# Patient Record
Sex: FEMALE | Race: WHITE | NOT HISPANIC OR LATINO | Employment: OTHER | URBAN - METROPOLITAN AREA
[De-identification: names, ages, dates, MRNs, and addresses within clinical notes are randomized per-mention and may not be internally consistent; named-entity substitution may affect disease eponyms.]

---

## 2017-01-10 ENCOUNTER — OFFICE VISIT (OUTPATIENT)
Dept: ORTHOPEDICS | Facility: CLINIC | Age: 82
End: 2017-01-10
Payer: MEDICARE

## 2017-01-10 VITALS — DIASTOLIC BLOOD PRESSURE: 64 MMHG | HEART RATE: 76 BPM | SYSTOLIC BLOOD PRESSURE: 103 MMHG

## 2017-01-10 DIAGNOSIS — M17.11 PRIMARY OSTEOARTHRITIS OF RIGHT KNEE: Primary | ICD-10-CM

## 2017-01-10 PROCEDURE — 20610 DRAIN/INJ JOINT/BURSA W/O US: CPT | Mod: S$PBB,RT,, | Performed by: PHYSICIAN ASSISTANT

## 2017-01-10 PROCEDURE — 99999 PR PBB SHADOW E&M-EST. PATIENT-LVL III: CPT | Mod: PBBFAC,,, | Performed by: PHYSICIAN ASSISTANT

## 2017-01-10 PROCEDURE — 20610 DRAIN/INJ JOINT/BURSA W/O US: CPT | Mod: PBBFAC | Performed by: PHYSICIAN ASSISTANT

## 2017-01-10 PROCEDURE — 99213 OFFICE O/P EST LOW 20 MIN: CPT | Mod: 25,S$PBB,, | Performed by: PHYSICIAN ASSISTANT

## 2017-01-10 PROCEDURE — 99213 OFFICE O/P EST LOW 20 MIN: CPT | Mod: PBBFAC | Performed by: PHYSICIAN ASSISTANT

## 2017-01-10 RX ORDER — HYALURONATE SODIUM 20 MG/2 ML
2 SYRINGE (ML) INTRAARTICULAR WEEKLY
Status: COMPLETED | OUTPATIENT
Start: 2017-01-10 | End: 2017-01-24

## 2017-01-10 RX ADMIN — Medication 20 MG: at 05:01

## 2017-01-10 NOTE — PROGRESS NOTES
Subjective:      Patient ID: Ruth Lan is a 81 y.o. female.    Chief Complaint: No chief complaint on file.    HPI  Patient returns to clinic regarding her right knee pain. She has a known h/o OA. She received cortisone injection in October that provided relief for 1.5 months. Pain is mostly posterior knee. She is having increased difficulty getting in and out of her car. She ambulates with a cane when she leaves the house.   Review of Systems   Constitution: Negative for chills, fever and night sweats.   Cardiovascular: Negative for chest pain.   Respiratory: Negative for cough and shortness of breath.    Hematologic/Lymphatic: Does not bruise/bleed easily.   Skin: Negative for color change.   Gastrointestinal: Negative for heartburn.   Genitourinary: Negative for dysuria.   Neurological: Negative for numbness and paresthesias.   Psychiatric/Behavioral: Negative for altered mental status.   Allergic/Immunologic: Negative for persistent infections.         Objective:            General    Vitals reviewed.  Constitutional: She is oriented to person, place, and time. She appears well-developed and well-nourished.   Cardiovascular: Normal rate.    Neurological: She is alert and oriented to person, place, and time.             Right Knee Exam:  ROM 5-90 degrees  +crepitus  No effusion  TTP medial and lateral joint line    X-ray: reviewed by myself. Severe DJD present.       Assessment:       Encounter Diagnosis   Name Primary?    Primary osteoarthritis of right knee Yes          Plan:       Discussed treatment options with patient. She is not ready for surgery at this time. She would like to try viscosupplementation. First euflexxa injection was given. She will f/u in 1 week for second injection.     PROCEDURE:  I have explained the risks, benefits, and alternatives of the procedure in detail.  The patient voices understanding and all questions have been answered.  The patient agrees to proceed as planned. So  after I performed a sterile prep of the skin in the normal fashion the right knee is injected using a 22 gauge needle from the anterolateral approach with 2cc of euflexxa solution. The patient is reminded that it can take 6 - 8 weeks to see all the affects of this treatment, they must complete all three injections to see all the affects and the treatment can not be repeated any earlier than six months.

## 2017-01-10 NOTE — MR AVS SNAPSHOT
[facility]  [address]  [facility] [address]  Phone: [phone]                  Ruth Lan   1/10/2017 11:00 AM   Appointment    Description:  Female : 1935   Provider:  Valeri Liz PA-C   Department:  [facility] - Orthopedics                To Do List           Future Appointments        Provider Department Dept Phone    1/10/2017 11:00 AM Valeri Liz PA-C [facility] [phone]      Goals (5 Years of Data)     None      [facility]     [facility] Care [facility] -  Assistance  Registered nurses in the [facility] On Call Center provide clinical advisement, health education, appointment booking, and other advisory services.  Call for this free service at [phone].             Medications           Message regarding Medications     Verify the changes and/or additions to your medication regime listed below are the same as discussed with your clinician today.  If any of these changes or additions are incorrect, please notify your healthcare provider.             Verify that the below list of medications is an accurate representation of the medications you are currently taking.  If none reported, the list may be blank. If incorrect, please contact your healthcare provider. Carry this list with you in case of emergency.           Current Medications     acetaminophen (TYLENOL) 325 MG tablet Take by mouth 2 (two) times daily.     amlodipine (NORVASC) 5 MG tablet Take 1 tablet (5 mg total) by mouth once daily.    aspirin 81 mg Tab Take 81 mg by mouth Daily. 1 Tablet Oral Every day    calcium-vitamin D 500-125 mg-unit tablet Take 3 tablets by mouth Daily. 3 Tablet Oral Every day    cyanocobalamin (VITAMIN B-12) 1000 MCG tablet Take 100 mcg by mouth once daily.    DOCOSAHEXANOIC ACID/EPA (FISH OIL ORAL) Daily. 2   Every day    flaxseed 1,000 mg Cap     multivitamin (ONE DAILY MULTIVITAMIN) per tablet Take 1 tablet by mouth once daily.     pravastatin (PRAVACHOL) 80 MG tablet Take 1 tablet (80 mg total) by mouth once daily.           Clinical Reference Information           Allergies as of 1/10/2017     Etodolac    Nsaids (Non-steroidal Anti-inflammatory Drug)      Immunizations Administered on Date of Encounter - 1/10/2017     None

## 2017-01-17 ENCOUNTER — OFFICE VISIT (OUTPATIENT)
Dept: ORTHOPEDICS | Facility: CLINIC | Age: 82
End: 2017-01-17
Payer: MEDICARE

## 2017-01-17 VITALS — DIASTOLIC BLOOD PRESSURE: 73 MMHG | HEART RATE: 75 BPM | SYSTOLIC BLOOD PRESSURE: 127 MMHG

## 2017-01-17 DIAGNOSIS — M17.11 PRIMARY OSTEOARTHRITIS OF RIGHT KNEE: Primary | ICD-10-CM

## 2017-01-17 PROCEDURE — 99213 OFFICE O/P EST LOW 20 MIN: CPT | Mod: PBBFAC | Performed by: PHYSICIAN ASSISTANT

## 2017-01-17 PROCEDURE — 99999 PR PBB SHADOW E&M-EST. PATIENT-LVL III: CPT | Mod: PBBFAC,,, | Performed by: PHYSICIAN ASSISTANT

## 2017-01-17 PROCEDURE — 99499 UNLISTED E&M SERVICE: CPT | Mod: S$PBB,,, | Performed by: PHYSICIAN ASSISTANT

## 2017-01-17 PROCEDURE — 20610 DRAIN/INJ JOINT/BURSA W/O US: CPT | Mod: PBBFAC | Performed by: PHYSICIAN ASSISTANT

## 2017-01-17 PROCEDURE — 20610 DRAIN/INJ JOINT/BURSA W/O US: CPT | Mod: S$PBB,,, | Performed by: PHYSICIAN ASSISTANT

## 2017-01-17 RX ADMIN — Medication 20 MG: at 11:01

## 2017-01-17 NOTE — MR AVS SNAPSHOT
[facility]  [address]  [facility] [address]  Phone: [phone]                  Ruth Lan   2017 10:45 AM   Appointment    Description:  Female : 1935   Provider:  Valeri Liz PA-C   Department:  [facility] - Orthopedics                To Do List           Future Appointments        Provider Department Dept Phone    2017 10:45 AM Valeri Liz PA-C [facility] [phone]      Goals (5 Years of Data)     None      [facility]     [facility] Care [facility] -  Assistance  Registered nurses in the [facility] On Call Center provide clinical advisement, health education, appointment booking, and other advisory services.  Call for this free service at [phone].             Medications           Message regarding Medications     Verify the changes and/or additions to your medication regime listed below are the same as discussed with your clinician today.  If any of these changes or additions are incorrect, please notify your healthcare provider.             Verify that the below list of medications is an accurate representation of the medications you are currently taking.  If none reported, the list may be blank. If incorrect, please contact your healthcare provider. Carry this list with you in case of emergency.           Current Medications     acetaminophen (TYLENOL) 325 MG tablet Take by mouth 2 (two) times daily.     amlodipine (NORVASC) 5 MG tablet Take 1 tablet (5 mg total) by mouth once daily.    aspirin 81 mg Tab Take 81 mg by mouth Daily. 1 Tablet Oral Every day    calcium-vitamin D 500-125 mg-unit tablet Take 3 tablets by mouth Daily. 3 Tablet Oral Every day    cyanocobalamin (VITAMIN B-12) 1000 MCG tablet Take 100 mcg by mouth once daily.    DOCOSAHEXANOIC ACID/EPA (FISH OIL ORAL) Daily. 2   Every day    flaxseed 1,000 mg Cap     multivitamin (ONE DAILY MULTIVITAMIN) per tablet Take 1 tablet by mouth once daily.     pravastatin (PRAVACHOL) 80 MG tablet Take 1 tablet (80 mg total) by mouth once daily.           Clinical Reference Information           Allergies as of 1/17/2017     Etodolac    Nsaids (Non-steroidal Anti-inflammatory Drug)      Immunizations Administered on Date of Encounter - 1/17/2017     None

## 2017-01-17 NOTE — LETTER
January 17, 2017      Leif Butler MD  [address]  [facility] [address]           [facility]  [address]  [facility] [address]  Phone: [phone]          Patient: Ruth Lan   MR Number: [id number]   YOB: 1935   Date of Visit: 1/17/2017       Dear Dr. Leif Butler:    Thank you for referring Ruth Lan to me for evaluation. Attached you will find relevant portions of my assessment and plan of care.    If you have questions, please do not hesitate to call me. I look forward to following Ruth Lan along with you.    Sincerely,    Valeri Liz PA-C    Enclosure  CC:  No Recipients    If you would like to receive this communication electronically, please contact [email or web address] or [phone] to request more information on [employer] Link access.    For providers and/or their staff who would like to refer a patient to [facility], please contact us through our one-stop-shop provider referral line, [facility], at [phone].    If you feel you have received this communication in error or would no longer like to receive these types of communications, please e-mail [email or web address]

## 2017-01-17 NOTE — PROGRESS NOTES
Subjective:      Patient ID: Ruth Lan is a 81 y.o. female.    Chief Complaint: Injections    HPI  Patient returns for the second of three. The patient tolerated the last injection well. The patient reports the Euflexxa injection in the right knee(s) has brought about little improvement..    Review of Systems   Constitution: Negative for chills, fever and night sweats.   Cardiovascular: Negative for chest pain.   Respiratory: Negative for cough and shortness of breath.    Hematologic/Lymphatic: Does not bruise/bleed easily.   Skin: Negative for color change.   Gastrointestinal: Negative for heartburn.   Genitourinary: Negative for dysuria.   Neurological: Negative for numbness and paresthesias.   Psychiatric/Behavioral: Negative for altered mental status.   Allergic/Immunologic: Negative for persistent infections.         Objective:            Ortho/SPM Exam  The right knee is examined, there is no evidence of swelling or effusion.  There is no evidence of erythema. Skin, pulses, sensation are intact.            Assessment:       Encounter Diagnosis   Name Primary?    Primary osteoarthritis of right knee Yes          Plan:       PROCEDURE:  I have explained the risks, benefits, and alternatives of the procedure in detail.  The patient voices understanding and all questions have been answered.  The patient agrees to proceed as planned. So after I performed a sterile prep of the skin in the normal fashion the right knee is injected using a 22 gauge needle from the anteromedial approach with 2cc of euflexxa solution. The patient is reminded that it can take 6 - 8 weeks to see all the affects of this treatment, they must complete all three injections to see all the affects and the treatment can not be repeated any earlier than six months.

## 2017-01-24 ENCOUNTER — OFFICE VISIT (OUTPATIENT)
Dept: ORTHOPEDICS | Facility: CLINIC | Age: 82
End: 2017-01-24
Payer: MEDICARE

## 2017-01-24 VITALS — HEART RATE: 70 BPM | DIASTOLIC BLOOD PRESSURE: 65 MMHG | SYSTOLIC BLOOD PRESSURE: 98 MMHG

## 2017-01-24 DIAGNOSIS — M17.11 PRIMARY OSTEOARTHRITIS OF RIGHT KNEE: Primary | ICD-10-CM

## 2017-01-24 PROCEDURE — 20610 DRAIN/INJ JOINT/BURSA W/O US: CPT | Mod: PBBFAC | Performed by: PHYSICIAN ASSISTANT

## 2017-01-24 PROCEDURE — 99213 OFFICE O/P EST LOW 20 MIN: CPT | Mod: PBBFAC,25 | Performed by: PHYSICIAN ASSISTANT

## 2017-01-24 PROCEDURE — 99499 UNLISTED E&M SERVICE: CPT | Mod: S$PBB,,, | Performed by: PHYSICIAN ASSISTANT

## 2017-01-24 PROCEDURE — 99999 PR PBB SHADOW E&M-EST. PATIENT-LVL III: CPT | Mod: PBBFAC,,, | Performed by: PHYSICIAN ASSISTANT

## 2017-01-24 PROCEDURE — 20610 DRAIN/INJ JOINT/BURSA W/O US: CPT | Mod: S$PBB,RT,, | Performed by: PHYSICIAN ASSISTANT

## 2017-01-24 RX ADMIN — Medication 20 MG: at 11:01

## 2017-01-24 NOTE — MR AVS SNAPSHOT
[facility]  [address]  [facility] [address]  Phone: [phone]                  Ruth Lan   2017 10:45 AM   Appointment    Description:  Female : 1935   Provider:  Valeri Liz PA-C   Department:  [facility] - Orthopedics                To Do List           Future Appointments        Provider Department Dept Phone    2017 10:45 AM Valeri Liz PA-C [facility] [phone]      Goals (5 Years of Data)     None      [facility]     [facility] Care [facility] -  Assistance  Registered nurses in the [facility] Call Center provide clinical advisement, health education, appointment booking, and other advisory services.  Call for this free service at [phone].             Medications           Message regarding Medications     Verify the changes and/or additions to your medication regime listed below are the same as discussed with your clinician today.  If any of these changes or additions are incorrect, please notify your healthcare provider.             Verify that the below list of medications is an accurate representation of the medications you are currently taking.  If none reported, the list may be blank. If incorrect, please contact your healthcare provider. Carry this list with you in case of emergency.           Current Medications     acetaminophen (TYLENOL) 325 MG tablet Take by mouth 2 (two) times daily.     amlodipine (NORVASC) 5 MG tablet Take 1 tablet (5 mg total) by mouth once daily.    aspirin 81 mg Tab Take 81 mg by mouth Daily. 1 Tablet Oral Every day    calcium-vitamin D 500-125 mg-unit tablet Take 3 tablets by mouth Daily. 3 Tablet Oral Every day    cyanocobalamin (VITAMIN B-12) 1000 MCG tablet Take 100 mcg by mouth once daily.    DOCOSAHEXANOIC ACID/EPA (FISH OIL ORAL) Daily. 2   Every day    flaxseed 1,000 mg Cap     multivitamin (ONE DAILY MULTIVITAMIN) per tablet Take 1 tablet by mouth once daily.     pravastatin (PRAVACHOL) 80 MG tablet Take 1 tablet (80 mg total) by mouth once daily.           Clinical Reference Information           Allergies as of 1/24/2017     Etodolac    Nsaids (Non-steroidal Anti-inflammatory Drug)      Immunizations Administered on Date of Encounter - 1/24/2017     None

## 2017-01-24 NOTE — PROGRESS NOTES
Subjective:      Patient ID: Ruth Lan is a 81 y.o. female.    Chief Complaint: Injections    HPI  Patient returns for the third of three. The patient tolerated the last injection well. The patient reports the Euflexxa injection in the right knee(s) has brought about little improvement..    Review of Systems   Constitution: Negative for chills, fever and night sweats.   Cardiovascular: Negative for chest pain.   Respiratory: Negative for cough and shortness of breath.    Hematologic/Lymphatic: Does not bruise/bleed easily.   Skin: Negative for color change.   Gastrointestinal: Negative for heartburn.   Genitourinary: Negative for dysuria.   Neurological: Negative for numbness and paresthesias.   Psychiatric/Behavioral: Negative for altered mental status.   Allergic/Immunologic: Negative for persistent infections.         Objective:            Ortho/SPM Exam  The right knee is examined, there is no evidence of swelling or effusion.  There is no evidence of erythema. Skin, pulses, sensation are intact.            Assessment:       Encounter Diagnosis   Name Primary?    Primary osteoarthritis of right knee Yes          Plan:       PROCEDURE:  I have explained the risks, benefits, and alternatives of the procedure in detail.  The patient voices understanding and all questions have been answered.  The patient agrees to proceed as planned. So after I performed a sterile prep of the skin in the normal fashion the right knee is injected using a 22 gauge needle from the anteromedial approach with 2cc of euflexxa solution. The patient is reminded that it can take 6 - 8 weeks to see all the affects of this treatment, they must complete all three injections to see all the affects and the treatment can not be repeated any earlier than six months.

## 2017-03-12 ENCOUNTER — PATIENT MESSAGE (OUTPATIENT)
Dept: HEMATOLOGY/ONCOLOGY | Facility: CLINIC | Age: 82
End: 2017-03-12

## 2017-03-24 ENCOUNTER — PATIENT MESSAGE (OUTPATIENT)
Dept: RESEARCH | Facility: HOSPITAL | Age: 82
End: 2017-03-24

## 2017-05-02 ENCOUNTER — OFFICE VISIT (OUTPATIENT)
Dept: OPTOMETRY | Facility: CLINIC | Age: 82
End: 2017-05-02
Payer: MEDICARE

## 2017-05-02 DIAGNOSIS — H04.123 DRY EYE SYNDROME, BILATERAL: ICD-10-CM

## 2017-05-02 DIAGNOSIS — I10 ESSENTIAL HYPERTENSION: Primary | ICD-10-CM

## 2017-05-02 DIAGNOSIS — Z96.1 PSEUDOPHAKIA OF BOTH EYES: ICD-10-CM

## 2017-05-02 DIAGNOSIS — Z13.5 GLAUCOMA SCREENING: ICD-10-CM

## 2017-05-02 DIAGNOSIS — H52.13 MYOPIC ASTIGMATISM OF BOTH EYES: ICD-10-CM

## 2017-05-02 DIAGNOSIS — H52.203 MYOPIC ASTIGMATISM OF BOTH EYES: ICD-10-CM

## 2017-05-02 PROCEDURE — 99211 OFF/OP EST MAY X REQ PHY/QHP: CPT | Mod: PBBFAC,PO | Performed by: OPTOMETRIST

## 2017-05-02 PROCEDURE — 99999 PR PBB SHADOW E&M-EST. PATIENT-LVL I: CPT | Mod: PBBFAC,,, | Performed by: OPTOMETRIST

## 2017-05-02 PROCEDURE — 92014 COMPRE OPH EXAM EST PT 1/>: CPT | Mod: S$PBB,,, | Performed by: OPTOMETRIST

## 2017-05-02 PROCEDURE — 92015 DETERMINE REFRACTIVE STATE: CPT | Mod: ,,, | Performed by: OPTOMETRIST

## 2017-05-02 NOTE — PROGRESS NOTES
HPI     DSL- 4/26/17     Pt states no VA change.   Pt denies eye allergies.   Pt occas has floaters. Denies flashes.     Eyemeds  NO gtts       Last edited by Leif De Oliveira, OD on 5/2/2017 11:02 AM.         Assessment /Plan     For exam results, see Encounter Report.    Essential hypertension  No retinopathy noted today.  Continued control with primary care physician and annual comprehensive eye exam.    Glaucoma screening  -Monitor with annual eye exam and IOP check    Pseudophakia of both eyes  -Clear, centered    Myopic astigmatism of both eyes  Eyeglass Final Rx     Eyeglass Final Rx      Sphere Cylinder Axis Add   Right -2.25 +1.25 010 +2.50   Left -1.50 +2.00 170 +2.50       Expiration Date:  5/3/2018                Dry eye syndrome, bilateral  -Systane PRN        RTC 1 yr

## 2017-05-12 ENCOUNTER — OFFICE VISIT (OUTPATIENT)
Dept: HEMATOLOGY/ONCOLOGY | Facility: CLINIC | Age: 82
End: 2017-05-12
Payer: MEDICARE

## 2017-05-12 VITALS
HEART RATE: 85 BPM | SYSTOLIC BLOOD PRESSURE: 127 MMHG | RESPIRATION RATE: 16 BRPM | DIASTOLIC BLOOD PRESSURE: 64 MMHG | TEMPERATURE: 97 F

## 2017-05-12 DIAGNOSIS — C50.311 BREAST CANCER OF LOWER-INNER QUADRANT OF RIGHT FEMALE BREAST: Primary | ICD-10-CM

## 2017-05-12 PROCEDURE — 99213 OFFICE O/P EST LOW 20 MIN: CPT | Mod: S$PBB,,, | Performed by: INTERNAL MEDICINE

## 2017-05-12 PROCEDURE — 99999 PR PBB SHADOW E&M-EST. PATIENT-LVL III: CPT | Mod: PBBFAC,,, | Performed by: INTERNAL MEDICINE

## 2017-05-12 PROCEDURE — 99213 OFFICE O/P EST LOW 20 MIN: CPT | Mod: PBBFAC | Performed by: INTERNAL MEDICINE

## 2017-05-12 NOTE — PROGRESS NOTES
Subjective:       Patient ID: Ruth Lan is a 82 y.o. female.    Chief Complaint: No chief complaint on file.    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  She was intolerant of aromatase inhibitor therapy.    She continues to do well with no new issues.  Her major problem has been chronic right knee pain.          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 The tumor was strongly ER/NC positive.  Underwent adjuvant XRT 3/21/13-5/14/13.  Started on letrozole 5/15/13 and then changed to anastrozole, then to Aromasin which was stopped in April 2016.  Review of Systems   Constitutional: Negative for activity change, appetite change, fever and unexpected weight change.   Respiratory: Negative for cough and shortness of breath.    Cardiovascular: Negative for chest pain.   Gastrointestinal: Negative for abdominal pain, diarrhea, nausea and vomiting.   Musculoskeletal: Positive for arthralgias (right knee). Negative for back pain.   Neurological: Negative for headaches.   Psychiatric/Behavioral: Positive for sleep disturbance. Negative for dysphoric mood.       Objective:      Physical Exam   Constitutional: She is oriented to person, place, and time. She appears well-developed and well-nourished. No distress.   HENT:   Mouth/Throat: No oropharyngeal exudate.   Dentures in place   Cardiovascular: Normal rate and regular rhythm.    Murmur heard.  Pulmonary/Chest: Effort normal and breath sounds normal. She has no wheezes. She has no rales. Right breast exhibits skin change.  Right breast exhibits no mass and no nipple discharge. Left breast exhibits no mass, no nipple discharge and no skin change.       Abdominal: Soft. She exhibits no mass. There is no tenderness.   Lymphadenopathy:     She has no cervical adenopathy.   Neurological: She is alert and oriented to person, place, and time.   Psychiatric: Her behavior is normal. Thought content normal.   Slightly depressed affect       Assessment:       1. Breast cancer of lower-inner quadrant of right female breast        Plan:       I'll plan to see her again in 5 months.

## 2017-05-24 DIAGNOSIS — I34.0 MITRAL VALVE INSUFFICIENCY, UNSPECIFIED ETIOLOGY: Primary | ICD-10-CM

## 2017-06-16 ENCOUNTER — OFFICE VISIT (OUTPATIENT)
Dept: CARDIOLOGY | Facility: CLINIC | Age: 82
End: 2017-06-16
Payer: MEDICARE

## 2017-06-16 ENCOUNTER — HOSPITAL ENCOUNTER (OUTPATIENT)
Dept: CARDIOLOGY | Facility: CLINIC | Age: 82
Discharge: HOME | End: 2017-06-16
Payer: MEDICARE

## 2017-06-16 VITALS
DIASTOLIC BLOOD PRESSURE: 62 MMHG | BODY MASS INDEX: 31.24 KG/M2 | WEIGHT: 183 LBS | HEART RATE: 72 BPM | HEIGHT: 64 IN | SYSTOLIC BLOOD PRESSURE: 123 MMHG

## 2017-06-16 DIAGNOSIS — E78.00 PURE HYPERCHOLESTEROLEMIA: ICD-10-CM

## 2017-06-16 DIAGNOSIS — I10 ESSENTIAL HYPERTENSION: ICD-10-CM

## 2017-06-16 DIAGNOSIS — I34.0 SEVERE MITRAL REGURGITATION: ICD-10-CM

## 2017-06-16 DIAGNOSIS — Z85.3 HISTORY OF BREAST CANCER: ICD-10-CM

## 2017-06-16 DIAGNOSIS — I34.0 MITRAL VALVE INSUFFICIENCY, UNSPECIFIED ETIOLOGY: ICD-10-CM

## 2017-06-16 DIAGNOSIS — Z95.2 S/P AORTIC VALVE REPLACEMENT: Primary | Chronic | ICD-10-CM

## 2017-06-16 LAB
ESTIMATED PA SYSTOLIC PRESSURE: 44.89
MITRAL VALVE MOBILITY: ABNORMAL
MITRAL VALVE REGURGITATION: ABNORMAL
RETIRED EF AND QEF - SEE NOTES: 60 (ref 55–65)
TRICUSPID VALVE REGURGITATION: ABNORMAL

## 2017-06-16 PROCEDURE — 1157F ADVNC CARE PLAN IN RCRD: CPT | Mod: ,,, | Performed by: NURSE PRACTITIONER

## 2017-06-16 PROCEDURE — 99213 OFFICE O/P EST LOW 20 MIN: CPT | Mod: PBBFAC | Performed by: NURSE PRACTITIONER

## 2017-06-16 PROCEDURE — 93306 TTE W/DOPPLER COMPLETE: CPT | Mod: 26,S$PBB,, | Performed by: INTERNAL MEDICINE

## 2017-06-16 PROCEDURE — 1125F AMNT PAIN NOTED PAIN PRSNT: CPT | Mod: ,,, | Performed by: NURSE PRACTITIONER

## 2017-06-16 PROCEDURE — 99999 PR PBB SHADOW E&M-EST. PATIENT-LVL III: CPT | Mod: PBBFAC,,, | Performed by: NURSE PRACTITIONER

## 2017-06-16 PROCEDURE — 99214 OFFICE O/P EST MOD 30 MIN: CPT | Mod: S$PBB,,, | Performed by: NURSE PRACTITIONER

## 2017-06-16 PROCEDURE — 1159F MED LIST DOCD IN RCRD: CPT | Mod: ,,, | Performed by: NURSE PRACTITIONER

## 2017-06-16 NOTE — PROGRESS NOTES
Ms. Lan  was last seen in [facility] 3/31/2016.      Subjective:   Patient ID:  Ruth Lan is a 82 y.o. female who presents for follow-up of Severe mitral regurgitation  .   HPI:    Ms. Lan is an 81yo female with a PMHx of bioprosthetic TAVR in 2003, breast CA (s/p x2 lumpectomy and radiation in 2012), HTN, HLD, and MR here for annual follow-up. Today she reports no changes in symptoms. Ms. Lan denies chest pain with exertion or at rest, palpitations, SOB, VALLE, dizziness, syncope, claudication, PND, or orthopnea. She has mild dependent edema which resolves with elevation.  She is currently taking ASA 81mg. Pravastatin 80mg (.6 on 12/27/2016), amlodipine 5mg. She does not routinely exercise but the ambulates around the house and experiences no limitations in activity tolerance. She is significantly limited by right knee pain and arthritis.     Recent Cardiac Tests:    2D Echo (6/16/2017):  CONCLUSIONS     1 - Normal left ventricular systolic function (EF 60-65%).     2 - Normal right ventricular systolic function .     3 - Pulmonary hypertension. The estimated PA systolic pressure is greater than 45 mmHg.     4 - S/P transcatheter AVR, effective prosthetic valve area corrected for BSA is 0.63 cm2.     5 - The mitral valve is markedly sclerotic with mildly restricted leaflet mobility due to severe MAC, no significant stenosis.      6 - Moderate mitral regurgitation.     7 - Mild tricuspid regurgitation.     8 - Severe left atrial enlargement.     Current Outpatient Prescriptions   Medication Sig    acetaminophen (TYLENOL) 325 MG tablet Take by mouth 2 (two) times daily.     amlodipine (NORVASC) 5 MG tablet Take 1 tablet (5 mg total) by mouth once daily.    aspirin 81 mg Tab Take 81 mg by mouth Daily. 1 Tablet Oral Every day    calcium-vitamin D 500-125 mg-unit tablet Take 3 tablets by mouth Daily. 3 Tablet Oral Every day    cyanocobalamin (VITAMIN B-12) 1000 MCG tablet Take 100  "mcg by mouth once daily.    DOCOSAHEXANOIC ACID/EPA (FISH OIL ORAL) Daily. 2   Every day    flaxseed 1,000 mg Cap     multivitamin (ONE DAILY MULTIVITAMIN) per tablet Take 1 tablet by mouth once daily.    pravastatin (PRAVACHOL) 80 MG tablet Take 1 tablet (80 mg total) by mouth once daily.     No current facility-administered medications for this visit.        Review of Systems   Constitution: Negative for malaise/fatigue.   HENT: Negative for headaches.    Eyes: Negative for blurred vision.   Cardiovascular: Negative for chest pain, claudication, dyspnea on exertion, irregular heartbeat, leg swelling, orthopnea, palpitations, paroxysmal nocturnal dyspnea and syncope.   Respiratory: Negative for shortness of breath.    Hematologic/Lymphatic: Negative for bleeding problem.   Skin: Negative for rash.   Musculoskeletal: Positive for arthritis and joint pain (right knee). Negative for myalgias.   Gastrointestinal: Negative for abdominal pain, constipation, diarrhea and nausea.   Genitourinary: Negative for hematuria.   Neurological: Negative for loss of balance and numbness.   Psychiatric/Behavioral: Negative for altered mental status. The patient has insomnia.    Allergic/Immunologic: Negative for persistent infections.     Objective:     Right Arm BP - Sittin/63 (17 1316)  Left Arm BP - Sittin/62 (17 1316)    /62 (BP Location: Left arm, Patient Position: Sitting, BP Method: Automatic)   Pulse 72   Ht 5' 4" (1.626 m)   Wt 83 kg (182 lb 15.7 oz)   BMI 31.41 kg/m²     Physical Exam   Constitutional: She is oriented to person, place, and time. She appears well-developed and well-nourished.   HENT:   Head: Normocephalic.   Nose: Nose normal.   Eyes: Pupils are equal, round, and reactive to light.   Neck: No JVD present. Carotid bruit is not present.   Cardiovascular: Normal rate, regular rhythm, S1 normal and S2 normal.  Exam reveals no gallop.    Murmur heard.   Harsh midsystolic " murmur is present with a grade of 3/6  at the upper right sternal border radiating to the neck  High-pitched musical holosystolic murmur of grade 3/6 is also present at the back, and apex.  Pulses:       Carotid pulses are 2+ on the right side, and 2+ on the left side.       Radial pulses are 2+ on the right side, and 2+ on the left side.        Dorsalis pedis pulses are 2+ on the right side, and 2+ on the left side.   Pulmonary/Chest: Breath sounds normal. No respiratory distress.   Abdominal: Soft. Bowel sounds are normal. She exhibits no distension. There is no tenderness.   Musculoskeletal: Normal range of motion. She exhibits no edema.   Neurological: She is alert and oriented to person, place, and time.   Skin: Skin is warm and dry. No erythema.   Psychiatric: She has a normal mood and affect. Her speech is normal and behavior is normal.   Nursing note and vitals reviewed.    Lab Results   Component Value Date     12/27/2016    K 4.7 12/27/2016    MG 2.0 09/18/2013     12/27/2016    CO2 28 12/27/2016    BUN 31 (H) 12/27/2016    CREATININE 1.0 12/27/2016     (H) 12/27/2016    HGBA1C 5.8 10/10/2013    AST 67 (H) 12/27/2016    ALT 60 (H) 12/27/2016    ALBUMIN 3.6 12/27/2016    PROT 7.6 12/27/2016    BILITOT 0.4 12/27/2016    WBC 6.07 12/27/2016    HGB 13.3 12/27/2016    HCT 40.7 12/27/2016    MCV 98 12/27/2016     12/27/2016    TSH 1.853 09/04/2013    CHOL 201 (H) 12/27/2016    HDL 59 12/27/2016    LDLCALC 119.6 12/27/2016    TRIG 112 12/27/2016         Test(s) Reviewed  I have reviewed the following in detail:  [] Stress test   [] Angiography   [x] Echocardiogram   [x] Labs   [] Other:         Assessment:         1. S/P aortic valve replacement. S/P AVR in 2003. Effective prosthetic valve area corrected for BSA is 0.63 cm2. Patient has no angina, SOB or other signs of ischemia. No unstable arrhythmia. No symptoms of heart failure. Advised patient that she needs antibiotic prophylaxis  prior to dental procedures.       2. Severe mitral regurgitation. Moderate per echo today. Patient asymptomatic. Will follow-up in one year. Advised patient that she should keep her SBP under 130.     3. Essential hypertension. Controlled.      4. Pure hypercholesterolemia. On pravastatin 80mg. . No known CAD.      5. History of breast cancer.     Plan:     Ruth was seen today for severe mitral regurgitation.    Diagnoses and all orders for this visit:    S/P aortic valve replacement  -     2D echo with color flow doppler; Future; Expected date: 06/16/2018    Severe mitral regurgitation  -     2D echo with color flow doppler; Future; Expected date: 06/16/2018    Essential hypertension    Pure hypercholesterolemia    History of breast cancer      Continue current medications.  Echo prior to visit in one year.    Return in about 1 year (around 6/16/2018).    ------------------------------------------------------------------    TEE Andrade, NP-C  Consult Cardiology

## 2017-06-26 ENCOUNTER — OFFICE VISIT (OUTPATIENT)
Dept: FAMILY MEDICINE | Facility: CLINIC | Age: 82
End: 2017-06-26
Payer: MEDICARE

## 2017-06-26 ENCOUNTER — LAB VISIT (OUTPATIENT)
Dept: LAB | Facility: HOSPITAL | Age: 82
End: 2017-06-26
Attending: FAMILY MEDICINE
Payer: MEDICARE

## 2017-06-26 VITALS
RESPIRATION RATE: 16 BRPM | DIASTOLIC BLOOD PRESSURE: 86 MMHG | HEART RATE: 88 BPM | OXYGEN SATURATION: 95 % | SYSTOLIC BLOOD PRESSURE: 120 MMHG | TEMPERATURE: 98 F | HEIGHT: 64 IN | WEIGHT: 179 LBS | BODY MASS INDEX: 30.56 KG/M2

## 2017-06-26 DIAGNOSIS — F51.04 PSYCHOPHYSIOLOGICAL INSOMNIA: ICD-10-CM

## 2017-06-26 DIAGNOSIS — E78.00 PURE HYPERCHOLESTEROLEMIA: ICD-10-CM

## 2017-06-26 DIAGNOSIS — N18.30 STAGE 3 CHRONIC KIDNEY DISEASE: ICD-10-CM

## 2017-06-26 DIAGNOSIS — I10 ESSENTIAL HYPERTENSION: Primary | ICD-10-CM

## 2017-06-26 DIAGNOSIS — M17.11 PRIMARY OSTEOARTHRITIS OF RIGHT KNEE: ICD-10-CM

## 2017-06-26 LAB
ANION GAP SERPL CALC-SCNC: 8 MMOL/L
BUN SERPL-MCNC: 25 MG/DL
CALCIUM SERPL-MCNC: 10.4 MG/DL
CHLORIDE SERPL-SCNC: 102 MMOL/L
CHOLEST/HDLC SERPL: 3.4 {RATIO}
CO2 SERPL-SCNC: 29 MMOL/L
CREAT SERPL-MCNC: 1.1 MG/DL
EST. GFR  (AFRICAN AMERICAN): 54 ML/MIN/1.73 M^2
EST. GFR  (NON AFRICAN AMERICAN): 46.9 ML/MIN/1.73 M^2
GLUCOSE SERPL-MCNC: 123 MG/DL
HDL/CHOLESTEROL RATIO: 29.1 %
HDLC SERPL-MCNC: 165 MG/DL
HDLC SERPL-MCNC: 48 MG/DL
LDLC SERPL CALC-MCNC: 95.2 MG/DL
NONHDLC SERPL-MCNC: 117 MG/DL
POTASSIUM SERPL-SCNC: 4.3 MMOL/L
SODIUM SERPL-SCNC: 139 MMOL/L
TRIGL SERPL-MCNC: 109 MG/DL

## 2017-06-26 PROCEDURE — 99999 PR PBB SHADOW E&M-EST. PATIENT-LVL III: CPT | Mod: PBBFAC,,, | Performed by: FAMILY MEDICINE

## 2017-06-26 PROCEDURE — 99214 OFFICE O/P EST MOD 30 MIN: CPT | Mod: S$PBB,,, | Performed by: FAMILY MEDICINE

## 2017-06-26 PROCEDURE — 80061 LIPID PANEL: CPT

## 2017-06-26 PROCEDURE — 36415 COLL VENOUS BLD VENIPUNCTURE: CPT | Mod: PO

## 2017-06-26 PROCEDURE — 1159F MED LIST DOCD IN RCRD: CPT | Mod: ,,, | Performed by: FAMILY MEDICINE

## 2017-06-26 PROCEDURE — 80048 BASIC METABOLIC PNL TOTAL CA: CPT

## 2017-06-26 PROCEDURE — 1126F AMNT PAIN NOTED NONE PRSNT: CPT | Mod: ,,, | Performed by: FAMILY MEDICINE

## 2017-06-26 PROCEDURE — 1157F ADVNC CARE PLAN IN RCRD: CPT | Mod: ,,, | Performed by: FAMILY MEDICINE

## 2017-06-26 RX ORDER — TRAZODONE HYDROCHLORIDE 50 MG/1
50 TABLET ORAL NIGHTLY
Qty: 90 TABLET | Refills: 0 | Status: SHIPPED | OUTPATIENT
Start: 2017-06-26 | End: 2017-11-22

## 2017-06-26 NOTE — PROGRESS NOTES
Chief Complaint   Patient presents with    Follow-up     6 month from LOV, 12/27/2016       Ruth Lan is a 82 y.o. female who presents per the Chief Complaint.  Pt is known to me and was last seen by me on 12/27/2016.  All known chronic medical issues have been documented.       Hypertension   This is a chronic problem. The current episode started more than 1 year ago. The problem has been waxing and waning since onset. The problem is uncontrolled. Associated symptoms include anxiety. Pertinent negatives include no blurred vision, chest pain, headaches, malaise/fatigue, neck pain, orthopnea, palpitations, peripheral edema, PND, shortness of breath or sweats. There are no associated agents to hypertension. Risk factors for coronary artery disease include dyslipidemia, obesity and post-menopausal state. Past treatments include calcium channel blockers. The current treatment provides moderate improvement. There is no history of angina, kidney disease, CAD/MI, CVA, heart failure, left ventricular hypertrophy, PVD, renovascular disease, retinopathy or a thyroid problem. There is no history of chronic renal disease, coarctation of the aorta, hyperaldosteronism, hypercortisolism, hyperparathyroidism, a hypertension causing med, pheochromocytoma or sleep apnea.   Hyperlipidemia   This is a chronic problem. The current episode started more than 1 year ago. The problem is uncontrolled. Recent lipid tests were reviewed and are high. Exacerbating diseases include obesity. She has no history of chronic renal disease, diabetes, hypothyroidism, liver disease or nephrotic syndrome. There are no known factors aggravating her hyperlipidemia. Pertinent negatives include no chest pain, myalgias or shortness of breath. Current antihyperlipidemic treatment includes statins. The current treatment provides moderate improvement of lipids. Risk factors for coronary artery disease include dyslipidemia, hypertension, obesity and  post-menopausal.   Arthritis   Presents for follow-up visit. She complains of pain and joint swelling. She reports no stiffness or joint warmth. The symptoms have been stable. Affected locations include the right knee. Her pain is at a severity of 4/10. Associated symptoms include pain at night and pain while resting. Pertinent negatives include no diarrhea, dry eyes, dry mouth, dysuria, fatigue, fever, rash, Raynaud's syndrome, uveitis or weight loss.        ROS  Review of Systems   Constitutional: Negative.  Negative for activity change, appetite change, chills, diaphoresis, fatigue, fever, malaise/fatigue, unexpected weight change and weight loss.   HENT: Negative.  Negative for congestion, ear pain, hearing loss, nosebleeds, postnasal drip, rhinorrhea, sinus pressure, sneezing, sore throat and trouble swallowing.    Eyes: Negative for blurred vision, pain and visual disturbance.   Respiratory: Negative for cough, choking and shortness of breath.    Cardiovascular: Negative for chest pain, palpitations, orthopnea, leg swelling and PND.   Gastrointestinal: Negative for abdominal pain, constipation, diarrhea, nausea and vomiting.   Genitourinary: Positive for enuresis and frequency. Negative for decreased urine volume, difficulty urinating, dyspareunia, dysuria, flank pain, genital sores, hematuria, menstrual problem, pelvic pain, urgency, vaginal bleeding, vaginal discharge and vaginal pain.        Urinary incontinence   Musculoskeletal: Positive for arthralgias (right knee), arthritis and joint swelling. Negative for back pain, gait problem, myalgias, neck pain and stiffness.   Skin: Negative.  Negative for rash.   Allergic/Immunologic: Negative for environmental allergies and food allergies.   Neurological: Negative.  Negative for dizziness, seizures, syncope, weakness, light-headedness and headaches.   Psychiatric/Behavioral: Positive for sleep disturbance. Negative for confusion, decreased concentration and  "dysphoric mood. The patient is not nervous/anxious.        Physical Exam  Vitals:    06/26/17 0919   BP: 120/86   Pulse: 88   Resp: 16   Temp: 98.3 °F (36.8 °C)    Body mass index is 30.73 kg/m².  Weight: 81.2 kg (179 lb 0.2 oz)   Height: 5' 4" (162.6 cm)     Physical Exam   Constitutional: She is oriented to person, place, and time. She appears well-developed and well-nourished. She is active and cooperative.  Non-toxic appearance. She does not appear ill.   HENT:   Head: Normocephalic and atraumatic.   Right Ear: Hearing, tympanic membrane, external ear and ear canal normal.   Left Ear: Hearing, tympanic membrane, external ear and ear canal normal.   Nose: Nose normal. No rhinorrhea or nasal deformity.   Mouth/Throat: Uvula is midline, oropharynx is clear and moist and mucous membranes are normal.   Eyes: Conjunctivae, EOM and lids are normal. Pupils are equal, round, and reactive to light. No scleral icterus.   Neck: Normal range of motion. Neck supple. No thyroid mass and no thyromegaly present.   Cardiovascular: Normal rate, regular rhythm, S1 normal and S2 normal.  Exam reveals no gallop and no friction rub.    Murmur heard.   Systolic murmur is present with a grade of 5/6   Mechanical murmur noted   Pulmonary/Chest: Effort normal and breath sounds normal. She has no wheezes. She has no rales.   Abdominal: Soft. She exhibits no mass. There is no tenderness. There is no rebound and no guarding.   Musculoskeletal:        Right knee: She exhibits decreased range of motion, swelling and bony tenderness. She exhibits no effusion, no ecchymosis, no deformity, no laceration, no erythema, normal alignment, no LCL laxity, normal patellar mobility and normal meniscus. Tenderness found. Medial joint line and lateral joint line tenderness noted. No MCL, no LCL and no patellar tendon tenderness noted.   Lymphadenopathy:        Head (right side): No submental, no submandibular and no tonsillar adenopathy present.        " Head (left side): No submental, no submandibular and no tonsillar adenopathy present.     She has no cervical adenopathy.   Neurological: She is alert and oriented to person, place, and time.   Skin: Skin is warm, dry and intact. She is not diaphoretic. No pallor.   Psychiatric: She has a normal mood and affect. Her speech is normal and behavior is normal. Judgment and thought content normal. Cognition and memory are normal.       Assessment & Plan    1. Essential hypertension  Patient was counseled and encouraged to maintain a low sodium diet, as well as increasing physical activity.  Recommend random BP checks at home on a regular basis.  Repeat BP at end of visit was not necessary. Will continue medication at this time, and follow up in 6 months, or sooner if blood pressure is not well controlled.       2. Pure hypercholesterolemia  We discussed ways to manage cholesterol levels, including increasing whole grain foods and decreasing fried and fatty foods.  I also recommended OTC Omega 3 and Omega 6 supplements to improve overall cholesterol levels.  Will continue current therapy at this visit; patient is due for screening blood test at this time.   - Lipid panel; Future    3. Primary osteoarthritis of right knee  Continue acetaminophen 8 hr as needed; continue physical activity as tolerated.    4. Stage 3 chronic kidney disease  Stable; encouraged patient to avoid NSAID medications and to increase water and clear liquid hydration.  Will continue to monitor for decline and refer as necessary.  Screening test will be ordered and once results available patient will be notified of results and managed accordingly.    - Basic metabolic panel; Future    5. Psychophysiological insomnia  Patient was advised to practice good sleep hygiene, which includes decreasing stimulation at least one hour before bedtime.  This includes no television, no stimulation reading or games.  Natural sleep aids were recommended as well  including lavender and chamomile.  The problem of recurrent insomnia is discussed. Avoidance of caffeine sources is strongly encouraged. Sleep hygiene issues are reviewed.    - trazodone (DESYREL) 50 MG tablet; Take 1 tablet (50 mg total) by mouth every evening.  Dispense: 90 tablet; Refill: 0      Follow up documented    ACTIVE MEDICAL ISSUES:  Documented in Problem List    PAST MEDICAL HISTORY  Documented    PAST SURGICAL HISTORY:  Documented    SOCIAL HISTORY:  Documented    FAMILY HISTORY:  Documented    ALLERGIES AND MEDICATIONS: updated and reviewed.  Documented    Health Maintenance       Date Due Completion Date    TETANUS VACCINE 03/07/1953 ---    Zoster Vaccine 03/07/1995 ---    Pneumococcal (65+) (1 of 2 - PCV13) 03/07/2000 ---    Influenza Vaccine 08/01/2017 11/18/2014 (Declined)    Override on 11/18/2014: Declined (refused)    Override on 8/19/2014: Declined (refused)    DEXA SCAN 02/02/2019 2/2/2016    Lipid Panel 12/27/2021 12/27/2016

## 2017-09-19 ENCOUNTER — TELEPHONE (OUTPATIENT)
Dept: HEMATOLOGY/ONCOLOGY | Facility: CLINIC | Age: 82
End: 2017-09-19

## 2017-09-19 NOTE — TELEPHONE ENCOUNTER
----- Message from Saul Adamson sent at 9/19/2017 10:57 AM CDT -----  Contact: Pt daughter Ela      ----- Message -----  From: Denise Justin  Sent: 9/19/2017  10:17 AM  To: Saul Mahmood calling regarding pt appt on 10/10 with . She wants to move it to another day.    Ela call back number [phone]

## 2017-10-11 ENCOUNTER — OFFICE VISIT (OUTPATIENT)
Dept: HEMATOLOGY/ONCOLOGY | Facility: CLINIC | Age: 82
End: 2017-10-11
Payer: MEDICARE

## 2017-10-11 ENCOUNTER — HOSPITAL ENCOUNTER (OUTPATIENT)
Dept: RADIOLOGY | Facility: HOSPITAL | Age: 82
Discharge: HOME | End: 2017-10-11
Attending: PHYSICIAN ASSISTANT
Payer: MEDICARE

## 2017-10-11 VITALS
HEART RATE: 83 BPM | RESPIRATION RATE: 18 BRPM | DIASTOLIC BLOOD PRESSURE: 75 MMHG | BODY MASS INDEX: 28.99 KG/M2 | TEMPERATURE: 98 F | SYSTOLIC BLOOD PRESSURE: 154 MMHG | OXYGEN SATURATION: 99 % | HEIGHT: 65 IN | WEIGHT: 174 LBS

## 2017-10-11 DIAGNOSIS — Z17.0 MALIGNANT NEOPLASM OF LOWER-INNER QUADRANT OF RIGHT BREAST OF FEMALE, ESTROGEN RECEPTOR POSITIVE: ICD-10-CM

## 2017-10-11 DIAGNOSIS — C50.311 MALIGNANT NEOPLASM OF LOWER-INNER QUADRANT OF RIGHT BREAST OF FEMALE, ESTROGEN RECEPTOR POSITIVE: ICD-10-CM

## 2017-10-11 DIAGNOSIS — C50.311 MALIGNANT NEOPLASM OF LOWER-INNER QUADRANT OF RIGHT BREAST OF FEMALE, ESTROGEN RECEPTOR POSITIVE: Primary | ICD-10-CM

## 2017-10-11 DIAGNOSIS — R63.4 WEIGHT LOSS: ICD-10-CM

## 2017-10-11 DIAGNOSIS — Z17.0 MALIGNANT NEOPLASM OF LOWER-INNER QUADRANT OF RIGHT BREAST OF FEMALE, ESTROGEN RECEPTOR POSITIVE: Primary | ICD-10-CM

## 2017-10-11 PROCEDURE — 99213 OFFICE O/P EST LOW 20 MIN: CPT | Mod: PBBFAC,25 | Performed by: PHYSICIAN ASSISTANT

## 2017-10-11 PROCEDURE — 99213 OFFICE O/P EST LOW 20 MIN: CPT | Mod: S$PBB,,, | Performed by: PHYSICIAN ASSISTANT

## 2017-10-11 PROCEDURE — 77066 DX MAMMO INCL CAD BI: CPT | Mod: 26,,, | Performed by: RADIOLOGY

## 2017-10-11 PROCEDURE — 76642 ULTRASOUND BREAST LIMITED: CPT | Mod: 26,LT,, | Performed by: RADIOLOGY

## 2017-10-11 PROCEDURE — 77066 DX MAMMO INCL CAD BI: CPT | Mod: TC

## 2017-10-11 PROCEDURE — 76642 ULTRASOUND BREAST LIMITED: CPT | Mod: TC,LT

## 2017-10-11 PROCEDURE — 77062 BREAST TOMOSYNTHESIS BI: CPT | Mod: 26,,, | Performed by: RADIOLOGY

## 2017-10-11 PROCEDURE — 99999 PR PBB SHADOW E&M-EST. PATIENT-LVL III: CPT | Mod: PBBFAC,,, | Performed by: PHYSICIAN ASSISTANT

## 2017-10-11 NOTE — Clinical Note
Hi- Saw this patient yesterday for breast cancer f/u and she will be moving to annual visit. We did discuss her weight loss (20 lbs over the last year) and I asked her to try Boost/high protein snacks and let one of us know if that continued. Just wanted to give you a heads up. homar

## 2017-10-11 NOTE — PROGRESS NOTES
Subjective:       Patient ID: Ruth Lan is a 82 y.o. female.    Chief Complaint: No chief complaint on file.    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  She was intolerant of aromatase inhibitor therapy.     She continues to do well with no new issues.  She has some arthritic complaints and ambulates with a cane.  No fever, chills, nausea, vomiting, shortness of breath.  She has had 20 lb weight loss over the last year which she attributes to decreasing appetite.  Has spoken with primary care regarding this issue, trying to drink an additional Boost daily.  Lives alone, doesn't often cook for one. Notes decreased physical activity as well ; however continues to volunteer twice weekly at the [employer] and tries to remain active but limited by knee pain.      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 The tumor was strongly ER/ID positive.  Underwent adjuvant XRT 3/21/13-5/14/13.  Started on letrozole 5/15/13 and then changed to anastrozole, then to Aromasin which was stopped in April 2016.      Review of Systems   Constitutional: Positive for unexpected weight change (see HPI). Negative for activity change, appetite change, chills, diaphoresis, fatigue and fever.   HENT: Negative for congestion, rhinorrhea, sore throat and trouble swallowing.    Eyes: Negative for visual disturbance.   Respiratory: Negative for cough, chest tightness and shortness of breath.    Cardiovascular: Negative.    Gastrointestinal: Negative.    Genitourinary: Negative for dysuria and hematuria.    Musculoskeletal: Positive for arthralgias (knees).   Skin: Negative for pallor and rash.   Neurological: Negative for dizziness, syncope, weakness and light-headedness.   Psychiatric/Behavioral: Negative.        Objective:      Physical Exam   Constitutional: She is oriented to person, place, and time. She appears well-developed and well-nourished. No distress.   Presents with daughter  ECOG 0   HENT:   Head: Normocephalic and atraumatic.   Eyes: Conjunctivae are normal. No scleral icterus.   Neck: Normal range of motion. Neck supple.   Cardiovascular: Normal rate and regular rhythm.    Pulmonary/Chest: Effort normal and breath sounds normal. No respiratory distress.   Right breast with well healed lumpectomy incision and hyperpigmentation over central portion of breast from radiation. Left breast without mass or nodules. There are several shoddy small nodularities in the left axilla, proximal to the chest wall. No skin changes. No axillary or supraclavicular adenopathy.    Abdominal: Soft. Bowel sounds are normal. She exhibits no distension and no mass. There is no tenderness.   Musculoskeletal: Normal range of motion.   No spinal or paraspinal tenderness to palpation     Neurological: She is alert and oriented to person, place, and time. No cranial nerve deficit.   Skin: Skin is warm and dry.   Psychiatric: She has a normal mood and affect. Her behavior is normal. Judgment and thought content normal.   Vitals reviewed.      Assessment:       1. Malignant neoplasm of lower-inner quadrant of right breast of female, estrogen receptor positive    2. Weight loss        Plan:       Patient sent for US today for questionable left axillary findings, US and mammogram were normal with recommended repeat in 1 year.  She is approaching her 5 year george, will plan to see her in 1 year with annual mammogram.  We discussed weight loss and she will continue to supplement diet with Boost and high protein shakes. Will alert PCP  and patient knows to contact office if weight loss continues.

## 2017-11-22 ENCOUNTER — OFFICE VISIT (OUTPATIENT)
Dept: FAMILY MEDICINE | Facility: CLINIC | Age: 82
End: 2017-11-22
Payer: MEDICARE

## 2017-11-22 ENCOUNTER — LAB VISIT (OUTPATIENT)
Dept: LAB | Facility: HOSPITAL | Age: 82
End: 2017-11-22
Attending: FAMILY MEDICINE
Payer: MEDICARE

## 2017-11-22 VITALS
DIASTOLIC BLOOD PRESSURE: 76 MMHG | OXYGEN SATURATION: 95 % | TEMPERATURE: 98 F | SYSTOLIC BLOOD PRESSURE: 130 MMHG | RESPIRATION RATE: 17 BRPM | HEIGHT: 65 IN | HEART RATE: 80 BPM

## 2017-11-22 DIAGNOSIS — E78.00 PURE HYPERCHOLESTEROLEMIA: ICD-10-CM

## 2017-11-22 DIAGNOSIS — N39.42 URINARY INCONTINENCE WITHOUT SENSORY AWARENESS: ICD-10-CM

## 2017-11-22 DIAGNOSIS — R53.83 FATIGUE, UNSPECIFIED TYPE: ICD-10-CM

## 2017-11-22 DIAGNOSIS — I10 ESSENTIAL HYPERTENSION: ICD-10-CM

## 2017-11-22 DIAGNOSIS — M17.11 PRIMARY OSTEOARTHRITIS OF RIGHT KNEE: ICD-10-CM

## 2017-11-22 DIAGNOSIS — I10 ESSENTIAL HYPERTENSION: Primary | ICD-10-CM

## 2017-11-22 LAB
ALBUMIN SERPL BCP-MCNC: 3.5 G/DL
ALP SERPL-CCNC: 127 U/L
ALT SERPL W/O P-5'-P-CCNC: 18 U/L
ANION GAP SERPL CALC-SCNC: 9 MMOL/L
AST SERPL-CCNC: 25 U/L
BASOPHILS # BLD AUTO: 0.04 K/UL
BASOPHILS NFR BLD: 0.7 %
BILIRUB SERPL-MCNC: 0.5 MG/DL
BUN SERPL-MCNC: 29 MG/DL
CALCIUM SERPL-MCNC: 10.4 MG/DL
CHLORIDE SERPL-SCNC: 104 MMOL/L
CHOLEST SERPL-MCNC: 195 MG/DL
CHOLEST/HDLC SERPL: 3.3 {RATIO}
CO2 SERPL-SCNC: 27 MMOL/L
CREAT SERPL-MCNC: 1.1 MG/DL
DIFFERENTIAL METHOD: ABNORMAL
EOSINOPHIL # BLD AUTO: 0.3 K/UL
EOSINOPHIL NFR BLD: 5.2 %
ERYTHROCYTE [DISTWIDTH] IN BLOOD BY AUTOMATED COUNT: 14.1 %
EST. GFR  (AFRICAN AMERICAN): 54 ML/MIN/1.73 M^2
EST. GFR  (NON AFRICAN AMERICAN): 46.9 ML/MIN/1.73 M^2
GLUCOSE SERPL-MCNC: 121 MG/DL
HCT VFR BLD AUTO: 41.8 %
HDLC SERPL-MCNC: 60 MG/DL
HDLC SERPL: 30.8 %
HGB BLD-MCNC: 13.5 G/DL
IMM GRANULOCYTES # BLD AUTO: 0.02 K/UL
IMM GRANULOCYTES NFR BLD AUTO: 0.4 %
LDLC SERPL CALC-MCNC: 111.4 MG/DL
LYMPHOCYTES # BLD AUTO: 1.2 K/UL
LYMPHOCYTES NFR BLD: 22 %
MCH RBC QN AUTO: 31.8 PG
MCHC RBC AUTO-ENTMCNC: 32.3 G/DL
MCV RBC AUTO: 98 FL
MONOCYTES # BLD AUTO: 0.5 K/UL
MONOCYTES NFR BLD: 9.5 %
NEUTROPHILS # BLD AUTO: 3.5 K/UL
NEUTROPHILS NFR BLD: 62.2 %
NONHDLC SERPL-MCNC: 135 MG/DL
NRBC BLD-RTO: 0 /100 WBC
PLATELET # BLD AUTO: 196 K/UL
PMV BLD AUTO: 10.9 FL
POTASSIUM SERPL-SCNC: 4.6 MMOL/L
PROT SERPL-MCNC: 8.3 G/DL
RBC # BLD AUTO: 4.25 M/UL
SODIUM SERPL-SCNC: 140 MMOL/L
T4 FREE SERPL-MCNC: 1.01 NG/DL
TRIGL SERPL-MCNC: 118 MG/DL
TSH SERPL DL<=0.005 MIU/L-ACNC: 3.43 UIU/ML
WBC # BLD AUTO: 5.6 K/UL

## 2017-11-22 PROCEDURE — 80053 COMPREHEN METABOLIC PANEL: CPT

## 2017-11-22 PROCEDURE — 99213 OFFICE O/P EST LOW 20 MIN: CPT | Mod: PBBFAC,PO | Performed by: FAMILY MEDICINE

## 2017-11-22 PROCEDURE — 84443 ASSAY THYROID STIM HORMONE: CPT

## 2017-11-22 PROCEDURE — 36415 COLL VENOUS BLD VENIPUNCTURE: CPT | Mod: PO

## 2017-11-22 PROCEDURE — 99999 PR PBB SHADOW E&M-EST. PATIENT-LVL III: CPT | Mod: PBBFAC,,, | Performed by: FAMILY MEDICINE

## 2017-11-22 PROCEDURE — 80061 LIPID PANEL: CPT

## 2017-11-22 PROCEDURE — 84439 ASSAY OF FREE THYROXINE: CPT

## 2017-11-22 PROCEDURE — 99214 OFFICE O/P EST MOD 30 MIN: CPT | Mod: S$PBB,,, | Performed by: FAMILY MEDICINE

## 2017-11-22 PROCEDURE — 85025 COMPLETE CBC W/AUTO DIFF WBC: CPT

## 2017-11-22 RX ORDER — PRAVASTATIN SODIUM 80 MG/1
80 TABLET ORAL DAILY
Qty: 90 TABLET | Refills: 3 | Status: SHIPPED | OUTPATIENT
Start: 2017-11-22 | End: 2018-09-04 | Stop reason: SDUPTHER

## 2017-11-22 RX ORDER — AMLODIPINE BESYLATE 5 MG/1
5 TABLET ORAL DAILY
Qty: 90 TABLET | Refills: 3 | Status: SHIPPED | OUTPATIENT
Start: 2017-11-22 | End: 2018-07-16 | Stop reason: ALTCHOICE

## 2017-11-22 RX ORDER — TOLTERODINE 2 MG/1
2 CAPSULE, EXTENDED RELEASE ORAL DAILY
Qty: 90 CAPSULE | Refills: 0 | Status: SHIPPED | OUTPATIENT
Start: 2017-11-22 | End: 2018-03-26 | Stop reason: DRUGHIGH

## 2017-11-22 NOTE — PROGRESS NOTES
Chief Complaint   Patient presents with    Hypertension    Hyperlipidemia       Ruth Lan is a 82 y.o. female who presents per the Chief Complaint.  Pt is known to me and was last seen by me on 6/26/2017.  All known chronic medical issues have been documented.       Hypertension   This is a chronic problem. The current episode started more than 1 year ago. The problem has been waxing and waning since onset. The problem is uncontrolled. Associated symptoms include anxiety. Pertinent negatives include no blurred vision, chest pain, headaches, malaise/fatigue, neck pain, orthopnea, palpitations, peripheral edema, PND, shortness of breath or sweats. There are no associated agents to hypertension. Risk factors for coronary artery disease include dyslipidemia, obesity and post-menopausal state. Past treatments include calcium channel blockers. The current treatment provides moderate improvement. There is no history of angina, kidney disease, CAD/MI, CVA, heart failure, left ventricular hypertrophy, PVD, renovascular disease, retinopathy or a thyroid problem. There is no history of chronic renal disease, coarctation of the aorta, hyperaldosteronism, hypercortisolism, hyperparathyroidism, a hypertension causing med, pheochromocytoma or sleep apnea.   Hyperlipidemia   This is a chronic problem. The current episode started more than 1 year ago. The problem is uncontrolled. Recent lipid tests were reviewed and are high. She has no history of chronic renal disease, diabetes, hypothyroidism, liver disease, obesity or nephrotic syndrome. There are no known factors aggravating her hyperlipidemia. Pertinent negatives include no chest pain, myalgias or shortness of breath. Current antihyperlipidemic treatment includes statins. The current treatment provides moderate improvement of lipids. Risk factors for coronary artery disease include dyslipidemia, hypertension, obesity and post-menopausal.        ROS  Review of Systems  "  Constitutional: Negative.  Negative for activity change, appetite change, chills, diaphoresis, fatigue, fever, malaise/fatigue, unexpected weight change and weight loss.   HENT: Negative.  Negative for congestion, ear pain, hearing loss, nosebleeds, postnasal drip, rhinorrhea, sinus pressure, sneezing, sore throat and trouble swallowing.    Eyes: Negative for blurred vision, pain and visual disturbance.   Respiratory: Negative for cough, choking and shortness of breath.    Cardiovascular: Negative for chest pain, palpitations, orthopnea, leg swelling and PND.   Gastrointestinal: Negative for abdominal pain, constipation, diarrhea, nausea and vomiting.   Genitourinary: Negative for difficulty urinating, dysuria, frequency and urgency.   Musculoskeletal: Positive for arthralgias, arthritis and joint swelling. Negative for back pain, gait problem, myalgias, neck pain and stiffness.   Skin: Negative.  Negative for rash.   Allergic/Immunologic: Negative for environmental allergies and food allergies.   Neurological: Negative.  Negative for dizziness, seizures, syncope, weakness, light-headedness and headaches.   Psychiatric/Behavioral: Negative.  Negative for confusion, decreased concentration, dysphoric mood and sleep disturbance. The patient is not nervous/anxious.        Physical Exam  Vitals:    11/22/17 0959   BP: 130/76   Pulse: 80   Resp: 17   Temp: 97.7 °F (36.5 °C)    There is no height or weight on file to calculate BMI.      Height: 5' 5" (165.1 cm)     Physical Exam   Constitutional: She is oriented to person, place, and time. She appears well-developed and well-nourished. She is active and cooperative.  Non-toxic appearance. She does not appear ill.   HENT:   Head: Normocephalic and atraumatic.   Right Ear: Hearing, tympanic membrane, external ear and ear canal normal.   Left Ear: Hearing, tympanic membrane, external ear and ear canal normal.   Nose: Nose normal. No rhinorrhea or nasal deformity. "   Mouth/Throat: Uvula is midline, oropharynx is clear and moist and mucous membranes are normal.   Eyes: Conjunctivae, EOM and lids are normal. Pupils are equal, round, and reactive to light. No scleral icterus.   Neck: Normal range of motion. Neck supple. No thyroid mass and no thyromegaly present.   Cardiovascular: Normal rate, regular rhythm, S1 normal and S2 normal.  Exam reveals no gallop and no friction rub.    Murmur heard.   Systolic murmur is present with a grade of 5/6   Mechanical murmur noted   Pulmonary/Chest: Effort normal and breath sounds normal. She has no wheezes. She has no rales.   Abdominal: Soft. She exhibits no mass. There is no tenderness. There is no rebound and no guarding.   Musculoskeletal:        Right knee: She exhibits decreased range of motion, swelling and bony tenderness. She exhibits no effusion, no ecchymosis, no deformity, no laceration, no erythema, normal alignment, no LCL laxity, normal patellar mobility and normal meniscus. Tenderness found. Medial joint line and lateral joint line tenderness noted. No MCL, no LCL and no patellar tendon tenderness noted.   Lymphadenopathy:        Head (right side): No submental, no submandibular and no tonsillar adenopathy present.        Head (left side): No submental, no submandibular and no tonsillar adenopathy present.     She has no cervical adenopathy.   Neurological: She is alert and oriented to person, place, and time.   Skin: Skin is warm, dry and intact. She is not diaphoretic. No pallor.   Psychiatric: She has a normal mood and affect. Her speech is normal and behavior is normal. Judgment and thought content normal. Cognition and memory are normal.       Assessment & Plan    1. Essential hypertension  Patient was counseled and encouraged to maintain a low sodium diet, as well as increasing physical activity.  Recommend random BP checks at home on a regular basis.  Repeat BP at end of visit was not necessary. Will continue  medication at this time, and follow up in 3-6 months, or sooner if blood pressure begins to increase.     - amLODIPine (NORVASC) 5 MG tablet; Take 1 tablet (5 mg total) by mouth once daily.  Dispense: 90 tablet; Refill: 3  - Comprehensive metabolic panel; Future    2. Pure hypercholesterolemia  We discussed ways to manage cholesterol levels, including increasing whole grain foods and decreasing fried and fatty foods.  I also recommended OTC Omega 3 and Omega 6 supplements to improve overall cholesterol levels.  Will continue current therapy at this visit; patient is due for screening blood test at this time.   - pravastatin (PRAVACHOL) 80 MG tablet; Take 1 tablet (80 mg total) by mouth once daily.  Dispense: 90 tablet; Refill: 3  - Lipid panel; Future    3. Fatigue, unspecified type  Screening test will be ordered and once results available patient will be notified of results and managed accordingly.    - CBC auto differential; Future  - TSH; Future  - T4, free; Future    4. Primary osteoarthritis of right knee  Continue extended release acetaminophen for pain management.    5. Urinary incontinence without sensory awareness  Will start medication to control symptoms of incontinence; may increase to 4 mg daily if not effective.  - tolterodine (DETROL LA) 2 MG Cp24; Take 1 capsule (2 mg total) by mouth once daily.  Dispense: 90 capsule; Refill: 0      Follow up documented    ACTIVE MEDICAL ISSUES:  Documented in Problem List    PAST MEDICAL HISTORY  Documented    PAST SURGICAL HISTORY:  Documented    SOCIAL HISTORY:  Documented    FAMILY HISTORY:  Documented    ALLERGIES AND MEDICATIONS: updated and reviewed.  Documented    Health Maintenance       Date Due Completion Date    TETANUS VACCINE 03/07/1953 ---    Zoster Vaccine 03/07/1995 ---    Pneumococcal (65+) (1 of 2 - PCV13) 03/07/2000 ---    Influenza Vaccine 08/01/2017 11/18/2014 (Declined)    Override on 11/18/2014: Declined (refused)    Override on 8/19/2014:  Declined (refused)    DEXA SCAN 02/02/2019 2/2/2016    Lipid Panel 06/26/2022 6/26/2017

## 2017-11-29 ENCOUNTER — TELEPHONE (OUTPATIENT)
Dept: FAMILY MEDICINE | Facility: CLINIC | Age: 82
End: 2017-11-29

## 2017-11-29 NOTE — TELEPHONE ENCOUNTER
Spoke with patient. States she was a little upset after reading her after visit summary because she is supposed to start a new medication and she says she doesn't have it. Explained to her that [facility] to [facility] and that it should be coming in the mail. Verbalized understanding and states that she will give them a few more days and if she doesn't get them she will call the office back.

## 2017-11-29 NOTE — TELEPHONE ENCOUNTER
----- Message from Bull Najera sent at 11/29/2017  9:00 AM CST -----  Contact: PT/[phone]  Calling to speak with Doc or nurse regarding results. Pt states she's very nervous,and upset regarding results .She don't understand them. Please call Pt back asap

## 2017-12-04 ENCOUNTER — TELEPHONE (OUTPATIENT)
Dept: FAMILY MEDICINE | Facility: CLINIC | Age: 82
End: 2017-12-04

## 2017-12-04 NOTE — TELEPHONE ENCOUNTER
Spoke with patient. States that she received a letter from [employer] and it confused her. I asked her what she was confused about and she said that it made her feel like all three of her medications were the same. Educated patient on reason for each medication. Verbalized understanding.

## 2017-12-04 NOTE — TELEPHONE ENCOUNTER
----- Message from Charissa Abdul sent at 12/4/2017  2:16 PM CST -----  Contact: [phone]  Pt is wanting to know when she needs to take a certain kind of medication  Please call pt at your earliest convenience.  Thanks !

## 2017-12-04 NOTE — TELEPHONE ENCOUNTER
----- Message from Cassi Guerrier sent at 12/4/2017  1:53 PM CST -----  Contact: self  Pt calling to discuss medication. Pt states she is confused about medications. Please call [phone]

## 2018-01-01 ENCOUNTER — PATIENT MESSAGE (OUTPATIENT)
Dept: ELECTROPHYSIOLOGY | Facility: CLINIC | Age: 83
End: 2018-01-01

## 2018-01-01 ENCOUNTER — ANESTHESIA (OUTPATIENT)
Dept: MEDSURG UNIT | Facility: HOSPITAL | Age: 83
End: 2018-01-01
Payer: MEDICARE

## 2018-01-01 ENCOUNTER — HOSPITAL ENCOUNTER (OUTPATIENT)
Dept: RADIOLOGY | Facility: HOSPITAL | Age: 83
Discharge: HOME | End: 2018-11-05
Attending: STUDENT IN AN ORGANIZED HEALTH CARE EDUCATION/TRAINING PROGRAM
Payer: MEDICARE

## 2018-01-01 ENCOUNTER — HOSPITAL ENCOUNTER (INPATIENT)
Facility: HOSPITAL | Age: 83
LOS: 3 days | Discharge: HOME HEALTH | DRG: 309 | End: 2018-10-26
Attending: EMERGENCY MEDICINE | Admitting: HOSPITALIST
Payer: MEDICARE

## 2018-01-01 ENCOUNTER — INITIAL CONSULT (OUTPATIENT)
Dept: ELECTROPHYSIOLOGY | Facility: CLINIC | Age: 83
End: 2018-01-01
Payer: MEDICARE

## 2018-01-01 ENCOUNTER — HOSPITAL ENCOUNTER (OUTPATIENT)
Dept: RADIOLOGY | Facility: HOSPITAL | Age: 83
Discharge: HOME | End: 2018-10-18
Attending: PHYSICIAN ASSISTANT
Payer: MEDICARE

## 2018-01-01 ENCOUNTER — HOSPITAL ENCOUNTER (OUTPATIENT)
Dept: CARDIOLOGY | Facility: CLINIC | Age: 83
Discharge: HOME | End: 2018-10-17
Attending: NURSE PRACTITIONER
Payer: MEDICARE

## 2018-01-01 ENCOUNTER — TELEPHONE (OUTPATIENT)
Dept: CARDIOLOGY | Facility: CLINIC | Age: 83
End: 2018-01-01

## 2018-01-01 ENCOUNTER — OFFICE VISIT (OUTPATIENT)
Dept: FAMILY MEDICINE | Facility: CLINIC | Age: 83
End: 2018-01-01
Payer: MEDICARE

## 2018-01-01 ENCOUNTER — HOSPITAL ENCOUNTER (OUTPATIENT)
Dept: CARDIOLOGY | Facility: CLINIC | Age: 83
Discharge: HOME | End: 2018-11-01
Payer: MEDICARE

## 2018-01-01 ENCOUNTER — PES CALL (OUTPATIENT)
Dept: ADMINISTRATIVE | Facility: CLINIC | Age: 83
End: 2018-01-01

## 2018-01-01 ENCOUNTER — HOSPITAL ENCOUNTER (OUTPATIENT)
Facility: HOSPITAL | Age: 83
Discharge: HOME | End: 2018-12-18
Attending: INTERNAL MEDICINE | Admitting: INTERNAL MEDICINE
Payer: MEDICARE

## 2018-01-01 ENCOUNTER — OFFICE VISIT (OUTPATIENT)
Dept: HEMATOLOGY/ONCOLOGY | Facility: CLINIC | Age: 83
End: 2018-01-01
Payer: MEDICARE

## 2018-01-01 ENCOUNTER — LAB VISIT (OUTPATIENT)
Dept: LAB | Facility: HOSPITAL | Age: 83
End: 2018-01-01
Attending: INTERNAL MEDICINE
Payer: MEDICARE

## 2018-01-01 ENCOUNTER — CLINICAL SUPPORT (OUTPATIENT)
Dept: CARDIOLOGY | Facility: CLINIC | Age: 83
End: 2018-01-01
Attending: STUDENT IN AN ORGANIZED HEALTH CARE EDUCATION/TRAINING PROGRAM
Payer: MEDICARE

## 2018-01-01 ENCOUNTER — PATIENT MESSAGE (OUTPATIENT)
Dept: CARDIOLOGY | Facility: CLINIC | Age: 83
End: 2018-01-01

## 2018-01-01 ENCOUNTER — PATIENT MESSAGE (OUTPATIENT)
Dept: FAMILY MEDICINE | Facility: CLINIC | Age: 83
End: 2018-01-01

## 2018-01-01 ENCOUNTER — PATIENT MESSAGE (OUTPATIENT)
Dept: NEUROLOGY | Facility: CLINIC | Age: 83
End: 2018-01-01

## 2018-01-01 ENCOUNTER — HOSPITAL ENCOUNTER (OUTPATIENT)
Dept: CARDIOLOGY | Facility: CLINIC | Age: 83
Discharge: HOME | End: 2018-12-11
Payer: MEDICARE

## 2018-01-01 ENCOUNTER — DOCUMENTATION ONLY (OUTPATIENT)
Dept: ELECTROPHYSIOLOGY | Facility: CLINIC | Age: 83
End: 2018-01-01

## 2018-01-01 ENCOUNTER — OFFICE VISIT (OUTPATIENT)
Dept: NEUROLOGY | Facility: CLINIC | Age: 83
End: 2018-01-01
Payer: MEDICARE

## 2018-01-01 ENCOUNTER — HOSPITAL ENCOUNTER (OUTPATIENT)
Dept: CARDIOLOGY | Facility: CLINIC | Age: 83
Discharge: HOME | End: 2018-12-18
Payer: MEDICARE

## 2018-01-01 ENCOUNTER — OFFICE VISIT (OUTPATIENT)
Dept: CARDIOLOGY | Facility: CLINIC | Age: 83
End: 2018-01-01
Payer: MEDICARE

## 2018-01-01 ENCOUNTER — TELEPHONE (OUTPATIENT)
Dept: NEUROLOGY | Facility: CLINIC | Age: 83
End: 2018-01-01

## 2018-01-01 ENCOUNTER — TELEPHONE (OUTPATIENT)
Dept: ELECTROPHYSIOLOGY | Facility: CLINIC | Age: 83
End: 2018-01-01

## 2018-01-01 ENCOUNTER — ANESTHESIA EVENT (OUTPATIENT)
Dept: MEDSURG UNIT | Facility: HOSPITAL | Age: 83
End: 2018-01-01
Payer: MEDICARE

## 2018-01-01 ENCOUNTER — HOSPITAL ENCOUNTER (OUTPATIENT)
Dept: CARDIOLOGY | Facility: CLINIC | Age: 83
Discharge: HOME | End: 2018-12-27
Payer: MEDICARE

## 2018-01-01 ENCOUNTER — TELEPHONE (OUTPATIENT)
Dept: ADMINISTRATIVE | Facility: CLINIC | Age: 83
End: 2018-01-01

## 2018-01-01 VITALS
RESPIRATION RATE: 14 BRPM | OXYGEN SATURATION: 94 % | HEART RATE: 86 BPM | WEIGHT: 158.94 LBS | SYSTOLIC BLOOD PRESSURE: 107 MMHG | DIASTOLIC BLOOD PRESSURE: 58 MMHG | WEIGHT: 156.5 LBS | HEIGHT: 64 IN | BODY MASS INDEX: 25.54 KG/M2 | HEART RATE: 112 BPM | BODY MASS INDEX: 26.72 KG/M2 | RESPIRATION RATE: 17 BRPM | DIASTOLIC BLOOD PRESSURE: 59 MMHG | TEMPERATURE: 98 F | SYSTOLIC BLOOD PRESSURE: 93 MMHG | TEMPERATURE: 98 F | OXYGEN SATURATION: 93 % | HEIGHT: 66 IN

## 2018-01-01 VITALS
OXYGEN SATURATION: 95 % | RESPIRATION RATE: 17 BRPM | DIASTOLIC BLOOD PRESSURE: 62 MMHG | DIASTOLIC BLOOD PRESSURE: 61 MMHG | BODY MASS INDEX: 28.16 KG/M2 | BODY MASS INDEX: 28.24 KG/M2 | SYSTOLIC BLOOD PRESSURE: 100 MMHG | SYSTOLIC BLOOD PRESSURE: 96 MMHG | HEART RATE: 99 BPM | TEMPERATURE: 98 F | WEIGHT: 159.38 LBS | HEIGHT: 63 IN | HEART RATE: 138 BPM | HEIGHT: 63 IN

## 2018-01-01 VITALS
DIASTOLIC BLOOD PRESSURE: 55 MMHG | HEART RATE: 74 BPM | WEIGHT: 159.19 LBS | HEIGHT: 64 IN | BODY MASS INDEX: 27.18 KG/M2 | SYSTOLIC BLOOD PRESSURE: 103 MMHG

## 2018-01-01 VITALS
DIASTOLIC BLOOD PRESSURE: 63 MMHG | WEIGHT: 158 LBS | BODY MASS INDEX: 26.98 KG/M2 | SYSTOLIC BLOOD PRESSURE: 100 MMHG | TEMPERATURE: 97 F | RESPIRATION RATE: 16 BRPM | HEART RATE: 71 BPM | HEIGHT: 64 IN | OXYGEN SATURATION: 90 %

## 2018-01-01 VITALS
SYSTOLIC BLOOD PRESSURE: 82 MMHG | BODY MASS INDEX: 28.24 KG/M2 | HEIGHT: 63 IN | HEART RATE: 123 BPM | DIASTOLIC BLOOD PRESSURE: 60 MMHG

## 2018-01-01 VITALS — WEIGHT: 159 LBS | HEIGHT: 64 IN | BODY MASS INDEX: 27.14 KG/M2

## 2018-01-01 DIAGNOSIS — I48.91 AF (ATRIAL FIBRILLATION): ICD-10-CM

## 2018-01-01 DIAGNOSIS — I10 ESSENTIAL HYPERTENSION: ICD-10-CM

## 2018-01-01 DIAGNOSIS — Z85.3 HISTORY OF BREAST CANCER: ICD-10-CM

## 2018-01-01 DIAGNOSIS — M79.89 LEG SWELLING: Primary | ICD-10-CM

## 2018-01-01 DIAGNOSIS — I48.91 ATRIAL FIBRILLATION, UNSPECIFIED TYPE: ICD-10-CM

## 2018-01-01 DIAGNOSIS — R00.2 PALPITATIONS: Primary | ICD-10-CM

## 2018-01-01 DIAGNOSIS — E78.00 PURE HYPERCHOLESTEROLEMIA: ICD-10-CM

## 2018-01-01 DIAGNOSIS — I48.19 PERSISTENT ATRIAL FIBRILLATION: ICD-10-CM

## 2018-01-01 DIAGNOSIS — G31.84 MCI (MILD COGNITIVE IMPAIRMENT): Primary | ICD-10-CM

## 2018-01-01 DIAGNOSIS — I10 ESSENTIAL HYPERTENSION: Chronic | ICD-10-CM

## 2018-01-01 DIAGNOSIS — I34.0 SEVERE MITRAL REGURGITATION: Chronic | ICD-10-CM
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
## 2018-01-01 DIAGNOSIS — I48.0 PAROXYSMAL ATRIAL FIBRILLATION: Primary | ICD-10-CM

## 2018-01-01 DIAGNOSIS — I48.91 ATRIAL FIBRILLATION, UNSPECIFIED TYPE: Primary | ICD-10-CM

## 2018-01-01 DIAGNOSIS — I48.0 PAROXYSMAL ATRIAL FIBRILLATION: ICD-10-CM

## 2018-01-01 DIAGNOSIS — R60.9 EDEMA, UNSPECIFIED TYPE: ICD-10-CM

## 2018-01-01 DIAGNOSIS — C50.311 MALIGNANT NEOPLASM OF LOWER-INNER QUADRANT OF RIGHT BREAST OF FEMALE, ESTROGEN RECEPTOR POSITIVE: Primary | ICD-10-CM

## 2018-01-01 DIAGNOSIS — I48.19 PERSISTENT ATRIAL FIBRILLATION: Primary | ICD-10-CM

## 2018-01-01 DIAGNOSIS — R00.0 TACHYCARDIA: ICD-10-CM

## 2018-01-01 DIAGNOSIS — I50.32 CHRONIC DIASTOLIC HEART FAILURE: ICD-10-CM

## 2018-01-01 DIAGNOSIS — I48.91 ATRIAL FIBRILLATION WITH RVR: ICD-10-CM

## 2018-01-01 DIAGNOSIS — I48.91 ATRIAL FIBRILLATION: ICD-10-CM

## 2018-01-01 DIAGNOSIS — E53.8 VITAMIN B12 DEFICIENCY: ICD-10-CM

## 2018-01-01 DIAGNOSIS — M79.89 LEG SWELLING: ICD-10-CM

## 2018-01-01 DIAGNOSIS — R00.2 PALPITATIONS: ICD-10-CM

## 2018-01-01 DIAGNOSIS — J90 PLEURAL EFFUSION, RIGHT: Chronic | ICD-10-CM

## 2018-01-01 DIAGNOSIS — F41.9 ANXIETY: Chronic | ICD-10-CM

## 2018-01-01 DIAGNOSIS — R41.840 ATTENTION OR CONCENTRATION DEFICIT: ICD-10-CM

## 2018-01-01 DIAGNOSIS — I70.0 AORTIC ATHEROSCLEROSIS: ICD-10-CM

## 2018-01-01 DIAGNOSIS — I34.0 SEVERE MITRAL REGURGITATION: ICD-10-CM

## 2018-01-01 DIAGNOSIS — R79.89 POSITIVE D DIMER: ICD-10-CM

## 2018-01-01 DIAGNOSIS — C50.311 MALIGNANT NEOPLASM OF LOWER-INNER QUADRANT OF RIGHT BREAST OF FEMALE, ESTROGEN RECEPTOR POSITIVE: ICD-10-CM

## 2018-01-01 DIAGNOSIS — N18.9 CHRONIC KIDNEY DISEASE, UNSPECIFIED CKD STAGE: ICD-10-CM

## 2018-01-01 DIAGNOSIS — R06.02 SHORTNESS OF BREATH: ICD-10-CM

## 2018-01-01 DIAGNOSIS — I48.91 NEW ONSET ATRIAL FIBRILLATION: Primary | ICD-10-CM

## 2018-01-01 DIAGNOSIS — Z17.0 MALIGNANT NEOPLASM OF LOWER-INNER QUADRANT OF RIGHT BREAST OF FEMALE, ESTROGEN RECEPTOR POSITIVE: ICD-10-CM

## 2018-01-01 DIAGNOSIS — I48.91 ATRIAL FIBRILLATION WITH RAPID VENTRICULAR RESPONSE: ICD-10-CM

## 2018-01-01 DIAGNOSIS — I50.33 ACUTE ON CHRONIC DIASTOLIC HEART FAILURE: ICD-10-CM

## 2018-01-01 DIAGNOSIS — I34.0 SEVERE MITRAL REGURGITATION: Primary | Chronic | ICD-10-CM

## 2018-01-01 DIAGNOSIS — Z17.0 MALIGNANT NEOPLASM OF LOWER-INNER QUADRANT OF RIGHT BREAST OF FEMALE, ESTROGEN RECEPTOR POSITIVE: Primary | ICD-10-CM

## 2018-01-01 DIAGNOSIS — I27.20 PULMONARY HTN: ICD-10-CM

## 2018-01-01 DIAGNOSIS — N18.30 STAGE 3 CHRONIC KIDNEY DISEASE: ICD-10-CM

## 2018-01-01 DIAGNOSIS — I50.32 CHRONIC DIASTOLIC HEART FAILURE: Chronic | ICD-10-CM

## 2018-01-01 LAB
ALBUMIN SERPL BCP-MCNC: 3.2 G/DL
ALP SERPL-CCNC: 203 U/L
ALT SERPL W/O P-5'-P-CCNC: 78 U/L
ANION GAP SERPL CALC-SCNC: 11 MMOL/L
ANION GAP SERPL CALC-SCNC: 11 MMOL/L
ANION GAP SERPL CALC-SCNC: 12 MMOL/L
ANION GAP SERPL CALC-SCNC: 13 MMOL/L
ANION GAP SERPL CALC-SCNC: 18 MMOL/L
APTT BLDCRRT: 32.2 SEC
AST SERPL-CCNC: 69 U/L
BASOPHILS # BLD AUTO: 0.02 K/UL
BASOPHILS # BLD AUTO: 0.03 K/UL
BASOPHILS # BLD AUTO: 0.03 K/UL
BASOPHILS # BLD AUTO: 0.04 K/UL
BASOPHILS NFR BLD: 0.3 %
BASOPHILS NFR BLD: 0.4 %
BASOPHILS NFR BLD: 0.4 %
BASOPHILS NFR BLD: 0.6 %
BILIRUB SERPL-MCNC: 0.5 MG/DL
BNP SERPL-MCNC: 952 PG/ML
BSA FOR ECHO PROCEDURE: 1.8 M2
BUN SERPL-MCNC: 29 MG/DL
BUN SERPL-MCNC: 47 MG/DL
BUN SERPL-MCNC: 49 MG/DL
BUN SERPL-MCNC: 56 MG/DL
BUN SERPL-MCNC: 67 MG/DL
CALCIUM SERPL-MCNC: 9.1 MG/DL
CALCIUM SERPL-MCNC: 9.2 MG/DL
CALCIUM SERPL-MCNC: 9.3 MG/DL
CALCIUM SERPL-MCNC: 9.4 MG/DL
CALCIUM SERPL-MCNC: 9.7 MG/DL
CHLORIDE SERPL-SCNC: 100 MMOL/L
CHLORIDE SERPL-SCNC: 102 MMOL/L
CHLORIDE SERPL-SCNC: 103 MMOL/L
CHLORIDE SERPL-SCNC: 104 MMOL/L
CHLORIDE SERPL-SCNC: 98 MMOL/L
CO2 SERPL-SCNC: 23 MMOL/L
CO2 SERPL-SCNC: 23 MMOL/L
CO2 SERPL-SCNC: 24 MMOL/L
CO2 SERPL-SCNC: 25 MMOL/L
CO2 SERPL-SCNC: 25 MMOL/L
CREAT SERPL-MCNC: 1.4 MG/DL
CREAT SERPL-MCNC: 1.4 MG/DL
CREAT SERPL-MCNC: 1.5 MG/DL
CREAT SERPL-MCNC: 1.5 MG/DL
CREAT SERPL-MCNC: 1.6 MG/DL (ref 0.5–1.4)
CREAT SERPL-MCNC: 1.9 MG/DL
D DIMER PPP IA.FEU-MCNC: 10.31 MG/L FEU
DIFFERENTIAL METHOD: ABNORMAL
EOSINOPHIL # BLD AUTO: 0.2 K/UL
EOSINOPHIL # BLD AUTO: 0.3 K/UL
EOSINOPHIL # BLD AUTO: 0.4 K/UL
EOSINOPHIL # BLD AUTO: 0.5 K/UL
EOSINOPHIL NFR BLD: 3.6 %
EOSINOPHIL NFR BLD: 4.1 %
EOSINOPHIL NFR BLD: 4.7 %
EOSINOPHIL NFR BLD: 8.6 %
ERYTHROCYTE [DISTWIDTH] IN BLOOD BY AUTOMATED COUNT: 14.3 %
ERYTHROCYTE [DISTWIDTH] IN BLOOD BY AUTOMATED COUNT: 14.5 %
ERYTHROCYTE [DISTWIDTH] IN BLOOD BY AUTOMATED COUNT: 14.7 %
ERYTHROCYTE [DISTWIDTH] IN BLOOD BY AUTOMATED COUNT: 15.7 %
EST. GFR  (AFRICAN AMERICAN): 28 ML/MIN/1.73 M^2
EST. GFR  (AFRICAN AMERICAN): 37 ML/MIN/1.73 M^2
EST. GFR  (AFRICAN AMERICAN): 37 ML/MIN/1.73 M^2
EST. GFR  (AFRICAN AMERICAN): 40 ML/MIN/1.73 M^2
EST. GFR  (AFRICAN AMERICAN): 40 ML/MIN/1.73 M^2
EST. GFR  (NON AFRICAN AMERICAN): 24 ML/MIN/1.73 M^2
EST. GFR  (NON AFRICAN AMERICAN): 32 ML/MIN/1.73 M^2
EST. GFR  (NON AFRICAN AMERICAN): 32 ML/MIN/1.73 M^2
EST. GFR  (NON AFRICAN AMERICAN): 35 ML/MIN/1.73 M^2
EST. GFR  (NON AFRICAN AMERICAN): 35 ML/MIN/1.73 M^2
ESTIMATED PA SYSTOLIC PRESSURE: 43.2
GLUCOSE SERPL-MCNC: 126 MG/DL
GLUCOSE SERPL-MCNC: 136 MG/DL
GLUCOSE SERPL-MCNC: 141 MG/DL
GLUCOSE SERPL-MCNC: 168 MG/DL
GLUCOSE SERPL-MCNC: 204 MG/DL
HCT VFR BLD AUTO: 33 %
HCT VFR BLD AUTO: 34.3 %
HCT VFR BLD AUTO: 35 %
HCT VFR BLD AUTO: 36.6 %
HGB BLD-MCNC: 10.6 G/DL
HGB BLD-MCNC: 10.7 G/DL
HGB BLD-MCNC: 11.2 G/DL
HGB BLD-MCNC: 11.4 G/DL
INR PPP: 1.4
LACTATE SERPL-SCNC: 1.4 MMOL/L
LACTATE SERPL-SCNC: 1.8 MMOL/L
LYMPHOCYTES # BLD AUTO: 0.6 K/UL
LYMPHOCYTES # BLD AUTO: 0.8 K/UL
LYMPHOCYTES NFR BLD: 12.2 %
LYMPHOCYTES NFR BLD: 12.3 %
LYMPHOCYTES NFR BLD: 13 %
LYMPHOCYTES NFR BLD: 6.5 %
MAGNESIUM SERPL-MCNC: 1.6 MG/DL
MAGNESIUM SERPL-MCNC: 1.7 MG/DL
MAGNESIUM SERPL-MCNC: 1.9 MG/DL
MAGNESIUM SERPL-MCNC: 2 MG/DL
MCH RBC QN AUTO: 29.5 PG
MCH RBC QN AUTO: 29.8 PG
MCH RBC QN AUTO: 30.4 PG
MCH RBC QN AUTO: 30.7 PG
MCHC RBC AUTO-ENTMCNC: 30.9 G/DL
MCHC RBC AUTO-ENTMCNC: 31.1 G/DL
MCHC RBC AUTO-ENTMCNC: 32 G/DL
MCHC RBC AUTO-ENTMCNC: 32.4 G/DL
MCV RBC AUTO: 95 FL
MCV RBC AUTO: 96 FL
MITRAL VALVE REGURGITATION: ABNORMAL
MONOCYTES # BLD AUTO: 0.6 K/UL
MONOCYTES # BLD AUTO: 0.7 K/UL
MONOCYTES # BLD AUTO: 0.8 K/UL
MONOCYTES # BLD AUTO: 0.9 K/UL
MONOCYTES NFR BLD: 10.1 %
MONOCYTES NFR BLD: 10.7 %
MONOCYTES NFR BLD: 12.5 %
MONOCYTES NFR BLD: 9.4 %
NEUTROPHILS # BLD AUTO: 4.1 K/UL
NEUTROPHILS # BLD AUTO: 5 K/UL
NEUTROPHILS # BLD AUTO: 5 K/UL
NEUTROPHILS # BLD AUTO: 6.6 K/UL
NEUTROPHILS NFR BLD: 65.1 %
NEUTROPHILS NFR BLD: 72.2 %
NEUTROPHILS NFR BLD: 74.3 %
NEUTROPHILS NFR BLD: 78.2 %
PLATELET # BLD AUTO: 172 K/UL
PLATELET # BLD AUTO: 175 K/UL
PLATELET # BLD AUTO: 222 K/UL
PLATELET # BLD AUTO: 244 K/UL
PMV BLD AUTO: 10 FL
PMV BLD AUTO: 9.4 FL
PMV BLD AUTO: 9.5 FL
PMV BLD AUTO: 9.6 FL
POTASSIUM SERPL-SCNC: 3.2 MMOL/L
POTASSIUM SERPL-SCNC: 4.1 MMOL/L
POTASSIUM SERPL-SCNC: 4.2 MMOL/L
PROT SERPL-MCNC: 7.3 G/DL
PROTHROMBIN TIME: 14.3 SEC
RBC # BLD AUTO: 3.49 M/UL
RBC # BLD AUTO: 3.56 M/UL
RBC # BLD AUTO: 3.69 M/UL
RBC # BLD AUTO: 3.86 M/UL
RETIRED EF AND QEF - SEE NOTES: 55 (ref 55–65)
SAMPLE: ABNORMAL
SODIUM SERPL-SCNC: 137 MMOL/L
SODIUM SERPL-SCNC: 138 MMOL/L
SODIUM SERPL-SCNC: 138 MMOL/L
SODIUM SERPL-SCNC: 139 MMOL/L
SODIUM SERPL-SCNC: 140 MMOL/L
TRICUSPID VALVE REGURGITATION: ABNORMAL
TROPONIN ISTAT: 0.07 NG/ML
TSH SERPL DL<=0.005 MIU/L-ACNC: 3.02 UIU/ML
TSH SERPL DL<=0.005 MIU/L-ACNC: 3.35 UIU/ML
WBC # BLD AUTO: 6.31 K/UL
WBC # BLD AUTO: 6.73 K/UL
WBC # BLD AUTO: 6.9 K/UL
WBC # BLD AUTO: 8.49 K/UL

## 2018-01-01 PROCEDURE — 25000003 PHARM REV CODE 250: Performed by: HOSPITALIST

## 2018-01-01 PROCEDURE — 77066 DX MAMMO INCL CAD BI: CPT | Mod: 26,,, | Performed by: RADIOLOGY

## 2018-01-01 PROCEDURE — 97535 SELF CARE MNGMENT TRAINING: CPT

## 2018-01-01 PROCEDURE — 71275 CT ANGIOGRAPHY CHEST: CPT | Mod: TC

## 2018-01-01 PROCEDURE — 97110 THERAPEUTIC EXERCISES: CPT

## 2018-01-01 PROCEDURE — 97530 THERAPEUTIC ACTIVITIES: CPT

## 2018-01-01 PROCEDURE — 77066 DX MAMMO INCL CAD BI: CPT | Mod: TC,PO

## 2018-01-01 PROCEDURE — 80048 BASIC METABOLIC PNL TOTAL CA: CPT

## 2018-01-01 PROCEDURE — 25000003 PHARM REV CODE 250: Performed by: NURSE PRACTITIONER

## 2018-01-01 PROCEDURE — G8979 MOBILITY GOAL STATUS: HCPCS | Mod: CJ

## 2018-01-01 PROCEDURE — 84484 ASSAY OF TROPONIN QUANT: CPT

## 2018-01-01 PROCEDURE — 71000033 HC RECOVERY, INTIAL HOUR: Performed by: INTERNAL MEDICINE

## 2018-01-01 PROCEDURE — 37000008 HC ANESTHESIA 1ST 15 MINUTES: Performed by: INTERNAL MEDICINE

## 2018-01-01 PROCEDURE — 93005 ELECTROCARDIOGRAM TRACING: CPT

## 2018-01-01 PROCEDURE — 97116 GAIT TRAINING THERAPY: CPT

## 2018-01-01 PROCEDURE — 93005 ELECTROCARDIOGRAM TRACING: CPT | Mod: PBBFAC | Performed by: INTERNAL MEDICINE

## 2018-01-01 PROCEDURE — 11000001 HC ACUTE MED/SURG PRIVATE ROOM

## 2018-01-01 PROCEDURE — 36415 COLL VENOUS BLD VENIPUNCTURE: CPT

## 2018-01-01 PROCEDURE — 83605 ASSAY OF LACTIC ACID: CPT

## 2018-01-01 PROCEDURE — 93010 ELECTROCARDIOGRAM REPORT: CPT | Mod: S$PBB,,, | Performed by: INTERNAL MEDICINE

## 2018-01-01 PROCEDURE — 99213 OFFICE O/P EST LOW 20 MIN: CPT | Mod: PBBFAC,PO | Performed by: FAMILY MEDICINE

## 2018-01-01 PROCEDURE — 63600175 PHARM REV CODE 636 W HCPCS: Performed by: HOSPITALIST

## 2018-01-01 PROCEDURE — 94761 N-INVAS EAR/PLS OXIMETRY MLT: CPT

## 2018-01-01 PROCEDURE — 84443 ASSAY THYROID STIM HORMONE: CPT

## 2018-01-01 PROCEDURE — 20000000 HC ICU ROOM

## 2018-01-01 PROCEDURE — 85025 COMPLETE CBC W/AUTO DIFF WBC: CPT

## 2018-01-01 PROCEDURE — D9220A PRA ANESTHESIA: Mod: ANES,,, | Performed by: ANESTHESIOLOGY

## 2018-01-01 PROCEDURE — 93312 ECHO TRANSESOPHAGEAL: CPT | Mod: 26,S$PBB,, | Performed by: INTERNAL MEDICINE

## 2018-01-01 PROCEDURE — 25000003 PHARM REV CODE 250: Performed by: INTERNAL MEDICINE

## 2018-01-01 PROCEDURE — 99213 OFFICE O/P EST LOW 20 MIN: CPT | Mod: S$PBB,,, | Performed by: PHYSICIAN ASSISTANT

## 2018-01-01 PROCEDURE — 99999 PR PBB SHADOW E&M-EST. PATIENT-LVL III: CPT | Mod: PBBFAC,,, | Performed by: PSYCHIATRY & NEUROLOGY

## 2018-01-01 PROCEDURE — 25000003 PHARM REV CODE 250: Performed by: NURSE ANESTHETIST, CERTIFIED REGISTERED

## 2018-01-01 PROCEDURE — 99205 OFFICE O/P NEW HI 60 MIN: CPT | Mod: S$PBB,,, | Performed by: INTERNAL MEDICINE

## 2018-01-01 PROCEDURE — 71000039 HC RECOVERY, EACH ADD'L HOUR: Performed by: INTERNAL MEDICINE

## 2018-01-01 PROCEDURE — 99999 PR PBB SHADOW E&M-EST. PATIENT-LVL III: CPT | Mod: PBBFAC,,, | Performed by: INTERNAL MEDICINE

## 2018-01-01 PROCEDURE — 85379 FIBRIN DEGRADATION QUANT: CPT

## 2018-01-01 PROCEDURE — 80053 COMPREHEN METABOLIC PANEL: CPT

## 2018-01-01 PROCEDURE — 93970 EXTREMITY STUDY: CPT | Mod: PBBFAC | Performed by: INTERNAL MEDICINE

## 2018-01-01 PROCEDURE — 84443 ASSAY THYROID STIM HORMONE: CPT | Mod: 91

## 2018-01-01 PROCEDURE — 99214 OFFICE O/P EST MOD 30 MIN: CPT | Mod: S$PBB,,, | Performed by: FAMILY MEDICINE

## 2018-01-01 PROCEDURE — 83880 ASSAY OF NATRIURETIC PEPTIDE: CPT

## 2018-01-01 PROCEDURE — 92960 CARDIOVERSION ELECTRIC EXT: CPT | Mod: ,,, | Performed by: INTERNAL MEDICINE

## 2018-01-01 PROCEDURE — 92960 CARDIOVERSION ELECTRIC EXT: CPT | Performed by: INTERNAL MEDICINE

## 2018-01-01 PROCEDURE — 99233 SBSQ HOSP IP/OBS HIGH 50: CPT | Mod: ,,, | Performed by: NURSE PRACTITIONER

## 2018-01-01 PROCEDURE — 99999 PR PBB SHADOW E&M-EST. PATIENT-LVL IV: CPT | Mod: PBBFAC,GC,, | Performed by: STUDENT IN AN ORGANIZED HEALTH CARE EDUCATION/TRAINING PROGRAM

## 2018-01-01 PROCEDURE — 85730 THROMBOPLASTIN TIME PARTIAL: CPT

## 2018-01-01 PROCEDURE — 99213 OFFICE O/P EST LOW 20 MIN: CPT | Mod: PBBFAC,25 | Performed by: INTERNAL MEDICINE

## 2018-01-01 PROCEDURE — 93325 DOPPLER ECHO COLOR FLOW MAPG: CPT | Mod: 26,S$PBB,, | Performed by: INTERNAL MEDICINE

## 2018-01-01 PROCEDURE — 96365 THER/PROPH/DIAG IV INF INIT: CPT

## 2018-01-01 PROCEDURE — 25500020 PHARM REV CODE 255: Performed by: STUDENT IN AN ORGANIZED HEALTH CARE EDUCATION/TRAINING PROGRAM

## 2018-01-01 PROCEDURE — 99214 OFFICE O/P EST MOD 30 MIN: CPT | Mod: S$PBB,,, | Performed by: PSYCHIATRY & NEUROLOGY

## 2018-01-01 PROCEDURE — 96375 TX/PRO/DX INJ NEW DRUG ADDON: CPT

## 2018-01-01 PROCEDURE — 99214 OFFICE O/P EST MOD 30 MIN: CPT | Mod: PBBFAC | Performed by: STUDENT IN AN ORGANIZED HEALTH CARE EDUCATION/TRAINING PROGRAM

## 2018-01-01 PROCEDURE — 37000009 HC ANESTHESIA EA ADD 15 MINS: Performed by: INTERNAL MEDICINE

## 2018-01-01 PROCEDURE — 99291 CRITICAL CARE FIRST HOUR: CPT | Mod: ,,, | Performed by: INTERNAL MEDICINE

## 2018-01-01 PROCEDURE — 27000221 HC OXYGEN, UP TO 24 HOURS

## 2018-01-01 PROCEDURE — D9220A PRA ANESTHESIA: Mod: CRNA,,, | Performed by: NURSE ANESTHETIST, CERTIFIED REGISTERED

## 2018-01-01 PROCEDURE — 97165 OT EVAL LOW COMPLEX 30 MIN: CPT

## 2018-01-01 PROCEDURE — G8978 MOBILITY CURRENT STATUS: HCPCS | Mod: CL

## 2018-01-01 PROCEDURE — 83735 ASSAY OF MAGNESIUM: CPT

## 2018-01-01 PROCEDURE — 99999 PR PBB SHADOW E&M-EST. PATIENT-LVL IV: CPT | Mod: PBBFAC,,, | Performed by: PHYSICIAN ASSISTANT

## 2018-01-01 PROCEDURE — 99214 OFFICE O/P EST MOD 30 MIN: CPT | Mod: PBBFAC | Performed by: PHYSICIAN ASSISTANT

## 2018-01-01 PROCEDURE — 85610 PROTHROMBIN TIME: CPT

## 2018-01-01 PROCEDURE — 63600175 PHARM REV CODE 636 W HCPCS: Performed by: NURSE ANESTHETIST, CERTIFIED REGISTERED

## 2018-01-01 PROCEDURE — 25000003 PHARM REV CODE 250: Performed by: EMERGENCY MEDICINE

## 2018-01-01 PROCEDURE — 99214 OFFICE O/P EST MOD 30 MIN: CPT | Mod: S$PBB,GC,, | Performed by: STUDENT IN AN ORGANIZED HEALTH CARE EDUCATION/TRAINING PROGRAM

## 2018-01-01 PROCEDURE — 99213 OFFICE O/P EST LOW 20 MIN: CPT | Mod: PBBFAC | Performed by: PSYCHIATRY & NEUROLOGY

## 2018-01-01 PROCEDURE — 63600175 PHARM REV CODE 636 W HCPCS: Performed by: EMERGENCY MEDICINE

## 2018-01-01 PROCEDURE — 99285 EMERGENCY DEPT VISIT HI MDM: CPT | Mod: 25

## 2018-01-01 PROCEDURE — 97161 PT EVAL LOW COMPLEX 20 MIN: CPT

## 2018-01-01 PROCEDURE — 93320 DOPPLER ECHO COMPLETE: CPT | Mod: PBBFAC | Performed by: INTERNAL MEDICINE

## 2018-01-01 PROCEDURE — 99999 PR PBB SHADOW E&M-EST. PATIENT-LVL III: CPT | Mod: PBBFAC,,, | Performed by: FAMILY MEDICINE

## 2018-01-01 PROCEDURE — 71275 CT ANGIOGRAPHY CHEST: CPT | Mod: 26,,, | Performed by: RADIOLOGY

## 2018-01-01 PROCEDURE — 25000003 PHARM REV CODE 250: Performed by: PHYSICIAN ASSISTANT

## 2018-01-01 PROCEDURE — 93306 TTE W/DOPPLER COMPLETE: CPT | Mod: PBBFAC | Performed by: INTERNAL MEDICINE

## 2018-01-01 PROCEDURE — 96361 HYDRATE IV INFUSION ADD-ON: CPT

## 2018-01-01 PROCEDURE — 93010 ELECTROCARDIOGRAM REPORT: CPT | Mod: ,,, | Performed by: INTERNAL MEDICINE

## 2018-01-01 RX ORDER — PRAVASTATIN SODIUM 40 MG/1
80 TABLET ORAL DAILY
Status: DISCONTINUED | OUTPATIENT
Start: 2018-01-01 | End: 2018-01-01 | Stop reason: HOSPADM

## 2018-01-01 RX ORDER — VENLAFAXINE HYDROCHLORIDE 37.5 MG/1
37.5 CAPSULE, EXTENDED RELEASE ORAL DAILY
Status: DISCONTINUED | OUTPATIENT
Start: 2018-01-01 | End: 2018-01-01

## 2018-01-01 RX ORDER — DIGOXIN 0.25 MG/ML
250 INJECTION INTRAMUSCULAR; INTRAVENOUS ONCE
Status: COMPLETED | OUTPATIENT
Start: 2018-01-01 | End: 2018-01-01

## 2018-01-01 RX ORDER — SODIUM CHLORIDE 9 MG/ML
INJECTION, SOLUTION INTRAVENOUS CONTINUOUS
Status: DISCONTINUED | OUTPATIENT
Start: 2018-01-01 | End: 2018-01-01 | Stop reason: HOSPADM

## 2018-01-01 RX ORDER — DILTIAZEM HYDROCHLORIDE 240 MG/1
240 CAPSULE, COATED, EXTENDED RELEASE ORAL DAILY
Qty: 30 CAPSULE | Refills: 2 | Status: SHIPPED | OUTPATIENT
Start: 2018-01-01 | End: 2019-01-01 | Stop reason: SDUPTHER

## 2018-01-01 RX ORDER — BENZONATATE 100 MG/1
100 CAPSULE ORAL 3 TIMES DAILY PRN
Status: DISCONTINUED | OUTPATIENT
Start: 2018-01-01 | End: 2018-01-01 | Stop reason: HOSPADM

## 2018-01-01 RX ORDER — METOPROLOL TARTRATE 25 MG/1
25 TABLET, FILM COATED ORAL 2 TIMES DAILY
Qty: 60 TABLET | Refills: 2 | Status: SHIPPED | OUTPATIENT
Start: 2018-01-01 | End: 2019-01-01 | Stop reason: SDUPTHER

## 2018-01-01 RX ORDER — PROPOFOL 10 MG/ML
VIAL (ML) INTRAVENOUS
Status: DISCONTINUED | OUTPATIENT
Start: 2018-01-01 | End: 2018-01-01

## 2018-01-01 RX ORDER — VENLAFAXINE HYDROCHLORIDE 37.5 MG/1
37.5 CAPSULE, EXTENDED RELEASE ORAL DAILY
Qty: 30 CAPSULE | Refills: 5 | Status: SHIPPED | OUTPATIENT
Start: 2018-01-01 | End: 2019-01-01

## 2018-01-01 RX ORDER — METOPROLOL TARTRATE 25 MG/1
25 TABLET, FILM COATED ORAL 2 TIMES DAILY
Status: DISCONTINUED | OUTPATIENT
Start: 2018-01-01 | End: 2018-01-01 | Stop reason: HOSPADM

## 2018-01-01 RX ORDER — VITAMIN B COMPLEX
TABLET ORAL DAILY
Status: CANCELLED | COMMUNITY
Start: 2018-01-01

## 2018-01-01 RX ORDER — ONDANSETRON 8 MG/1
8 TABLET, ORALLY DISINTEGRATING ORAL EVERY 8 HOURS PRN
Status: DISCONTINUED | OUTPATIENT
Start: 2018-01-01 | End: 2018-01-01 | Stop reason: HOSPADM

## 2018-01-01 RX ORDER — DILTIAZEM HYDROCHLORIDE 30 MG/1
60 TABLET, FILM COATED ORAL ONCE
Status: COMPLETED | OUTPATIENT
Start: 2018-01-01 | End: 2018-01-01

## 2018-01-01 RX ORDER — SODIUM CHLORIDE 0.9 % (FLUSH) 0.9 %
5 SYRINGE (ML) INJECTION
Status: DISCONTINUED | OUTPATIENT
Start: 2018-01-01 | End: 2018-01-01 | Stop reason: HOSPADM

## 2018-01-01 RX ORDER — NAPROXEN SODIUM 220 MG/1
81 TABLET, FILM COATED ORAL DAILY
Status: DISCONTINUED | OUTPATIENT
Start: 2018-01-01 | End: 2018-01-01 | Stop reason: HOSPADM

## 2018-01-01 RX ORDER — ACETAMINOPHEN 500 MG
500 TABLET ORAL EVERY 6 HOURS PRN
Status: DISCONTINUED | OUTPATIENT
Start: 2018-01-01 | End: 2018-01-01 | Stop reason: HOSPADM

## 2018-01-01 RX ORDER — ETOMIDATE 2 MG/ML
INJECTION INTRAVENOUS
Status: DISCONTINUED | OUTPATIENT
Start: 2018-01-01 | End: 2018-01-01

## 2018-01-01 RX ORDER — VENLAFAXINE HYDROCHLORIDE 37.5 MG/1
37.5 CAPSULE, EXTENDED RELEASE ORAL NIGHTLY
Status: DISCONTINUED | OUTPATIENT
Start: 2018-01-01 | End: 2018-01-01 | Stop reason: HOSPADM

## 2018-01-01 RX ORDER — LISINOPRIL 2.5 MG/1
2.5 TABLET ORAL DAILY
Status: ON HOLD | COMMUNITY
End: 2018-01-01 | Stop reason: HOSPADM

## 2018-01-01 RX ORDER — SODIUM CHLORIDE 0.9 % (FLUSH) 0.9 %
3 SYRINGE (ML) INJECTION
Status: DISCONTINUED | OUTPATIENT
Start: 2018-01-01 | End: 2018-01-01 | Stop reason: HOSPADM

## 2018-01-01 RX ORDER — DILTIAZEM HYDROCHLORIDE 120 MG/1
240 CAPSULE, COATED, EXTENDED RELEASE ORAL DAILY
Status: DISCONTINUED | OUTPATIENT
Start: 2018-01-01 | End: 2018-01-01

## 2018-01-01 RX ORDER — FUROSEMIDE 40 MG/1
40 TABLET ORAL DAILY
Status: DISCONTINUED | OUTPATIENT
Start: 2018-01-01 | End: 2018-01-01 | Stop reason: HOSPADM

## 2018-01-01 RX ORDER — DIGOXIN 0.25 MG/ML
500 INJECTION INTRAMUSCULAR; INTRAVENOUS
Status: COMPLETED | OUTPATIENT
Start: 2018-01-01 | End: 2018-01-01

## 2018-01-01 RX ORDER — DILTIAZEM HYDROCHLORIDE 5 MG/ML
15 INJECTION INTRAVENOUS
Status: COMPLETED | OUTPATIENT
Start: 2018-01-01 | End: 2018-01-01

## 2018-01-01 RX ORDER — ENOXAPARIN SODIUM 100 MG/ML
30 INJECTION SUBCUTANEOUS EVERY 24 HOURS
Status: DISCONTINUED | OUTPATIENT
Start: 2018-01-01 | End: 2018-01-01

## 2018-01-01 RX ORDER — DILTIAZEM HYDROCHLORIDE 30 MG/1
60 TABLET, FILM COATED ORAL ONCE
Status: DISCONTINUED | OUTPATIENT
Start: 2018-01-01 | End: 2018-01-01

## 2018-01-01 RX ORDER — DILTIAZEM HYDROCHLORIDE 120 MG/1
240 CAPSULE, COATED, EXTENDED RELEASE ORAL DAILY
Status: DISCONTINUED | OUTPATIENT
Start: 2018-01-01 | End: 2018-01-01 | Stop reason: HOSPADM

## 2018-01-01 RX ORDER — PROPOFOL 10 MG/ML
VIAL (ML) INTRAVENOUS CONTINUOUS PRN
Status: DISCONTINUED | OUTPATIENT
Start: 2018-01-01 | End: 2018-01-01

## 2018-01-01 RX ORDER — DILTIAZEM HCL 1 MG/ML
5 INJECTION, SOLUTION INTRAVENOUS CONTINUOUS
Status: DISCONTINUED | OUTPATIENT
Start: 2018-01-01 | End: 2018-01-01

## 2018-01-01 RX ORDER — LIDOCAINE HCL/PF 100 MG/5ML
SYRINGE (ML) INTRAVENOUS
Status: DISCONTINUED | OUTPATIENT
Start: 2018-01-01 | End: 2018-01-01

## 2018-01-01 RX ORDER — SODIUM CHLORIDE 9 MG/ML
500 INJECTION, SOLUTION INTRAVENOUS
Status: COMPLETED | OUTPATIENT
Start: 2018-01-01 | End: 2018-01-01

## 2018-01-01 RX ORDER — DILTIAZEM HYDROCHLORIDE 180 MG/1
360 CAPSULE, COATED, EXTENDED RELEASE ORAL DAILY
Status: DISCONTINUED | OUTPATIENT
Start: 2018-01-01 | End: 2018-01-01

## 2018-01-01 RX ORDER — METOPROLOL TARTRATE 1 MG/ML
5 INJECTION, SOLUTION INTRAVENOUS EVERY 4 HOURS PRN
Status: DISCONTINUED | OUTPATIENT
Start: 2018-01-01 | End: 2018-01-01 | Stop reason: HOSPADM

## 2018-01-01 RX ORDER — VITAMIN B COMPLEX
TABLET ORAL DAILY
COMMUNITY
End: 2019-01-01

## 2018-01-01 RX ADMIN — APIXABAN 2.5 MG: 2.5 TABLET, FILM COATED ORAL at 09:10

## 2018-01-01 RX ADMIN — METOPROLOL TARTRATE 25 MG: 25 TABLET ORAL at 10:10

## 2018-01-01 RX ADMIN — PRAVASTATIN SODIUM 80 MG: 40 TABLET ORAL at 10:10

## 2018-01-01 RX ADMIN — SODIUM CHLORIDE 250 ML: 0.9 INJECTION, SOLUTION INTRAVENOUS at 05:10

## 2018-01-01 RX ADMIN — ENOXAPARIN SODIUM 30 MG: 100 INJECTION SUBCUTANEOUS at 11:10

## 2018-01-01 RX ADMIN — DIGOXIN 250 MCG: 0.25 INJECTION INTRAMUSCULAR; INTRAVENOUS at 03:10

## 2018-01-01 RX ADMIN — FUROSEMIDE 40 MG: 40 TABLET ORAL at 08:10

## 2018-01-01 RX ADMIN — DILTIAZEM HYDROCHLORIDE 5 MG/HR: 5 INJECTION INTRAVENOUS at 06:10

## 2018-01-01 RX ADMIN — APIXABAN 2.5 MG: 2.5 TABLET, FILM COATED ORAL at 12:10

## 2018-01-01 RX ADMIN — ASPIRIN 81 MG 81 MG: 81 TABLET ORAL at 08:10

## 2018-01-01 RX ADMIN — DILTIAZEM HYDROCHLORIDE 240 MG: 120 CAPSULE, COATED, EXTENDED RELEASE ORAL at 10:10

## 2018-01-01 RX ADMIN — PRAVASTATIN SODIUM 80 MG: 40 TABLET ORAL at 08:10

## 2018-01-01 RX ADMIN — DILTIAZEM HYDROCHLORIDE 240 MG: 120 CAPSULE, COATED, EXTENDED RELEASE ORAL at 09:10

## 2018-01-01 RX ADMIN — FUROSEMIDE 40 MG: 40 TABLET ORAL at 10:10

## 2018-01-01 RX ADMIN — PROPOFOL 50 MCG/KG/MIN: 10 INJECTION, EMULSION INTRAVENOUS at 01:12

## 2018-01-01 RX ADMIN — APIXABAN 2.5 MG: 2.5 TABLET, FILM COATED ORAL at 10:10

## 2018-01-01 RX ADMIN — PROPOFOL 30 MG: 10 INJECTION, EMULSION INTRAVENOUS at 01:12

## 2018-01-01 RX ADMIN — SODIUM CHLORIDE: 0.9 INJECTION, SOLUTION INTRAVENOUS at 01:12

## 2018-01-01 RX ADMIN — LIDOCAINE HYDROCHLORIDE 40 MG: 20 INJECTION, SOLUTION INTRAVENOUS at 01:12

## 2018-01-01 RX ADMIN — DILTIAZEM HYDROCHLORIDE 360 MG: 180 CAPSULE, COATED, EXTENDED RELEASE ORAL at 08:10

## 2018-01-01 RX ADMIN — ACETAMINOPHEN 500 MG: 500 TABLET, FILM COATED ORAL at 09:10

## 2018-01-01 RX ADMIN — DILTIAZEM HYDROCHLORIDE 60 MG: 30 TABLET, FILM COATED ORAL at 11:10

## 2018-01-01 RX ADMIN — IOHEXOL 75 ML: 350 INJECTION, SOLUTION INTRAVENOUS at 08:11

## 2018-01-01 RX ADMIN — SODIUM CHLORIDE 500 ML: 0.9 INJECTION, SOLUTION INTRAVENOUS at 04:10

## 2018-01-01 RX ADMIN — ETOMIDATE 6 MG: 2 INJECTION, SOLUTION INTRAVENOUS at 01:12

## 2018-01-01 RX ADMIN — DILTIAZEM HYDROCHLORIDE 15 MG: 5 INJECTION INTRAVENOUS at 05:10

## 2018-01-01 RX ADMIN — VENLAFAXINE HYDROCHLORIDE 37.5 MG: 37.5 CAPSULE, EXTENDED RELEASE ORAL at 09:10

## 2018-01-01 RX ADMIN — ACETAMINOPHEN 500 MG: 500 TABLET, FILM COATED ORAL at 11:10

## 2018-01-01 RX ADMIN — APIXABAN 2.5 MG: 2.5 TABLET, FILM COATED ORAL at 08:10

## 2018-01-01 RX ADMIN — VENLAFAXINE HYDROCHLORIDE 37.5 MG: 37.5 CAPSULE, EXTENDED RELEASE ORAL at 12:10

## 2018-01-01 RX ADMIN — ACETAMINOPHEN 500 MG: 500 TABLET, FILM COATED ORAL at 10:10

## 2018-01-01 RX ADMIN — ACETAMINOPHEN 500 MG: 500 TABLET, FILM COATED ORAL at 04:10

## 2018-01-01 RX ADMIN — DILTIAZEM HYDROCHLORIDE 15 MG/HR: 5 INJECTION INTRAVENOUS at 03:10

## 2018-01-01 RX ADMIN — METOPROLOL TARTRATE 25 MG: 25 TABLET ORAL at 09:10

## 2018-01-01 RX ADMIN — ASPIRIN 81 MG 81 MG: 81 TABLET ORAL at 10:10

## 2018-01-01 RX ADMIN — VENLAFAXINE HYDROCHLORIDE 37.5 MG: 37.5 CAPSULE, EXTENDED RELEASE ORAL at 10:10

## 2018-01-01 RX ADMIN — DIGOXIN 500 MCG: 0.25 INJECTION INTRAMUSCULAR; INTRAVENOUS at 08:10

## 2018-01-18 ENCOUNTER — OFFICE VISIT (OUTPATIENT)
Dept: FAMILY MEDICINE | Facility: CLINIC | Age: 83
End: 2018-01-18
Payer: MEDICARE

## 2018-01-18 VITALS
RESPIRATION RATE: 18 BRPM | SYSTOLIC BLOOD PRESSURE: 142 MMHG | OXYGEN SATURATION: 96 % | HEIGHT: 65 IN | DIASTOLIC BLOOD PRESSURE: 76 MMHG | HEART RATE: 85 BPM | TEMPERATURE: 98 F

## 2018-01-18 DIAGNOSIS — S01.81XA FACIAL LACERATION, INITIAL ENCOUNTER: Primary | ICD-10-CM

## 2018-01-18 PROCEDURE — 99213 OFFICE O/P EST LOW 20 MIN: CPT | Mod: S$PBB,,, | Performed by: PHYSICIAN ASSISTANT

## 2018-01-18 PROCEDURE — 99999 PR PBB SHADOW E&M-EST. PATIENT-LVL IV: CPT | Mod: PBBFAC,,, | Performed by: PHYSICIAN ASSISTANT

## 2018-01-18 PROCEDURE — 1126F AMNT PAIN NOTED NONE PRSNT: CPT | Mod: ,,, | Performed by: PHYSICIAN ASSISTANT

## 2018-01-18 PROCEDURE — 1159F MED LIST DOCD IN RCRD: CPT | Mod: ,,, | Performed by: PHYSICIAN ASSISTANT

## 2018-01-18 PROCEDURE — 99214 OFFICE O/P EST MOD 30 MIN: CPT | Mod: PBBFAC,PO | Performed by: PHYSICIAN ASSISTANT

## 2018-01-19 NOTE — PROGRESS NOTES
Subjective:       Patient ID: Ruth Lan is a 82 y.o. female.    Chief Complaint: Facial Injury (pt fell on ice and cut right side of face)    HPI: 81 yo female presents for fall at home. Slipped on ice outside house. Fell in the grass hitting and cutting her face. Denies losing consciousness. Minimal pain. Not on blood thinners.     Review of Systems   Skin: Positive for wound.       Objective:      Physical Exam   Constitutional: She is oriented to person, place, and time. She appears well-developed and well-nourished. No distress.   HENT:   Head: Normocephalic and atraumatic.       Neurological: She is alert and oriented to person, place, and time.   Skin: Skin is warm and dry. She is not diaphoretic.   Psychiatric: She has a normal mood and affect. Her behavior is normal.   Vitals reviewed.      Assessment:       1. Facial laceration, initial encounter        Plan:         Ruth was seen today for facial injury.    Diagnoses and all orders for this visit:    Facial laceration, initial encounter  -    Allergies reviewed, wounds cleaned with small amount of peroxide due to falling in grass. And flushed with bottle of sterile water. Eye lid lesion closed with dermabond. Closed well, bleeding stopped. Other wounds were covered with antibiotic ointment. She was advised to avoid wetting the dermabond. Until area completely closed. She was advised to apply ice to swelling as needed.

## 2018-01-25 ENCOUNTER — CLINICAL SUPPORT (OUTPATIENT)
Dept: AUDIOLOGY | Facility: CLINIC | Age: 83
End: 2018-01-25

## 2018-01-25 DIAGNOSIS — H90.3 SENSORINEURAL HEARING LOSS, BILATERAL: Primary | ICD-10-CM

## 2018-01-25 PROCEDURE — 99499 UNLISTED E&M SERVICE: CPT | Mod: S$GLB,,, | Performed by: OTOLARYNGOLOGY

## 2018-01-26 NOTE — PROGRESS NOTES
Audio completed.  I am ordering the patient a new aid for her left ear.  The old aid is 11 years old and cannot be repaired.  The aid will be dispensed in one-two weeks.

## 2018-02-07 ENCOUNTER — CLINICAL SUPPORT (OUTPATIENT)
Dept: AUDIOLOGY | Facility: CLINIC | Age: 83
End: 2018-02-07

## 2018-02-07 DIAGNOSIS — H90.3 SENSORINEURAL HEARING LOSS, BILATERAL: Primary | ICD-10-CM

## 2018-02-07 NOTE — PROGRESS NOTES
I dispensed Mrs. Lan's left [employer] hearing aid.  I had replaced the tubing in her left mold.  I only have the one [place] program in her hearing aid.  I demonstrated the volume control.    She will call if she needs adjustments.

## 2018-02-21 ENCOUNTER — TELEPHONE (OUTPATIENT)
Dept: FAMILY MEDICINE | Facility: CLINIC | Age: 83
End: 2018-02-21

## 2018-02-21 NOTE — TELEPHONE ENCOUNTER
----- Message from Irma Wyatt sent at 2/21/2018  2:54 PM CST -----  Contact: self  Pt states pharmacy refused to give her, her meds. She states she is very frustrated and states even more confused. Please call back at  [phone]

## 2018-02-21 NOTE — TELEPHONE ENCOUNTER
Patient notified script sent 11/22/17 90 day with 3 refills [facility] to call to inquire, patient verbalized understanding

## 2018-02-21 NOTE — TELEPHONE ENCOUNTER
----- Message from Charissa Abdul sent at 2/21/2018  2:05 PM CST -----  Contact: [phone]  Pt is requesting a call back in regards to a prescription  she cant reorder  Please call pt at your earliest convenience.  Thanks !

## 2018-02-21 NOTE — TELEPHONE ENCOUNTER
----- Message from Clari Dejesus sent at 2/21/2018 12:21 PM CST -----  Contact: Self   Patient says she need a refill. Please call patient at [phone].      amLODIPine (NORVASC) 5 MG tablet    [facility] - [facility], [place]  [address] [facility]

## 2018-02-22 ENCOUNTER — TELEPHONE (OUTPATIENT)
Dept: FAMILY MEDICINE | Facility: CLINIC | Age: 83
End: 2018-02-22

## 2018-02-22 NOTE — TELEPHONE ENCOUNTER
[facility] and was told prescription that was sent in November was voided, couldn't tell me a reason why; I verbally called in prescription for amlodipine as it was sent in November; informed pt [facility]; she states has 10 pills left; informed her to call if she does not receive mail order next week so we can send small supply to local pharmacy; pt verbalized understanding

## 2018-02-22 NOTE — TELEPHONE ENCOUNTER
Pt states she is almost out of amlodipine and needs refill called in; states bottle has no refills; I will place [facility]

## 2018-02-22 NOTE — TELEPHONE ENCOUNTER
----- Message from Cassi Guerrier sent at 2/22/2018  7:48 AM CST -----  Contact: self  Pt states that [facility] told her that they don't have refills. Please call them to confirm script. Pt states that she is out. [facility] is [facility]. PLease call them at [phone]. Thanks

## 2018-02-22 NOTE — TELEPHONE ENCOUNTER
----- Message from Kareen Milner sent at 2/21/2018  3:58 PM CST -----  Contact: Self  Pt returning call. [phone].

## 2018-02-28 ENCOUNTER — TELEPHONE (OUTPATIENT)
Dept: FAMILY MEDICINE | Facility: CLINIC | Age: 83
End: 2018-02-28

## 2018-02-28 NOTE — TELEPHONE ENCOUNTER
Spoke with patient. States she still has not received her prescription from [facility].Informed patient I will [facility] to check on status. Verbalized understanding.

## 2018-02-28 NOTE — TELEPHONE ENCOUNTER
Spoke with Alverto at [id number]. States that medication was sent out today. Will contact patient.

## 2018-02-28 NOTE — TELEPHONE ENCOUNTER
Spoke with patient. Notified of medication being sent out today and it will take 3-5 days for delivery. States she has enough to last until then. Advised to call if does not receive medication within the next 5 days.Verbalized understanding.

## 2018-02-28 NOTE — TELEPHONE ENCOUNTER
----- Message from Yvon Sands sent at 2/28/2018 10:20 AM CST -----  Contact: Self  Pt states she 's returning a [phone]

## 2018-03-26 ENCOUNTER — OFFICE VISIT (OUTPATIENT)
Dept: FAMILY MEDICINE | Facility: CLINIC | Age: 83
End: 2018-03-26
Payer: MEDICARE

## 2018-03-26 VITALS
SYSTOLIC BLOOD PRESSURE: 128 MMHG | HEART RATE: 84 BPM | TEMPERATURE: 98 F | HEIGHT: 65 IN | DIASTOLIC BLOOD PRESSURE: 70 MMHG | RESPIRATION RATE: 16 BRPM | WEIGHT: 162.5 LBS | BODY MASS INDEX: 27.07 KG/M2 | OXYGEN SATURATION: 93 %

## 2018-03-26 DIAGNOSIS — N18.30 STAGE 3 CHRONIC KIDNEY DISEASE: ICD-10-CM

## 2018-03-26 DIAGNOSIS — I10 ESSENTIAL HYPERTENSION: Primary | ICD-10-CM

## 2018-03-26 DIAGNOSIS — N39.42 URINARY INCONTINENCE WITHOUT SENSORY AWARENESS: ICD-10-CM

## 2018-03-26 PROCEDURE — 99214 OFFICE O/P EST MOD 30 MIN: CPT | Mod: S$PBB,,, | Performed by: FAMILY MEDICINE

## 2018-03-26 PROCEDURE — 99999 PR PBB SHADOW E&M-EST. PATIENT-LVL III: CPT | Mod: PBBFAC,,, | Performed by: FAMILY MEDICINE

## 2018-03-26 PROCEDURE — 99213 OFFICE O/P EST LOW 20 MIN: CPT | Mod: PBBFAC,PO | Performed by: FAMILY MEDICINE

## 2018-03-26 RX ORDER — TOLTERODINE 4 MG/1
4 CAPSULE, EXTENDED RELEASE ORAL DAILY
Qty: 90 CAPSULE | Refills: 0 | Status: SHIPPED | OUTPATIENT
Start: 2018-03-26 | End: 2018-06-22 | Stop reason: SDUPTHER

## 2018-03-26 NOTE — PROGRESS NOTES
"Chief Complaint   Patient presents with    Hypertension    discuss about medication       Ruth Lan is a 83 y.o. female who presents per the Chief Complaint.  Pt is known to me and was last seen by me on 11/22/2017.  All known chronic medical issues have been documented.       HPI     ROS  Review of Systems   Constitutional: Positive for appetite change and unexpected weight change. Negative for activity change, chills, diaphoresis, fatigue and fever.   HENT: Negative.  Negative for congestion, ear pain, hearing loss, nosebleeds, postnasal drip, rhinorrhea, sinus pressure, sneezing, sore throat and trouble swallowing.    Eyes: Positive for pain (resolved; associated with fall). Negative for visual disturbance.   Respiratory: Negative for cough, choking and shortness of breath.    Cardiovascular: Negative for chest pain, palpitations and leg swelling.   Gastrointestinal: Negative for abdominal pain, constipation, diarrhea, nausea and vomiting.   Genitourinary: Negative for difficulty urinating, dysuria, frequency and urgency.   Musculoskeletal: Positive for arthralgias and joint swelling. Negative for back pain, gait problem, myalgias and neck pain.   Skin: Negative.  Negative for rash.   Allergic/Immunologic: Negative for environmental allergies and food allergies.   Neurological: Negative.  Negative for dizziness, seizures, syncope, weakness, light-headedness and headaches.   Psychiatric/Behavioral: Negative.  Negative for confusion, decreased concentration, dysphoric mood and sleep disturbance. The patient is not nervous/anxious.        Physical Exam  Vitals:    03/26/18 0941   BP: 128/70   Pulse: 84   Resp: 16   Temp: 97.6 °F (36.4 °C)    Body mass index is 27.04 kg/m².  Weight: 73.7 kg (162 lb 7.7 oz)   Height: 5' 5" (165.1 cm)     Physical Exam   Constitutional: She is oriented to person, place, and time. She appears well-developed and well-nourished. She is active and cooperative.  Non-toxic " appearance. She does not appear ill.   HENT:   Head: Normocephalic and atraumatic.   Right Ear: Hearing, tympanic membrane, external ear and ear canal normal.   Left Ear: Hearing, tympanic membrane, external ear and ear canal normal.   Nose: Nose normal. No rhinorrhea or nasal deformity.   Mouth/Throat: Uvula is midline, oropharynx is clear and moist and mucous membranes are normal.   Eyes: Conjunctivae, EOM and lids are normal. Pupils are equal, round, and reactive to light. No scleral icterus.       Neck: Normal range of motion. Neck supple. No thyroid mass and no thyromegaly present.   Cardiovascular: Normal rate, regular rhythm, S1 normal and S2 normal.  Exam reveals no gallop and no friction rub.    Murmur heard.   Systolic murmur is present with a grade of 5/6   Mechanical murmur noted   Pulmonary/Chest: Effort normal and breath sounds normal. She has no wheezes. She has no rales.   Abdominal: Soft. She exhibits no mass. There is no tenderness. There is no rebound and no guarding.   Musculoskeletal:        Right knee: She exhibits decreased range of motion, swelling and bony tenderness. She exhibits no effusion, no ecchymosis, no deformity, no laceration, no erythema, normal alignment, no LCL laxity, normal patellar mobility and normal meniscus. Tenderness found. Medial joint line and lateral joint line tenderness noted. No MCL, no LCL and no patellar tendon tenderness noted.   Lymphadenopathy:        Head (right side): No submental, no submandibular and no tonsillar adenopathy present.        Head (left side): No submental, no submandibular and no tonsillar adenopathy present.     She has no cervical adenopathy.   Neurological: She is alert and oriented to person, place, and time.   Skin: Skin is warm, dry and intact. She is not diaphoretic. No pallor.   Psychiatric: She has a normal mood and affect. Her speech is normal and behavior is normal. Judgment and thought content normal. Cognition and memory are  normal.       Assessment & Plan    1. Essential hypertension  Patient was counseled and encouraged to maintain a low sodium diet, as well as increasing physical activity.  Recommend random BP checks at home on a regular basis.  Repeat BP at end of visit was not necessary. Will continue medication at this time, and follow up in 3-6 months, or sooner if blood pressure begins to increase.       2. Urinary incontinence without sensory awareness  Will increase dose and monitor for improvement; otherwise will consider other medication for symptom control.  - tolterodine (DETROL LA) 4 MG 24 hr capsule; Take 1 capsule (4 mg total) by mouth once daily.  Dispense: 90 capsule; Refill: 0     3. Stage 3 chronic kidney disease  Stable; encouraged patient to avoid NSAID medications and to increase water and clear liquid hydration.  Will continue to monitor for decline and refer as necessary.       Follow up documented    ACTIVE MEDICAL ISSUES:  Documented in Problem List    PAST MEDICAL HISTORY  Documented    PAST SURGICAL HISTORY:  Documented    SOCIAL HISTORY:  Documented    FAMILY HISTORY:  Documented    ALLERGIES AND MEDICATIONS: updated and reviewed.  Documented    Health Maintenance       Date Due Completion Date    Pneumococcal (65+) (1 of 2 - PCV13) 11/17/2025 (Originally 3/7/2000) ---    DEXA SCAN 02/02/2019 2/2/2016    Lipid Panel 11/22/2022 11/22/2017    TETANUS VACCINE 11/22/2027 11/22/2017 (Declined)    Override on 11/22/2017: Declined

## 2018-06-04 ENCOUNTER — OFFICE VISIT (OUTPATIENT)
Dept: FAMILY MEDICINE | Facility: CLINIC | Age: 83
End: 2018-06-04
Payer: MEDICARE

## 2018-06-04 VITALS
SYSTOLIC BLOOD PRESSURE: 120 MMHG | DIASTOLIC BLOOD PRESSURE: 72 MMHG | WEIGHT: 156.31 LBS | BODY MASS INDEX: 26.04 KG/M2 | OXYGEN SATURATION: 95 % | RESPIRATION RATE: 16 BRPM | TEMPERATURE: 98 F | HEIGHT: 65 IN | HEART RATE: 84 BPM

## 2018-06-04 DIAGNOSIS — G31.84 MCI (MILD COGNITIVE IMPAIRMENT): ICD-10-CM

## 2018-06-04 DIAGNOSIS — N39.42 URINARY INCONTINENCE WITHOUT SENSORY AWARENESS: ICD-10-CM

## 2018-06-04 DIAGNOSIS — M17.0 PRIMARY OSTEOARTHRITIS OF BOTH KNEES: Primary | ICD-10-CM

## 2018-06-04 DIAGNOSIS — I10 ESSENTIAL HYPERTENSION: ICD-10-CM

## 2018-06-04 DIAGNOSIS — F51.04 PSYCHOPHYSIOLOGICAL INSOMNIA: ICD-10-CM

## 2018-06-04 PROCEDURE — 99214 OFFICE O/P EST MOD 30 MIN: CPT | Mod: S$PBB,,, | Performed by: FAMILY MEDICINE

## 2018-06-04 PROCEDURE — 99213 OFFICE O/P EST LOW 20 MIN: CPT | Mod: PBBFAC,PO | Performed by: FAMILY MEDICINE

## 2018-06-04 PROCEDURE — 99999 PR PBB SHADOW E&M-EST. PATIENT-LVL III: CPT | Mod: PBBFAC,,, | Performed by: FAMILY MEDICINE

## 2018-06-04 NOTE — PROGRESS NOTES
Chief Complaint   Patient presents with    Hypertension     follow up    Weight Loss     Discuss        Ruth JHOAN Lan is a 83 y.o. female who presents per the Chief Complaint.  Pt is known to me and was last seen by me on 3/26/2018.  All known chronic medical issues have been documented.       Hypertension   This is a chronic problem. The current episode started more than 1 year ago. The problem has been waxing and waning since onset. The problem is uncontrolled. Associated symptoms include anxiety. Pertinent negatives include no blurred vision, chest pain, headaches, malaise/fatigue, neck pain, orthopnea, palpitations, peripheral edema, PND, shortness of breath or sweats. There are no associated agents to hypertension. Risk factors for coronary artery disease include dyslipidemia, obesity and post-menopausal state. Past treatments include calcium channel blockers. The current treatment provides moderate improvement. There is no history of angina, kidney disease, CAD/MI, CVA, heart failure, left ventricular hypertrophy, PVD or retinopathy. There is no history of chronic renal disease, coarctation of the aorta, hyperaldosteronism, hypercortisolism, hyperparathyroidism, a hypertension causing med, pheochromocytoma, renovascular disease, sleep apnea or a thyroid problem.   Hyperlipidemia   This is a chronic problem. The current episode started more than 1 year ago. The problem is uncontrolled. Recent lipid tests were reviewed and are high. She has no history of chronic renal disease, diabetes, hypothyroidism, liver disease, obesity or nephrotic syndrome. There are no known factors aggravating her hyperlipidemia. Pertinent negatives include no chest pain, myalgias or shortness of breath. Current antihyperlipidemic treatment includes statins. The current treatment provides moderate improvement of lipids. Risk factors for coronary artery disease include dyslipidemia, hypertension, obesity and post-menopausal.     "    ROS  Review of Systems   Constitutional: Positive for appetite change, fatigue and unexpected weight change. Negative for activity change, chills, diaphoresis, fever and malaise/fatigue.   HENT: Negative.  Negative for congestion, ear pain, hearing loss, nosebleeds, postnasal drip, rhinorrhea, sinus pressure, sneezing, sore throat and trouble swallowing.    Eyes: Negative for blurred vision, pain and visual disturbance.   Respiratory: Negative for cough, choking and shortness of breath.    Cardiovascular: Negative for chest pain, palpitations, orthopnea, leg swelling and PND.   Gastrointestinal: Negative for abdominal pain, constipation, diarrhea, nausea and vomiting.   Genitourinary: Negative for difficulty urinating, dysuria, frequency and urgency.   Musculoskeletal: Positive for arthralgias and joint swelling. Negative for back pain, gait problem, myalgias and neck pain.   Skin: Negative.  Negative for rash.   Allergic/Immunologic: Negative for environmental allergies and food allergies.   Neurological: Negative.  Negative for dizziness, seizures, syncope, weakness, light-headedness and headaches.   Psychiatric/Behavioral: Negative.  Negative for confusion, decreased concentration, dysphoric mood and sleep disturbance. The patient is not nervous/anxious.        Physical Exam  Vitals:    06/04/18 0943   BP: 120/72   Pulse: 84   Resp: 16   Temp: 97.5 °F (36.4 °C)    Body mass index is 26.01 kg/m².  Weight: 70.9 kg (156 lb 4.9 oz)   Height: 5' 5" (165.1 cm)     Physical Exam   Constitutional: She is oriented to person, place, and time. She appears well-developed and well-nourished. She is active and cooperative.  Non-toxic appearance. She does not appear ill.   HENT:   Head: Normocephalic and atraumatic.   Right Ear: Hearing, tympanic membrane, external ear and ear canal normal.   Left Ear: Hearing, tympanic membrane, external ear and ear canal normal.   Nose: Nose normal. No rhinorrhea or nasal deformity. "   Mouth/Throat: Uvula is midline, oropharynx is clear and moist and mucous membranes are normal.   Eyes: Conjunctivae, EOM and lids are normal. Pupils are equal, round, and reactive to light. No scleral icterus.   Neck: Normal range of motion. Neck supple. No thyroid mass and no thyromegaly present.   Cardiovascular: Normal rate, regular rhythm, S1 normal and S2 normal.  Exam reveals no gallop and no friction rub.    Murmur heard.   Systolic murmur is present with a grade of 5/6   Mechanical murmur noted   Pulmonary/Chest: Effort normal and breath sounds normal. She has no wheezes. She has no rales.   Abdominal: Soft. She exhibits no mass. There is no tenderness. There is no rebound and no guarding.   Musculoskeletal:        Right knee: She exhibits decreased range of motion, swelling and bony tenderness. She exhibits no effusion, no ecchymosis, no deformity, no laceration, no erythema, normal alignment, no LCL laxity, normal patellar mobility and normal meniscus. Tenderness found. Medial joint line and lateral joint line tenderness noted. No MCL, no LCL and no patellar tendon tenderness noted.   Lymphadenopathy:        Head (right side): No submental, no submandibular and no tonsillar adenopathy present.        Head (left side): No submental, no submandibular and no tonsillar adenopathy present.     She has no cervical adenopathy.   Neurological: She is alert and oriented to person, place, and time.   Skin: Skin is warm, dry and intact. She is not diaphoretic. No pallor.   Psychiatric: She has a normal mood and affect. Her speech is normal and behavior is normal. Judgment and thought content normal. Cognition and memory are normal.       Assessment & Plan    1. Primary osteoarthritis of both knees  Recommended to continue acetaminophen therapy daily and minimize physical activity involving stair climbing.    2. Essential hypertension  Patient was counseled and encouraged to maintain a low sodium diet, as well as  increasing physical activity.  Recommend random BP checks at home on a regular basis.  Repeat BP at end of visit was not necessary. Will continue medication at this time, and follow up in 3-6 months, or sooner if blood pressure begins to increase.       3. MCI (mild cognitive impairment)  Patient leaves alone and has lost weight due to lack of interest in cooking or eating.  Will refer to Case Management to evaluate possible resources available for assistance.  Home health will be considered.  - Ambulatory referral to Outpatient Case Management    4. Urinary incontinence without sensory awareness  The current medical regimen is effective at this time and no changes to present plan or medications will be made at this visit.       5. Psychophysiological insomnia  The current medical regimen is effective at this time and no changes to present plan or medications will be made at this visit.  Patient was advised to practice good sleep hygiene, which includes decreasing stimulation at least one hour before bedtime.  This includes no television, no stimulation reading or games.  Natural sleep aids were recommended as well including lavender and chamomile.  The problem of recurrent insomnia is discussed. Avoidance of caffeine sources is strongly encouraged. Sleep hygiene issues are reviewed.        Follow up documented    ACTIVE MEDICAL ISSUES:  Documented in Problem List    PAST MEDICAL HISTORY  Documented    PAST SURGICAL HISTORY:  Documented    SOCIAL HISTORY:  Documented    FAMILY HISTORY:  Documented    ALLERGIES AND MEDICATIONS: updated and reviewed.  Documented    Health Maintenance       Date Due Completion Date    Pneumococcal (65+) (1 of 2 - PCV13) 11/17/2025 (Originally 3/7/2000) ---    Influenza Vaccine 08/01/2018 11/22/2017 (Declined)    Override on 11/22/2017: Declined    Override on 11/18/2014: Declined (refused)    Override on 8/19/2014: Declined (refused)    DEXA SCAN 02/02/2019 2/2/2016    Lipid Panel  11/22/2022 11/22/2017    TETANUS VACCINE 11/22/2027 11/22/2017 (Declined)    Override on 11/22/2017: Declined

## 2018-06-05 ENCOUNTER — OUTPATIENT CASE MANAGEMENT (OUTPATIENT)
Dept: ADMINISTRATIVE | Facility: OTHER | Age: 83
End: 2018-06-05

## 2018-06-05 NOTE — PROGRESS NOTES
Thank you for the referral.  Patient has been assigned to Jazz De La Fuente LMSW for low risk screening for Outpatient Case Management.     Reason for referral: MCI (mild cognitive impairment)    Please contact \[facility] [place] [facility]\"" at ext. [phone] with any questions.    Thank you,    Lis Bañuelos LMSW

## 2018-06-06 ENCOUNTER — OUTPATIENT CASE MANAGEMENT (OUTPATIENT)
Dept: ADMINISTRATIVE | Facility: OTHER | Age: 83
End: 2018-06-06

## 2018-06-06 NOTE — PROGRESS NOTES
This SW received a referral on the above patient.   Reason for referral:MCI (mild cognitive impairment)   Name of the community resource that was provided:Senior Resource Guide, [employer]  Program, Hired Caregivers, RTA Application , [employer]  Resource given to: Patient  via US Mail and Telephone    This LMSW completed assessment with patient daughter. Daughter reports patient resides alone.  Daughter reports patient has an emergency alert button. Daughter reports patient is able to complete all ADL's. Daughter reports patient ambulates with a cane. Daughter denied patient needs assistance with food, shelter, medication or medical. Daughter reports patient drives herself to and from appointments, however no longer has interest in driving. LMSW provided daughter with an RTA application to assist with transportation resources. Daughter is patient POA. Advance Directive is in chart. Daughter is seeking assistance with  services. Daughter reports patient no longer has interest in cooking or any social activities. Daughter reports patient used to volunteer with the [employer] for 19 years, however recently stopped. Daughter reports patient advised her she no longer feels comfortable driving. LMSW encouraged daughter to complete RTA application to assist with transportation. Daughter also reports patient is connected with [employer]. Daughter will follow up with [employer] for possible volunteering position. Daughter reports patient needs assistance with cleaning and possible meal preparation. Daughter reports patient is forgetting to eat. LMSW encourage daughter to make a follow up appointment with neurologist to address the memory issues. SW provided daughter with information for [employer]  Service to assist with light housekeeping.  Daughter reports she will also hire a  to assist as well. SW also mailed daughter hired caregiver resources to assist with sitter  services. LMSW complete PHQ screening with daughter on behalf of patient. Score was a 16. Daughter believes patient is depressed . Patient is not on any anti depressant. Daughter reports patient will not see a therapist to assist with depression. Daughter reports NH placement will not be an option. LMSW mailed all resources to patient and provided her contact information for any additional needs.

## 2018-06-20 ENCOUNTER — OFFICE VISIT (OUTPATIENT)
Dept: NEUROLOGY | Facility: CLINIC | Age: 83
End: 2018-06-20
Payer: MEDICARE

## 2018-06-20 VITALS
HEIGHT: 63 IN | WEIGHT: 152.56 LBS | SYSTOLIC BLOOD PRESSURE: 112 MMHG | HEART RATE: 77 BPM | BODY MASS INDEX: 27.03 KG/M2 | DIASTOLIC BLOOD PRESSURE: 70 MMHG

## 2018-06-20 DIAGNOSIS — R41.3 MEMORY PROBLEM: Primary | ICD-10-CM

## 2018-06-20 PROCEDURE — 99213 OFFICE O/P EST LOW 20 MIN: CPT | Mod: PBBFAC | Performed by: PSYCHIATRY & NEUROLOGY

## 2018-06-20 PROCEDURE — 99215 OFFICE O/P EST HI 40 MIN: CPT | Mod: S$PBB,GC,, | Performed by: PSYCHIATRY & NEUROLOGY

## 2018-06-20 PROCEDURE — 99999 PR PBB SHADOW E&M-EST. PATIENT-LVL III: CPT | Mod: PBBFAC,GC,, | Performed by: PSYCHIATRY & NEUROLOGY

## 2018-06-20 NOTE — PROGRESS NOTES
I have seen the patient, reviewed the Resident's history and physical, assessment and plan. I have personally interviewed and examined the patient at bedside and agree with the findings.     Patient presents with daughter who corroborates history.  Earlier this year patient stopped volunteering at the [employer] after many years due to her concerns [place] with her advancing age.  Since that time she has noted mild STM difficulties and family has some concerns regarding STM and maintenance of diet although she is without depression and remains very independent.  She lives independently with family nearby and partially manages her own finances, no issues with driving despite stopping longer trips.  I have low suspicion for neurodegenerative process but she should have neuropsychology testing to better evaluate, will refrain from additional medications pending results.  She was advised to limit driving to daylight hours within short distances of her home.  She was advised to maintain activities as an important part of cognitive health.  She is looking into volunteering at the [employer] near her home, I think this is a good idea.    MD Rohan King - Neurology

## 2018-06-20 NOTE — PROGRESS NOTES
Subjective:       Patient ID: Ruth Lan is a 83 y.o. female.    Chief Complaint:  Follow-up    Interval History:(6/20/2018)  83 year old female with history of HTN, HL, osteoarthritis and breast ca s/p radiation presented to my clinic because she couldn't get appointment with Dr Lassiter. Daughter noticed few changes in last 2 months. Patient leaves alone and has lost weight due to lack of interest in cooking or eating. She also reported in worsening in memory problem. Per daughter she is forgetting appointments, dates and time and asking things repeatedly. She do remember the names. 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 She was evaluated by outpatient case management.       -- Daughter reports patient ambulates with a cane.   -- Daughter denied patient needs assistance with food, shelter, medication or medical.   -- Daughter reports patient drives herself to and from appointments. Never lost her way home.    -- Advance Directive is in chart.   -- Daughter reports patient needs assistance with cleaning and possible meal preparation. Daughter reports patient is forgetting to eat.   -- Daughter believes patient is depressed . Patient is not on any anti depressant.   -- Daughter reports NH placement will not be an option.   -- LMSW mailed all resources to patient and provided her contact information for any additional needs.     Daughter denies any behavioral changes. She reported problem with sleep. She lost her son 2 year ago and since then falling a sleep is a problem. She remember her medications. She strongly think if she can volunteer somewhere that will help. Denies HA, dizziness, focal deficit, vision problem or LOC.      History of Present Illness  HPI This is a 81-year-old female who returns in followup. She had been seen by me with complaints of memory difficulties were last few years and was last seen by me 1 year ago. Workup in the past had  revealed low therapeutic B12 levels. She did receive a six-month course of high and B12. The patient has a history of breast cancer. According to the daughter, the patient first started noticing problems after she completed radiation in June 2013. 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 She underwent adjuvant XRT 3/21/13-5/14/13.     The patient lives alone and is able to take care of her day-to-day needs without any problems. She continues to drive around the neighborhood. Her appetite has improved and she has been sleeping well. This is confirmed by her daughter. A CT scan of the brain done in 2015 showed age-appropriate changes with mild small ischemic changes. It was otherwise unremarkable.  She has continued to volunteer at the [employer] which has helped.       Review of Systems  Review of Systems   Constitutional: Negative.    HENT: Negative.  Negative for hearing loss.    Eyes: Negative.  Negative for visual disturbance.   Respiratory: Negative.  Negative for shortness of breath.    Cardiovascular: Negative.  Negative for chest pain and palpitations.   Gastrointestinal: Negative.    Genitourinary: Negative.    Musculoskeletal: Positive for arthralgias (knee pain-right). Negative for back pain, gait problem and neck pain.   Skin: Negative.    Neurological: Negative.  Negative for tremors, seizures, syncope, speech difficulty, weakness, numbness and headaches.   Psychiatric/Behavioral: Negative.  Negative for confusion and decreased concentration.       Objective:      Neurologic Exam     Cranial Nerves     CN III, IV, VI   Pupils are equal, round, and reactive to light.  Extraocular motions are normal.       Physical Exam   Constitutional: She appears well-developed and well-nourished.   HENT:   Head: Normocephalic and atraumatic.   Right Ear: Hearing normal.   Left Ear: Hearing normal.   Eyes: EOM are  normal. Pupils are equal, round, and reactive to light.   Fundus examination showed sharp disc margins.   Neck: Normal range of motion. Neck supple. Carotid bruit is not present.   Neurological: She is alert. She has normal reflexes. She displays no atrophy. No cranial nerve deficit (Visual fields at bedside testing essentially normal.  No facial asymmetry noted with facial movements and sensory exam being normal/symmetrical.  Corneals/gag reflexes normal.  Tongue & palate movements normal.  Shoulder shrug was normal.) or sensory deficit. She exhibits normal muscle tone. She displays a negative Romberg sign. Coordination and gait normal.   Oriented to place/floor, time, person. Able to spell word WORLD forward. Able to spell word WORLD backward with one mistake. Registration and recall is 3/3. language and insight is good. She cracked a multiple jokes during this visit.       Psychiatric: Her behavior is normal. Thought content normal.   Vitals reviewed.          Assessment:     -- Memory Problem  -- HTN  -- HL  -- Osteoarthritis       Plan:     -- Refer for Neuro psych testing   -- Will order B1, B2, B6, B12, and MMA  -- [facility] [place] and provided her contact information for any additional needs.   -- Counseled about physical activity, appetite, sleep and hydration.    -- Case discussed with Dr Barrios   -- Follow up with Dr Lassiter.        Arnulfo Deluca MD  Neurology Resident   [facility] [address] [place] [address]  Pager: [phone]

## 2018-06-22 DIAGNOSIS — N39.42 URINARY INCONTINENCE WITHOUT SENSORY AWARENESS: ICD-10-CM

## 2018-06-25 RX ORDER — TOLTERODINE 4 MG/1
4 CAPSULE, EXTENDED RELEASE ORAL DAILY
Qty: 90 CAPSULE | Refills: 0 | Status: SHIPPED | OUTPATIENT
Start: 2018-06-25 | End: 2018-09-04 | Stop reason: SDUPTHER

## 2018-06-26 ENCOUNTER — OUTPATIENT CASE MANAGEMENT (OUTPATIENT)
Dept: ADMINISTRATIVE | Facility: OTHER | Age: 83
End: 2018-06-26

## 2018-06-26 NOTE — PROGRESS NOTES
LMSW received a call on voice recorder form patient daughter Sandra. Sandra reports not sure what services she needs for her mom. Daughter reports mom is losing weight, not able to clean her home. LMSW advised daughter that she is looking for a personal care attendant that can offer those services. Daughter will make contact with agency on today.

## 2018-07-03 ENCOUNTER — TELEPHONE (OUTPATIENT)
Dept: FAMILY MEDICINE | Facility: CLINIC | Age: 83
End: 2018-07-03

## 2018-07-03 NOTE — TELEPHONE ENCOUNTER
----- Message from Clive Mcmanus sent at 7/3/2018 10:28 AM CDT -----  Contact: Daughter/Tiana  Tiana has additional questions regarding pt's health. Contact Tiana at [phone].    Thanks-

## 2018-07-03 NOTE — TELEPHONE ENCOUNTER
Spoke with patient's daughter,Tiana. States that she would like a sooner appointment than Tuesday. Informed that due to holiday that is the earliest apartment.Verbalzied understanding. Would like to be contacted if anyone cancels before 11am on Friday.

## 2018-07-03 NOTE — TELEPHONE ENCOUNTER
----- Message from Bull Najera sent at 7/3/2018  3:14 PM CDT -----  Contact: Sandra Daughter [phone]  Calling TO speak with doc regarding appt as possible

## 2018-07-03 NOTE — TELEPHONE ENCOUNTER
Attempt to contact patient's daughter, Tiana. No answer. Unable to leave message due to voicemail not set up.

## 2018-07-03 NOTE — TELEPHONE ENCOUNTER
Spoke with patient's daughter,Tiana. States that she feels her mom is getting worse. She forgets to eat at times. She fell Thursday at [facility]. EMS came out and everything was ok. Denies hitting her head. Daughter is going to try and get home health started. She has a meeting with someone today. She is requesting a wheelchair for her mom.And would like to know what else should she do. Offered appointment at this time. Refused for now. States that she wants to wait for the wheelchair and after she gets home health started.Her mom is afraid to get up and walk. She is also going to bring in POA paperwork to be put into patient's chart.Wheelchair order pended.Please advise.

## 2018-07-07 ENCOUNTER — HOSPITAL ENCOUNTER (INPATIENT)
Facility: HOSPITAL | Age: 83
LOS: 4 days | Discharge: HOME HEALTH | DRG: 292 | End: 2018-07-11
Attending: EMERGENCY MEDICINE | Admitting: HOSPITALIST
Payer: MEDICARE

## 2018-07-07 ENCOUNTER — PATIENT MESSAGE (OUTPATIENT)
Dept: FAMILY MEDICINE | Facility: CLINIC | Age: 83
End: 2018-07-07

## 2018-07-07 DIAGNOSIS — I34.0 NONRHEUMATIC MITRAL VALVE INSUFFICIENCY: ICD-10-CM

## 2018-07-07 DIAGNOSIS — R09.02 HYPOXIA: ICD-10-CM

## 2018-07-07 DIAGNOSIS — J90 PLEURAL EFFUSION: Primary | ICD-10-CM

## 2018-07-07 DIAGNOSIS — I27.20 PULMONARY HTN: ICD-10-CM

## 2018-07-07 DIAGNOSIS — D64.9 SYMPTOMATIC ANEMIA: ICD-10-CM

## 2018-07-07 DIAGNOSIS — R06.02 SHORTNESS OF BREATH: ICD-10-CM

## 2018-07-07 DIAGNOSIS — M17.0 PRIMARY OSTEOARTHRITIS OF BOTH KNEES: ICD-10-CM

## 2018-07-07 DIAGNOSIS — I50.33 ACUTE ON CHRONIC DIASTOLIC HEART FAILURE: ICD-10-CM

## 2018-07-07 DIAGNOSIS — I34.0 SEVERE MITRAL REGURGITATION: ICD-10-CM

## 2018-07-07 PROBLEM — E44.0 MALNUTRITION OF MODERATE DEGREE: Status: ACTIVE | Noted: 2018-07-07

## 2018-07-07 LAB
ABO + RH BLD: NORMAL
ALBUMIN SERPL BCP-MCNC: 2.8 G/DL
ALP SERPL-CCNC: 146 U/L
ALT SERPL W/O P-5'-P-CCNC: 28 U/L
ANION GAP SERPL CALC-SCNC: 9 MMOL/L
AST SERPL-CCNC: 26 U/L
BASOPHILS # BLD AUTO: 0.04 K/UL
BASOPHILS NFR BLD: 0.5 %
BILIRUB SERPL-MCNC: 0.6 MG/DL
BLD GP AB SCN CELLS X3 SERPL QL: NORMAL
BLD PROD TYP BPU: NORMAL
BLOOD UNIT EXPIRATION DATE: NORMAL
BLOOD UNIT TYPE CODE: 9500
BLOOD UNIT TYPE: NORMAL
BNP SERPL-MCNC: 815 PG/ML
BUN SERPL-MCNC: 31 MG/DL
CALCIUM SERPL-MCNC: 9.3 MG/DL
CHLORIDE SERPL-SCNC: 103 MMOL/L
CO2 SERPL-SCNC: 24 MMOL/L
CODING SYSTEM: NORMAL
CREAT SERPL-MCNC: 1.1 MG/DL
DIFFERENTIAL METHOD: ABNORMAL
DISPENSE STATUS: NORMAL
EOSINOPHIL # BLD AUTO: 0.2 K/UL
EOSINOPHIL NFR BLD: 1.8 %
ERYTHROCYTE [DISTWIDTH] IN BLOOD BY AUTOMATED COUNT: 17.4 %
EST. GFR  (AFRICAN AMERICAN): 54 ML/MIN/1.73 M^2
EST. GFR  (NON AFRICAN AMERICAN): 47 ML/MIN/1.73 M^2
GLUCOSE SERPL-MCNC: 164 MG/DL
HCT VFR BLD AUTO: 24.8 %
HGB BLD-MCNC: 7.9 G/DL
IRON SERPL-MCNC: 21 UG/DL
LYMPHOCYTES # BLD AUTO: 0.9 K/UL
LYMPHOCYTES NFR BLD: 11 %
MCH RBC QN AUTO: 30.7 PG
MCHC RBC AUTO-ENTMCNC: 31.9 G/DL
MCV RBC AUTO: 97 FL
MONOCYTES # BLD AUTO: 0.8 K/UL
MONOCYTES NFR BLD: 8.9 %
NEUTROPHILS # BLD AUTO: 6.5 K/UL
NEUTROPHILS NFR BLD: 77.8 %
PLATELET # BLD AUTO: 248 K/UL
PMV BLD AUTO: 9.3 FL
POTASSIUM SERPL-SCNC: 4.1 MMOL/L
PROT SERPL-MCNC: 7.1 G/DL
RBC # BLD AUTO: 2.57 M/UL
RETICS/RBC NFR AUTO: 6.8 %
SATURATED IRON: 5 %
SODIUM SERPL-SCNC: 136 MMOL/L
TOTAL IRON BINDING CAPACITY: 394 UG/DL
TRANS ERYTHROCYTES VOL PATIENT: NORMAL ML
TRANSFERRIN SERPL-MCNC: 266 MG/DL
TROPONIN I SERPL DL<=0.01 NG/ML-MCNC: 0.02 NG/ML
WBC # BLD AUTO: 8.4 K/UL

## 2018-07-07 PROCEDURE — 93005 ELECTROCARDIOGRAM TRACING: CPT

## 2018-07-07 PROCEDURE — P9021 RED BLOOD CELLS UNIT: HCPCS

## 2018-07-07 PROCEDURE — 82746 ASSAY OF FOLIC ACID SERUM: CPT

## 2018-07-07 PROCEDURE — 63600175 PHARM REV CODE 636 W HCPCS: Performed by: EMERGENCY MEDICINE

## 2018-07-07 PROCEDURE — 80053 COMPREHEN METABOLIC PANEL: CPT

## 2018-07-07 PROCEDURE — 83540 ASSAY OF IRON: CPT

## 2018-07-07 PROCEDURE — 87040 BLOOD CULTURE FOR BACTERIA: CPT | Mod: 59

## 2018-07-07 PROCEDURE — 99999 HC NO LEVEL OF SERVICE - ED ONLY: CPT

## 2018-07-07 PROCEDURE — 86901 BLOOD TYPING SEROLOGIC RH(D): CPT

## 2018-07-07 PROCEDURE — 85045 AUTOMATED RETICULOCYTE COUNT: CPT

## 2018-07-07 PROCEDURE — 83880 ASSAY OF NATRIURETIC PEPTIDE: CPT

## 2018-07-07 PROCEDURE — 84484 ASSAY OF TROPONIN QUANT: CPT

## 2018-07-07 PROCEDURE — 82607 VITAMIN B-12: CPT

## 2018-07-07 PROCEDURE — 21400001 HC TELEMETRY ROOM

## 2018-07-07 PROCEDURE — 85025 COMPLETE CBC W/AUTO DIFF WBC: CPT

## 2018-07-07 PROCEDURE — 93010 ELECTROCARDIOGRAM REPORT: CPT | Mod: ,,, | Performed by: INTERNAL MEDICINE

## 2018-07-07 PROCEDURE — 36430 TRANSFUSION BLD/BLD COMPNT: CPT

## 2018-07-07 PROCEDURE — 86901 BLOOD TYPING SEROLOGIC RH(D): CPT | Mod: 91

## 2018-07-07 PROCEDURE — 86920 COMPATIBILITY TEST SPIN: CPT

## 2018-07-07 PROCEDURE — 63600175 PHARM REV CODE 636 W HCPCS: Performed by: HOSPITALIST

## 2018-07-07 RX ORDER — LANOLIN ALCOHOL/MO/W.PET/CERES
3000 CREAM (GRAM) TOPICAL DAILY
Status: DISCONTINUED | OUTPATIENT
Start: 2018-07-08 | End: 2018-07-11 | Stop reason: HOSPADM

## 2018-07-07 RX ORDER — PRAVASTATIN SODIUM 40 MG/1
80 TABLET ORAL DAILY
Status: DISCONTINUED | OUTPATIENT
Start: 2018-07-08 | End: 2018-07-11 | Stop reason: HOSPADM

## 2018-07-07 RX ORDER — SODIUM CHLORIDE 0.9 % (FLUSH) 0.9 %
5 SYRINGE (ML) INJECTION
Status: DISCONTINUED | OUTPATIENT
Start: 2018-07-07 | End: 2018-07-11 | Stop reason: HOSPADM

## 2018-07-07 RX ORDER — FUROSEMIDE 10 MG/ML
20 INJECTION INTRAMUSCULAR; INTRAVENOUS ONCE
Status: DISCONTINUED | OUTPATIENT
Start: 2018-07-07 | End: 2018-07-11 | Stop reason: HOSPADM

## 2018-07-07 RX ORDER — FUROSEMIDE 10 MG/ML
20 INJECTION INTRAMUSCULAR; INTRAVENOUS ONCE
Status: DISCONTINUED | OUTPATIENT
Start: 2018-07-07 | End: 2018-07-07

## 2018-07-07 RX ORDER — HYDROCODONE BITARTRATE AND ACETAMINOPHEN 500; 5 MG/1; MG/1
TABLET ORAL
Status: DISCONTINUED | OUTPATIENT
Start: 2018-07-07 | End: 2018-07-11 | Stop reason: HOSPADM

## 2018-07-07 RX ORDER — OXYBUTYNIN CHLORIDE 5 MG/1
10 TABLET, EXTENDED RELEASE ORAL DAILY
Status: DISCONTINUED | OUTPATIENT
Start: 2018-07-08 | End: 2018-07-11 | Stop reason: HOSPADM

## 2018-07-07 RX ORDER — DIPHENHYDRAMINE HCL 25 MG
25 CAPSULE ORAL
Status: DISCONTINUED | OUTPATIENT
Start: 2018-07-07 | End: 2018-07-11 | Stop reason: HOSPADM

## 2018-07-07 RX ORDER — FUROSEMIDE 10 MG/ML
40 INJECTION INTRAMUSCULAR; INTRAVENOUS ONCE
Status: COMPLETED | OUTPATIENT
Start: 2018-07-07 | End: 2018-07-07

## 2018-07-07 RX ORDER — LIDOCAINE HYDROCHLORIDE 10 MG/ML
10 INJECTION INFILTRATION; PERINEURAL ONCE
Status: COMPLETED | OUTPATIENT
Start: 2018-07-08 | End: 2018-07-08

## 2018-07-07 RX ADMIN — CEFTRIAXONE 1 G: 1 INJECTION, SOLUTION INTRAVENOUS at 12:07

## 2018-07-07 RX ADMIN — FUROSEMIDE 40 MG: 10 INJECTION, SOLUTION INTRAMUSCULAR; INTRAVENOUS at 04:07

## 2018-07-07 RX ADMIN — AZITHROMYCIN MONOHYDRATE 500 MG: 500 INJECTION, POWDER, LYOPHILIZED, FOR SOLUTION INTRAVENOUS at 01:07

## 2018-07-07 NOTE — PLAN OF CARE
Problem: Anemia (Adult)  Intervention: Facilitate Safe Activity   07/07/18 1736   Musculoskeletal Interventions   Fatigue Management activity assistance provided     Intervention: Assist with Determining Underlying Cause   07/07/18 1736   Safety Interventions   Bleeding Precautions blood pressure closely monitored;monitored for signs of bleeding     Intervention: Minimize Infection Risk   07/07/18 1736   Safety Interventions   Infection Prevention environmental surveillance;single patient room provided       Goal: Identify Related Risk Factors and Signs and Symptoms  Related risk factors and signs and symptoms are identified upon initiation of Human Response Clinical Practice Guideline (CPG)  Outcome: Ongoing (interventions implemented as appropriate)   07/07/18 1736   Anemia   Related Risk Factors (Anemia) poor nutrition   Signs and Symptoms (Anemia) fatigue;pallor     Goal: Symptom Improvement  Patient will demonstrate the desired outcomes by discharge/transition of care.  Outcome: Ongoing (interventions implemented as appropriate)   07/07/18 1736   Anemia (Adult)   Symptom Improvement making progress toward outcome

## 2018-07-07 NOTE — ED PROVIDER NOTES
"Encounter Date: 7/7/2018     This is a 83 y.o. female complaining of shortness of breath which began 3-4 days. History of heart murmer and valve replacement. Daughter denies history of CHF. Patient fell about 10 days ago and daughter states that "things have been downhill since then." Patient denies pain or any additional symptoms.    I have evaluated and conducted a medical screening exam with initial orders entered, if indicated, to expedite care. The patient will be placed in a treatment area when one is available. Care will be transferred to an alternate provider for a full assessment including but not limited to: history, physical exam, additional orders, and final disposition.    Cesar Alexander NP      SCRIBE #1 NOTE: I, Gabriele Cazares, am scribing for, and in the presence of,  Cleo Bartlett MD. I have scribed the following portions of the note - Other sections scribed: HPI, ROS.       History     Chief Complaint   Patient presents with    labored breathing     pt. with daughter who c/o pt. is havign labored breathing.daughter states symptoms started 3-4 days ago. denies any chest pain. c/o SOB     CC: Shortness of Breath    HPI: This 83 y.o female with PMHx Heart murmur, HLD, HTN and PSHx Cardiac valve surgery, Tissue aortic valve replacement presents to the ED c/o acute onset, intermittent SOB that began x3-4 days ago. Per pt's daughter, pt had x1 fall episode x1 week ago and sustained no injuries. Pt denies hx of similar sx. Pt is a former smoker, quitting x20 years ago. No prior tx. No modifying factors. Otherwise, pt denies COPD, CP, fever, chills, coughing, N/V/D, BM incontinence, urinary incontinence, dysuria, and any other associated sx.       The history is provided by the patient and a relative (daughter). No  was used.     Review of patient's allergies indicates:   Allergen Reactions    Etodolac Other (See Comments)     Dehydration    Nsaids (non-steroidal anti-inflammatory " drug)      COLITIS/DEHYDRATION     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, ER/[place] positive    Heart murmur     Hyperlipidemia     Hypertension      Past Surgical History:   Procedure Laterality Date    BREAST BIOPSY  11/2012    Right breast- invasive ductal carcinoma    CARDIAC VALVE REPLACEMENT      aortic, pig valve    CARDIAC VALVE SURGERY  2004    AVR    CATARACT EXTRACTION W/  INTRAOCULAR LENS IMPLANT      OU     EYE SURGERY      cataracts- bilateral    TISSUE AORTIC VALVE REPLACEMENT  2003    TUBAL LIGATION       Family History   Problem Relation Age of Onset    Cancer Maternal Aunt         cervical or breast    Breast cancer Maternal Grandmother     Heart disease Father     No Known Problems Mother     No Known Problems Sister     No Known Problems Brother     No Known Problems Maternal Uncle     No Known Problems Paternal Aunt     No Known Problems Paternal Uncle     No Known Problems Maternal Grandfather     No Known Problems Paternal Grandmother     No Known Problems Paternal Grandfather     Blindness Neg Hx     Cataracts Neg Hx     Diabetes Neg Hx     Glaucoma Neg Hx     Ovarian cancer Neg Hx     Amblyopia Neg Hx     Hypertension Neg Hx     Macular degeneration Neg Hx     Retinal detachment Neg Hx     Strabismus Neg Hx     Stroke Neg Hx     Thyroid disease Neg Hx      Social History     Tobacco Use    Smoking status: Former Smoker     Packs/day: 1.00     Years: 50.00     Pack years: 50.00     Last attempt to quit: 11/15/2002     Years since quitting: 15.7    Smokeless tobacco: Never Used   Substance Use Topics    Alcohol use: Yes     Alcohol/week: 1.5 oz     Types: 3 Standard drinks or equivalent per week     Comment: 3 OZ WINE     Drug use: No     Review of Systems   Constitutional: Negative for chills and fever.   HENT:  Negative for congestion, ear pain, rhinorrhea and sore throat.    Eyes: Negative for pain and visual disturbance.   Respiratory: Positive for shortness of breath. Negative for cough.    Cardiovascular: Negative for chest pain.   Gastrointestinal: Negative for abdominal pain, diarrhea, nausea and vomiting.        (-) BM incontinence   Genitourinary: Negative for difficulty urinating and dysuria.        (-) urinary incontinence   Musculoskeletal: Negative for back pain and neck pain.   Skin: Negative for rash.   Neurological: Negative for headaches.   All other systems reviewed and are negative.      Physical Exam     Initial Vitals   BP Pulse Resp Temp SpO2   07/07/18 0921 07/07/18 0921 07/07/18 1001 07/07/18 0921 07/07/18 0921   125/68 92 18 98.1 °F (36.7 °C) (!) 81 %      MAP       --                Physical Exam  Constitutional: Well-developed, Thin, No acute distressed, Alert  HENT: Normocephalic, Atraumatic, Moist mucous membranes  Eyes: Conjunctiva normal  Neck: Supple, ROM normal  Cardiac: RRR, ++ murmurs  Pulmonary/Chest wall: No respiratory distress, diminished breath sounds, no chest wall tenderness  Abdomen: Soft, nontender, nondistended, no rebound, no guarding  Musc: Normal ROM, No obvious joint swelling  Lymph: No lower extremity edema  Neuro: oriented x 3, no focal neurologic deficit  Skin: Pink, warm, dry.  No rashes  Psych: Behavior normal, Mood and affect normal    Previous medical record and nursing documentation reviewed where available.            ED Course   Procedures  Labs Reviewed   CBC W/ AUTO DIFFERENTIAL - Abnormal; Notable for the following components:       Result Value    RBC 2.57 (*)     Hemoglobin 7.9 (*)     Hematocrit 24.8 (*)     MCHC 31.9 (*)     RDW 17.4 (*)     Lymph # 0.9 (*)     Gran% 77.8 (*)     Lymph% 11.0 (*)     All other components within normal limits   COMPREHENSIVE METABOLIC PANEL - Abnormal; Notable for the following components:    Glucose 164 (*)     BUN, Bld 31 (*)      Albumin 2.8 (*)     Alkaline Phosphatase 146 (*)     eGFR if  54 (*)     eGFR if non  47 (*)     All other components within normal limits   B-TYPE NATRIURETIC PEPTIDE - Abnormal; Notable for the following components:     (*)     All other components within normal limits   IRON AND TIBC - Abnormal; Notable for the following components:    Iron 21 (*)     Saturated Iron 5 (*)     All other components within normal limits   RETICULOCYTES - Abnormal; Notable for the following components:    Retic 6.8 (*)     All other components within normal limits   TROPONIN I   TYPE & SCREEN   PREPARE RBC SOFT     EKG Readings: (Independently Interpreted)   Initial Reading: No STEMI. Rhythm: Normal Sinus Rhythm. Ectopy: No Ectopy. Conduction: Normal. ST Segments: Normal ST Segments. T Waves: Normal. Clinical Impression: Normal Sinus Rhythm with PACs       Imaging Results          CT Chest Without Contrast (Final result)  Result time 07/07/18 11:31:53    Final result by Vanessa Greene MD (07/07/18 11:31:53)                 Impression:      Moderate left and large right-sided pleural effusions with cardiomegaly, pulmonary vascular congestion and interstitial edema.  There is more focal opacity in the right middle lobe and right lower lobe adjacent to the pleural effusions which could be related to atelectasis, aspiration or pneumonia.  Underlying mass is difficult to entirely exclude on the basis of this examination.  Consider follow-up after resolution of acute symptoms and resolution of pleural effusions to exclude underlying mass.  Please consider follow-up examination with contrast enhancement.    Nonspecific mediastinal adenopathy with benign and malignant etiologies to be considered.    Incompletely imaged air is well dilatation of the infrarenal abdominal aorta.    Calcified coronary artery disease with significant calcifications of the aortic valve and mitral  annulus.    Cholelithiasis.      Electronically signed by: Vanessa Greene MD  Date:    07/07/2018  Time:    11:31             Narrative:    EXAMINATION:  CT CHEST WITHOUT CONTRAST    CLINICAL HISTORY:  effusion;    TECHNIQUE:  Low dose axial images, sagittal and coronal reformations were obtained from the thoracic inlet to the lung bases. Contrast was not administered.    COMPARISON:  07/07/2018    FINDINGS:  The thyroid appears normal.  The main central airways are patent.  The esophagus appears normal.  The heart is enlarged.  There are significant calcifications of the mitral annulus and calcifications of the aortic valve.  Calcified coronary artery disease is noted.  Calcified atheromatous disease also affects the aorta and its major branch vessels.  There is aneurysmal dilatation of the infrarenal abdominal aorta, incompletely imaged.  No pericardial effusion.  There are multiple enlarged mediastinal lymph nodes, pretracheal lymph node measures 1.8 cm, prevascular lymph nodes measure up to 1.2 cm.  Hilar lymph nodes are difficult to exclude given the lack of intravenous contrast.    Moderate left and large right-sided pleural effusions are noted.  Evaluation of the lung parenchyma is somewhat limited given significant respiratory motion artifact.  There is pulmonary vascular congestion with interlobular septal thickening suggesting interstitial edema.  Hazy ground-glass opacity is seen throughout a majority of both lungs.  Focal regions of consolidation in the right middle lobe and right lower lobe adjacent to pleural effusions could be related to atelectasis as well as pneumonia.  Is difficult to entirely exclude a mass on the basis of this examination.    Cholelithiasis.  The visualized portions of the liver, spleen, pancreas, adrenal glands and kidneys have a normal appearance.    The bones are osteopenic.  No fracture is seen.                               X-Ray Chest 1 View (Final result)  Result time  07/07/18 09:58:08   Procedure changed from X-Ray Chest PA And Lateral     Final result by Vanessa Greene MD (07/07/18 09:58:08)                 Impression:      As above.      Electronically signed by: Vanessa Greene MD  Date:    07/07/2018  Time:    09:58             Narrative:    EXAMINATION:  XR CHEST 1 VIEW    CLINICAL HISTORY:  chest pain; Shortness of breath    TECHNIQUE:  Single frontal view of the chest was performed.    COMPARISON:  09/17/2013    FINDINGS:  There is a large right-sided pleural effusion with underlying airspace opacity which could be related to atelectasis, aspiration or pneumonia.  Chronic lung changes are seen bilaterally with superimposed pulmonary vascular congestion and interstitial edema.  The heart appears enlarged.  Calcified atheromatous disease affects the aorta.  Age-appropriate degenerative changes affect the skeleton.                              X-Rays:   Independently Interpreted Readings:   Chest X-Ray: Right pleural effusion present.     Medical Decision Making:   History:   I obtained history from: someone other than patient.  Old Medical Records: I decided to obtain old medical records.  Independently Interpreted Test(s):   I have ordered and independently interpreted X-rays - see prior notes.  I have ordered and independently interpreted EKG Reading(s) - see prior notes  Clinical Tests:   Lab Tests: Ordered and Reviewed  Radiological Study: Reviewed and Ordered  Medical Tests: Reviewed and Ordered  ED Management:  Patient is a 83 year old female who presents to the ED with shortness of breath and dyspnea on exertion.  DDx would include ACS, HF, pneumonia, thoracic mass/malignancy, PTX.  Pulse ox is 81% on RA.  Patient emergently placed on oxygen which improved her symptoms markedly.  CXR reveals very large R pleural effusion - unable to characterize whether this is parapnumonic, secondary to malignancy or other.  Doubt hemothorax.  CT obtained which also demonstrates  significant adenopathy.  I have discussed with internal medicine who will admit.  I have started antibiotics to cover for possible pneumonia.  I do not feel the patient requires emergent thoracentesis so  I have placed a consult for IR drainage.  Will admit.            Scribe Attestation:   Scribe #1: I performed the above scribed service and the documentation accurately describes the services I performed. I attest to the accuracy of the note.    Attending Attestation:           Physician Attestation for Scribe:  Physician Attestation Statement for Scribe #1: I, Cleo Bartlett MD, reviewed documentation, as scribed by Gabriele Cazares in my presence, and it is both accurate and complete.                    Clinical Impression:   The primary encounter diagnosis was Pleural effusion. Diagnoses of Shortness of breath, Hypoxia, Symptomatic anemia, Nonrheumatic mitral valve insufficiency, Severe mitral regurgitation, Acute on chronic diastolic heart failure, Pulmonary HTN, and Primary osteoarthritis of both knees were also pertinent to this visit.                             Cleo Bartlett MD  08/24/18 [id number]

## 2018-07-07 NOTE — NURSING
Report given to night nurseJayant. Pt. AAO. Respirations even and unlabored on 2L NC. No apparent distress noted at this time.Side rails up x 2. Bed alarm set. Call light within patient's reach. Safety measures maintained.

## 2018-07-07 NOTE — SUBJECTIVE & OBJECTIVE
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, ER/[place] positive    Heart murmur     Hyperlipidemia     Hypertension        Past Surgical History:   Procedure Laterality Date    BREAST BIOPSY  11/2012    Right breast- invasive ductal carcinoma    CARDIAC VALVE REPLACEMENT      aortic, pig valve    CARDIAC VALVE SURGERY  2004    AVR    CATARACT EXTRACTION W/  INTRAOCULAR LENS IMPLANT      OU     EYE SURGERY      cataracts- bilateral    TISSUE AORTIC VALVE REPLACEMENT  2003    TUBAL LIGATION         Review of patient's allergies indicates:   Allergen Reactions    Etodolac Other (See Comments)     Dehydration    Nsaids (non-steroidal anti-inflammatory drug)      COLITIS/DEHYDRATION       No current facility-administered medications on file prior to encounter.      Current Outpatient Prescriptions on File Prior to Encounter   Medication Sig    acetaminophen (TYLENOL) 325 MG tablet Take by mouth 2 (two) times daily.     amLODIPine (NORVASC) 5 MG tablet Take 1 tablet (5 mg total) by mouth once daily.    calcium-vitamin D 500-125 mg-unit tablet Take 3 tablets by mouth Daily. 3 Tablet Oral Every day    cyanocobalamin (VITAMIN B-12) 1000 MCG tablet Take 3,000 mcg by mouth once daily.     DOCOSAHEXANOIC ACID/EPA (FISH OIL ORAL) Daily. 2   Every day    multivitamin (ONE DAILY MULTIVITAMIN) per tablet Take 1 tablet by mouth once daily.    pravastatin (PRAVACHOL) 80 MG tablet Take 1 tablet (80 mg total) by mouth once daily.    tolterodine (DETROL LA) 4 MG 24 hr capsule Take 1 capsule (4 mg total) by mouth once daily.    aspirin 81 mg Tab Take 81 mg by mouth Daily. 1 Tablet Oral Every day    flaxseed 1,000 mg Cap      Family History     Problem Relation (Age of Onset)    Breast cancer Maternal Grandmother    Cancer Maternal Aunt    Heart disease Father    No Known Problems  Mother, Sister, Brother, Maternal Uncle, Paternal Aunt, Paternal Uncle, Maternal Grandfather, Paternal Grandmother, Paternal Grandfather        Social History Main Topics    Smoking status: Former Smoker     Packs/day: 1.00     Years: 50.00     Quit date: 11/15/2002    Smokeless tobacco: Never Used    Alcohol use 1.5 oz/week     3 Standard drinks or equivalent per week      Comment: 3 OZ WINE     Drug use: No    Sexual activity: No     Review of Systems   Constitutional: Positive for activity change, appetite change, fatigue and unexpected weight change.   HENT: Negative.    Eyes: Negative.    Respiratory: Positive for shortness of breath.    Cardiovascular: Negative.    Gastrointestinal: Negative.    Endocrine: Negative.    Genitourinary: Negative.    Musculoskeletal: Positive for gait problem.   Skin: Negative.    Neurological: Positive for weakness and numbness.   Psychiatric/Behavioral: Positive for sleep disturbance.     Objective:     Vital Signs (Most Recent):  Temp: 97.8 °F (36.6 °C) (07/07/18 1634)  Pulse: 80 (07/07/18 1634)  Resp: 18 (07/07/18 1527)  BP: 133/72 (07/07/18 1634)  SpO2: (!) 93 % (07/07/18 1634) Vital Signs (24h Range):  Temp:  [97.3 °F (36.3 °C)-98.7 °F (37.1 °C)] 97.8 °F (36.6 °C)  Pulse:  [78-92] 80  Resp:  [18-20] 18  SpO2:  [81 %-95 %] 93 %  BP: (120-144)/(56-93) 133/72     Weight: 71.8 kg (158 lb 4.6 oz)  Body mass index is 27.17 kg/m².    Physical Exam   Constitutional: She is oriented to person, place, and time. She appears well-developed and well-nourished. No distress.   HENT:   Head: Normocephalic and atraumatic.   Mouth/Throat: Oropharynx is clear and moist.   Eyes: Conjunctivae and EOM are normal.   Neck: Normal range of motion. Neck supple.   Cardiovascular: Normal rate, regular rhythm and intact distal pulses.    Murmur heard.  Pulmonary/Chest: Effort normal. She has no wheezes.   Dull breath sounds R>L    Abdominal: Soft. Bowel sounds are normal. She exhibits no  distension. There is no tenderness.   Musculoskeletal: Normal range of motion. She exhibits no edema.   Lymphadenopathy:     She has no cervical adenopathy.   Neurological: She is alert and oriented to person, place, and time.   Skin: Skin is warm and dry. Capillary refill takes less than 2 seconds. No erythema.   Psychiatric: She has a normal mood and affect. Her behavior is normal.         CRANIAL NERVES     CN III, IV, VI   Extraocular motions are normal.        Significant Labs:   Blood Culture: No results for input(s): LABBLOO in the last 48 hours.  CBC:   Recent Labs  Lab 07/07/18  0956   WBC 8.40   HGB 7.9*   HCT 24.8*        CMP:   Recent Labs  Lab 07/07/18  0956      K 4.1      CO2 24   *   BUN 31*   CREATININE 1.1   CALCIUM 9.3   PROT 7.1   ALBUMIN 2.8*   BILITOT 0.6   ALKPHOS 146*   AST 26   ALT 28   ANIONGAP 9   EGFRNONAA 47*     Cardiac Markers:   Recent Labs  Lab 07/07/18  0956   *     Coagulation: No results for input(s): PT, INR, APTT in the last 48 hours.  POCT Glucose: No results for input(s): POCTGLUCOSE in the last 48 hours.  Respiratory Culture: No results for input(s): GSRESP, RESPIRATORYC in the last 48 hours.  Troponin:   Recent Labs  Lab 07/07/18  0956   TROPONINI 0.017     Urine Studies: No results for input(s): COLORU, APPEARANCEUA, PHUR, SPECGRAV, PROTEINUA, GLUCUA, KETONESU, BILIRUBINUA, OCCULTUA, NITRITE, UROBILINOGEN, LEUKOCYTESUR, RBCUA, WBCUA, BACTERIA, SQUAMEPITHEL, HYALINECASTS in the last 48 hours.    Invalid input(s): WRIGHTSUR    Significant Imaging: Chest CT as read by radiologist :  Moderate left and large right-sided pleural effusions with cardiomegaly, pulmonary vascular congestion and interstitial edema.  There is more focal opacity in the right middle lobe and right lower lobe adjacent to the pleural effusions which could be related to atelectasis, aspiration or pneumonia.  Underlying mass is difficult to entirely exclude on the basis of  this examination.  Consider follow-up after resolution of acute symptoms and resolution of pleural effusions to exclude underlying mass.  Please consider follow-up examination with contrast enhancement.    Nonspecific mediastinal adenopathy with benign and malignant etiologies to be considered.    Incompletely imaged air is well dilatation of the infrarenal abdominal aorta.    Calcified coronary artery disease with significant calcifications of the aortic valve and mitral annulus.

## 2018-07-07 NOTE — H&P
[facility] Medicine  History & Physical    Patient Name: Ruth Lan  MRN: [id number]  Admission Date: 7/7/2018  Attending Physician: Mae Sabillon MD   Primary Care Provider: Azikiwe K Lombard, MD         Patient information was obtained from patient and ER records.     Subjective:     Principal Problem:Pleural effusion    Chief Complaint:   Chief Complaint   Patient presents with    labored breathing     pt. with daughter who c/o pt. is havign labored breathing.daughter states symptoms started 3-4 days ago. denies any chest pain. c/o SOB        HPI: 82 yo female with AVR/pig valve, HTN, HLP, former smoker,and  h/o breast cancer presented from home with c/o SOB. She reports a fall 8 days ago and has been getting weaker everyday with worsening SOB and VALLE. Patient daughter reports in last few months, noticeable drop in weight, poor appetite, forgetting to eat and worsening weakness in LE. On workup she was found to have h/h 7.9/24.8,  and bilateral pleural effusion on CXR (R>L). A chest CT suggest large right pleural effusion and cannot rule out underlying mass/infection.  Pt denies fever, chills, CP and cough. O2 saturation 81% on room air on admit. Pulmonology consulted.     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, ER/AZ positive    Heart murmur     Hyperlipidemia     Hypertension        Past Surgical History:   Procedure Laterality Date    BREAST BIOPSY  11/2012    Right breast- invasive ductal carcinoma    CARDIAC VALVE REPLACEMENT      aortic, pig valve    CARDIAC VALVE SURGERY  2004    AVR    CATARACT EXTRACTION W/  INTRAOCULAR LENS IMPLANT      OU     EYE SURGERY      cataracts- bilateral    TISSUE AORTIC VALVE REPLACEMENT  2003    TUBAL LIGATION         Review of patient's allergies indicates:   Allergen Reactions     Etodolac Other (See Comments)     Dehydration    Nsaids (non-steroidal anti-inflammatory drug)      COLITIS/DEHYDRATION       No current facility-administered medications on file prior to encounter.      Current Outpatient Prescriptions on File Prior to Encounter   Medication Sig    acetaminophen (TYLENOL) 325 MG tablet Take by mouth 2 (two) times daily.     amLODIPine (NORVASC) 5 MG tablet Take 1 tablet (5 mg total) by mouth once daily.    calcium-vitamin D 500-125 mg-unit tablet Take 3 tablets by mouth Daily. 3 Tablet Oral Every day    cyanocobalamin (VITAMIN B-12) 1000 MCG tablet Take 3,000 mcg by mouth once daily.     DOCOSAHEXANOIC ACID/EPA (FISH OIL ORAL) Daily. 2   Every day    multivitamin (ONE DAILY MULTIVITAMIN) per tablet Take 1 tablet by mouth once daily.    pravastatin (PRAVACHOL) 80 MG tablet Take 1 tablet (80 mg total) by mouth once daily.    tolterodine (DETROL LA) 4 MG 24 hr capsule Take 1 capsule (4 mg total) by mouth once daily.    aspirin 81 mg Tab Take 81 mg by mouth Daily. 1 Tablet Oral Every day    flaxseed 1,000 mg Cap      Family History     Problem Relation (Age of Onset)    Breast cancer Maternal Grandmother    Cancer Maternal Aunt    Heart disease Father    No Known Problems Mother, Sister, Brother, Maternal Uncle, Paternal Aunt, Paternal Uncle, Maternal Grandfather, Paternal Grandmother, Paternal Grandfather        Social History Main Topics    Smoking status: Former Smoker     Packs/day: 1.00     Years: 50.00     Quit date: 11/15/2002    Smokeless tobacco: Never Used    Alcohol use 1.5 oz/week     3 Standard drinks or equivalent per week      Comment: 3 OZ WINE     Drug use: No    Sexual activity: No     Review of Systems   Constitutional: Positive for activity change, appetite change, fatigue and unexpected weight change.   HENT: Negative.    Eyes: Negative.    Respiratory: Positive for shortness of breath.    Cardiovascular: Negative.    Gastrointestinal: Negative.     Endocrine: Negative.    Genitourinary: Negative.    Musculoskeletal: Positive for gait problem.   Skin: Negative.    Neurological: Positive for weakness and numbness.   Psychiatric/Behavioral: Positive for sleep disturbance.     Objective:     Vital Signs (Most Recent):  Temp: 97.8 °F (36.6 °C) (07/07/18 1634)  Pulse: 80 (07/07/18 1634)  Resp: 18 (07/07/18 1527)  BP: 133/72 (07/07/18 1634)  SpO2: (!) 93 % (07/07/18 1634) Vital Signs (24h Range):  Temp:  [97.3 °F (36.3 °C)-98.7 °F (37.1 °C)] 97.8 °F (36.6 °C)  Pulse:  [78-92] 80  Resp:  [18-20] 18  SpO2:  [81 %-95 %] 93 %  BP: (120-144)/(56-93) 133/72     Weight: 71.8 kg (158 lb 4.6 oz)  Body mass index is 27.17 kg/m².    Physical Exam   Constitutional: She is oriented to person, place, and time. She appears well-developed and well-nourished. No distress.   HENT:   Head: Normocephalic and atraumatic.   Mouth/Throat: Oropharynx is clear and moist.   Eyes: Conjunctivae and EOM are normal.   Neck: Normal range of motion. Neck supple.   Cardiovascular: Normal rate, regular rhythm and intact distal pulses.    Murmur heard.  Pulmonary/Chest: Effort normal. She has no wheezes.   Dull breath sounds R>L    Abdominal: Soft. Bowel sounds are normal. She exhibits no distension. There is no tenderness.   Musculoskeletal: Normal range of motion. She exhibits no edema.   Lymphadenopathy:     She has no cervical adenopathy.   Neurological: She is alert and oriented to person, place, and time.   Skin: Skin is warm and dry. Capillary refill takes less than 2 seconds. No erythema.   Psychiatric: She has a normal mood and affect. Her behavior is normal.         CRANIAL NERVES     CN III, IV, VI   Extraocular motions are normal.        Significant Labs:   Blood Culture: No results for input(s): LABBLOO in the last 48 hours.  CBC:   Recent Labs  Lab 07/07/18  0956   WBC 8.40   HGB 7.9*   HCT 24.8*        CMP:   Recent Labs  Lab 07/07/18  0956      K 4.1      CO2 24    *   BUN 31*   CREATININE 1.1   CALCIUM 9.3   PROT 7.1   ALBUMIN 2.8*   BILITOT 0.6   ALKPHOS 146*   AST 26   ALT 28   ANIONGAP 9   EGFRNONAA 47*     Cardiac Markers:   Recent Labs  Lab 07/07/18  0956   *     Coagulation: No results for input(s): PT, INR, APTT in the last 48 hours.  POCT Glucose: No results for input(s): POCTGLUCOSE in the last 48 hours.  Respiratory Culture: No results for input(s): GSRESP, RESPIRATORYC in the last 48 hours.  Troponin:   Recent Labs  Lab 07/07/18  0956   TROPONINI 0.017     Urine Studies: No results for input(s): COLORU, APPEARANCEUA, PHUR, SPECGRAV, PROTEINUA, GLUCUA, KETONESU, BILIRUBINUA, OCCULTUA, NITRITE, UROBILINOGEN, LEUKOCYTESUR, RBCUA, WBCUA, BACTERIA, SQUAMEPITHEL, HYALINECASTS in the last 48 hours.    Invalid input(s): WRIGHTSUR    Significant Imaging: Chest CT as read by radiologist :  Moderate left and large right-sided pleural effusions with cardiomegaly, pulmonary vascular congestion and interstitial edema.  There is more focal opacity in the right middle lobe and right lower lobe adjacent to the pleural effusions which could be related to atelectasis, aspiration or pneumonia.  Underlying mass is difficult to entirely exclude on the basis of this examination.  Consider follow-up after resolution of acute symptoms and resolution of pleural effusions to exclude underlying mass.  Please consider follow-up examination with contrast enhancement.    Nonspecific mediastinal adenopathy with benign and malignant etiologies to be considered.    Incompletely imaged air is well dilatation of the infrarenal abdominal aorta.    Calcified coronary artery disease with significant calcifications of the aortic valve and mitral annulus.    Assessment/Plan:     * Pleural effusion    IR (Monday) vs pulmonology for thoracentesis   ECHO pending  Wean oxygen as tolerated  [place]ost signed, pt request DNR   Hold aspirin           Symptomatic anemia    One unit blood transfusion    Repeat CBC   Pt takes vitamin B12 supplements           Malnutrition of moderate degree    Concern for malignancy   BOOST TID          Hypoxia    Wean oxygen as tolerated  Poor lung mechanics with large effusion on right  IV lasix given after blood transfusion           History of breast cancer    Reviewed MMG 2017 - no evidence of malignancy             Memory loss, short term    Follow up neurology as scheduled   Consider brain imaging as malignancy of concern           Vitamin B12 deficiency    See above           Essential hypertension    Resume norvasc as BP allows                         S/P aortic valve replacement    ECHO pending            VTE Risk Mitigation         Ordered     Place TONY hose  Until discontinued      07/07/18 1628     IP VTE LOW RISK PATIENT  Once      07/07/18 1539             Mae Sabillon MD  Department of Hospital Medicine   [facility] [place]

## 2018-07-07 NOTE — ED TRIAGE NOTES
"83 y.o. Female presents to the ED with chief complaint of labored breathing. Patient states she fell a week ago and has been having labored breathing for x3 days. Patient's daughter states patient "is feeling weaker and her appetite is decreased since the fall." Patient resting in bed in NAD. Side rails up x2.   "

## 2018-07-07 NOTE — ASSESSMENT & PLAN NOTE
IR (Monday) vs pulmonology for thoracentesis   ECHO pending  Wean oxygen as tolerated  Lapost signed, pt request DNR   Hold aspirin

## 2018-07-07 NOTE — HPI
82 yo female with AVR/pig valve, HTN, HLP, former smoker,and  h/o breast cancer presented from home with c/o SOB. She reports a fall 8 days ago and has been getting weaker everyday with worsening SOB and VALLE. Patient daughter reports in last few months, noticeable drop in weight, poor appetite, forgetting to eat and worsening weakness in LE. On workup she was found to have h/h 7.9/24.8,  and bilateral pleural effusion on CXR (R>L). A chest CT suggest large right pleural effusion and cannot rule out underlying mass/infection.  Pt denies fever, chills, CP and cough. O2 saturation 81% on room air on admit. Pulmonology consulted.

## 2018-07-07 NOTE — ASSESSMENT & PLAN NOTE
Wean oxygen as tolerated  Poor lung mechanics with large effusion on right  IV lasix given after blood transfusion

## 2018-07-07 NOTE — NURSING
Received patient from ER to room via stretcher. Blood infusing at 75 mL/hr. Patient accompanied by transport and  daughter. Transferred patient to bed. Evaluated general patient appearance and condition. Admit assessment initiated. Tele monitoring initiated. Saline lock at L AC and R hand Intact. No apparent distress noted at this time.

## 2018-07-08 PROBLEM — I50.33 ACUTE ON CHRONIC DIASTOLIC HEART FAILURE: Status: ACTIVE | Noted: 2018-07-08

## 2018-07-08 LAB
ANION GAP SERPL CALC-SCNC: 8 MMOL/L
APPEARANCE FLD: NORMAL
BODY FLD TYPE: NORMAL
BODY FLUID COMMENTS: NORMAL
BUN SERPL-MCNC: 28 MG/DL
CALCIUM SERPL-MCNC: 9.1 MG/DL
CHLORIDE SERPL-SCNC: 106 MMOL/L
CO2 SERPL-SCNC: 27 MMOL/L
COLOR FLD: NORMAL
CREAT SERPL-MCNC: 1.1 MG/DL
EST. GFR  (AFRICAN AMERICAN): 54 ML/MIN/1.73 M^2
EST. GFR  (NON AFRICAN AMERICAN): 47 ML/MIN/1.73 M^2
FOLATE SERPL-MCNC: 15.2 NG/ML
GLUCOSE SERPL-MCNC: 130 MG/DL
LYMPHOCYTES NFR FLD MANUAL: 85 %
MAGNESIUM SERPL-MCNC: 1.8 MG/DL
MONOS+MACROS NFR FLD MANUAL: 3 %
NEUTROPHILS NFR FLD MANUAL: 12 %
PHOSPHATE SERPL-MCNC: 3 MG/DL
POTASSIUM SERPL-SCNC: 4 MMOL/L
RH BLD: NORMAL
SODIUM SERPL-SCNC: 141 MMOL/L
VIT B12 SERPL-MCNC: 753 PG/ML
WBC # FLD: 363 /CU MM

## 2018-07-08 PROCEDURE — 21400001 HC TELEMETRY ROOM

## 2018-07-08 PROCEDURE — 32554 ASPIRATE PLEURA W/O IMAGING: CPT | Mod: RT,,, | Performed by: INTERNAL MEDICINE

## 2018-07-08 PROCEDURE — 63600175 PHARM REV CODE 636 W HCPCS: Performed by: EMERGENCY MEDICINE

## 2018-07-08 PROCEDURE — 93306 TTE W/DOPPLER COMPLETE: CPT | Mod: 26,,, | Performed by: INTERNAL MEDICINE

## 2018-07-08 PROCEDURE — 80048 BASIC METABOLIC PNL TOTAL CA: CPT

## 2018-07-08 PROCEDURE — 25000003 PHARM REV CODE 250: Performed by: HOSPITALIST

## 2018-07-08 PROCEDURE — 89051 BODY FLUID CELL COUNT: CPT

## 2018-07-08 PROCEDURE — 87205 SMEAR GRAM STAIN: CPT

## 2018-07-08 PROCEDURE — 82945 GLUCOSE OTHER FLUID: CPT

## 2018-07-08 PROCEDURE — 88112 CYTOPATH CELL ENHANCE TECH: CPT | Performed by: PATHOLOGY

## 2018-07-08 PROCEDURE — 93306 TTE W/DOPPLER COMPLETE: CPT

## 2018-07-08 PROCEDURE — 25000003 PHARM REV CODE 250: Performed by: INTERNAL MEDICINE

## 2018-07-08 PROCEDURE — 84100 ASSAY OF PHOSPHORUS: CPT

## 2018-07-08 PROCEDURE — 99223 1ST HOSP IP/OBS HIGH 75: CPT | Mod: 25,,, | Performed by: INTERNAL MEDICINE

## 2018-07-08 PROCEDURE — 36415 COLL VENOUS BLD VENIPUNCTURE: CPT

## 2018-07-08 PROCEDURE — 87070 CULTURE OTHR SPECIMN AEROBIC: CPT

## 2018-07-08 PROCEDURE — 0W993ZZ DRAINAGE OF RIGHT PLEURAL CAVITY, PERCUTANEOUS APPROACH: ICD-10-PCS | Performed by: INTERNAL MEDICINE

## 2018-07-08 PROCEDURE — 88305 TISSUE EXAM BY PATHOLOGIST: CPT | Mod: 26,,, | Performed by: PATHOLOGY

## 2018-07-08 PROCEDURE — 83615 LACTATE (LD) (LDH) ENZYME: CPT

## 2018-07-08 PROCEDURE — 84157 ASSAY OF PROTEIN OTHER: CPT

## 2018-07-08 PROCEDURE — 83735 ASSAY OF MAGNESIUM: CPT

## 2018-07-08 PROCEDURE — 88112 CYTOPATH CELL ENHANCE TECH: CPT | Mod: 26,,, | Performed by: PATHOLOGY

## 2018-07-08 RX ORDER — OXYCODONE AND ACETAMINOPHEN 5; 325 MG/1; MG/1
1 TABLET ORAL EVERY 6 HOURS PRN
Status: DISCONTINUED | OUTPATIENT
Start: 2018-07-08 | End: 2018-07-08

## 2018-07-08 RX ORDER — ACETAMINOPHEN 325 MG/1
650 TABLET ORAL EVERY 6 HOURS PRN
Status: DISCONTINUED | OUTPATIENT
Start: 2018-07-08 | End: 2018-07-11 | Stop reason: HOSPADM

## 2018-07-08 RX ORDER — OXYCODONE AND ACETAMINOPHEN 5; 325 MG/1; MG/1
1 TABLET ORAL EVERY 6 HOURS PRN
Status: DISCONTINUED | OUTPATIENT
Start: 2018-07-08 | End: 2018-07-11 | Stop reason: HOSPADM

## 2018-07-08 RX ADMIN — Medication 3000 MCG: at 09:07

## 2018-07-08 RX ADMIN — AZITHROMYCIN MONOHYDRATE 500 MG: 500 INJECTION, POWDER, LYOPHILIZED, FOR SOLUTION INTRAVENOUS at 02:07

## 2018-07-08 RX ADMIN — OXYBUTYNIN CHLORIDE 10 MG: 5 TABLET, EXTENDED RELEASE ORAL at 09:07

## 2018-07-08 RX ADMIN — PRAVASTATIN SODIUM 80 MG: 40 TABLET ORAL at 09:07

## 2018-07-08 RX ADMIN — OXYCODONE HYDROCHLORIDE AND ACETAMINOPHEN 1 TABLET: 5; 325 TABLET ORAL at 08:07

## 2018-07-08 RX ADMIN — CEFTRIAXONE 1 G: 1 INJECTION, SOLUTION INTRAVENOUS at 02:07

## 2018-07-08 RX ADMIN — LIDOCAINE HYDROCHLORIDE 10 ML: 10 INJECTION, SOLUTION INFILTRATION; PERINEURAL at 05:07

## 2018-07-08 RX ADMIN — THERA TABS 1 TABLET: TAB at 09:07

## 2018-07-08 NOTE — PLAN OF CARE
07/08/18 1056   Discharge Assessment   Assessment Type Discharge Planning Assessment   Confirmed/corrected address and phone number on facesheet? Yes   Assessment information obtained from? Patient   Expected Length of Stay (days) 3   Communicated expected length of stay with patient/caregiver yes   Prior to hospitilization cognitive status: Alert/Oriented   Prior to hospitalization functional status: Assistive Equipment   Current cognitive status: Alert/Oriented   Current Functional Status: Assistive Equipment   Facility Arrived From: Home   Lives With alone   Able to Return to Prior Arrangements yes   Is patient able to care for self after discharge? Yes   Who are your caregiver(s) and their phone number(s)? Sandra Diaz [phone]   Patient's perception of discharge disposition home or selfcare   Readmission Within The Last 30 Days no previous admission in last 30 days   Patient currently being followed by outpatient case management? No   Patient currently receives any other outside agency services? Yes   How many hours a day does the patient receive services? 4   Name and contact number of agency or person providing outside services Private Agency   Is it the patient/care giver preference to resume care with the current outside agency? (Unknown)   Equipment Currently Used at Home walker, standard;bath bench;cane, straight;rollator;shower chair   Do you have any problems affording any of your prescribed medications? No   Is the patient taking medications as prescribed? yes   Does the patient have transportation home? Yes   Transportation Available family or friend will provide  (Tiana Diaz [phone])   Dialysis Name and Scheduled days N/A   Does the patient receive services at the [facility]? No   Discharge Plan A Home   Discharge Plan B Home   Patient/Family In Agreement With Plan yes   SW to patient's room to discuss Helping the patient manage care at home.   SW role  "explained to pt.   Pt's daughter, Ms. Sandra Perez's stated that morning appointment are preferred.    "Discharge planning begins on Admission" pamphlet discussed and placed in "My Health Packet" and placed at bedside.     SW's  name and contact info placed on white board.     Preferred Appointment time: Pt prefers morning appointments. Appointments Days: Monday, Tuesday & Wednesday.    Preferred pharmacy:   [facility]  for [facility] [place] [address]  Phone: [phone] Fax: [phone]    [facility] - [place], [place] - [address]  [facility]  [place] [address]  Phone: [phone] Fax: [phone]    [facility] - [place] [address]  Phone: [phone] Fax: [phone]    [facility] [id number] - [place], [place] - [address]  [address]  [place] [address]  Phone: [phone] Fax: [phone]    "

## 2018-07-08 NOTE — PLAN OF CARE
Problem: Fall Risk (Adult)  Intervention: Monitor/Assist with Self Care   18   Daily Care Interventions   Self-Care Promotion --  independence encouraged;BADL personal objects within reach;BADL personal routines maintained   Functional Level Current   Ambulation 2 - assistive person --    Transferring 2 - assistive person --    Toileting 2 - assistive person --    Bathing 2 - assistive person --    Dressing 2 - assistive person --    Eating 0 - independent --    Communication 0 - understands/communicates without difficulty --    Swallowing 0 - swallows foods/liquids without difficulty --    Activity   Activity Assistance Provided assistance, 1 person --      Intervention: Reduce Risk/Promote Restraint Free Environment   18   Safety Interventions   Safety Precautions emergency equipment at bedside   Safety Interventions   Environmental Safety Modification assistive device/personal items within reach;clutter free environment maintained;lighting adjusted;room near unit station;room organization consistent   Prevent [place] Drop/Fall   Safety/Security Measures bed alarm set     Intervention: Review Medications/Identify Contributors to Fall Risk   18   Safety Interventions   Medication Review/Management medications reviewed     Intervention: Patient Rounds   18   Safety Interventions   Patient Rounds bed in low position;bed wheels locked;call light in reach;clutter free environment maintained;ID band on;placement of personal items at bedside;toileting offered;visualized patient     Intervention: Safety Promotion/Fall Prevention   18   Safety Interventions   Safety Promotion/Fall Prevention assistive device/personal item within reach;bed alarm set;commode/urinal/bedpan at bedside;Fall Risk reviewed with patient/family;lighting adjusted;medications reviewed;nonskid shoes/socks when out of bed;room near unit station;side rails raised x 2;supervised  activity;toileting scheduled;instructed to call staff for mobility     Intervention: Safety Precautions   07/08/18 0215   Safety Interventions   Safety Precautions emergency equipment at bedside       Goal: Identify Related Risk Factors and Signs and Symptoms  Related risk factors and signs and symptoms are identified upon initiation of Human Response Clinical Practice Guideline (CPG)   Outcome: Ongoing (interventions implemented as appropriate)   07/08/18 0215   Fall Risk   Related Risk Factors (Fall Risk) environment unfamiliar;slipper/uneven surfaces;objects hard to reach;age-related changes;history of falls;inadequate lighting   Signs and Symptoms (Fall Risk) presence of risk factors     Goal: Absence of Falls  Patient will demonstrate the desired outcomes by discharge/transition of care.   Outcome: Ongoing (interventions implemented as appropriate)   07/08/18 0215   Fall Risk (Adult)   Absence of Falls making progress toward outcome       Problem: Patient Care Overview  Goal: Plan of Care Review  Outcome: Ongoing (interventions implemented as appropriate)   07/08/18 0215   Coping/Psychosocial   Plan Of Care Reviewed With patient     Goal: Interdisciplinary Rounds/Family Conf  Outcome: Ongoing (interventions implemented as appropriate)   07/08/18 0215   Interdisciplinary Rounds/Family Conf   Participants patient       Problem: Pressure Ulcer Risk (Jesus Alberto Scale) (Adult,Obstetrics,Pediatric)  Intervention: Prevent/Manage Excess Moisture   07/07/18 2000 07/08/18 0215   Hygiene Care   Perineal Care perineum cleansed --    Bathing/Skin Care --  bedtime care   Skin Interventions   Skin Protection Incontinence pads;Tubing/devices free from skin contact --      Intervention: Maintain Head of Bed Elevation Less Than 30 Degrees as Tolerated   07/07/18 1540   Positioning   Head of Bed (HOB) HOB at 30-45 degrees     Intervention: Prevent/Minimize Sheer/Friction Injuries   07/07/18 2000 07/08/18 0215   Positioning    Positioning/Transfer Devices --  pillows   Skin Interventions   Pressure Reduction Techniques frequent weight shift encouraged --      Intervention: Turn/Reposition Often   07/07/18 2000   Positioning   Body Position positioned/repositioned independently   Skin Interventions   Pressure Reduction Techniques frequent weight shift encouraged       Goal: Identify Related Risk Factors and Signs and Symptoms  Related risk factors and signs and symptoms are identified upon initiation of Human Response Clinical Practice Guideline (CPG)   Outcome: Ongoing (interventions implemented as appropriate)   07/08/18 0215   Pressure Ulcer Risk (Jesus Alberto Scale)   Related Risk Factors (Pressure Ulcer Risk (Jesus Alberto Scale)) hospitalization prolonged;mobility impaired     Goal: Skin Integrity  Patient will demonstrate the desired outcomes by discharge/transition of care.   Outcome: Ongoing (interventions implemented as appropriate)   07/08/18 0215   Pressure Ulcer Risk (Jesus Alberto Scale) (Adult,Obstetrics,Pediatric)   Skin Integrity making progress toward outcome       Problem: Anemia (Adult)  Intervention: Facilitate Safe Activity   07/07/18 1736 07/08/18 0215   Musculoskeletal Interventions   Fatigue Management activity assistance provided --    Safety Interventions   Safety Precautions --  emergency equipment at bedside     Intervention: Assist with Determining Underlying Cause   07/07/18 2000   Safety Interventions   Bleeding Precautions blood pressure closely monitored;monitored for signs of bleeding     Intervention: Minimize Infection Risk   07/07/18 2000   Safety Interventions   Infection Prevention environmental surveillance;rest/sleep promoted;single patient room provided     Intervention: Promote Hydration and Nutrition   07/08/18 0215   Nutrition Interventions   Oral Nutrition Promotion safe use of adaptive equipment encouraged       Goal: Identify Related Risk Factors and Signs and Symptoms  Related risk factors and signs and  symptoms are identified upon initiation of Human Response Clinical Practice Guideline (CPG)   Outcome: Ongoing (interventions implemented as appropriate)   07/08/18 0215   Anemia   Related Risk Factors (Anemia) poor nutrition   Signs and Symptoms (Anemia) pallor     Goal: Symptom Improvement  Patient will demonstrate the desired outcomes by discharge/transition of care.   Outcome: Ongoing (interventions implemented as appropriate)   07/08/18 0215   Anemia (Adult)   Symptom Improvement making progress toward outcome

## 2018-07-08 NOTE — PROCEDURES
"Ruth Lan is a 83 y.o. female patient.    Temp: 98.4 °F (36.9 °C) (07/08/18 1708)  Pulse: 100 (07/08/18 1708)  Resp: 20 (07/08/18 1708)  BP: 110/71 (07/08/18 1708)  SpO2: 95 % (07/08/18 1708)  Weight: 70.2 kg (154 lb 12.2 oz) (07/08/18 0502)  Height: 5' 4" (162.6 cm) (07/07/18 1527)       Thoracentesis  Date/Time: 7/8/2018 5:11 PM  Performed by: ALEXIS YAÑEZ  Authorized by: ALEXIS YAÑEZ   Pre-operative diagnosis: pleural effusion  Post-operative diagnosis: pleural effusion  Consent Done: Yes  Consent: Written consent obtained.  Consent given by: patient  Patient understanding: patient states understanding of the procedure being performed  Patient consent: the patient's understanding of the procedure matches consent given  Procedure consent: procedure consent matches procedure scheduled  Relevant documents: relevant documents present and verified  Test results: test results available and properly labeled  Site marked: the operative site was marked  Imaging studies: imaging studies available  Required items: required blood products, implants, devices, and special equipment available  Patient identity confirmed: name and verbally with patient  Procedure purpose: diagnostic and therapeutic  Indications: pleural effusion  Preparation: Patient was prepped and draped in the usual sterile fashion.  Local anesthesia used: yes    Anesthesia:  Local anesthesia used: yes  Local Anesthetic: lidocaine 1% without epinephrine  Anesthetic total: 10 mL  Patient sedated: no  Description of findings: clear yellow fluid   Preparation: skin prepped with ChloraPrep  Patient position: sitting  Ultrasound guidance: yes  Location: right posterior  Puncture method: over-the-needle catheter  Needle size: 18  Catheter size: 18 gauge  Number of attempts: 1  Drainage amount: 700 ml  Drainage characteristics: serous  Patient tolerance: Patient tolerated the procedure well with no immediate complications  Chest x-ray " performed: yes  Complications: No  Estimated blood loss (mL): 1          Gonzalez Damon  7/8/2018

## 2018-07-09 PROBLEM — Z66 DNR (DO NOT RESUSCITATE): Status: ACTIVE | Noted: 2018-07-09

## 2018-07-09 PROBLEM — I27.20 PULMONARY HTN: Status: ACTIVE | Noted: 2018-07-09

## 2018-07-09 LAB
ANION GAP SERPL CALC-SCNC: 8 MMOL/L
BODY FLUID SOURCE, LDH: NORMAL
BUN SERPL-MCNC: 26 MG/DL
CALCIUM SERPL-MCNC: 9.2 MG/DL
CHLORIDE SERPL-SCNC: 106 MMOL/L
CO2 SERPL-SCNC: 26 MMOL/L
CREAT SERPL-MCNC: 0.9 MG/DL
EST. GFR  (AFRICAN AMERICAN): >60 ML/MIN/1.73 M^2
EST. GFR  (NON AFRICAN AMERICAN): 59 ML/MIN/1.73 M^2
ESTIMATED PA SYSTOLIC PRESSURE: 110.65
GLUCOSE FLD-MCNC: 152 MG/DL
GLUCOSE SERPL-MCNC: 118 MG/DL
LDH FLD L TO P-CCNC: 76 U/L
LDH SERPL L TO P-CCNC: 166 U/L
MAGNESIUM SERPL-MCNC: 2 MG/DL
MITRAL VALVE REGURGITATION: ABNORMAL
PHOSPHATE SERPL-MCNC: 2.9 MG/DL
POTASSIUM SERPL-SCNC: 4.4 MMOL/L
PROT FLD-MCNC: 1.4 G/DL
RETIRED EF AND QEF - SEE NOTES: 55 (ref 55–65)
SODIUM SERPL-SCNC: 140 MMOL/L
SPECIMEN SOURCE: NORMAL
SPECIMEN SOURCE: NORMAL
TRICUSPID VALVE REGURGITATION: ABNORMAL

## 2018-07-09 PROCEDURE — 21400001 HC TELEMETRY ROOM

## 2018-07-09 PROCEDURE — 36415 COLL VENOUS BLD VENIPUNCTURE: CPT

## 2018-07-09 PROCEDURE — 94761 N-INVAS EAR/PLS OXIMETRY MLT: CPT

## 2018-07-09 PROCEDURE — 25000003 PHARM REV CODE 250: Performed by: HOSPITALIST

## 2018-07-09 PROCEDURE — 63600175 PHARM REV CODE 636 W HCPCS: Performed by: INTERNAL MEDICINE

## 2018-07-09 PROCEDURE — 84100 ASSAY OF PHOSPHORUS: CPT

## 2018-07-09 PROCEDURE — 99223 1ST HOSP IP/OBS HIGH 75: CPT | Mod: ,,, | Performed by: INTERNAL MEDICINE

## 2018-07-09 PROCEDURE — 99233 SBSQ HOSP IP/OBS HIGH 50: CPT | Mod: ,,, | Performed by: EMERGENCY MEDICINE

## 2018-07-09 PROCEDURE — 93005 ELECTROCARDIOGRAM TRACING: CPT

## 2018-07-09 PROCEDURE — 27000221 HC OXYGEN, UP TO 24 HOURS

## 2018-07-09 PROCEDURE — 83615 LACTATE (LD) (LDH) ENZYME: CPT

## 2018-07-09 PROCEDURE — 83735 ASSAY OF MAGNESIUM: CPT

## 2018-07-09 PROCEDURE — 80048 BASIC METABOLIC PNL TOTAL CA: CPT

## 2018-07-09 RX ORDER — FUROSEMIDE 10 MG/ML
20 INJECTION INTRAMUSCULAR; INTRAVENOUS 2 TIMES DAILY
Status: DISCONTINUED | OUTPATIENT
Start: 2018-07-09 | End: 2018-07-11 | Stop reason: HOSPADM

## 2018-07-09 RX ORDER — FUROSEMIDE 20 MG/1
20 TABLET ORAL DAILY
Status: DISCONTINUED | OUTPATIENT
Start: 2018-07-09 | End: 2018-07-09

## 2018-07-09 RX ORDER — LISINOPRIL 5 MG/1
5 TABLET ORAL DAILY
Status: DISCONTINUED | OUTPATIENT
Start: 2018-07-09 | End: 2018-07-11 | Stop reason: HOSPADM

## 2018-07-09 RX ADMIN — Medication 3000 MCG: at 09:07

## 2018-07-09 RX ADMIN — OXYCODONE HYDROCHLORIDE AND ACETAMINOPHEN 1 TABLET: 5; 325 TABLET ORAL at 09:07

## 2018-07-09 RX ADMIN — THERA TABS 1 TABLET: TAB at 09:07

## 2018-07-09 RX ADMIN — OXYBUTYNIN CHLORIDE 10 MG: 5 TABLET, EXTENDED RELEASE ORAL at 09:07

## 2018-07-09 RX ADMIN — PRAVASTATIN SODIUM 80 MG: 40 TABLET ORAL at 09:07

## 2018-07-09 RX ADMIN — FUROSEMIDE 20 MG: 10 INJECTION, SOLUTION INTRAMUSCULAR; INTRAVENOUS at 05:07

## 2018-07-09 NOTE — ASSESSMENT & PLAN NOTE
I suspect CHF as the primary cause, but given her risk factors for cancer we will sent fluid for cytology as well as routine labs. Millicent completed. Will follow up with pt when lab results available.

## 2018-07-09 NOTE — PROGRESS NOTES
Patient c/o pain rated 8 on numeric pain rating scale, no pain medication ordered. Dr Medina notified, Percocet ordered.

## 2018-07-09 NOTE — ASSESSMENT & PLAN NOTE
"Malnutrition in the context of Acute Illness/Injury - pt admitted 2/2 pleural effusion    Related to (etiology):  Debility, adv'd age    Signs and Symptoms (as evidenced by):  Energy Intake: <50% of estimated energy requirement for "a few months"  Body Fat Depletion: moderate depletion of orbitals, triceps and thoracic and lumbar region   Muscle Mass Depletion: moderate depletion of temples, clavicle region and lower extremities   Weight Loss: 11% x 8 months     Interventions/Recommendations (treatment strategy):  See recs in note dated 7/9/18    Nutrition Diagnosis Status:  New          "

## 2018-07-09 NOTE — SUBJECTIVE & OBJECTIVE
Interval History: pt c/o SOB and discomfort and very tired today     Review of Systems   Constitutional: Positive for activity change, appetite change, fatigue and unexpected weight change.   HENT: Negative.    Eyes: Negative.    Respiratory: Positive for shortness of breath.    Cardiovascular: Negative.    Gastrointestinal: Negative.    Endocrine: Negative.    Genitourinary: Negative.    Musculoskeletal: Positive for gait problem.   Skin: Negative.    Neurological: Positive for weakness and numbness.   Psychiatric/Behavioral: Positive for sleep disturbance.     Objective:     Vital Signs (Most Recent):  Temp: 97.4 °F (36.3 °C) (07/09/18 1109)  Pulse: 81 (07/09/18 1109)  Resp: 19 (07/09/18 1109)  BP: 125/60 (07/09/18 1109)  SpO2: 96 % (07/09/18 1109) Vital Signs (24h Range):  Temp:  [97.4 °F (36.3 °C)-98.4 °F (36.9 °C)] 97.4 °F (36.3 °C)  Pulse:  [] 81  Resp:  [18-20] 19  SpO2:  [90 %-98 %] 96 %  BP: (110-128)/(55-71) 125/60     Weight: 70.2 kg (154 lb 12.2 oz) (taken by RN on 7/8)  Body mass index is 26.57 kg/m².    Intake/Output Summary (Last 24 hours) at 07/09/18 1459  Last data filed at 07/09/18 1325   Gross per 24 hour   Intake              400 ml   Output              950 ml   Net             -550 ml      Physical Exam   Constitutional: She is oriented to person, place, and time. She appears well-developed and well-nourished. No distress.   HENT:   Head: Normocephalic and atraumatic.   Mouth/Throat: Oropharynx is clear and moist.   Eyes: Conjunctivae and EOM are normal.   Neck: Normal range of motion. Neck supple.   Cardiovascular: Normal rate, regular rhythm and intact distal pulses.    Murmur heard.  Pulmonary/Chest: Effort normal. She has no wheezes.   Dull breath sounds R>L    Abdominal: Soft. Bowel sounds are normal. She exhibits no distension. There is no tenderness.   Musculoskeletal: Normal range of motion. She exhibits no edema.   Lymphadenopathy:     She has no cervical adenopathy.    Neurological: She is alert and oriented to person, place, and time.   Skin: Skin is warm and dry. Capillary refill takes less than 2 seconds. No erythema.   Psychiatric: She has a normal mood and affect. Her behavior is normal.       Significant Labs:   Blood Culture: No results for input(s): LABBLOO in the last 48 hours.  BMP:     Recent Labs  Lab 07/09/18  0558   *      K 4.4      CO2 26   BUN 26*   CREATININE 0.9   CALCIUM 9.2   MG 2.0     CBC: No results for input(s): WBC, HGB, HCT, PLT in the last 48 hours.    Significant Imaging: I have reviewed all pertinent imaging results/findings within the past 24 hours.

## 2018-07-09 NOTE — SUBJECTIVE & OBJECTIVE
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, ER/VT positive    Heart murmur     Hyperlipidemia     Hypertension        Past Surgical History:   Procedure Laterality Date    BREAST BIOPSY  11/2012    Right breast- invasive ductal carcinoma    CARDIAC VALVE REPLACEMENT      aortic, pig valve    CARDIAC VALVE SURGERY  2004    AVR    CATARACT EXTRACTION W/  INTRAOCULAR LENS IMPLANT      OU     EYE SURGERY      cataracts- bilateral    TISSUE AORTIC VALVE REPLACEMENT  2003    TUBAL LIGATION         Review of patient's allergies indicates:   Allergen Reactions    Etodolac Other (See Comments)     Dehydration    Nsaids (non-steroidal anti-inflammatory drug)      COLITIS/DEHYDRATION       Family History     Problem Relation (Age of Onset)    Breast cancer Maternal Grandmother    Cancer Maternal Aunt    Heart disease Father    No Known Problems Mother, Sister, Brother, Maternal Uncle, Paternal Aunt, Paternal Uncle, Maternal Grandfather, Paternal Grandmother, Paternal Grandfather        Social History Main Topics    Smoking status: Former Smoker     Packs/day: 1.00     Years: 50.00     Quit date: 11/15/2002    Smokeless tobacco: Never Used    Alcohol use 1.5 oz/week     3 Standard drinks or equivalent per week      Comment: 3 OZ WINE     Drug use: No    Sexual activity: No         Review of Systems   Review of Systems:  CV: no syncope  ENT: no sore throat  Resp: per hpi  Eyes: no visual changes  Gastrointestinal: no nausea or vomiting  Integument/Breast: no rash  Musculoskeletal: no arthralgias  Neurological: no headaches  Behavioral/Psych: no confusion or depression  Heme: no bleeding    Objective:     Vital Signs (Most Recent):  Temp: 98.4 °F (36.9 °C) (07/08/18 1950)  Pulse: 88 (07/08/18 1950)  Resp: 18 (07/08/18 1950)  BP: 115/63 (07/08/18 1950)  SpO2: 95 % (07/08/18  1950) Vital Signs (24h Range):  Temp:  [97.3 °F (36.3 °C)-98.4 °F (36.9 °C)] 98.4 °F (36.9 °C)  Pulse:  [] 88  Resp:  [18-20] 18  SpO2:  [91 %-95 %] 95 %  BP: (110-141)/(56-71) 115/63     Weight: 70.2 kg (154 lb 12.2 oz)  Body mass index is 26.57 kg/m².      Intake/Output Summary (Last 24 hours) at 07/08/18 2335  Last data filed at 07/08/18 1800   Gross per 24 hour   Intake              600 ml   Output              600 ml   Net                0 ml       Physical Exam  General: no distress  Eyes:  conjunctivae/corneas clear  Nose: no discharge  Neck: no lymphadenopathy  Lungs:  normal respiratory effort and no wheezes or rales. Diminished at bases.  Heart: regular rate and rhythm, + systolic murmur  Abdomen: non-distended, soft, non-tender  Extremities: no cyanosis or clubbing, + mild dependent edema  Skin: No rashes or lesions. good skin turgor  Neurologic: alert, oriented, thought content appropriate      Lines/Drains/Airways     Peripheral Intravenous Line                 Peripheral IV - Single Lumen 07/07/18 0950 Left Antecubital 1 day         Peripheral IV - Single Lumen 07/07/18 1308 Right Hand 1 day                Significant Labs:    CBC/Anemia Profile:    Recent Labs  Lab 07/07/18  0956 07/07/18  1225   WBC 8.40  --    HGB 7.9*  --    HCT 24.8*  --      --    MCV 97  --    RDW 17.4*  --    IRON  --  21*   RETIC  --  6.8*   FOLATE  --  15.2   [id number]  --  753        Chemistries:    Recent Labs  Lab 07/07/18  0956 07/08/18  0519    141   K 4.1 4.0    106   CO2 24 27   BUN 31* 28*   CREATININE 1.1 1.1   CALCIUM 9.3 9.1   ALBUMIN 2.8*  --    PROT 7.1  --    BILITOT 0.6  --    ALKPHOS 146*  --    ALT 28  --    AST 26  --    MG  --  1.8   PHOS  --  3.0     Significant Imaging:   CT: I have reviewed all pertinent results/findings within the past 24 hours and my personal findings are:  bilateral pleural effusion R > L

## 2018-07-09 NOTE — PT/OT/SLP PROGRESS
Physical Therapy      Patient Name:  Ruth Lan   MRN:  [id number]    Patient not seen today secondary to patient fatigue and requesting to sleep. Will follow-up again tomorrow morning per patient request.    Eliane Kraft, PT

## 2018-07-09 NOTE — PROGRESS NOTES
Received bedside report. Patient AAOx4, 2L O2 NC, telemetry monitoring in place no distress noted. Family at bedside, safety maintained, call light in reach.

## 2018-07-09 NOTE — ASSESSMENT & PLAN NOTE
S/p thoracentesis 7/8  ECHO as above  Wean oxygen as tolerated  Lapost signed, pt request DNR   Hold aspirin   Continue lasix

## 2018-07-09 NOTE — SUBJECTIVE & OBJECTIVE
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, ER/HI positive    Heart murmur     Hyperlipidemia     Hypertension        Past Surgical History:   Procedure Laterality Date    BREAST BIOPSY  11/2012    Right breast- invasive ductal carcinoma    CARDIAC VALVE REPLACEMENT      aortic, pig valve    CARDIAC VALVE SURGERY  2004    AVR    CATARACT EXTRACTION W/  INTRAOCULAR LENS IMPLANT      OU     EYE SURGERY      cataracts- bilateral    TISSUE AORTIC VALVE REPLACEMENT  2003    TUBAL LIGATION         Review of patient's allergies indicates:   Allergen Reactions    Etodolac Other (See Comments)     Dehydration    Nsaids (non-steroidal anti-inflammatory drug)      COLITIS/DEHYDRATION       No current facility-administered medications on file prior to encounter.      Current Outpatient Prescriptions on File Prior to Encounter   Medication Sig    acetaminophen (TYLENOL) 325 MG tablet Take by mouth 2 (two) times daily.     amLODIPine (NORVASC) 5 MG tablet Take 1 tablet (5 mg total) by mouth once daily.    calcium-vitamin D 500-125 mg-unit tablet Take 3 tablets by mouth Daily. 3 Tablet Oral Every day    cyanocobalamin (VITAMIN B-12) 1000 MCG tablet Take 3,000 mcg by mouth once daily.     DOCOSAHEXANOIC ACID/EPA (FISH OIL ORAL) Daily. 2   Every day    multivitamin (ONE DAILY MULTIVITAMIN) per tablet Take 1 tablet by mouth once daily.    pravastatin (PRAVACHOL) 80 MG tablet Take 1 tablet (80 mg total) by mouth once daily.    tolterodine (DETROL LA) 4 MG 24 hr capsule Take 1 capsule (4 mg total) by mouth once daily.    aspirin 81 mg Tab Take 81 mg by mouth Daily. 1 Tablet Oral Every day    flaxseed 1,000 mg Cap      Family History     Problem Relation (Age of Onset)    Breast cancer Maternal Grandmother    Cancer Maternal Aunt    Heart disease Father    No Known Problems  Mother, Sister, Brother, Maternal Uncle, Paternal Aunt, Paternal Uncle, Maternal Grandfather, Paternal Grandmother, Paternal Grandfather        Social History Main Topics    Smoking status: Former Smoker     Packs/day: 1.00     Years: 50.00     Quit date: 11/15/2002    Smokeless tobacco: Never Used    Alcohol use 1.5 oz/week     3 Standard drinks or equivalent per week      Comment: 3 OZ WINE     Drug use: No    Sexual activity: No     Review of Systems   Unable to perform ROS: dementia     Objective:     Vital Signs (Most Recent):  Temp: 97.4 °F (36.3 °C) (07/09/18 1109)  Pulse: 81 (07/09/18 1109)  Resp: 19 (07/09/18 1109)  BP: 125/60 (07/09/18 1109)  SpO2: 96 % (07/09/18 1109) Vital Signs (24h Range):  Temp:  [97.4 °F (36.3 °C)-98.4 °F (36.9 °C)] 97.4 °F (36.3 °C)  Pulse:  [] 81  Resp:  [18-20] 19  SpO2:  [90 %-96 %] 96 %  BP: (110-141)/(55-71) 125/60     Weight: 70.2 kg (154 lb 12.2 oz)  Body mass index is 26.57 kg/m².    SpO2: 96 %  O2 Device (Oxygen Therapy): room air      Intake/Output Summary (Last 24 hours) at 07/09/18 1121  Last data filed at 07/09/18 0600   Gross per 24 hour   Intake              400 ml   Output              700 ml   Net             -300 ml       Lines/Drains/Airways     Peripheral Intravenous Line                 Peripheral IV - Single Lumen 07/07/18 1308 Right Hand 1 day                Physical Exam   Constitutional: She is oriented to person, place, and time. She appears well-developed and well-nourished.   HENT:   Head: Normocephalic and atraumatic.   Eyes: Conjunctivae are normal. Pupils are equal, round, and reactive to light.   Neck: Normal range of motion. Neck supple.   Cardiovascular: Normal rate and intact distal pulses.    Murmur heard.  High-pitched blowing holosystolic murmur is present with a grade of 3/6  at the apex  Pulmonary/Chest: Effort normal. She has decreased breath sounds.   Abdominal: Soft. Bowel sounds are normal.   Musculoskeletal: Normal range of  motion.   Neurological: She is alert and oriented to person, place, and time.   Skin: Skin is warm and dry.       Significant Labs: All pertinent lab results from the last 24 hours have been reviewed.    Significant Imaging: Echocardiogram:   2D echo with color flow doppler:   Results for orders placed or performed during the hospital encounter of 07/07/18   2D echo with color flow doppler   Result Value Ref Range    EF 55 55 - 65    Mitral Valve Regurgitation SEVERE (A)     Est. PA Systolic Pressure 110.65 (A)     Tricuspid Valve Regurgitation MODERATE (A)

## 2018-07-09 NOTE — PROGRESS NOTES
" [facility]  Adult Nutrition  Progress Note    SUMMARY       Recommendations    Recommendation/Intervention:   1. Cont w/ current diet & oral nutr suppl order    2. Nursing staff- Please record pt's meal intake    Goals: Maintain meal intake >50%  Nutrition Goal Status: new  Communication of MELISSA Recs:  (Plan of care)    Reason for Assessment    Reason for Assessment: identified at risk by screening criteria  Diagnosis:  (pleural effusion)  Relevant Medical History: AVR/pig valve, HTN, HLP, breast ca   General Information Comments: Admitted  pleural effusion; likely due to CHF per MD notes. H/o breast ca noted. Thoracentesis ; cytology pending. Spoke to pt this a.m. who reports a poor appetite "but I'm making myself eat." Drinking Boost when she receives it on her tray. It did not come up w/ her lunch tray. I asked kitchen to bring one up to her. Reports unintentional wt loss over the past yr. NFPE performed & pt w/ moderate/severe muscle wasting & fat depletion.   Nutrition Discharge Planning: Adequate intake of meals/oral nutr suppl to meet nutr needs    Nutrition Risk Screen    Nutrition Risk Screen: no indicators present    Nutrition/Diet History    Patient Reported Diet/Restrictions/Preferences: general  Typical Food/Fluid Intake: Likely inadequate pta  Food Preferences: None reported  Do you have any cultural, spiritual, Worship conflicts, given your current situation?: none  Factors Affecting Nutritional Intake: decreased appetite    Anthropometrics    Temp: 97.4 °F (36.3 °C)  Height Method: Stated  Height: 5' 4" (162.6 cm)  Height (inches): 64 in    Weight Method: Bed Scale  Weight: 70.2 kg (154 lb 12.2 oz) (taken by RN on )  Weight (lb): 154.76 lb    Ideal Body Weight (IBW), Female: 120 lb  % Ideal Body Weight, Female (lb): 128.97 lb    BMI (Calculated): 26.6  BMI Grade: 25 - 29.9 - overweight    Usual Body Weight (UBW), k.9 kg (weighed 78.9kg 10/2017; UBW prior to that " "~82kg)  % Usual Body Weight: 89.16  % Weight Change From Usual Weight: -11.03 %       Lab/Procedures/Meds    Pertinent Labs Reviewed: reviewed  Pertinent Labs Comments: BUN 26, Glu 118, , Alb 2.8  Pertinent Medications Reviewed: reviewed  Pertinent Medications Comments: B12, lasix, MVI, statin    Physical Findings/Assessment    Overall Physical Appearance: loss of muscle mass, loss of subcutaneous fat, advanced age  Skin: intact    Estimated/Assessed Needs    Weight Used For Calorie Calculations: 70.2 kg (154 lb 12.2 oz)  Energy Calorie Requirements (kcal): 1428  Energy Need Method: Pocahontas-St Jeor (x 1.25 (PAL))  Protein Requirements: 70-84 gms (1-1.2 gms/kg)  Weight Used For Protein Calculations: 70.2 kg (154 lb 12.2 oz)     Fluid Need Method: RDA Method  RDA Method (mL): 1428         Nutrition Prescription Ordered    Current Diet Order: Cardiac  Oral Nutrition Supplement: Boost Plus TID    Evaluation of Received Nutrient/Fluid Intake    I/O: reviewed (no meal intake recorded)  Comments: LBM 7/8  Tolerance: tolerating  % Intake of Estimated Energy Needs: 50 - 75 % b/w intake of meals/ONS  % Meal Intake: 50 %    Nutrition Risk    Level of Risk/Frequency of Follow-up:  (F/u 1 x weekly)     Assessment and Plan    Malnutrition of moderate degree    Malnutrition in the context of Acute Illness/Injury - pt admitted 2/2 pleural effusion    Related to (etiology):  Debility, decreased appetite, adv'd age    Signs and Symptoms (as evidenced by):  Energy Intake: <50% of estimated energy requirement for "a few months"  Body Fat Depletion: moderate depletion of orbitals, triceps and thoracic and lumbar region   Muscle Mass Depletion: moderate depletion of temples, clavicle region and lower extremities   Weight Loss: 11% x 8 months     Interventions/Recommendations (treatment strategy):  See recs in note dated 7/9/18    Nutrition Diagnosis Status:  New                     Monitor and Evaluation    Food and Nutrient " Intake: food and beverage intake, energy intake  Food and Nutrient Adminstration: diet order  Physical Activity and Function: nutrition-related ADLs and IADLs  Anthropometric Measurements: weight, weight change  Biochemical Data, Medical Tests and Procedures: electrolyte and renal panel, glucose/endocrine profile  Nutrition-Focused Physical Findings: overall appearance     Nutrition Follow-Up    RD Follow-up?: Yes

## 2018-07-09 NOTE — ASSESSMENT & PLAN NOTE
Wean oxygen as tolerated, test for home oxygen in am   Poor lung mechanics with large effusion on right, s/p thoracentesis  Lasix daily

## 2018-07-09 NOTE — SUBJECTIVE & OBJECTIVE
Interval History: pt c/o SOB and discomfort     Review of Systems   Constitutional: Positive for activity change, appetite change, fatigue and unexpected weight change.   HENT: Negative.    Eyes: Negative.    Respiratory: Positive for shortness of breath.    Cardiovascular: Negative.    Gastrointestinal: Negative.    Endocrine: Negative.    Genitourinary: Negative.    Musculoskeletal: Positive for gait problem.   Skin: Negative.    Neurological: Positive for weakness and numbness.   Psychiatric/Behavioral: Positive for sleep disturbance.     Objective:     Vital Signs (Most Recent):  Temp: 98.4 °F (36.9 °C) (07/08/18 1950)  Pulse: 88 (07/08/18 1950)  Resp: 18 (07/08/18 1950)  BP: 115/63 (07/08/18 1950)  SpO2: 95 % (07/08/18 1950) Vital Signs (24h Range):  Temp:  [97.3 °F (36.3 °C)-98.4 °F (36.9 °C)] 98.4 °F (36.9 °C)  Pulse:  [] 88  Resp:  [18-20] 18  SpO2:  [91 %-95 %] 95 %  BP: (110-141)/(56-71) 115/63     Weight: 70.2 kg (154 lb 12.2 oz)  Body mass index is 26.57 kg/m².    Intake/Output Summary (Last 24 hours) at 07/08/18 2224  Last data filed at 07/08/18 1800   Gross per 24 hour   Intake              600 ml   Output              600 ml   Net                0 ml      Physical Exam   Constitutional: She is oriented to person, place, and time. She appears well-developed and well-nourished. No distress.   HENT:   Head: Normocephalic and atraumatic.   Mouth/Throat: Oropharynx is clear and moist.   Eyes: Conjunctivae and EOM are normal.   Neck: Normal range of motion. Neck supple.   Cardiovascular: Normal rate, regular rhythm and intact distal pulses.    Murmur heard.  Pulmonary/Chest: Effort normal. She has no wheezes.   Dull breath sounds R>L    Abdominal: Soft. Bowel sounds are normal. She exhibits no distension. There is no tenderness.   Musculoskeletal: Normal range of motion. She exhibits no edema.   Lymphadenopathy:     She has no cervical adenopathy.   Neurological: She is alert and oriented to person,  place, and time.   Skin: Skin is warm and dry. Capillary refill takes less than 2 seconds. No erythema.   Psychiatric: She has a normal mood and affect. Her behavior is normal.       Significant Labs:   Blood Culture:   Recent Labs  Lab 07/07/18  1218 07/07/18  1225   LABBLOO No Growth to date  No Growth to date No Growth to date  No Growth to date     BMP:   Recent Labs  Lab 07/08/18  0519   *      K 4.0      CO2 27   BUN 28*   CREATININE 1.1   CALCIUM 9.1   MG 1.8     CBC:   Recent Labs  Lab 07/07/18  0956   WBC 8.40   HGB 7.9*   HCT 24.8*          Significant Imaging: I have reviewed all pertinent imaging results/findings within the past 24 hours.

## 2018-07-09 NOTE — HPI
The patient is an 83 yof presenting with a 5-day history of dyspnea. She has also noted leg swelling. She denies chest pain, cough or fevers. She had a fall ~1 week ago but did not injure her chest. She was noted to have a pleural effusion.    She is a former smoker who quit ~15 years ago. She has a history of breast cancer several years ago. She has a history of a bioprosthetic valve replacement of the aortic valve as well as TAVR & mitral regurg.

## 2018-07-09 NOTE — HPI
84 yo female with AVR/pig valve, HTN, HLP, former smoker,and  h/o breast cancer presented from home with c/o SOB. She reports a fall 8 days ago and has been getting weaker everyday with worsening SOB and VALLE. Patient daughter reports in last few months, noticeable drop in weight, poor appetite, forgetting to eat and worsening weakness in LE. On workup she was found to have h/h 7.9/24.8,  and bilateral pleural effusion on CXR (R>L). A chest CT suggest large right pleural effusion and cannot rule out underlying mass/infection.  Pt denies fever, chills, CP and cough. O2 saturation 81% on room air on admit. Pulmonology consulted.      Hospital Course:  Pt admitted with SOB and large right pleural effusion. Pulmonology consulted and plan for thoracentesis today. Wean oxygen as tolerated.     Echo 7/8/18     1 - Normal left ventricular systolic function (EF 55-60%).     2 - Concentric hypertrophy.     3 - Biatrial enlargement.     4 - Severe mitral regurgitation.     5 - Moderate tricuspid regurgitation.     6 - Pulmonary hypertension. The estimated PA systolic pressure is 111 mmHg.     Followed by [facility] cardiology  Ms. Lan is an 83yo female with a PMHx of bioprosthetic TAVR in 2003, breast CA (s/p x2 lumpectomy and radiation in 2012), HTN, HLD, and MR here for annual follow-up. Today she reports no changes in symptoms. Ms. Lan denies chest pain with exertion or at rest, palpitations, SOB, VALLE, dizziness, syncope, claudication, PND, or orthopnea. She has mild dependent edema which resolves with elevation.  She is currently taking ASA 81mg. Pravastatin 80mg (.6 on 12/27/2016), amlodipine 5mg. She does not routinely exercise but the ambulates around the house and experiences no limitations in activity tolerance. She is significantly limited by right knee pain and arthritis.      Recent Cardiac Tests:     2D Echo (6/16/2017):  CONCLUSIONS     1 - Normal left ventricular systolic function (EF 60-65%).      2 - Normal right ventricular systolic function .     3 - Pulmonary hypertension. The estimated PA systolic pressure is greater than 45 mmHg.     4 - S/P transcatheter AVR, effective prosthetic valve area corrected for BSA is 0.63 cm2.     5 - The mitral valve is markedly sclerotic with mildly restricted leaflet mobility due to severe MAC, no significant stenosis.      6 - Moderate mitral regurgitation.     7 - Mild tricuspid regurgitation.     8 - Severe left atrial enlargement.

## 2018-07-09 NOTE — PROGRESS NOTES
[facility] Hospital Medicine  Progress Note    Patient Name: Ruth Lan  MRN: [id number]  Patient Class: IP- Inpatient   Admission Date: 7/7/2018  Length of Stay: 2 days  Attending Physician: Mae Sabillon MD  Primary Care Provider: Azikiwe K Lombard, MD        Subjective:     Principal Problem:Pleural effusion    HPI:  82 yo female with AVR/pig valve, HTN, HLP, former smoker,and  h/o breast cancer presented from home with c/o SOB. She reports a fall 8 days ago and has been getting weaker everyday with worsening SOB and VALLE. Patient daughter reports in last few months, noticeable drop in weight, poor appetite, forgetting to eat and worsening weakness in LE. On workup she was found to have h/h 7.9/24.8,  and bilateral pleural effusion on CXR (R>L). A chest CT suggest large right pleural effusion and cannot rule out underlying mass/infection.  Pt denies fever, chills, CP and cough. O2 saturation 81% on room air on admit. Pulmonology consulted.     Hospital Course:  Pt admitted with SOB and large right pleural effusion. Pulmonology consulted and plan for thoracentesis today. Wean oxygen as tolerated.     7/9 pt is exhausted with very little sleep last night, will attempt PT eval tomorrow with home oxygen testing. Effusion appears transudative.   ECHO: CONCLUSIONS     1 - Normal left ventricular systolic function (EF 55-60%).     2 - Concentric hypertrophy.     3 - Biatrial enlargement.     4 - Severe mitral regurgitation.     5 - Moderate tricuspid regurgitation.     6 - Pulmonary hypertension. The estimated PA systolic pressure is 111 mmHg.     7 - S/P transcatheter AVR, NISHA = 1.35 cm2, AVAi = 0.77 cm2/m2, peak velocity = 3.23 m/s, mean gradient = 26 mmHg.       Interval History: pt c/o SOB and discomfort and very tired today     Review of Systems   Constitutional: Positive for activity change, appetite change, fatigue and unexpected weight change.   HENT: Negative.    Eyes:  Negative.    Respiratory: Positive for shortness of breath.    Cardiovascular: Negative.    Gastrointestinal: Negative.    Endocrine: Negative.    Genitourinary: Negative.    Musculoskeletal: Positive for gait problem.   Skin: Negative.    Neurological: Positive for weakness and numbness.   Psychiatric/Behavioral: Positive for sleep disturbance.     Objective:     Vital Signs (Most Recent):  Temp: 97.4 °F (36.3 °C) (07/09/18 1109)  Pulse: 81 (07/09/18 1109)  Resp: 19 (07/09/18 1109)  BP: 125/60 (07/09/18 1109)  SpO2: 96 % (07/09/18 1109) Vital Signs (24h Range):  Temp:  [97.4 °F (36.3 °C)-98.4 °F (36.9 °C)] 97.4 °F (36.3 °C)  Pulse:  [] 81  Resp:  [18-20] 19  SpO2:  [90 %-98 %] 96 %  BP: (110-128)/(55-71) 125/60     Weight: 70.2 kg (154 lb 12.2 oz) (taken by RN on 7/8)  Body mass index is 26.57 kg/m².    Intake/Output Summary (Last 24 hours) at 07/09/18 1459  Last data filed at 07/09/18 1325   Gross per 24 hour   Intake              400 ml   Output              950 ml   Net             -550 ml      Physical Exam   Constitutional: She is oriented to person, place, and time. She appears well-developed and well-nourished. No distress.   HENT:   Head: Normocephalic and atraumatic.   Mouth/Throat: Oropharynx is clear and moist.   Eyes: Conjunctivae and EOM are normal.   Neck: Normal range of motion. Neck supple.   Cardiovascular: Normal rate, regular rhythm and intact distal pulses.    Murmur heard.  Pulmonary/Chest: Effort normal. She has no wheezes.   Dull breath sounds R>L    Abdominal: Soft. Bowel sounds are normal. She exhibits no distension. There is no tenderness.   Musculoskeletal: Normal range of motion. She exhibits no edema.   Lymphadenopathy:     She has no cervical adenopathy.   Neurological: She is alert and oriented to person, place, and time.   Skin: Skin is warm and dry. Capillary refill takes less than 2 seconds. No erythema.   Psychiatric: She has a normal mood and affect. Her behavior is normal.        Significant Labs:   Blood Culture: No results for input(s): LABBLOO in the last 48 hours.  BMP:     Recent Labs  Lab 07/09/18  0558   *      K 4.4      CO2 26   BUN 26*   CREATININE 0.9   CALCIUM 9.2   MG 2.0     CBC: No results for input(s): WBC, HGB, HCT, PLT in the last 48 hours.    Significant Imaging: I have reviewed all pertinent imaging results/findings within the past 24 hours.    Assessment/Plan:      * Pleural effusion    S/p thoracentesis 7/8  ECHO as above  Wean oxygen as tolerated  Lapost signed, pt request DNR   Hold aspirin   Continue lasix           Symptomatic anemia    One unit blood transfusion   Repeat CBC   Pt takes vitamin B12 supplements           DNR (do not resuscitate)    See lapost           Pulmonary HTN      ECHO reviewed - severe PHTN  Defer to cardiology         Acute on chronic diastolic heart failure    Lasix daily  Follow up cardiology         Malnutrition of moderate degree    Concern for malignancy   BOOST TID          Hypoxia    Wean oxygen as tolerated, test for home oxygen in am   Poor lung mechanics with large effusion on right, s/p thoracentesis  Lasix daily           Severe mitral regurgitation    See above           History of breast cancer    Reviewed MMG 2017 - no evidence of malignancy             Memory loss, short term    Follow up neurology as scheduled   Consider brain imaging as malignancy of concern           Vitamin B12 deficiency    See above           Essential hypertension    Resume norvasc as BP allows           Anxiety              S/P aortic valve replacement    ECHO :  CONCLUSIONS     1 - Normal left ventricular systolic function (EF 55-60%).     2 - Concentric hypertrophy.     3 - Biatrial enlargement.     4 - Severe mitral regurgitation.     5 - Moderate tricuspid regurgitation.     6 - Pulmonary hypertension. The estimated PA systolic pressure is 111 mmHg.     7 - S/P transcatheter AVR, NISHA = 1.35 cm2, AVAi = 0.77 cm2/m2, peak  velocity = 3.23 m/s, mean gradient = 26 mmHg            VTE Risk Mitigation         Ordered     Place TONY hose  Until discontinued      07/07/18 1628     IP VTE LOW RISK PATIENT  Once      07/07/18 1539              Mae Sabillon MD  Department of Hospital Medicine   [facility]

## 2018-07-09 NOTE — CONSULTS
[facility]  Pulmonology  Consult Note    Patient Name: Ruth Lan  MRN: [id number]  Admission Date: 7/7/2018  Hospital Length of Stay: 1 days  Code Status: DNR  Attending Physician: Alexandra Sabillon MD  Primary Care Provider: Azikiwe K Lombard, MD   Principal Problem: Pleural effusion    Inpatient consult to Pulmonology  Consult performed by: ALEXIS YAÑEZ  Consult ordered by: ALEXANDRA SABILLON        Subjective:     HPI:  The patient is an 83 yof presenting with a 5-day history of dyspnea. She has also noted leg swelling. She denies chest pain, cough or fevers. She had a fall ~1 week ago but did not injure her chest. She was noted to have a pleural effusion.    She is a former smoker who quit ~15 years ago. She has a history of breast cancer several years ago. She has a history of a bioprosthetic valve replacement of the aortic valve as well as TAVR & mitral regurg.    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, ER/[place] positive    Heart murmur     Hyperlipidemia     Hypertension        Past Surgical History:   Procedure Laterality Date    BREAST BIOPSY  11/2012    Right breast- invasive ductal carcinoma    CARDIAC VALVE REPLACEMENT      aortic, pig valve    CARDIAC VALVE SURGERY  2004    AVR    CATARACT EXTRACTION W/  INTRAOCULAR LENS IMPLANT      OU     EYE SURGERY      cataracts- bilateral    TISSUE AORTIC VALVE REPLACEMENT  2003    TUBAL LIGATION         Review of patient's allergies indicates:   Allergen Reactions    Etodolac Other (See Comments)     Dehydration    Nsaids (non-steroidal anti-inflammatory drug)      COLITIS/DEHYDRATION       Family History     Problem Relation (Age of Onset)    Breast cancer Maternal Grandmother    Cancer Maternal Aunt    Heart disease Father    No Known Problems Mother, Sister, Brother, Maternal  Uncle, Paternal Aunt, Paternal Uncle, Maternal Grandfather, Paternal Grandmother, Paternal Grandfather        Social History Main Topics    Smoking status: Former Smoker     Packs/day: 1.00     Years: 50.00     Quit date: 11/15/2002    Smokeless tobacco: Never Used    Alcohol use 1.5 oz/week     3 Standard drinks or equivalent per week      Comment: 3 OZ WINE     Drug use: No    Sexual activity: No         Review of Systems   Review of Systems:  CV: no syncope  ENT: no sore throat  Resp: per hpi  Eyes: no visual changes  Gastrointestinal: no nausea or vomiting  Integument/Breast: no rash  Musculoskeletal: no arthralgias  Neurological: no headaches  Behavioral/Psych: no confusion or depression  Heme: no bleeding    Objective:     Vital Signs (Most Recent):  Temp: 98.4 °F (36.9 °C) (07/08/18 1950)  Pulse: 88 (07/08/18 1950)  Resp: 18 (07/08/18 1950)  BP: 115/63 (07/08/18 1950)  SpO2: 95 % (07/08/18 1950) Vital Signs (24h Range):  Temp:  [97.3 °F (36.3 °C)-98.4 °F (36.9 °C)] 98.4 °F (36.9 °C)  Pulse:  [] 88  Resp:  [18-20] 18  SpO2:  [91 %-95 %] 95 %  BP: (110-141)/(56-71) 115/63     Weight: 70.2 kg (154 lb 12.2 oz)  Body mass index is 26.57 kg/m².      Intake/Output Summary (Last 24 hours) at 07/08/18 2335  Last data filed at 07/08/18 1800   Gross per 24 hour   Intake              600 ml   Output              600 ml   Net                0 ml       Physical Exam  General: no distress  Eyes:  conjunctivae/corneas clear  Nose: no discharge  Neck: no lymphadenopathy  Lungs:  normal respiratory effort and no wheezes or rales. Diminished at bases.  Heart: regular rate and rhythm, + systolic murmur  Abdomen: non-distended, soft, non-tender  Extremities: no cyanosis or clubbing, + mild dependent edema  Skin: No rashes or lesions. good skin turgor  Neurologic: alert, oriented, thought content appropriate      Lines/Drains/Airways     Peripheral Intravenous Line                 Peripheral IV - Single Lumen 07/07/18  0950 Left Antecubital 1 day         Peripheral IV - Single Lumen 07/07/18 1308 Right Hand 1 day                Significant Labs:    CBC/Anemia Profile:    Recent Labs  Lab 07/07/18  0956 07/07/18  1225   WBC 8.40  --    HGB 7.9*  --    HCT 24.8*  --      --    MCV 97  --    RDW 17.4*  --    IRON  --  21*   RETIC  --  6.8*   FOLATE  --  15.2   [id number]  --  753        Chemistries:    Recent Labs  Lab 07/07/18  0956 07/08/18  0519    141   K 4.1 4.0    106   CO2 24 27   BUN 31* 28*   CREATININE 1.1 1.1   CALCIUM 9.3 9.1   ALBUMIN 2.8*  --    PROT 7.1  --    BILITOT 0.6  --    ALKPHOS 146*  --    ALT 28  --    AST 26  --    MG  --  1.8   PHOS  --  3.0     Significant Imaging:   CT: I have reviewed all pertinent results/findings within the past 24 hours and my personal findings are:  bilateral pleural effusion R > L    Assessment/Plan:     * Pleural effusion    I suspect CHF as the primary cause, but given her risk factors for cancer we will sent fluid for cytology as well as routine labs. Millicent completed. Will follow up with pt when lab results available.        Acute on chronic diastolic heart failure    With valvular disease. Follow up echo. Recommend diuresis with IV lasix 40mg q12h.               Gonzalez Damon MD  Pulmonology  [facility]

## 2018-07-09 NOTE — PROGRESS NOTES
Pulmonary & Critical Care Medicine   Progress Note    Subjective:   No overnight events. Feels improved. Thoracentesis completed yesterday.         Past medical, surgical, family, and social history from initial consult was reviewed and verified during today's visit.     Vital Signs:   Vitals:    07/09/18 1109   BP: 125/60   Pulse: 81   Resp: 19   Temp: 97.4 °F (36.3 °C)     Temp:  [97.4 °F (36.3 °C)-98.4 °F (36.9 °C)] 97.4 °F (36.3 °C)  Pulse:  [] 81  Resp:  [18-20] 19  SpO2:  [90 %-96 %] 96 %  BP: (110-128)/(55-71) 125/60     Fluid Balance:     Intake/Output Summary (Last 24 hours) at 07/09/18 1418  Last data filed at 07/09/18 1325   Gross per 24 hour   Intake              400 ml   Output              950 ml   Net             -550 ml       Review of Systems:   A comprehensive 12-point review of systems was performed, and is negative except for those items mentioned above in the HPI section of this note.     Physical Exam:     GEN- NAD AAOx3. Frail, elderly.   HEENT- ATNC, PERRLA, EOMI, OP-Cl. No LAD or bruit noted. Trachea Midline.   CV- RRR No R/G- 3/6 mid systolic murmur at apex   RESP- Decreased BS bilateral bases. Crackles   GI- S/NT/ND. Positive BS X 4. No HSM Noted  BACK- Spine midline. No step off, crepitus or deformity noted. No midline TTP.   Ext- MAEW, No deformity. No edema or rashes noted.   Neuro- Strength 5/5 symmetric. CN 2-12 intact. Normal gait. Normal sensation.      Personal Review and Summary of Interval Diagnostics    Laboratory Studies:     Recent Labs  Lab 07/09/18  0558      K 4.4      CO2 26   BUN 26*   CREATININE 0.9   MG 2.0       Microbiology Data:   Microbiology Results (last 7 days)     Procedure Component Value Units Date/Time    Culture, Body Fluid (Aerobic) w/ GS [[id number]] Collected:  07/08/18 9759    Order Status:  Completed Specimen:  Pleural Fluid from Pleural Fluid Updated:  07/09/18 1041     AEROBIC CULTURE - FLUID No growth     Gram Stain Result Many WBC's       No organisms seen    Blood Culture #1 **CANNOT BE ORDERED STAT** [[id number]] Collected:  07/07/18 1218    Order Status:  Completed Specimen:  Blood from Peripheral, Antecubital, Right Updated:  07/08/18 1503     Blood Culture, Routine No Growth to date     Blood Culture, Routine No Growth to date    Blood Culture #2 **CANNOT BE ORDERED STAT** [[id number]] Collected:  07/07/18 1225    Order Status:  Completed Specimen:  Blood from Peripheral, Hand, Left Updated:  07/08/18 1503     Blood Culture, Routine No Growth to date     Blood Culture, Routine No Growth to date    Culture, Respiratory [[id number]]     Order Status:  No result Specimen:  Respiratory from Sputum         Chest Imaging: decreased pleural effusion volume. No PTX     Infusions:  None       Scheduled Medications:    cyanocobalamin  3,000 mcg Oral Daily    furosemide  20 mg Intravenous Once    furosemide  20 mg Intravenous BID    lisinopril  5 mg Oral Daily    multivitamin  1 tablet Oral Daily    oxybutynin  10 mg Oral Daily    pravastatin  80 mg Oral Daily       PRN Medications:   sodium chloride, acetaminophen, diphenhydrAMINE, oxyCODONE-acetaminophen, sodium chloride 0.9%    Impression & Recommendations    1. Pleural effusion- s/p thoracentesis by Dr. Damon one day prior. Anterior chest pain with drainage but without radiographic complication. Effusion is transudative by studies. Given the patient underlying diastolic dysfunction, torrential MR this is likely secondary to HF and volume overload.     -- Would continue with diuresis in attempt to optimize volume status.  -- Cytology can be followed up in clinic  -- No need for additional thoracentesis at this time.   -- Daughter updated by phone     Will be available if additional issues arise. Please call with questions.         Garret Sanchez M.D.   [facility]  [phone]

## 2018-07-09 NOTE — CONSULTS
[facility]  Cardiology  Consult Note    Patient Name: Ruth Lan  MRN: [id number]  Admission Date: 7/7/2018  Hospital Length of Stay: 2 days  Code Status: DNR   Attending Provider: Mae Sabillon MD   Consulting Provider: Derek Banks MD  Primary Care Physician: Azikiwe K Lombard, MD  Principal Problem:Pleural effusion    Patient information was obtained from patient and ER records.     Consults  Subjective:     Chief Complaint:  CHF     HPI:   84 yo female with AVR/pig valve, HTN, HLP, former smoker,and  h/o breast cancer presented from home with c/o SOB. She reports a fall 8 days ago and has been getting weaker everyday with worsening SOB and VALLE. Patient daughter reports in last few months, noticeable drop in weight, poor appetite, forgetting to eat and worsening weakness in LE. On workup she was found to have h/h 7.9/24.8,  and bilateral pleural effusion on CXR (R>L). A chest CT suggest large right pleural effusion and cannot rule out underlying mass/infection.  Pt denies fever, chills, CP and cough. O2 saturation 81% on room air on admit. Pulmonology consulted.      Hospital Course:  Pt admitted with SOB and large right pleural effusion. Pulmonology consulted and plan for thoracentesis today. Wean oxygen as tolerated.     Echo 7/8/18     1 - Normal left ventricular systolic function (EF 55-60%).     2 - Concentric hypertrophy.     3 - Biatrial enlargement.     4 - Severe mitral regurgitation.     5 - Moderate tricuspid regurgitation.     6 - Pulmonary hypertension. The estimated PA systolic pressure is 111 mmHg.     Followed by [facility] cardiology  Ms. Lan is an 83yo female with a PMHx of bioprosthetic TAVR in 2003, breast CA (s/p x2 lumpectomy and radiation in 2012), HTN, HLD, and MR here for annual follow-up. Today she reports no changes in symptoms. Ms. Lan denies chest pain with exertion or at rest, palpitations, SOB, VALLE, dizziness, syncope, claudication, PND, or  orthopnea. She has mild dependent edema which resolves with elevation.  She is currently taking ASA 81mg. Pravastatin 80mg (.6 on 12/27/2016), amlodipine 5mg. She does not routinely exercise but the ambulates around the house and experiences no limitations in activity tolerance. She is significantly limited by right knee pain and arthritis.      Recent Cardiac Tests:     2D Echo (6/16/2017):  CONCLUSIONS     1 - Normal left ventricular systolic function (EF 60-65%).     2 - Normal right ventricular systolic function .     3 - Pulmonary hypertension. The estimated PA systolic pressure is greater than 45 mmHg.     4 - S/P transcatheter AVR, effective prosthetic valve area corrected for BSA is 0.63 cm2.     5 - The mitral valve is markedly sclerotic with mildly restricted leaflet mobility due to severe MAC, no significant stenosis.      6 - Moderate mitral regurgitation.     7 - Mild tricuspid regurgitation.     8 - Severe left atrial enlargement.     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, ER/[place] positive    Heart murmur     Hyperlipidemia     Hypertension        Past Surgical History:   Procedure Laterality Date    BREAST BIOPSY  11/2012    Right breast- invasive ductal carcinoma    CARDIAC VALVE REPLACEMENT      aortic, pig valve    CARDIAC VALVE SURGERY  2004    AVR    CATARACT EXTRACTION W/  INTRAOCULAR LENS IMPLANT      OU     EYE SURGERY      cataracts- bilateral    TISSUE AORTIC VALVE REPLACEMENT  2003    TUBAL LIGATION         Review of patient's allergies indicates:   Allergen Reactions    Etodolac Other (See Comments)     Dehydration    Nsaids (non-steroidal anti-inflammatory drug)      COLITIS/DEHYDRATION       No current facility-administered medications on file prior to encounter.      Current Outpatient Prescriptions on File Prior to Encounter    Medication Sig    acetaminophen (TYLENOL) 325 MG tablet Take by mouth 2 (two) times daily.     amLODIPine (NORVASC) 5 MG tablet Take 1 tablet (5 mg total) by mouth once daily.    calcium-vitamin D 500-125 mg-unit tablet Take 3 tablets by mouth Daily. 3 Tablet Oral Every day    cyanocobalamin (VITAMIN B-12) 1000 MCG tablet Take 3,000 mcg by mouth once daily.     DOCOSAHEXANOIC ACID/EPA (FISH OIL ORAL) Daily. 2   Every day    multivitamin (ONE DAILY MULTIVITAMIN) per tablet Take 1 tablet by mouth once daily.    pravastatin (PRAVACHOL) 80 MG tablet Take 1 tablet (80 mg total) by mouth once daily.    tolterodine (DETROL LA) 4 MG 24 hr capsule Take 1 capsule (4 mg total) by mouth once daily.    aspirin 81 mg Tab Take 81 mg by mouth Daily. 1 Tablet Oral Every day    flaxseed 1,000 mg Cap      Family History     Problem Relation (Age of Onset)    Breast cancer Maternal Grandmother    Cancer Maternal Aunt    Heart disease Father    No Known Problems Mother, Sister, Brother, Maternal Uncle, Paternal Aunt, Paternal Uncle, Maternal Grandfather, Paternal Grandmother, Paternal Grandfather        Social History Main Topics    Smoking status: Former Smoker     Packs/day: 1.00     Years: 50.00     Quit date: 11/15/2002    Smokeless tobacco: Never Used    Alcohol use 1.5 oz/week     3 Standard drinks or equivalent per week      Comment: 3 OZ WINE     Drug use: No    Sexual activity: No     Review of Systems   Unable to perform ROS: dementia     Objective:     Vital Signs (Most Recent):  Temp: 97.4 °F (36.3 °C) (07/09/18 1109)  Pulse: 81 (07/09/18 1109)  Resp: 19 (07/09/18 1109)  BP: 125/60 (07/09/18 1109)  SpO2: 96 % (07/09/18 1109) Vital Signs (24h Range):  Temp:  [97.4 °F (36.3 °C)-98.4 °F (36.9 °C)] 97.4 °F (36.3 °C)  Pulse:  [] 81  Resp:  [18-20] 19  SpO2:  [90 %-96 %] 96 %  BP: (110-141)/(55-71) 125/60     Weight: 70.2 kg (154 lb 12.2 oz)  Body mass index is 26.57 kg/m².    SpO2: 96 %  O2 Device (Oxygen  Therapy): room air      Intake/Output Summary (Last 24 hours) at 07/09/18 1121  Last data filed at 07/09/18 0600   Gross per 24 hour   Intake              400 ml   Output              700 ml   Net             -300 ml       Lines/Drains/Airways     Peripheral Intravenous Line                 Peripheral IV - Single Lumen 07/07/18 1308 Right Hand 1 day                Physical Exam   Constitutional: She is oriented to person, place, and time. She appears well-developed and well-nourished.   HENT:   Head: Normocephalic and atraumatic.   Eyes: Conjunctivae are normal. Pupils are equal, round, and reactive to light.   Neck: Normal range of motion. Neck supple.   Cardiovascular: Normal rate and intact distal pulses.    Murmur heard.  High-pitched blowing holosystolic murmur is present with a grade of 3/6  at the apex  Pulmonary/Chest: Effort normal. She has decreased breath sounds.   Abdominal: Soft. Bowel sounds are normal.   Musculoskeletal: Normal range of motion.   Neurological: She is alert and oriented to person, place, and time.   Skin: Skin is warm and dry.       Significant Labs: All pertinent lab results from the last 24 hours have been reviewed.    Significant Imaging: Echocardiogram:   2D echo with color flow doppler:   Results for orders placed or performed during the hospital encounter of 07/07/18   2D echo with color flow doppler   Result Value Ref Range    EF 55 55 - 65    Mitral Valve Regurgitation SEVERE (A)     Est. PA Systolic Pressure 110.65 (A)     Tricuspid Valve Regurgitation MODERATE (A)      Assessment and Plan:     * Pleural effusion    S/P thoracentesis        DNR (do not resuscitate)             Pulmonary HTN    Severe - poor prognosis        Acute on chronic diastolic heart failure    Diuresis and afterload reduction as tolerated. Not interested in further invasive cardiac procedures        Severe mitral regurgitation    Not new. Not a candidate for valve surgery        Essential hypertension              S/P aortic valve replacement    Hx TAVR 2003 - adequate function on echo            VTE Risk Mitigation         Ordered     Place TONY hose  Until discontinued      07/07/18 1628     IP VTE LOW RISK PATIENT  Once      07/07/18 1539        Will follow briefly    Thank you for your consult. I will follow-up with patient. Please contact us if you have any additional questions.    Derek Banks MD  Cardiology   [facility]

## 2018-07-09 NOTE — ASSESSMENT & PLAN NOTE
ECHO :  CONCLUSIONS     1 - Normal left ventricular systolic function (EF 55-60%).     2 - Concentric hypertrophy.     3 - Biatrial enlargement.     4 - Severe mitral regurgitation.     5 - Moderate tricuspid regurgitation.     6 - Pulmonary hypertension. The estimated PA systolic pressure is 111 mmHg.     7 - S/P transcatheter AVR, NISHA = 1.35 cm2, AVAi = 0.77 cm2/m2, peak velocity = 3.23 m/s, mean gradient = 26 mmHg

## 2018-07-09 NOTE — PROGRESS NOTES
Introduced myself to patient and filled out information on white board. Explained blue DC folder and identified help at home and that I would be assisting patient with managing care at home throughout hospitalization.  Patient stated that she would be interested in HH at [place].   I discussed possibility of home oxygen as well. Patient requested that I call and speak with her help at home, daughter, Tiana, to update her as well    Called Tiana and updated her on plan. I explained to her the possibility of home oxygen and I gave her options and took her preference on HH companies. She and patient both chose [facility].  Tiana states that the patient has had some falls recently but she is unsure as to if they are from lightheadedness due to hypoxia or weakness. Tiana requests a WC for patient and states that pt already has a rollator and a RW that she last received in 2012. Will cx therapy for home needs. Tiana says that she has hired a sitter for 5 days a week to assist pt at home from 1PM-5PM.     Patient already has a cards appointment for 7/16 with cardiology at [facility]. Tiana states that she had to cancel the PCP appt because it was fr tomorrow but that she will reschedule it herself bc she will need to work it around her work schedule.     Patient would likely benefit from a HH SN, PT, OT, Aide, and a SW at [place]

## 2018-07-09 NOTE — PLAN OF CARE
Problem: Patient Care Overview  Goal: Plan of Care Review  Recommendations     Recommendation/Intervention:   1. Cont w/ current diet & oral nutr suppl order    2. Nursing staff- Please record pt's meal intake    Goals: Maintain meal intake >50%  Nutrition Goal Status: new

## 2018-07-09 NOTE — ASSESSMENT & PLAN NOTE
Diuresis and afterload reduction as tolerated. Not interested in further invasive cardiac procedures

## 2018-07-09 NOTE — PLAN OF CARE
07/09/18 1037   Medicare Message   Important Message from Medicare regarding Discharge Appeal Rights Given to patient/caregiver;Explained to patient/caregiver;Signed/date by patient/caregiver;Other (comments)  (reviewed with both patient and with her help at home, daughter, Tiana)   Date IMM was signed 07/09/18   Time IMM was signed 1037

## 2018-07-09 NOTE — HOSPITAL COURSE
Pt admitted with SOB and large right pleural effusion.  Patient was also anemic and transfused one unit of blood with adequate correction of H/H.  Pulmonology consulted and patient underwent thoracentesis with some improvement of symptoms.  Echo showed normal EF, diastolic dysfunction, severe MR and severe pulmonary HTN.  Pleural fluid is transudative and probably related to diastolic HF and MR.  Cardiology consulted.  Patient started on IV diuresis.  Hypoxic on RA on ambulation and will require home oxygen.  PT/OT consulted and recommending HH.  Patient has remained afebrile and hemodynamically stable.  Changing to PO Lasix.  Poor overall prognosis per Cardiology.  Patient is DNR.  She is currently feeling well and wants to go home.  SW consulted to arrange home oxygen.  Pleural fluid cytology pending on discharge.  Patient already scheduled to follow up with Cardiology next week.  She will also be referred to Pulmonary.  Patient will be discharged home today.

## 2018-07-09 NOTE — PROGRESS NOTES
[facility] Hospital Medicine  Progress Note    Patient Name: Ruth Lan  MRN: [id number]  Patient Class: IP- Inpatient   Admission Date: 7/7/2018  Length of Stay: 1 days  Attending Physician: Mae Sabillon MD  Primary Care Provider: Azikiwe K Lombard, MD        Subjective:     Principal Problem:Pleural effusion    HPI:  82 yo female with AVR/pig valve, HTN, HLP, former smoker,and  h/o breast cancer presented from home with c/o SOB. She reports a fall 8 days ago and has been getting weaker everyday with worsening SOB and VALLE. Patient daughter reports in last few months, noticeable drop in weight, poor appetite, forgetting to eat and worsening weakness in LE. On workup she was found to have h/h 7.9/24.8,  and bilateral pleural effusion on CXR (R>L). A chest CT suggest large right pleural effusion and cannot rule out underlying mass/infection.  Pt denies fever, chills, CP and cough. O2 saturation 81% on room air on admit. Pulmonology consulted.     Hospital Course:  Pt admitted with SOB and large right pleural effusion. Pulmonology consulted and plan for thoracentesis today. Wean oxygen as tolerated.     Interval History: pt c/o SOB and discomfort     Review of Systems   Constitutional: Positive for activity change, appetite change, fatigue and unexpected weight change.   HENT: Negative.    Eyes: Negative.    Respiratory: Positive for shortness of breath.    Cardiovascular: Negative.    Gastrointestinal: Negative.    Endocrine: Negative.    Genitourinary: Negative.    Musculoskeletal: Positive for gait problem.   Skin: Negative.    Neurological: Positive for weakness and numbness.   Psychiatric/Behavioral: Positive for sleep disturbance.     Objective:     Vital Signs (Most Recent):  Temp: 98.4 °F (36.9 °C) (07/08/18 1950)  Pulse: 88 (07/08/18 1950)  Resp: 18 (07/08/18 1950)  BP: 115/63 (07/08/18 1950)  SpO2: 95 % (07/08/18 1950) Vital Signs (24h Range):  Temp:  [97.3 °F (36.3  °C)-98.4 °F (36.9 °C)] 98.4 °F (36.9 °C)  Pulse:  [] 88  Resp:  [18-20] 18  SpO2:  [91 %-95 %] 95 %  BP: (110-141)/(56-71) 115/63     Weight: 70.2 kg (154 lb 12.2 oz)  Body mass index is 26.57 kg/m².    Intake/Output Summary (Last 24 hours) at 07/08/18 2224  Last data filed at 07/08/18 1800   Gross per 24 hour   Intake              600 ml   Output              600 ml   Net                0 ml      Physical Exam   Constitutional: She is oriented to person, place, and time. She appears well-developed and well-nourished. No distress.   HENT:   Head: Normocephalic and atraumatic.   Mouth/Throat: Oropharynx is clear and moist.   Eyes: Conjunctivae and EOM are normal.   Neck: Normal range of motion. Neck supple.   Cardiovascular: Normal rate, regular rhythm and intact distal pulses.    Murmur heard.  Pulmonary/Chest: Effort normal. She has no wheezes.   Dull breath sounds R>L    Abdominal: Soft. Bowel sounds are normal. She exhibits no distension. There is no tenderness.   Musculoskeletal: Normal range of motion. She exhibits no edema.   Lymphadenopathy:     She has no cervical adenopathy.   Neurological: She is alert and oriented to person, place, and time.   Skin: Skin is warm and dry. Capillary refill takes less than 2 seconds. No erythema.   Psychiatric: She has a normal mood and affect. Her behavior is normal.       Significant Labs:   Blood Culture:   Recent Labs  Lab 07/07/18  1218 07/07/18  1225   LABBLOO No Growth to date  No Growth to date No Growth to date  No Growth to date     BMP:   Recent Labs  Lab 07/08/18  0519   *      K 4.0      CO2 27   BUN 28*   CREATININE 1.1   CALCIUM 9.1   MG 1.8     CBC:   Recent Labs  Lab 07/07/18  0956   WBC 8.40   HGB 7.9*   HCT 24.8*          Significant Imaging: I have reviewed all pertinent imaging results/findings within the past 24 hours.    Assessment/Plan:      * Pleural effusion    IR (Monday) vs pulmonology for thoracentesis   ECHO  pending  Wean oxygen as tolerated  Lapost signed, pt request DNR   Hold aspirin           Symptomatic anemia    One unit blood transfusion   Repeat CBC   Pt takes vitamin B12 supplements           Malnutrition of moderate degree    Concern for malignancy   BOOST TID          Hypoxia    Wean oxygen as tolerated  Poor lung mechanics with large effusion on right  IV lasix given after blood transfusion           History of breast cancer    Reviewed MMG 2017 - no evidence of malignancy             Memory loss, short term    Follow up neurology as scheduled   Consider brain imaging as malignancy of concern           Vitamin B12 deficiency    See above           Essential hypertension    Resume norvasc as BP allows           Anxiety              S/P aortic valve replacement    ECHO pending            VTE Risk Mitigation         Ordered     Place TONY hose  Until discontinued      07/07/18 1628     IP VTE LOW RISK PATIENT  Once      07/07/18 1539              Mae Sabillon MD  Department of Hospital Medicine   [facility]

## 2018-07-10 LAB
ANION GAP SERPL CALC-SCNC: 8 MMOL/L
BASOPHILS # BLD AUTO: 0.04 K/UL
BASOPHILS NFR BLD: 0.5 %
BNP SERPL-MCNC: 717 PG/ML
BUN SERPL-MCNC: 28 MG/DL
CALCIUM SERPL-MCNC: 9 MG/DL
CHLORIDE SERPL-SCNC: 106 MMOL/L
CO2 SERPL-SCNC: 26 MMOL/L
CREAT SERPL-MCNC: 0.9 MG/DL
DIFFERENTIAL METHOD: ABNORMAL
EOSINOPHIL # BLD AUTO: 0.5 K/UL
EOSINOPHIL NFR BLD: 5.2 %
ERYTHROCYTE [DISTWIDTH] IN BLOOD BY AUTOMATED COUNT: 16.9 %
EST. GFR  (AFRICAN AMERICAN): >60 ML/MIN/1.73 M^2
EST. GFR  (NON AFRICAN AMERICAN): 59 ML/MIN/1.73 M^2
GLUCOSE SERPL-MCNC: 141 MG/DL
HCT VFR BLD AUTO: 30.5 %
HGB BLD-MCNC: 9.6 G/DL
LYMPHOCYTES # BLD AUTO: 0.7 K/UL
LYMPHOCYTES NFR BLD: 8 %
MAGNESIUM SERPL-MCNC: 1.9 MG/DL
MCH RBC QN AUTO: 30 PG
MCHC RBC AUTO-ENTMCNC: 31.5 G/DL
MCV RBC AUTO: 95 FL
MONOCYTES # BLD AUTO: 0.7 K/UL
MONOCYTES NFR BLD: 8 %
NEUTROPHILS # BLD AUTO: 6.7 K/UL
NEUTROPHILS NFR BLD: 78.2 %
PHOSPHATE SERPL-MCNC: 2.7 MG/DL
PLATELET # BLD AUTO: 245 K/UL
PMV BLD AUTO: 9.4 FL
POTASSIUM SERPL-SCNC: 4.6 MMOL/L
RBC # BLD AUTO: 3.2 M/UL
SODIUM SERPL-SCNC: 140 MMOL/L
WBC # BLD AUTO: 8.61 K/UL

## 2018-07-10 PROCEDURE — 25000003 PHARM REV CODE 250: Performed by: HOSPITALIST

## 2018-07-10 PROCEDURE — 25000003 PHARM REV CODE 250: Performed by: INTERNAL MEDICINE

## 2018-07-10 PROCEDURE — 80048 BASIC METABOLIC PNL TOTAL CA: CPT

## 2018-07-10 PROCEDURE — 36415 COLL VENOUS BLD VENIPUNCTURE: CPT

## 2018-07-10 PROCEDURE — 85025 COMPLETE CBC W/AUTO DIFF WBC: CPT

## 2018-07-10 PROCEDURE — 97165 OT EVAL LOW COMPLEX 30 MIN: CPT

## 2018-07-10 PROCEDURE — 84100 ASSAY OF PHOSPHORUS: CPT

## 2018-07-10 PROCEDURE — G8989 SELF CARE D/C STATUS: HCPCS | Mod: CJ

## 2018-07-10 PROCEDURE — 83735 ASSAY OF MAGNESIUM: CPT

## 2018-07-10 PROCEDURE — G8987 SELF CARE CURRENT STATUS: HCPCS | Mod: CJ

## 2018-07-10 PROCEDURE — 21400001 HC TELEMETRY ROOM

## 2018-07-10 PROCEDURE — 83880 ASSAY OF NATRIURETIC PEPTIDE: CPT

## 2018-07-10 PROCEDURE — 63600175 PHARM REV CODE 636 W HCPCS: Performed by: INTERNAL MEDICINE

## 2018-07-10 PROCEDURE — 97161 PT EVAL LOW COMPLEX 20 MIN: CPT

## 2018-07-10 PROCEDURE — G8988 SELF CARE GOAL STATUS: HCPCS | Mod: CI

## 2018-07-10 PROCEDURE — 99232 SBSQ HOSP IP/OBS MODERATE 35: CPT | Mod: ,,, | Performed by: INTERNAL MEDICINE

## 2018-07-10 PROCEDURE — 94761 N-INVAS EAR/PLS OXIMETRY MLT: CPT

## 2018-07-10 RX ORDER — GUAIFENESIN/DEXTROMETHORPHAN 100-10MG/5
5 SYRUP ORAL EVERY 6 HOURS
Status: DISCONTINUED | OUTPATIENT
Start: 2018-07-10 | End: 2018-07-11 | Stop reason: HOSPADM

## 2018-07-10 RX ORDER — BENZONATATE 100 MG/1
200 CAPSULE ORAL 3 TIMES DAILY PRN
Status: DISCONTINUED | OUTPATIENT
Start: 2018-07-10 | End: 2018-07-11 | Stop reason: HOSPADM

## 2018-07-10 RX ADMIN — LISINOPRIL 5 MG: 5 TABLET ORAL at 08:07

## 2018-07-10 RX ADMIN — ACETAMINOPHEN 650 MG: 325 TABLET, FILM COATED ORAL at 07:07

## 2018-07-10 RX ADMIN — FUROSEMIDE 20 MG: 10 INJECTION, SOLUTION INTRAMUSCULAR; INTRAVENOUS at 05:07

## 2018-07-10 RX ADMIN — GUAIFENESIN AND DEXTROMETHORPHAN 5 ML: 100; 10 SYRUP ORAL at 02:07

## 2018-07-10 RX ADMIN — THERA TABS 1 TABLET: TAB at 08:07

## 2018-07-10 RX ADMIN — FUROSEMIDE 20 MG: 10 INJECTION, SOLUTION INTRAMUSCULAR; INTRAVENOUS at 08:07

## 2018-07-10 RX ADMIN — GUAIFENESIN AND DEXTROMETHORPHAN 5 ML: 100; 10 SYRUP ORAL at 05:07

## 2018-07-10 RX ADMIN — PRAVASTATIN SODIUM 80 MG: 40 TABLET ORAL at 08:07

## 2018-07-10 RX ADMIN — OXYBUTYNIN CHLORIDE 10 MG: 5 TABLET, EXTENDED RELEASE ORAL at 08:07

## 2018-07-10 RX ADMIN — Medication 3000 MCG: at 08:07

## 2018-07-10 NOTE — PLAN OF CARE
Problem: Fall Risk (Adult)  Intervention: Monitor/Assist with Self Care   07/09/18 2000 07/10/18 0900   Daily Care Interventions   Self-Care Promotion independence encouraged;BADL personal objects within reach;BADL personal routines maintained --    Activity   Activity Assistance Provided --  assistance, 1 person     Intervention: Reduce Risk/Promote Restraint Free Environment   18   Safety Interventions   Environmental Safety Modification assistive device/personal items within reach;clutter free environment maintained;lighting adjusted;room near unit station;room organization consistent   Prevent Warner Drop/Fall   Safety/Security Measures bed alarm set     Intervention: Review Medications/Identify Contributors to Fall Risk   07/10/18 1045   Safety Interventions   Medication Review/Management medications reviewed     Intervention: Patient Rounds   07/10/18 0900   Safety Interventions   Patient Rounds bed in low position;bed wheels locked;clutter free environment maintained;call light in reach;ID band on;placement of personal items at bedside;toileting offered;visualized patient     Intervention: Safety Promotion/Fall Prevention   07/10/18 0900   Safety Interventions   Safety Promotion/Fall Prevention assistive device/personal item within reach;bed alarm set;commode/urinal/bedpan at bedside;Fall Risk reviewed with patient/family;lighting adjusted;medications reviewed;nonskid shoes/socks when out of bed;room near unit station;instructed to call staff for mobility       Goal: Identify Related Risk Factors and Signs and Symptoms  Related risk factors and signs and symptoms are identified upon initiation of Human Response Clinical Practice Guideline (CPG)   Outcome: Ongoing (interventions implemented as appropriate)   18   Fall Risk   Related Risk Factors (Fall Risk) environment unfamiliar;slipper/uneven surfaces;objects hard to reach;age-related changes;history of falls;inadequate lighting    Signs and Symptoms (Fall Risk) presence of risk factors     Goal: Absence of Falls  Patient will demonstrate the desired outcomes by discharge/transition of care.   Outcome: Ongoing (interventions implemented as appropriate)   07/08/18 0215   Fall Risk (Adult)   Absence of Falls making progress toward outcome

## 2018-07-10 NOTE — PLAN OF CARE
Problem: Physical Therapy Goal  Goal: Physical Therapy Goal  Goals to be met by: 18    Patient will increase functional independence with mobility by performin. Sit to stand transfer with Modified [place]  2. Gait  x 250 feet with Modified [place] using Single-point Cane   3. Lower extremity exercise program x30 reps, with independence      She ambulated 70ft with straight cane and CGA. She would benefit from  PT at discharge.

## 2018-07-10 NOTE — NURSING
Testing for Home O2    O2 Sats on RA at rest = 94%    O2 sats on RA with exercise  =82%    O2 sats on _2_L O2 with exercise = 97%

## 2018-07-10 NOTE — PLAN OF CARE
Problem: Occupational Therapy Goal  Goal: Occupational Therapy Goal  Goals to be met by: 7/14/18      Patient will increase functional independence with ADLs by performing:    UE Dressing with Modified [place].  LE Dressing with Modified [place].  Grooming while standing at sink with Modified [place] and Supervision.  Toileting from toilet with Modified [place] and Supervision for hygiene and clothing management.   Supine to sit with Modified [place] and Supervision.  Stand pivot transfers with Modified [place] and Supervision.  Toilet transfer to toilet with Modified [place] and Supervision.  Upper extremity exercise program x10 reps per handout, with assistance as needed.    Outcome: Ongoing (interventions implemented as appropriate)  Patient will benefit from OT to address functional deficits.    Comments: The patient is able to amb  To the bathroom using a cane and CGA, The patient will benefit from  OT.

## 2018-07-10 NOTE — PROGRESS NOTES
Pt will require home oxygen. Notified Gil from [facility] of ordrs. I have already discussed with patient and daughter Tiana previouslythis a t to have [facility]  at [place] patient daughter Tiana is requesting a transpirt WC to assist her with transport to MD appointments.  Spoke with Janie who statess that the orders have been sent over to [facility]

## 2018-07-10 NOTE — PROGRESS NOTES
[phone], daughter, Sandra per telephone had a few minutes at work,  TN taught S&S for High Blood Pressure home care with pt and Tiana cooley with teach back:  1. Cest pain an dSOB = 911, 2.Trouble speaking. TN placed education sheet in [place] [employer]..     Help at home will be from dtr, _Tiana___, assisting in pt's recovery.     TN checked whiteboard for cm/sw name, spectra link #, pt contact, and d/c disposition....Irma Hernandez, RN, BSN, STN CCM

## 2018-07-10 NOTE — PROGRESS NOTES
Received a call from nurse, Cordelia, stating that the patient and her daughter, Tiana, are concern because the patient sleeps and bathes upstairs and they don;t think that they will be able to carry the concentrator up and down the stairs.  They did present their issue to the person who delivered home oxygen, and his solution was to provide them an extension for the tubing so that the patient is able to move around upstairs.  Suggested that patient stay downstairs for the time being. Unforntunatly, at this time, these and the ptient staying somewhere else are the hu

## 2018-07-10 NOTE — PT/OT/SLP EVAL
Occupational Therapy   Evaluation    Name: Ruth Lan  MRN: [id number]  Admitting Diagnosis:  Pleural effusion      Recommendations:     Discharge Recommendations: home health OT (with family assist)  Discharge Equipment Recommendations:  none  Barriers to discharge:  Inaccessible home environment (bed and bathroom on the 2nd floor)    History:     Occupational Profile:  Living Environment: The patient lives alone in a 2 story house. The patient's bedroom and bathroom are on the 2nd floor.  Previous level of function: The patien states she was able to bathe andd dress herself. The patient states she will stop driving 2* getting assist from a sitter 5x/week.  Roles and Routines: The patient amb with the use of a cane. The patient states she fell recently outside.  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, ER/FL positive    Heart murmur     Hyperlipidemia     Hypertension        Past Surgical History:   Procedure Laterality Date    BREAST BIOPSY  11/2012    Right breast- invasive ductal carcinoma    CARDIAC VALVE REPLACEMENT      aortic, pig valve    CARDIAC VALVE SURGERY  2004    AVR    CATARACT EXTRACTION W/  INTRAOCULAR LENS IMPLANT      OU     EYE SURGERY      cataracts- bilateral    TISSUE AORTIC VALVE REPLACEMENT  2003    TUBAL LIGATION         Subjective     Chief Complaint: feels cold  Patient/Family stated goals: wants to go home  Communicated with: Hortensia duvall prior to session.  Pain/Comfort:  · Pain Rating 1: 0/10    Patients cultural, spiritual, Hoahaoism conflicts given the current situation: none    Objective:     Patient found with: telemetry, peripheral IV    General Precautions:  Standard, fall   Orthopedic Precautions:N/A   Braces: N/A     Occupational Performance:    Bed Mobility:    · N/T The patient was found seated on the EOB.    Functional Mobility/Transfers:  · Patient completed Sit <> Stand Transfer with contact guard assistance  with  straight cane   · Patient completed Toilet Transfer Step Transfer technique with contact guard assistance with  straight cane  · Functional Mobility: The patient amb using a cane and CGA  in the hallway with PT    Activities of Daily Living:  · Upper Body Dressing: contact guard assistance to don back gown  · Lower Body Dressing: stand by assistance to doff B socks while seated on the EOB  · Toileting: stand by assistance and contact guard assistance from the BSC (CGA to manage clothing)    Cognitive/Visual Perceptual:  Cognitive/Psychosocial Skills:     -       Oriented to: Person, Place and Situation   -       Follows Commands/attention:Follows two-step commands (some difficulty 2* [place])  -       Communication: clear/fluent  -       Memory: No Deficits noted  -       Safety awareness/insight to disability: impaired   -       Mood/Affect/Coping skills/emotional control: Appropriate to situation    Physical Exam:  Balance: -       fair+  Postural examination/scapula alignment:    -       Rounded shoulders  -       Forward head  Skin integrity: Visible skin intact  Upper Extremity Range of Motion:     -       Right Upper Extremity: WFL  -       Left Upper Extremity: WFL  Upper Extremity Strength:    -       Right Upper Extremity: WFL  -       Left Upper Extremity: WFL   Strength:    -       Right Upper Extremity: WFL  -       Left Upper Extremity: WFL    Patient left up in chair with all lines intact, call button in reach and nurseHortensia notified    [facility] 6 Click:  [facility] Total Score: 22    Treatment & Education:  The patient tolerated OT eval. The patient's PSO2 on RA was 82%. The patient was educated re: breathing techniques but PSO2 remained in  "the 80's. The patient' PSO2 increased to 97% with 2L O2 [place] and education. The patient was educated re: OT role and need to request nursing assist to amb to the bathroom or bed. The patient verbalized understanidng.  Education:    Assessment:     Ruth Lan is a 83 y.o. female with a medical diagnosis of Pleural effusion. The patyientt will benefit from HH OT and 24* (S)/assist for safety for management of )2 lines..  Performance deficits affecting function are weakness, impaired endurance, impaired self care skills, impaired balance, gait instability, impaired functional mobilty, decreased safety awareness, impaired cardiopulmonary response to activity.      Rehab Prognosis:  good; patient would benefit from acute skilled OT services to address these deficits and reach maximum level of function.         Clinical Decision Makin.  OT Low:  "Pt evaluation falls under low complexity for evaluation coding due to performance deficits noted in 1-3 areas as stated above and 0 co-morbities affecting current functional status. Data obtained from problem focused assessments. No modifications or assistance was required for completion of evaluation. Only brief occupational profile and history review completed."     Plan:     Patient to be seen 3 x/week to address the above listed problems via self-care/home management, therapeutic activities, therapeutic exercises  · Plan of Care Expires: 18  · Plan of Care Reviewed with: patient    This Plan of care has been discussed with the patient who was involved in its development and understands and is in agreement with the identified goals and treatment plan    GOALS:    Occupational Therapy Goals        Problem: Occupational Therapy Goal    Goal Priority Disciplines Outcome Interventions   Occupational Therapy Goal     OT, PT/OT Ongoing (interventions implemented as appropriate)    Description:  Goals to be met by: 18      Patient will increase functional " independence with ADLs by performing:    UE Dressing with Modified [place].  LE Dressing with Modified [place].  Grooming while standing at sink with Modified [place] and Supervision.  Toileting from toilet with Modified [place] and Supervision for hygiene and clothing management.   Supine to sit with Modified [place] and Supervision.  Stand pivot transfers with Modified [place] and Supervision.  Toilet transfer to toilet with Modified [place] and Supervision.  Upper extremity exercise program x10 reps per handout, with assistance as needed.                      Time Tracking:     OT Date of Treatment: 07/10/18  OT Start Time: 0921  OT Stop Time: 0942  OT Total Time (min): 21 min    Billable Minutes:Evaluation 21 (with PT)    Dena Tapia OT  7/10/2018

## 2018-07-10 NOTE — ASSESSMENT & PLAN NOTE
S/p thoracentesis 7/8  Appreciate Pulmonary input.  Fluid transudative.  Probably secondary to diastolic CHF and severe MR.  Hypoxic on RA on ambulation and will need home oxygen.  Lapost signed, pt request DNR   Will give one more day of IV lasix and hopefully home tomorrow.  PT/OT evaluated patient and recommending HH.

## 2018-07-10 NOTE — SUBJECTIVE & OBJECTIVE
Interval History:     Review of Systems   Constitution: Negative for decreased appetite.   HENT: Negative for ear discharge.    Eyes: Negative for blurred vision.   Respiratory: Negative for hemoptysis.    Endocrine: Negative for polyphagia.   Hematologic/Lymphatic: Negative for adenopathy.   Skin: Negative for color change.   Musculoskeletal: Negative for joint swelling.   Neurological: Negative for brief paralysis.   Psychiatric/Behavioral: Negative for hallucinations.     Objective:     Vital Signs (Most Recent):  Temp: 97.1 °F (36.2 °C) (07/10/18 0824)  Pulse: 81 (07/10/18 0824)  Resp: 18 (07/10/18 0824)  BP: (!) 126/58 (07/10/18 0824)  SpO2: 97 % (07/10/18 0824) Vital Signs (24h Range):  Temp:  [97.1 °F (36.2 °C)-98.5 °F (36.9 °C)] 97.1 °F (36.2 °C)  Pulse:  [81-91] 81  Resp:  [18-20] 18  SpO2:  [92 %-98 %] 97 %  BP: (124-135)/(56-64) 126/58     Weight: 72.6 kg (160 lb 0.9 oz)  Body mass index is 27.47 kg/m².     SpO2: 97 %  O2 Device (Oxygen Therapy): nasal cannula      Intake/Output Summary (Last 24 hours) at 07/10/18 0845  Last data filed at 07/10/18 0429   Gross per 24 hour   Intake              720 ml   Output              450 ml   Net              270 ml       Lines/Drains/Airways     Peripheral Intravenous Line                 Peripheral IV - Single Lumen 07/07/18 1308 Right Hand 2 days                Physical Exam   Constitutional: She is oriented to person, place, and time. She appears well-developed and well-nourished.   HENT:   Head: Normocephalic and atraumatic.   Eyes: Conjunctivae are normal. Pupils are equal, round, and reactive to light.   Neck: Normal range of motion. Neck supple.   Cardiovascular: Normal rate and intact distal pulses.    Murmur heard.  High-pitched blowing holosystolic murmur is present with a grade of 3/6  at the apex  Pulmonary/Chest: Effort normal. She has decreased breath sounds.   Abdominal: Soft. Bowel sounds are normal.   Musculoskeletal: Normal range of motion.    Neurological: She is alert and oriented to person, place, and time.   Skin: Skin is warm and dry.       Significant Labs: All pertinent lab results from the last 24 hours have been reviewed.    Significant Imaging: Echocardiogram:   2D echo with color flow doppler:   Results for orders placed or performed during the hospital encounter of 07/07/18   2D echo with color flow doppler   Result Value Ref Range    EF 55 55 - 65    Mitral Valve Regurgitation SEVERE (A)     Est. PA Systolic Pressure 110.65 (A)     Tricuspid Valve Regurgitation MODERATE (A)

## 2018-07-10 NOTE — PROGRESS NOTES
[facility] Hospital Medicine  Progress Note    Patient Name: Ruth Lan  MRN: [id number]  Patient Class: IP- Inpatient   Admission Date: 7/7/2018  Length of Stay: 3 days  Attending Physician: Juanito Hudson MD  Primary Care Provider: Azikiwe K Lombard, MD        Subjective:     Principal Problem:Pleural effusion    HPI:  82 yo female with AVR/pig valve, HTN, HLP, former smoker,and  h/o breast cancer presented from home with c/o SOB. She reports a fall 8 days ago and has been getting weaker everyday with worsening SOB and VALLE. Patient daughter reports in last few months, noticeable drop in weight, poor appetite, forgetting to eat and worsening weakness in LE. On workup she was found to have h/h 7.9/24.8,  and bilateral pleural effusion on CXR (R>L). A chest CT suggest large right pleural effusion and cannot rule out underlying mass/infection.  Pt denies fever, chills, CP and cough. O2 saturation 81% on room air on admit. Pulmonology consulted.     Hospital Course:  Pt admitted with SOB and large right pleural effusion. Pulmonology consulted and patient underwent thoracentesis.  Pleural fluid is transudative and probably related to diastolic HF and MR.  Cardiology consulted.  Patient started on IV diuresis.  Hypoxic on RA on ambulation and will require home oxygen.  PT/OT consulted and recommending HH.    Interval History: Tired, but doing well.    Review of Systems   HENT: Negative for ear discharge and ear pain.    Eyes: Negative for pain and itching.   Endocrine: Negative for polyphagia and polyuria.   Neurological: Negative for seizures and syncope.     Objective:     Vital Signs (Most Recent):  Temp: 97.6 °F (36.4 °C) (07/10/18 1505)  Pulse: 82 (07/10/18 1505)  Resp: 18 (07/10/18 1505)  BP: (!) 110/56 (07/10/18 1505)  SpO2: (!) 93 % (07/10/18 1505) Vital Signs (24h Range):  Temp:  [96.4 °F (35.8 °C)-98.5 °F (36.9 °C)] 97.6 °F (36.4 °C)  Pulse:  [81-91] 82  Resp:  [18-20]  18  SpO2:  [82 %-97 %] 93 %  BP: (110-135)/(53-64) 110/56     Weight: 72.6 kg (160 lb 0.9 oz)  Body mass index is 27.47 kg/m².    Intake/Output Summary (Last 24 hours) at 07/10/18 1537  Last data filed at 07/10/18 0945   Gross per 24 hour   Intake              960 ml   Output              500 ml   Net              460 ml      Physical Exam    Significant Labs:   BMP:   Recent Labs  Lab 07/10/18  0500   *      K 4.6      CO2 26   BUN 28*   CREATININE 0.9   CALCIUM 9.0   MG 1.9     CBC:   Recent Labs  Lab 07/10/18  0947   WBC 8.61   HGB 9.6*   HCT 30.5*          Significant Imaging: I have reviewed all pertinent imaging results/findings within the past 24 hours.    Assessment/Plan:      * Pleural effusion    S/p thoracentesis 7/8  Appreciate Pulmonary input.  Fluid transudative.  Probably secondary to diastolic CHF and severe MR.  Hypoxic on RA on ambulation and will need home oxygen.  Elaine signed, pt request DNR   Will give one more day of IV lasix and hopefully home tomorrow.  PT/OT evaluated patient and recommending HH.        DNR (do not resuscitate)    See elaine           Pulmonary HTN    ECHO reviewed - severe PHTN  Defer to cardiology         Acute on chronic diastolic heart failure    Continue lasix        Malnutrition of moderate degree              Hypoxia    Will need home oxygen on discharge.           Severe mitral regurgitation              History of breast cancer    Reviewed MMG 2017 - no evidence of malignancy             Memory loss, short term    Follow up neurology as scheduled             Vitamin B12 deficiency              Symptomatic anemia    One unit blood transfusion   Pt takes vitamin B12 supplements   Good correction of H/H.  No evidence of bleeding.          Essential hypertension    Continue current management.          Anxiety              S/P aortic valve replacement    ECHO :  CONCLUSIONS     1 - Normal left ventricular systolic function (EF 55-60%).     2  - Concentric hypertrophy.     3 - Biatrial enlargement.     4 - Severe mitral regurgitation.     5 - Moderate tricuspid regurgitation.     6 - Pulmonary hypertension. The estimated PA systolic pressure is 111 mmHg.     7 - S/P transcatheter AVR, NISHA = 1.35 cm2, AVAi = 0.77 cm2/m2, peak velocity = 3.23 m/s, mean gradient = 26 mmHg            VTE Risk Mitigation         Ordered     Place TONY hose  Until discontinued      07/07/18 1628     IP VTE LOW RISK PATIENT  Once      07/07/18 1539              Juanito Hudson MD  Department of Hospital Medicine   [facility]

## 2018-07-10 NOTE — ASSESSMENT & PLAN NOTE
One unit blood transfusion   Pt takes vitamin B12 supplements   Good correction of H/H.  No evidence of bleeding.

## 2018-07-10 NOTE — SUBJECTIVE & OBJECTIVE
Interval History: Tired, but doing well.    Review of Systems   HENT: Negative for ear discharge and ear pain.    Eyes: Negative for pain and itching.   Endocrine: Negative for polyphagia and polyuria.   Neurological: Negative for seizures and syncope.     Objective:     Vital Signs (Most Recent):  Temp: 97.6 °F (36.4 °C) (07/10/18 1505)  Pulse: 82 (07/10/18 1505)  Resp: 18 (07/10/18 1505)  BP: (!) 110/56 (07/10/18 1505)  SpO2: (!) 93 % (07/10/18 1505) Vital Signs (24h Range):  Temp:  [96.4 °F (35.8 °C)-98.5 °F (36.9 °C)] 97.6 °F (36.4 °C)  Pulse:  [81-91] 82  Resp:  [18-20] 18  SpO2:  [82 %-97 %] 93 %  BP: (110-135)/(53-64) 110/56     Weight: 72.6 kg (160 lb 0.9 oz)  Body mass index is 27.47 kg/m².    Intake/Output Summary (Last 24 hours) at 07/10/18 1537  Last data filed at 07/10/18 0945   Gross per 24 hour   Intake              960 ml   Output              500 ml   Net              460 ml      Physical Exam    Significant Labs:   BMP:   Recent Labs  Lab 07/10/18  0500   *      K 4.6      CO2 26   BUN 28*   CREATININE 0.9   CALCIUM 9.0   MG 1.9     CBC:   Recent Labs  Lab 07/10/18  0947   WBC 8.61   HGB 9.6*   HCT 30.5*          Significant Imaging: I have reviewed all pertinent imaging results/findings within the past 24 hours.

## 2018-07-10 NOTE — HOSPITAL COURSE
Echo 7/8/18    1 - Normal left ventricular systolic function (EF 55-60%).     2 - Concentric hypertrophy.     3 - Biatrial enlargement.     4 - Severe mitral regurgitation.     5 - Moderate tricuspid regurgitation.     6 - Pulmonary hypertension. The estimated PA systolic pressure is 111 mmHg.     7 - S/P transcatheter AVR, NISHA = 1.35 cm2, AVAi = 0.77 cm2/m2, peak velocity = 3.23 m/s, mean gradient = 26 mmHg.     Less SOB

## 2018-07-10 NOTE — PROGRESS NOTES
[facility]  Cardiology  Progress Note    Patient Name: Ruth Lan  MRN: [id number]  Admission Date: 7/7/2018  Hospital Length of Stay: 3 days  Code Status: DNR   Attending Physician: Juanito Hudson MD   Primary Care Physician: Azikiwe K Lombard, MD  Expected Discharge Date:   Principal Problem:Pleural effusion    Subjective:     Hospital Course:   Echo 7/8/18    1 - Normal left ventricular systolic function (EF 55-60%).     2 - Concentric hypertrophy.     3 - Biatrial enlargement.     4 - Severe mitral regurgitation.     5 - Moderate tricuspid regurgitation.     6 - Pulmonary hypertension. The estimated PA systolic pressure is 111 mmHg.     7 - S/P transcatheter AVR, NISHA = 1.35 cm2, AVAi = 0.77 cm2/m2, peak velocity = 3.23 m/s, mean gradient = 26 mmHg.     Less SOB    Interval History:     Review of Systems   Constitution: Negative for decreased appetite.   HENT: Negative for ear discharge.    Eyes: Negative for blurred vision.   Respiratory: Negative for hemoptysis.    Endocrine: Negative for polyphagia.   Hematologic/Lymphatic: Negative for adenopathy.   Skin: Negative for color change.   Musculoskeletal: Negative for joint swelling.   Neurological: Negative for brief paralysis.   Psychiatric/Behavioral: Negative for hallucinations.     Objective:     Vital Signs (Most Recent):  Temp: 97.1 °F (36.2 °C) (07/10/18 0824)  Pulse: 81 (07/10/18 0824)  Resp: 18 (07/10/18 0824)  BP: (!) 126/58 (07/10/18 0824)  SpO2: 97 % (07/10/18 0824) Vital Signs (24h Range):  Temp:  [97.1 °F (36.2 °C)-98.5 °F (36.9 °C)] 97.1 °F (36.2 °C)  Pulse:  [81-91] 81  Resp:  [18-20] 18  SpO2:  [92 %-98 %] 97 %  BP: (124-135)/(56-64) 126/58     Weight: 72.6 kg (160 lb 0.9 oz)  Body mass index is 27.47 kg/m².     SpO2: 97 %  O2 Device (Oxygen Therapy): nasal cannula      Intake/Output Summary (Last 24 hours) at 07/10/18 0845  Last data filed at 07/10/18 0429   Gross per 24 hour   Intake              720 ml   Output               450 ml   Net              270 ml       Lines/Drains/Airways     Peripheral Intravenous Line                 Peripheral IV - Single Lumen 07/07/18 1308 Right Hand 2 days                Physical Exam   Constitutional: She is oriented to person, place, and time. She appears well-developed and well-nourished.   HENT:   Head: Normocephalic and atraumatic.   Eyes: Conjunctivae are normal. Pupils are equal, round, and reactive to light.   Neck: Normal range of motion. Neck supple.   Cardiovascular: Normal rate and intact distal pulses.    Murmur heard.  High-pitched blowing holosystolic murmur is present with a grade of 3/6  at the apex  Pulmonary/Chest: Effort normal. She has decreased breath sounds.   Abdominal: Soft. Bowel sounds are normal.   Musculoskeletal: Normal range of motion.   Neurological: She is alert and oriented to person, place, and time.   Skin: Skin is warm and dry.       Significant Labs: All pertinent lab results from the last 24 hours have been reviewed.    Significant Imaging: Echocardiogram:   2D echo with color flow doppler:   Results for orders placed or performed during the hospital encounter of 07/07/18   2D echo with color flow doppler   Result Value Ref Range    EF 55 55 - 65    Mitral Valve Regurgitation SEVERE (A)     Est. PA Systolic Pressure 110.65 (A)     Tricuspid Valve Regurgitation MODERATE (A)      Assessment and Plan:     Brief HPI:     * Pleural effusion    S/P thoracentesis        DNR (do not resuscitate)             Pulmonary HTN    Severe - poor prognosis        Acute on chronic diastolic heart failure    Diuresis and afterload reduction as tolerated. Not interested in further invasive cardiac procedures        Severe mitral regurgitation    Not new. Not a candidate for valve surgery        Essential hypertension             S/P aortic valve replacement    Hx TAVR 2003 - adequate function on echo            VTE Risk Mitigation         Ordered     Place TONY hose  Until  discontinued      07/07/18 1628     IP VTE LOW RISK PATIENT  Once      07/07/18 1539        Will f/u prn    Derek Banks MD  Cardiology  [facility]

## 2018-07-10 NOTE — NURSING
Report received from NURY Turcios. Patient resting comfortably, no complaints, no acute distress noted. Plan of care reviewed with patient. Instructed patient to call for assistance before ambulating, side rails up x3, bed alarm set, call light in reach, non skid socks in use. Patient verbalized understanding of instructions. Will continue to monitor.

## 2018-07-11 VITALS
HEART RATE: 83 BPM | BODY MASS INDEX: 27.33 KG/M2 | SYSTOLIC BLOOD PRESSURE: 130 MMHG | DIASTOLIC BLOOD PRESSURE: 56 MMHG | RESPIRATION RATE: 18 BRPM | TEMPERATURE: 98 F | WEIGHT: 160.06 LBS | HEIGHT: 64 IN | OXYGEN SATURATION: 99 %

## 2018-07-11 PROBLEM — I50.33 ACUTE ON CHRONIC DIASTOLIC HEART FAILURE: Status: RESOLVED | Noted: 2018-07-08 | Resolved: 2018-07-11

## 2018-07-11 LAB
ANION GAP SERPL CALC-SCNC: 10 MMOL/L
BUN SERPL-MCNC: 37 MG/DL
CALCIUM SERPL-MCNC: 9.2 MG/DL
CHLORIDE SERPL-SCNC: 105 MMOL/L
CO2 SERPL-SCNC: 25 MMOL/L
CREAT SERPL-MCNC: 1.3 MG/DL
EST. GFR  (AFRICAN AMERICAN): 44 ML/MIN/1.73 M^2
EST. GFR  (NON AFRICAN AMERICAN): 38 ML/MIN/1.73 M^2
GLUCOSE SERPL-MCNC: 126 MG/DL
MAGNESIUM SERPL-MCNC: 2 MG/DL
PHOSPHATE SERPL-MCNC: 3.3 MG/DL
POTASSIUM SERPL-SCNC: 4.4 MMOL/L
SODIUM SERPL-SCNC: 140 MMOL/L

## 2018-07-11 PROCEDURE — 27000221 HC OXYGEN, UP TO 24 HOURS

## 2018-07-11 PROCEDURE — 84100 ASSAY OF PHOSPHORUS: CPT

## 2018-07-11 PROCEDURE — 80048 BASIC METABOLIC PNL TOTAL CA: CPT

## 2018-07-11 PROCEDURE — 25000003 PHARM REV CODE 250: Performed by: HOSPITALIST

## 2018-07-11 PROCEDURE — 94761 N-INVAS EAR/PLS OXIMETRY MLT: CPT

## 2018-07-11 PROCEDURE — 36415 COLL VENOUS BLD VENIPUNCTURE: CPT

## 2018-07-11 PROCEDURE — 83735 ASSAY OF MAGNESIUM: CPT

## 2018-07-11 PROCEDURE — 63600175 PHARM REV CODE 636 W HCPCS: Performed by: INTERNAL MEDICINE

## 2018-07-11 PROCEDURE — 25000003 PHARM REV CODE 250: Performed by: INTERNAL MEDICINE

## 2018-07-11 RX ORDER — FUROSEMIDE 40 MG/1
40 TABLET ORAL 2 TIMES DAILY
Qty: 60 TABLET | Refills: 11 | Status: SHIPPED | OUTPATIENT
Start: 2018-07-11 | End: 2018-08-23 | Stop reason: SDUPTHER

## 2018-07-11 RX ORDER — LISINOPRIL 5 MG/1
5 TABLET ORAL DAILY
Qty: 90 TABLET | Refills: 3 | Status: SHIPPED | OUTPATIENT
Start: 2018-07-12 | End: 2018-08-16

## 2018-07-11 RX ORDER — BENZONATATE 200 MG/1
200 CAPSULE ORAL 3 TIMES DAILY PRN
Qty: 15 CAPSULE | Refills: 0 | Status: SHIPPED | OUTPATIENT
Start: 2018-07-11 | End: 2018-07-18

## 2018-07-11 RX ADMIN — GUAIFENESIN AND DEXTROMETHORPHAN 5 ML: 100; 10 SYRUP ORAL at 06:07

## 2018-07-11 RX ADMIN — FUROSEMIDE 20 MG: 10 INJECTION, SOLUTION INTRAMUSCULAR; INTRAVENOUS at 09:07

## 2018-07-11 RX ADMIN — LISINOPRIL 5 MG: 5 TABLET ORAL at 09:07

## 2018-07-11 RX ADMIN — Medication 3000 MCG: at 09:07

## 2018-07-11 RX ADMIN — OXYBUTYNIN CHLORIDE 10 MG: 5 TABLET, EXTENDED RELEASE ORAL at 09:07

## 2018-07-11 RX ADMIN — PRAVASTATIN SODIUM 80 MG: 40 TABLET ORAL at 09:07

## 2018-07-11 RX ADMIN — GUAIFENESIN AND DEXTROMETHORPHAN 5 ML: 100; 10 SYRUP ORAL at 12:07

## 2018-07-11 RX ADMIN — THERA TABS 1 TABLET: TAB at 09:07

## 2018-07-11 RX ADMIN — GUAIFENESIN AND DEXTROMETHORPHAN 5 ML: 100; 10 SYRUP ORAL at 01:07

## 2018-07-11 NOTE — NURSING
In bed awake has no c/o pain/discomfort assessment unchanged call light in reach bed alarm set will report and round with oncoming nurse

## 2018-07-11 NOTE — PROGRESS NOTES
[place] received an order for DME, transport wheelchair. Facesheet, Orders and H&P sent to [facility] @ [phone].  Awaiting fax confirmation and return call to confirm equipment will be provided.

## 2018-07-11 NOTE — PHYSICIAN QUERY
"PT Name: Ruth Lan  MR #: [id number]    Physician Query Form - Hematology Clarification      CDS/: Tia Fishman               Contact information:[email or web address]    This form is a permanent document in the medical record.      Query Date: July 11, 2018    By submitting this query, we are merely seeking further clarification of documentation. Please utilize your independent clinical judgment when addressing the question(s) below.    The Medical record contains the following:   Indicators  Supporting Clinical Findings Location in Medical Record   x "Anemia" documented Symptomatic anemia   H&P 7/7   x H & H = Hgb = 7.9    Hct = 24.8   Labs 7/7    BP =                     HR=      "GI bleeding" documented      Acute bleeding (Non GI site)     x Transfusion(s) One unit blood transfusion    H&P 7/7   x Treatment: One unit blood transfusion   Repeat CBC    H&P 7/7   x Other:  Pt takes vitamin B12 supplements    Iron = 21  TIBC = 394  Sat Iron = 5  Transferrin = 266  Folate = 15.2 H&P 7/7      Labs 7/7     Px  ] Iron deficiency anemia    [  ] Chronic blood loss anemia    [  ] Other Hematological Diagnosis (please specify): _________________________________    [  ] Clinically Undetermined       Please document in your progress notes daily for the duration of treatment, until resolved, and include in your discharge summary.                                                                                                      "

## 2018-07-11 NOTE — PROGRESS NOTES
Follow-up Information     Verna Monet NP. Go on 7/16/2018.    Specialty:  Cardiology  Why:  Monday at 8AM  Contact information:  [address]  [facility] [address]  [phone]             Be Lassiter MD. Go on 9/26/2018.    Specialty:  Neurology  Why:  Wednesday at 2:20PM  Contact information:  WILLIE Damon MD On 9/5/2018.    Specialty:  Pulmonary Disease  Why:  Outpatient Services Pulmonary Follow-Up Wednessday; Nurse will contact pt's daughter with a sooner appointment  Contact information:  [address]  [address]  [facility] [address]  [phone]             [employer].    Specialty:  DME Provider  Why:  DME, transport wheelchair and oxygen  Contact information:  [address] [id number] [phone]             [facility].    Specialty:  Home Health Services  Why:  Home Health Agency will contact patient's daughter to iniate services.  Contact information:  [address]  [address]  [facility] [address]  [phone]                 PLEASE BRING TO ALL FOLLOW UP APPOINTMENTS:   A COPY YOUR DISCHARGE INSTRUCTIONS, Any new MEDICINES YOU ARE CURRENTLY TAKING IN THEIR ORIGINAL BOTTLES  And IDENTIFICATION AND INSURANCE CARD     **PLEASE ARRIVE 15 MINUTES AHEAD OF SCHEDULED APPOINTMENT TIME   ++PLEASE CALL 24 HOURS IN ADVANCE IF YOU MUST RESCHEDULE YOUR APPOINTMENT DAY AND/OR TIME     [facility]    WRITTEN HEALTHCARE AND DISCHARGE INFORMATION                        Help at Home           [phone]  After discharge for assistance [facility] On [facility] Nurse Care Line 24/7  Assistance    Things You are responsible For To Manage Your Care At Home:  1.    Getting your prescriptions filled   2.    Taking your medications as directed, DO NOT MISS ANY DOSES!  3.    Going to your follow-up doctor appointment. This is important because it  allow the doctor to monitor your progress and determine if  any changes need to made  to your treatment plan.     Thank you for choosing [facility] for your care.  Please answer any calls you may receive from [facility] we want to continue to support you as you manage your healthcare needs. [facility] is happy to have the opportunity to serve you.     Sincerely,  Your [facility],  NUR[place], [place];  [phone]

## 2018-07-11 NOTE — NURSING
In bed eyes closed easy to arouse has no c/op pain no distress noted call light in reach bed alarm set will continue to monitor

## 2018-07-11 NOTE — PROGRESS NOTES
WRITTEN HEALTH DISCHARGE INFORMATION ON PLEURAL EFFUSION    Pleural effusion may cause any of these symptoms:  · Shortness of breath  · Rapid breathing  · Cough or hiccups  · Sharp chest pain that hurts more with coughing or deep breathing  A small pleural effusion may cause no symptoms at all.  Treatment will be directed at the cause of the pleural effusion. If you are having a lot of trouble breathing, a thoracentesis procedure may be done to remove the fluid from the pleural space. This involves placing a needle or tube (catheter) through the chest wall into the pleural space. This usually gives relief right away. But the fluid may gradually return.    Home care  Follow these guidelines when caring for yourself at home:  · Rest until you feel better. Exerting yourself may make your symptoms worse.  · Your healthcare provider may have prescribed medicines to treat the underlying cause of the pleural effusion. Take these exactly as directed.    Follow-up care  Follow up with your healthcare provider, or as advised.    When to seek medical advice  Call your healthcare provider right away if any of these occur:  · Fever of 100.4ºF (38ºC) or higher  Call 911 or get immediate medical care  Contact emergency services right away if any of the following occur:  · Shortness of breath gets worse  · Chest pain gets worse  · Coughing up blood  · Weakness, dizziness, or fainting

## 2018-07-11 NOTE — DISCHARGE SUMMARY
[facility] Hospital Medicine  Discharge Summary      Patient Name: Ruth Lan  MRN: [id number]  Admission Date: 7/7/2018  Hospital Length of Stay: 4 days  Discharge Date and Time:  07/11/2018 1:08 PM  Attending Physician: Juanito Hudson MD   Discharging Provider: Juanito Hudson MD  Primary Care Provider: Azikiwe K Lombard, MD      HPI:   84 yo female with AVR/pig valve, HTN, HLP, former smoker,and  h/o breast cancer presented from home with c/o SOB. She reports a fall 8 days ago and has been getting weaker everyday with worsening SOB and VALLE. Patient daughter reports in last few months, noticeable drop in weight, poor appetite, forgetting to eat and worsening weakness in LE. On workup she was found to have h/h 7.9/24.8,  and bilateral pleural effusion on CXR (R>L). A chest CT suggest large right pleural effusion and cannot rule out underlying mass/infection.  Pt denies fever, chills, CP and cough. O2 saturation 81% on room air on admit. Pulmonology consulted.     * No surgery found *      Hospital Course:   Pt admitted with SOB and large right pleural effusion.  Patient was also anemic and transfused one unit of blood with adequate correction of H/H.  Pulmonology consulted and patient underwent thoracentesis with some improvement of symptoms.  Echo showed normal EF, diastolic dysfunction, severe MR and severe pulmonary HTN.  Pleural fluid is transudative and probably related to diastolic HF and MR.  Cardiology consulted.  Patient started on IV diuresis.  Hypoxic on RA on ambulation and will require home oxygen.  PT/OT consulted and recommending HH.  Patient has remained afebrile and hemodynamically stable.  Changing to PO Lasix.  Poor overall prognosis per Cardiology.  Patient is DNR.  She is currently feeling well and wants to go home.  SW consulted to arrange home oxygen.  Pleural fluid cytology pending on discharge.  Patient already scheduled to follow up with Cardiology next  week.  She will also be referred to Pulmonary.  Patient will be discharged home today.     Consults:   Consults         Status Ordering Provider     Inpatient consult to Cardiology  Once     Provider:  Derek Banks MD    Acknowledged ALEXANDRA SCHULTE     Inpatient consult to Pulmonology  Once     Provider:  Gonzalez Damon MD    Completed DAVID OLIVO     Inpatient consult to Social Work  Once     Provider:  (Not yet assigned)    Acknowledged LEVI ARMIOJ          No new Assessment & Plan notes have been filed under this hospital service since the last note was generated.  Service: Hospital Medicine    Final Active Diagnoses:    Diagnosis Date Noted POA    PRINCIPAL PROBLEM:  Pleural effusion [J90] 07/07/2018 Yes    Pulmonary HTN [I27.20] 07/09/2018 Yes    DNR (do not resuscitate) [Z66] 07/09/2018 Yes    Hypoxia [R09.02] 07/07/2018 Yes    Malnutrition of moderate degree [E44.0] 07/07/2018 Yes    Severe mitral regurgitation [I34.0] 03/31/2016 Yes    History of breast cancer [Z85.3] 02/24/2015 Not Applicable    Memory loss, short term [R41.3] 02/25/2014 Yes    Vitamin B12 deficiency [E53.8] 09/17/2013 Yes    Essential hypertension [I10] 09/13/2013 Yes    S/P aortic valve replacement [Z95.2] 10/02/2012 Not Applicable     Chronic      Problems Resolved During this Admission:    Diagnosis Date Noted Date Resolved POA    Acute on chronic diastolic heart failure [I50.33] 07/08/2018 07/11/2018 Yes    Symptomatic anemia [D64.9] 09/13/2013 07/11/2018 Yes       Discharged Condition: stable    Disposition: Home-Health Care [facility]    Follow Up:  Follow-up Information     Verna Monet NP. Go on 7/16/2018.    Specialty:  Cardiology  Why:  Monday at 8AM  Contact information:  [address] [address]  [phone]             Be Lassiter MD. Go on 9/26/2018.    Specialty:  Neurology  Why:  Wednesday at 2:20PM  Contact information:  WILLIE Damon MD In 2 weeks.   "  Specialty:  Pulmonary Disease  Contact information:  [address] [facility]  [address]  [facility] [address]  [phone]                 Patient Instructions:     OXYGEN FOR HOME USE   Order Specific Question Answer Comments   Liter Flow 2    Duration With activity    Qualifying SpO2: 82%    Testing done at: Exercise/Activity    Device home concentrator with portable unit    Length of need (in months): 99 mos    Patient condition with qualifying saturation other chronic pulmonary condition Pulmonary Hypertension   Height: 5' 4" (1.626 m)    Weight: 72.6 kg (160 lb 0.9 oz)    Does patient have medical equipment at home? walker, standard    Does patient have medical equipment at home? bath bench    Does patient have medical equipment at home? cane, straight    Does patient have medical equipment at home? rollator    Does patient have medical equipment at home? shower chair    Alternative treatment measures have been tried or considered and deemed clinically ineffective. Yes      HME - OTHER   Order Specific Question Answer Comments   Type of Equipment: Transport wheelchair    Height: 5' 4" (1.626 m)    Weight: 72.6 kg (160 lb 0.9 oz)    Does patient have medical equipment at home? cane, straight    Does patient have medical equipment at home? shower chair    Does patient have medical equipment at home? walker, rolling    Does patient have medical equipment at home? rollator      Diet Cardiac     Notify your health care provider if you experience any of the following:  temperature >100.4     Notify your health care provider if you experience any of the following:  persistent nausea and vomiting or diarrhea     Notify your health care provider if you experience any of the following:  severe uncontrolled pain     Notify your health care provider if you experience any of the following:  difficulty breathing or increased cough     Notify your health care provider if you experience any of the following:  persistent " dizziness, light-headedness, or visual disturbances     Notify your health care provider if you experience any of the following:  increased confusion or weakness     Activity as tolerated           Pending Diagnostic Studies:     Procedure Component Value Units Date/Time    Cytology Specimen-Medical Cytology (Fluid/Wash/Brush) [[id number]] Collected:  07/08/18 1714    Order Status:  Sent Lab Status:  In process Updated:  07/10/18 0756    Specimen:  Pleural Fluid          Medications:  Reconciled Home Medications:      Medication List      START taking these medications    benzonatate 200 MG capsule  Commonly known as:  TESSALON  Take 1 capsule (200 mg total) by mouth 3 (three) times daily as needed for Cough.     furosemide 40 MG tablet  Commonly known as:  LASIX  Take 1 tablet (40 mg total) by mouth 2 (two) times daily.     lisinopril 5 MG tablet  Commonly known as:  PRINIVIL,ZESTRIL  Take 1 tablet (5 mg total) by mouth once daily.  Start taking on:  7/12/2018        CONTINUE taking these medications    amLODIPine 5 MG tablet  Commonly known as:  NORVASC  Take 1 tablet (5 mg total) by mouth once daily.     aspirin 81 mg Tab  Take 81 mg by mouth Daily. 1 Tablet Oral Every day     calcium-vitamin D 500-125 mg-unit tablet  Take 3 tablets by mouth Daily. 3 Tablet Oral Every day     cyanocobalamin 1000 MCG tablet  Commonly known as:  VITAMIN B-12  Take 3,000 mcg by mouth once daily.     FISH OIL ORAL  Daily. 2   Every day     flaxseed 1,000 mg Cap     ONE DAILY MULTIVITAMIN per tablet  Generic drug:  multivitamin  Take 1 tablet by mouth once daily.     pravastatin 80 MG tablet  Commonly known as:  PRAVACHOL  Take 1 tablet (80 mg total) by mouth once daily.     tolterodine 4 MG 24 hr capsule  Commonly known as:  DETROL LA  Take 1 capsule (4 mg total) by mouth once daily.     TYLENOL 325 MG tablet  Generic drug:  acetaminophen  Take by mouth 2 (two) times daily.            Indwelling Lines/Drains at time of discharge:    Lines/Drains/Airways          No matching active lines, drains, or airways          Time spent on the discharge of patient: >30 minutes  Patient was seen and examined on the date of discharge and determined to be suitable for discharge.         Juanito Hudson MD  Department of Hospital Medicine  [facility]

## 2018-07-11 NOTE — NURSING
Pt in bed awake has no c/o pain at this time no distress noted call light in reach bed alarm set will continue to monitor

## 2018-07-11 NOTE — PLAN OF CARE
Problem: Fall Risk (Adult)  Goal: Absence of Falls  Patient will demonstrate the desired outcomes by discharge/transition of care.   Outcome: Ongoing (interventions implemented as appropriate)  Bed alarm set frequent rounds made

## 2018-07-11 NOTE — NURSING
Assisted pt to bsc and back to bed keegan well. Voided 100ml of yellow urine. Has no c/o pain no distress noted call light in reach bed alarm reset will continue to monitor.

## 2018-07-11 NOTE — NURSING
Report received from NURY Silver. Patient resting comfortably, no complaints, no acute distress noted. Plan of care reviewed with patient. Instructed patient to call for assistance before ambulating, side rails up x3, bed alarm set, call light in reach, non skid socks in use. Patient verbalized understanding of instructions.

## 2018-07-11 NOTE — NURSING
In preparation for discharge, d/c'ed patient's tele monitor,  NSR prior to removal, d/c'ed patient's saline lock, applied pressure to site, secured site with tape and gauze. Discharge instructions given to patient and daughter at bedside. Patient verbalized understanding of instructions. Patient states willingness to comply.

## 2018-07-11 NOTE — PT/OT/SLP EVAL
Physical Therapy Evaluation    Patient Name:  Ruth Lan   MRN:  [id number]    Recommendations:     Discharge Recommendations:  home health PT (with family assist. has sitter from 1-5PM for 5 days/week.)   Discharge Equipment Recommendations:  (daughter request transport wheelchair)   Barriers to discharge: Inaccessible home (bedroom and bathroom are on the 2nd floor)    Assessment:     Ruth Lan is a 83 y.o. female admitted with a medical diagnosis of Pleural effusion.  She presents with the following impairments/functional limitations:  weakness, impaired endurance, impaired functional mobilty, gait instability, impaired balance, decreased lower extremity function, impaired cardiopulmonary response to activity.    Rehab Prognosis:  fair; patient would benefit from acute skilled PT services to address these deficits and reach maximum level of function.      Recent Surgery: * No surgery found *      Plan:     During this hospitalization, patient to be seen 5 x/week to address the above listed problems via gait training, therapeutic activities, therapeutic exercises  · Plan of Care Expires:  07/24/18   Plan of Care Reviewed with: patient    Subjective     Communicated with nurse Myers prior to session.  Patient found seated on edge of bed upon PT entry to room, agreeable to evaluation.      Chief Complaint: Hopes she can go home today.   Patient comments/goals: To go home.   Pain/Comfort:  · Pain Rating 1: 0/10    Living Environment:  Patient lives alone in a 2 story house. Her bedroom/bathroom are on the 2nd floor. She states she only climbs the steps once a day. Prior to admission, patients level of function was ambulatory with straight cane. She has a sitter that just started coming over to assist her 1-5PM for 5 days/week. Patient has the following equipment: cane, straight, shower chair, walker, rolling, rollator. Upon discharge, patient will have assistance from daughter and sitter.    Objective:      Patient found with: telemetry, peripheral IV, oxygen (Nurse reported that she took patient's O2 off to do RA sats and she was okay. Prior to ambulation PT took RA sat and patient had dropped to 82%. Nurse Lucia notified and placed patient back on 2L NC O2 and she recovered to 97% with cues for pursed lip breathing.)     General Precautions: Standard, fall, respiratory   Orthopedic Precautions:N/A   Braces: N/A     Exams:  · Cognitive Exam:  Patient is oriented to Person, Place and Situation and follows 100% of two step commands   · Gross Motor Coordination:  WFL  · Postural Exam:  Patient presented with the following abnormalities:    · -       Rounded shoulders  · -       Forward head  · Sensation:    · -       Intact  · Skin Integrity/Edema:      · -       Skin integrity: Visible skin intact  · RLE ROM: WFL  · RLE Strength: WFL  · LLE ROM: WFL  · LLE Strength: WFL    Functional Mobility:  · Transfers:     · Sit to Stand:  contact guard assistance with straight cane  · Gait:  Patient ambulated 70ft with Straight Cane, 2L NC O2, and CGA. Patient demonstrated forward flexed posture at hips with decreased jonathan and decreased step length during gait due to impaired balance and decreased endurance.    AM-PAC 6 CLICK MOBILITY  Total Score:19     Patient left up in chair with all lines intact, call button in reach and nurse Lucia  notified.    GOALS:    Physical Therapy Goals        Problem: Physical Therapy Goal    Goal Priority Disciplines Outcome Goal Variances Interventions   Physical Therapy Goal     PT/OT, PT      Description:  Goals to be met by: 18    Patient will increase functional independence with mobility by performin. Sit to stand transfer with Modified [place]  2. Gait  x 250 feet with Modified [place] using Single-point Cane   3. 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, ER/VT positive    Heart murmur     Hyperlipidemia     Hypertension        Past Surgical History:   Procedure Laterality Date    BREAST BIOPSY  11/2012    Right breast- invasive ductal carcinoma    CARDIAC VALVE REPLACEMENT      aortic, pig valve    CARDIAC VALVE SURGERY  2004    AVR    CATARACT EXTRACTION W/  INTRAOCULAR LENS IMPLANT      OU     EYE SURGERY      cataracts- bilateral    TISSUE AORTIC VALVE REPLACEMENT  2003    TUBAL LIGATION       Time Tracking:     PT Received On: 07/10/18  PT Start Time: 0920     PT Stop Time: 0942  PT Total Time (min): 22 min     Billable Minutes: Evaluation  22 with OT present    Eliane Kraft, PT  07/11/2018

## 2018-07-11 NOTE — NURSING
Walking rounds made with offgoing nurse Received care of pt in bed aaox3 [place] to bilat ear. Has on eye glasses. gen weakness noted assisted to comfortable position in bed keegan well. Has c/o ha 5/10 medicated with prn tylenol as ordered keegan well . Decreased coarse breath sounds upon auscultation no sob noted has on o2 2l via n/c. Pulses palpable telemetry sr 80's. Active bowel sounds x4 quads. Has bsc at bedside. Pt is oob with assist only and is aware. No distress noted call light in reach bed alarm set will continue to monitor

## 2018-07-11 NOTE — PLAN OF CARE
[facility]    HOME HEALTH ORDERS  FACE TO FACE ENCOUNTER    Patient Name: Ruth Lan  YOB: 1935    PCP: Azikiwe K Lombard, MD   PCP Address: [address]  [place]  PCP Phone Number: [phone]  PCP Fax: [phone]       Encounter Date: 07/11/2018    Admit to Home Health    Diagnoses:  Active Hospital Problems    Diagnosis  POA    *Pleural effusion [J90]  Yes     Priority: 1 - High    Pulmonary HTN [I27.20]  Yes    DNR (do not resuscitate) [Z66]  Yes    Hypoxia [R09.02]  Yes    Malnutrition of moderate degree [E44.0]  Yes    Severe mitral regurgitation [I34.0]  Yes    History of breast cancer [Z85.3]  Not Applicable    Memory loss, short term [R41.3]  Yes    Vitamin B12 deficiency [E53.8]  Yes    Essential hypertension [I10]  Yes    S/P aortic valve replacement [Z95.2]  Not Applicable     Chronic      Resolved Hospital Problems    Diagnosis Date Resolved POA    Acute on chronic diastolic heart failure [I50.33] 07/11/2018 Yes    Symptomatic anemia [D64.9] 07/11/2018 Yes       Future Appointments  Date Time Provider Department Center   7/16/2018 8:00 AM Verna Monet NP [facility] CARDIO [facility]   9/26/2018 2:20 PM Be Lassiter MD [facility] NEURO [facility]     Follow-up Information     Verna Monet NP. Go on 7/16/2018.    Specialty:  Cardiology  Why:  Monday at 8AM  Contact information:  [address]  [facility] [address]  [phone]             Be Lassiter MD. Go on 9/26/2018.    Specialty:  Neurology  Why:  Wednesday at 2:20PM  Contact information:  WILLIE Damon MD In 2 weeks.    Specialty:  Pulmonary Disease  Contact information:  [address]  [address]  [facility] [address]  [phone]                     I have seen and examined this patient face to face today. My clinical findings that support the need for the home health skilled services and home bound status are the  following:  Weakness/numbness causing balance and gait disturbance due to Pleural Effusion making it taxing to leave home.    Allergies:  Review of patient's allergies indicates:   Allergen Reactions    Etodolac Other (See Comments)     Dehydration    Nsaids (non-steroidal anti-inflammatory drug)      COLITIS/DEHYDRATION       Diet: cardiac diet    Activities: activity as tolerated    Nursing:   SN to complete comprehensive assessment including routine vital signs. Instruct on disease process and s/s of complications to report to MD. Review/verify medication list sent home with the patient at time of discharge  and instruct patient/caregiver as needed. Frequency may be adjusted depending on start of care date.    Notify MD if SBP > 160 or < 90; DBP > 90 or < 50; HR > 120 or < 50; Temp > 101      CONSULTS:    Physical Therapy to evaluate and treat. Evaluate for home safety and equipment needs; Establish/upgrade home exercise program. Perform / instruct on therapeutic exercises, gait training, transfer training, and Range of Motion.  Occupational Therapy to evaluate and treat. Evaluate home environment for safety and equipment needs. Perform/Instruct on transfers, ADL training, ROM, and therapeutic exercises.   to evaluate for community resources/long-range planning.  Aide to provide assistance with personal care, ADLs, and vital signs.    MISCELLANEOUS CARE:  Home Oxygen:  Oxygen at 2 L/min nasal canula to be used:  As needed for SOB and On exertion.      Medications: Review discharge medications with patient and family and provide education.      Current Discharge Medication List      START taking these medications    Details   benzonatate (TESSALON) 200 MG capsule Take 1 capsule (200 mg total) by mouth 3 (three) times daily as needed for Cough.  Qty: 15 capsule, Refills: 0      furosemide (LASIX) 40 MG tablet Take 1 tablet (40 mg total) by mouth 2 (two) times daily.  Qty: 60 tablet, Refills: 11       lisinopril (PRINIVIL,ZESTRIL) 5 MG tablet Take 1 tablet (5 mg total) by mouth once daily.  Qty: 90 tablet, Refills: 3         CONTINUE these medications which have NOT CHANGED    Details   acetaminophen (TYLENOL) 325 MG tablet Take by mouth 2 (two) times daily.       amLODIPine (NORVASC) 5 MG tablet Take 1 tablet (5 mg total) by mouth once daily.  Qty: 90 tablet, Refills: 3    Associated Diagnoses: Essential hypertension      calcium-vitamin D 500-125 mg-unit tablet Take 3 tablets by mouth Daily. 3 Tablet Oral Every day      cyanocobalamin (VITAMIN B-12) 1000 MCG tablet Take 3,000 mcg by mouth once daily.       DOCOSAHEXANOIC ACID/EPA (FISH OIL ORAL) Daily. 2   Every day      multivitamin (ONE DAILY MULTIVITAMIN) per tablet Take 1 tablet by mouth once daily.      pravastatin (PRAVACHOL) 80 MG tablet Take 1 tablet (80 mg total) by mouth once daily.  Qty: 90 tablet, Refills: 3    Associated Diagnoses: Pure hypercholesterolemia      tolterodine (DETROL LA) 4 MG 24 hr capsule Take 1 capsule (4 mg total) by mouth once daily.  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 1 Tablet Oral Every day      flaxseed 1,000 mg Cap              I certify that this patient is confined to her home and needs intermittent skilled nursing care, physical therapy and occupational therapy.

## 2018-07-11 NOTE — PROGRESS NOTES
"EDUCATION:  TN provided with educational information on Pleural Effusion.  Information reviewed and placed in :My Healthcare Packet" to be brought home for pt to use as resource after discharge.  Information included:  signs and symptoms to look for and call the doctor if experiencing, and symptoms that may indicate a medical emergency: CALL 911.      All questions answered.  Teach back method used.    Patient stated, "I am in sync with my Mom ".    TN informed floor nurse, Lucia, care management is complete and can proceed with discharge teaching.        "

## 2018-07-11 NOTE — NURSING
Report given to NURY Pena. Patient is resting comfortably, no complaints, no acute distress noted.  12 hour chart check completed

## 2018-07-11 NOTE — PLAN OF CARE
Problem: Fall Risk (Adult)  Intervention: Reduce Risk/Promote Restraint Free Environment   07/08/18 0215 07/10/18 0000   Safety Interventions   Safety Precautions --  emergency equipment at bedside   Safety Interventions   Environmental Safety Modification assistive device/personal items within reach;clutter free environment maintained;lighting adjusted;room near unit station;room organization consistent --    Prevent [place] Drop/Fall   Safety/Security Measures bed alarm set --      Intervention: Review Medications/Identify Contributors to Fall Risk   07/10/18 1045   Safety Interventions   Medication Review/Management medications reviewed     Intervention: Patient Rounds   18   Safety Interventions   Patient Rounds bed in low position;bed wheels locked;call light in reach;clutter free environment maintained;ID band on;placement of personal items at bedside;toileting offered;visualized patient     Intervention: Safety Promotion/Fall Prevention   18   Safety Interventions   Safety Promotion/Fall Prevention assistive device/personal item within reach;bed alarm set;commode/urinal/bedpan at bedside;Fall Risk reviewed with patient/family;nonskid shoes/socks when out of bed;medications reviewed;lighting adjusted;instructed to call staff for mobility       Goal: Identify Related Risk Factors and Signs and Symptoms  Related risk factors and signs and symptoms are identified upon initiation of Human Response Clinical Practice Guideline (CPG)   Outcome: Ongoing (interventions implemented as appropriate)   18   Fall Risk   Related Risk Factors (Fall Risk) environment unfamiliar;slipper/uneven surfaces;objects hard to reach;age-related changes;history of falls;inadequate lighting   Signs and Symptoms (Fall Risk) presence of risk factors     Goal: Absence of Falls  Patient will demonstrate the desired outcomes by discharge/transition of care.   Outcome: Ongoing (interventions implemented as  appropriate)   07/08/18 0215   Fall Risk (Adult)   Absence of Falls making progress toward outcome

## 2018-07-11 NOTE — NURSING
In bed awake has no c/o pain no distress noted call light in reach will continue to monitor bed alarm set

## 2018-07-11 NOTE — PLAN OF CARE
07/11/18 1601   Final Note   Assessment Type Final Discharge Note   Discharge Disposition Home   What phone number can be called within the next 1-3 days to see how you are doing after discharge? (Listed in chart)   Hospital Follow Up  Appt(s) scheduled? Yes   Discharge plans and expectations educations in teach back method with documentation complete? Yes   Right Care Referral Info   Post Acute Recommendation Home-care

## 2018-07-12 ENCOUNTER — TELEPHONE (OUTPATIENT)
Dept: FAMILY MEDICINE | Facility: CLINIC | Age: 83
End: 2018-07-12

## 2018-07-12 DIAGNOSIS — G31.84 MCI (MILD COGNITIVE IMPAIRMENT): Primary | ICD-10-CM

## 2018-07-12 DIAGNOSIS — M17.0 PRIMARY OSTEOARTHRITIS OF BOTH KNEES: ICD-10-CM

## 2018-07-12 LAB
BACTERIA BLD CULT: NORMAL
BACTERIA BLD CULT: NORMAL
BACTERIA FLD AEROBE CULT: NO GROWTH
GRAM STN SPEC: NORMAL
GRAM STN SPEC: NORMAL

## 2018-07-12 NOTE — TELEPHONE ENCOUNTER
Attempt to contact Yennifer with [facility]. No answer. Left message to return call to clinic. Orders pending for bedside commode. Please sign if appropriate.

## 2018-07-12 NOTE — TELEPHONE ENCOUNTER
Spoke to Yennifer with [facility].Per Dr.Lombard Verbal order given for speech therapy. Verbalized understanding.

## 2018-07-12 NOTE — TELEPHONE ENCOUNTER
----- Message from Bull Najera sent at 7/12/2018  4:50 PM CDT -----  Contact: Jaren guy/Yennifer [phone]  Calling TO speak with nurse to get verbal order from doc for speech therapy

## 2018-07-12 NOTE — PT/OT/SLP DISCHARGE
Physical Therapy Discharge Summary    Name: Ruth Lan  MRN: [id number]   Principal Problem: Pleural effusion     Patient Discharged from acute Physical Therapy on 07/10/2018.  Please refer to prior PT noted date on 07/10/2018 for functional status.     Assessment:     Patient appropriate for care in another setting.    Objective:     GOALS:    Physical Therapy Goals     Not on file          Multidisciplinary Problems (Resolved)        Problem: Physical Therapy Goal    Goal Priority Disciplines Outcome Goal Variances Interventions   Physical Therapy Goal   (Resolved)     PT/OT, PT Outcome(s) achieved     Description:  Goals to be met by: 18    Patient will increase functional independence with mobility by performin. Sit to stand transfer with Modified [place]  2. Gait  x 250 feet with Modified [place] using Single-point Cane   3. Lower extremity exercise program x30 reps, with independence                      Reasons for Discontinuation of Therapy Services  Transfer to alternate level of care.      Plan:     Patient Discharged to: Home with Home Health Service.    Ralph Arnett, PT  2018

## 2018-07-12 NOTE — TELEPHONE ENCOUNTER
----- Message from Elidia King sent at 7/12/2018  1:12 PM CDT -----  Contact: Yennifer [facility]/ [phone]  Yennifer calling to request bedside commode. Please send orders to [facility] Direct at Fax# [phone]. Also needs verbal order for speech therapy from the office. Thank you.

## 2018-07-13 ENCOUNTER — PATIENT OUTREACH (OUTPATIENT)
Dept: ADMINISTRATIVE | Facility: CLINIC | Age: 83
End: 2018-07-13

## 2018-07-13 RX ORDER — ACETAMINOPHEN 500 MG
500 TABLET ORAL EVERY 6 HOURS PRN
COMMUNITY
End: 2019-09-11 | Stop reason: HOSPADM

## 2018-07-13 NOTE — PATIENT INSTRUCTIONS
Pleural Effusion  The pleura is a smooth double membrane that surrounds the lungs and separates them from the chest wall. One side of the pleura attaches to the lung, the other to the chest wall. This membrane makes it easier for the chest to inflate and deflate as you breathe without rubbing against the ribs. There is normally a small amount of lubricating fluid between the pleural membranes (pleural fluid).   A pleural effusion is when an excess amount of fluid collects in the space between the two pleural membranes (pleural space). As the amount of fluid increases, it begins to press on the lung, making it harder to take a full breath.  There are two types of pleural effusion:  Inflammatory--caused by a lung disease that irritates the pleura, such as pneumonia or lung cancer.  Non-inflammatory--caused by abnormal fluid pressures inside the lungs, such as congestive heart failure (also called CHF, where excess fluid collects inside the lung tissues due to a weakened heart muscle, then leaks into the pleural space).  Pleural effusion may cause any of the following symptoms:  Shortness of breath  Rapid breathing  Cough or hiccups  Sharp chest pain that hurts more with coughing or deep breathing  A small pleural effusion may cause no symptoms at all.   Treatment will be directed at the cause of the pleural effusion. If you are having a lot of trouble breathing, a thoracentesis procedure may be performed to remove the fluid from the pleural space. This usually gives immediate relief, although the fluid may gradually return.   Home Care:  1. Exertion may make your symptoms worse, so rest until you are feeling better.  2. If medicines were prescribed to treat the underlying cause of the pleural effusion, take these exactly as directed.  Follow Up  with your doctor or as advised by our staff.  Return Promptly  or contact your doctor if any of the following occur:  Increasing shortness of breath  Increasing chest  pain  Coughing up blood  Weakness, dizziness, or fainting  Fever over 100.4ºF (38.0ºC)  © 4192-1447 [facility], [address] [place] [address], [place] [address]. All rights reserved. This information is not intended as a substitute for professional medical care. Always follow your healthcare professional's instructions.

## 2018-07-16 ENCOUNTER — LAB VISIT (OUTPATIENT)
Dept: LAB | Facility: HOSPITAL | Age: 83
End: 2018-07-16
Payer: MEDICARE

## 2018-07-16 ENCOUNTER — OFFICE VISIT (OUTPATIENT)
Dept: CARDIOLOGY | Facility: CLINIC | Age: 83
End: 2018-07-16
Payer: MEDICARE

## 2018-07-16 ENCOUNTER — TELEPHONE (OUTPATIENT)
Dept: HEMATOLOGY/ONCOLOGY | Facility: CLINIC | Age: 83
End: 2018-07-16

## 2018-07-16 ENCOUNTER — TELEPHONE (OUTPATIENT)
Dept: FAMILY MEDICINE | Facility: CLINIC | Age: 83
End: 2018-07-16

## 2018-07-16 VITALS
HEART RATE: 80 BPM | SYSTOLIC BLOOD PRESSURE: 90 MMHG | BODY MASS INDEX: 25.97 KG/M2 | OXYGEN SATURATION: 100 % | HEIGHT: 64 IN | WEIGHT: 152.13 LBS | DIASTOLIC BLOOD PRESSURE: 54 MMHG

## 2018-07-16 DIAGNOSIS — E44.0 MALNUTRITION OF MODERATE DEGREE: ICD-10-CM

## 2018-07-16 DIAGNOSIS — N18.30 STAGE 3 CHRONIC KIDNEY DISEASE: ICD-10-CM

## 2018-07-16 DIAGNOSIS — Z66 DNR (DO NOT RESUSCITATE): ICD-10-CM

## 2018-07-16 DIAGNOSIS — I50.33 ACUTE ON CHRONIC DIASTOLIC HEART FAILURE: Primary | ICD-10-CM

## 2018-07-16 DIAGNOSIS — Z95.2 S/P AORTIC VALVE REPLACEMENT: Chronic | ICD-10-CM

## 2018-07-16 DIAGNOSIS — E78.00 PURE HYPERCHOLESTEROLEMIA: ICD-10-CM

## 2018-07-16 DIAGNOSIS — I10 ESSENTIAL HYPERTENSION: ICD-10-CM

## 2018-07-16 DIAGNOSIS — R73.9 HYPERGLYCEMIA: ICD-10-CM

## 2018-07-16 DIAGNOSIS — I34.0 SEVERE MITRAL REGURGITATION: ICD-10-CM

## 2018-07-16 DIAGNOSIS — D64.9 NORMOCYTIC ANEMIA: ICD-10-CM

## 2018-07-16 DIAGNOSIS — R09.02 HYPOXIA: ICD-10-CM

## 2018-07-16 DIAGNOSIS — Z99.81 SUPPLEMENTAL OXYGEN DEPENDENT: ICD-10-CM

## 2018-07-16 DIAGNOSIS — I50.33 ACUTE ON CHRONIC DIASTOLIC HEART FAILURE: ICD-10-CM

## 2018-07-16 DIAGNOSIS — Z87.891 HISTORY OF SMOKING GREATER THAN 50 PACK YEARS: ICD-10-CM

## 2018-07-16 DIAGNOSIS — J90 PLEURAL EFFUSION: ICD-10-CM

## 2018-07-16 DIAGNOSIS — I27.20 PULMONARY HTN: ICD-10-CM

## 2018-07-16 DIAGNOSIS — R63.4 UNINTENTIONAL WEIGHT LOSS OF 10% BODY WEIGHT WITHIN 6 MONTHS: ICD-10-CM

## 2018-07-16 DIAGNOSIS — Z85.3 HISTORY OF BREAST CANCER: ICD-10-CM

## 2018-07-16 DIAGNOSIS — I25.84 CORONARY ATHEROSCLEROSIS DUE TO CALCIFIED CORONARY LESION (CODE): ICD-10-CM

## 2018-07-16 LAB
ANION GAP SERPL CALC-SCNC: 9 MMOL/L
BASOPHILS # BLD AUTO: 0.06 K/UL
BASOPHILS NFR BLD: 0.7 %
BUN SERPL-MCNC: 45 MG/DL
CALCIUM SERPL-MCNC: 12.6 MG/DL
CHLORIDE SERPL-SCNC: 94 MMOL/L
CO2 SERPL-SCNC: 36 MMOL/L
CREAT SERPL-MCNC: 1.3 MG/DL
DIFFERENTIAL METHOD: ABNORMAL
EOSINOPHIL # BLD AUTO: 0.4 K/UL
EOSINOPHIL NFR BLD: 5.3 %
ERYTHROCYTE [DISTWIDTH] IN BLOOD BY AUTOMATED COUNT: 15.9 %
EST. GFR  (AFRICAN AMERICAN): 43.8 ML/MIN/1.73 M^2
EST. GFR  (NON AFRICAN AMERICAN): 38 ML/MIN/1.73 M^2
ESTIMATED AVG GLUCOSE: 97 MG/DL
GLUCOSE SERPL-MCNC: 112 MG/DL
HBA1C MFR BLD HPLC: 5 %
HCT VFR BLD AUTO: 33.1 %
HGB BLD-MCNC: 9.5 G/DL
IMM GRANULOCYTES # BLD AUTO: 0.03 K/UL
IMM GRANULOCYTES NFR BLD AUTO: 0.4 %
LYMPHOCYTES # BLD AUTO: 0.9 K/UL
LYMPHOCYTES NFR BLD: 11 %
MAGNESIUM SERPL-MCNC: 1.8 MG/DL
MCH RBC QN AUTO: 29.2 PG
MCHC RBC AUTO-ENTMCNC: 28.7 G/DL
MCV RBC AUTO: 102 FL
MONOCYTES # BLD AUTO: 0.9 K/UL
MONOCYTES NFR BLD: 10.2 %
NEUTROPHILS # BLD AUTO: 6 K/UL
NEUTROPHILS NFR BLD: 72.4 %
NRBC BLD-RTO: 0 /100 WBC
PLATELET # BLD AUTO: 284 K/UL
PMV BLD AUTO: 10.6 FL
POTASSIUM SERPL-SCNC: 4.4 MMOL/L
RBC # BLD AUTO: 3.25 M/UL
RETICS/RBC NFR AUTO: 2.1 %
SODIUM SERPL-SCNC: 139 MMOL/L
WBC # BLD AUTO: 8.31 K/UL

## 2018-07-16 PROCEDURE — 83735 ASSAY OF MAGNESIUM: CPT

## 2018-07-16 PROCEDURE — 99999 PR PBB SHADOW E&M-EST. PATIENT-LVL III: CPT | Mod: PBBFAC,,, | Performed by: NURSE PRACTITIONER

## 2018-07-16 PROCEDURE — 80048 BASIC METABOLIC PNL TOTAL CA: CPT

## 2018-07-16 PROCEDURE — 99215 OFFICE O/P EST HI 40 MIN: CPT | Mod: S$PBB,,, | Performed by: NURSE PRACTITIONER

## 2018-07-16 PROCEDURE — 85045 AUTOMATED RETICULOCYTE COUNT: CPT

## 2018-07-16 PROCEDURE — 99213 OFFICE O/P EST LOW 20 MIN: CPT | Mod: PBBFAC | Performed by: NURSE PRACTITIONER

## 2018-07-16 PROCEDURE — 83036 HEMOGLOBIN GLYCOSYLATED A1C: CPT

## 2018-07-16 PROCEDURE — 36415 COLL VENOUS BLD VENIPUNCTURE: CPT | Mod: PO

## 2018-07-16 PROCEDURE — 85025 COMPLETE CBC W/AUTO DIFF WBC: CPT

## 2018-07-16 NOTE — TELEPHONE ENCOUNTER
Contacted pt daughter who was encouraged per Cardiologist to attempt to get into clinic sooner than projected f/u date due to recurring fluid build up on the lungs. Per daughter, fluid drained and is currently being evaluated, however results still pending. Pt daughter stated that images have been obtained, and per cardiologist concerned that some underlying cause could be contributing to fluid build up that can not be visualized and therefore informed daughter to get in sooner if possible. Currently pt scheduled to return to clinic in 10/2018 w/ annual mammogram.

## 2018-07-16 NOTE — TELEPHONE ENCOUNTER
----- Message from Rosy Vazquez sent at 7/16/2018 10:56 AM CDT -----  Contact: Luke Diaz      ----- Message -----  From: Kumar Jarquin  Sent: 7/16/2018  10:27 AM  To: Rosy Vazquez    Will like to schedule yearly f/u appt with dr ortiz for September     Contact::[phone]

## 2018-07-16 NOTE — TELEPHONE ENCOUNTER
----- Message from Betzaida Loaiza sent at 7/16/2018  8:57 AM CDT -----  Contact: - Occupational Therapist With [facility]   Occupational Therapist With [facility]     Pt is in need of bedside commode and an order can be faxed [facility] Direct, Fax: [phone]    Call: [phone].

## 2018-07-16 NOTE — TELEPHONE ENCOUNTER
----- Message from Rebecca Talamantes sent at 7/16/2018 12:41 PM CDT -----  Contact: Tiana- Daughter  Good afternoon,  I spoke with Mrs. Lan's daughter regarding scheduling and she is requesting a call back to discuss some new test results from a different physician. She thinks her mother may need to be seen before September.  Thanks  ----- Message -----  From: Rosy Vazquez  Sent: 7/16/2018  10:56 AM  To: Rebecca Talamantes        ----- Message -----  From: Kumar Jarquin  Sent: 7/16/2018  10:27 AM  To: Rosy Vazquez    Will like to schedule yearly f/u appt with dr ortiz for September     Contact::[phone]

## 2018-07-16 NOTE — PROGRESS NOTES
Ms. Lan is a new patient to Dr. Zelaya and myself.  She was last seen in [facility] Cardiology Visit 6/16/17.    Subjective:   Patient ID:  Ruth Lan is a 83 y.o. female who presents for follow-up of Shortness of Breath (Hospital d/c 7/11/8); Severe mitral regurgitation; and Pleural Effusion    Problems:  HFpEF, EF 55-60%  CAD by CT scan 7/7/18  Cardiomegaly  Pulmonary hypertension,  mmHg  Oxygen dependent  bioprosthetic TAVR in 2003   breast CA (s/p x2 R breast lumpectomy and radiation in 2012)   HTN   HLD  MR   50 pack year h/o smoking, quit in 2002  CKD stage III    HPI  Ms. Lan is in clinic today for hospital up for HFpEF.  She had a thoracentesis and there were a lot of lymphocytes but the fluid did not show malignancy.  She received lasix 20mg IV during her admission and she was discharged on 40mg BID.  CXR and CT chest showed large pleural effusion on the right and pulmonary congestion.  BNP was 717.  She did receive a UPRBC for significant anemia of unknown etiology.  She was discharged on oxygen.  She has lost nearly 30 pounds over the last year.  Her weight loss was unintentional.  Reports no appetite and extreme fatigue.  She is weighing daily and following a low salt diet.  Patient denies chest pain with exertion or at rest, palpitations, SOB, VALLE, dizziness, syncope, edema, orthopnea, PND, or claudication.  Reports no routine exercise.  She is having PT/OT and speech therapy at home.  She is treated with low dose ASA and moderate intensity statin.  Patient is taking pravastatin 80mg and LDL is 111.  Reports following a low salt diet.     Review of Systems   Constitution: Positive for decreased appetite, malaise/fatigue and weight loss. Negative for diaphoresis, weakness and weight gain.   Eyes: Negative for visual disturbance.   Cardiovascular: Negative for chest pain, claudication, dyspnea on exertion, irregular heartbeat, leg swelling, near-syncope, orthopnea, palpitations,  paroxysmal nocturnal dyspnea and syncope.        Denies chest pressure   Respiratory: Positive for cough. Negative for hemoptysis, shortness of breath, sleep disturbances due to breathing and snoring.    Endocrine: Negative for cold intolerance and heat intolerance.   Hematologic/Lymphatic: Negative for bleeding problem. Does not bruise/bleed easily.   Musculoskeletal: Negative for myalgias.   Gastrointestinal: Negative for bloating, abdominal pain, anorexia, change in bowel habit, constipation, diarrhea, nausea and vomiting.   Neurological: Negative for difficulty with concentration, disturbances in coordination, excessive daytime sleepiness, dizziness, headaches, light-headedness, loss of balance and numbness.   Psychiatric/Behavioral: The patient does not have insomnia.      Allergies and current medications updated and reviewed:  Review of patient's allergies indicates:   Allergen Reactions    Etodolac Other (See Comments)     Dehydration    Nsaids (non-steroidal anti-inflammatory drug)      COLITIS/DEHYDRATION     Current Outpatient Prescriptions   Medication Sig    acetaminophen (TYLENOL) 500 MG tablet Take 500 mg by mouth every 6 (six) hours as needed for Pain.    amLODIPine (NORVASC) 5 MG tablet Take 1 tablet (5 mg total) by mouth once daily.    aspirin 81 mg Tab Take 81 mg by mouth Daily. 1 Tablet Oral Every day    benzonatate (TESSALON) 200 MG capsule Take 1 capsule (200 mg total) by mouth 3 (three) times daily as needed for Cough.    calcium-vitamin D 500-125 mg-unit tablet Take 3 tablets by mouth Daily. 3 Tablet Oral Every day    cyanocobalamin (VITAMIN B-12) 1000 MCG tablet Take 3,000 mcg by mouth 2 (two) times daily.     DOCOSAHEXANOIC ACID/EPA (FISH OIL ORAL) 2 (two) times daily. 2   Every day    flaxseed 1,000 mg Cap     furosemide (LASIX) 40 MG tablet Take 1 tablet (40 mg total) by mouth 2 (two) times daily.    lisinopril (PRINIVIL,ZESTRIL) 5 MG tablet Take 1 tablet (5 mg total) by mouth  "once daily.    multivitamin (ONE DAILY MULTIVITAMIN) per tablet Take 1 tablet by mouth once daily.    pravastatin (PRAVACHOL) 80 MG tablet Take 1 tablet (80 mg total) by mouth once daily.    tolterodine (DETROL LA) 4 MG 24 hr capsule Take 1 capsule (4 mg total) by mouth once daily.     No current facility-administered medications for this visit.      Objective:     Right Arm BP - Sittin/46 (18 0825)  Left Arm BP - Sittin/54 (18 0825)    BP (!) 90/54 (BP Location: Left arm, Patient Position: Sitting, BP Method: Medium (Automatic))   Pulse 80   Ht 5' 4" (1.626 m)   Wt 69 kg (152 lb 1.9 oz)   SpO2 100%   BMI 26.11 kg/m²     Physical Exam   Constitutional: She is oriented to person, place, and time. Vital signs are normal. She appears well-developed and well-nourished. She is active. No distress.   HENT:   Head: Normocephalic and atraumatic.   Eyes: Conjunctivae and lids are normal. No scleral icterus.   Neck: Neck supple. Normal carotid pulses, no hepatojugular reflux and no JVD present. Carotid bruit is not present.   Cardiovascular: Normal rate, regular rhythm, S1 normal, S2 normal and intact distal pulses.  PMI is not displaced.  Exam reveals no gallop and no friction rub.    Murmur heard.   Harsh midsystolic murmur is present with a grade of 3/6  at the upper right sternal border radiating to the neck  Pulses:       Carotid pulses are 2+ on the right side, and 2+ on the left side.       Radial pulses are 2+ on the right side, and 2+ on the left side.        Dorsalis pedis pulses are 2+ on the right side, and 2+ on the left side.        Posterior tibial pulses are 0 on the right side, and 0 on the left side.   Pulmonary/Chest: Effort normal. No respiratory distress. She has decreased breath sounds. She has no wheezes. She has no rhonchi. She has no rales. She exhibits no tenderness.   RLL lung sounds are absent.  RML and RLL dull to percussion.    Abdominal: Soft. Normal appearance and " bowel sounds are normal. She exhibits no distension, no fluid wave, no abdominal bruit, no ascites and no pulsatile midline mass. There is no hepatosplenomegaly. There is no tenderness.   Musculoskeletal: She exhibits no edema.   Neurological: She is alert and oriented to person, place, and time. Gait normal.   Skin: Skin is warm, dry and intact. No rash noted. She is not diaphoretic. Nails show no clubbing.   Psychiatric: She has a normal mood and affect. Her speech is normal and behavior is normal. Judgment and thought content normal. Cognition and memory are normal.   Nursing note and vitals reviewed.      Chemistry        Component Value Date/Time     07/16/2018 1050    K 4.4 07/16/2018 1050    CL 94 (L) 07/16/2018 1050    CO2 36 (H) 07/16/2018 1050    BUN 45 (H) 07/16/2018 1050    CREATININE 1.3 07/16/2018 1050     (H) 07/16/2018 1050        Component Value Date/Time    CALCIUM 12.6 (HH) 07/16/2018 1050    ALKPHOS 146 (H) 07/07/2018 0956    AST 26 07/07/2018 0956    ALT 28 07/07/2018 0956    BILITOT 0.6 07/07/2018 0956    ESTGFRAFRICA 43.8 (A) 07/16/2018 1050    EGFRNONAA 38.0 (A) 07/16/2018 1050        Lab Results   Component Value Date    HGBA1C 5.0 07/16/2018       Recent Labs  Lab 11/22/17  1108 07/07/18  0956 07/10/18  0500  07/16/18  1050   WHITE BLOOD CELL COUNT 5.60 8.40  --   < > 8.31   HEMOGLOBIN 13.5 7.9 L  --   < > 9.5 L   HEMATOCRIT 41.8 24.8 L  --   < > 33.1 L   MCV 98 97  --   < > 102 H   PLATELETS 196 248  --   < > 284   BNP  --  815 H 717 H  --   --    TSH 3.427  --   --   --   --    CHOLESTEROL 195  --   --   --   --    HDL 60  --   --   --   --    LDL CHOLESTEROL 111.4  --   --   --   --    TRIGLYCERIDES 118  --   --   --   --    HDL/CHOLESTEROL RATIO 30.8  --   --   --   --    < > = values in this interval not displayed.           Test(s) Reviewed  I have reviewed the following in detail:  [] Stress test   [] Angiography   [x] Echocardiogram   [x] Labs   [x] Other:  CXR and CT  chest       Assessment/Plan:     Acute on chronic diastolic heart failure  Comments:  Well compensated. Continue current dose of lasix. Discussed daily weights and salt restriction.   Orders:  -     Basic metabolic panel; Future; Expected date: 07/16/2018  -     Magnesium; Future; Expected date: 07/16/2018  -     2D Echo w/ Color Flow Doppler; Future; Expected date: 10/16/2018    Pulmonary HTN  -     2D Echo w/ Color Flow Doppler; Future; Expected date: 10/16/2018    Pleural effusion  Comments:  No other s/s of HF. Pleural effusion seems to have recurred based on exam findings, dull percussion and decreased breath sounds RML and absent RLL.    Supplemental oxygen dependent    History of smoking greater than 50 pack years    Severe mitral regurgitation    S/P aortic valve replacement    Essential hypertension  Comments:  Hypotensive today. Stop amlodipine. Requested bp log in 2 weeks.     DNR (do not resuscitate)    Coronary atherosclerosis due to calcified coronary lesion (CODE)- CT scan 7/7/18  Comments:  Asymptomatic. Taking ASA and moderate intensity statin therapy. No changes.     Pure hypercholesterolemia  Comments:  . Continue pravastatin 80mg.     Hypoxia    Stage 3 chronic kidney disease    Hyperglycemia  Comments:    Orders:  -     Hemoglobin A1c; Future; Expected date: 07/16/2018    History of breast cancer    Unintentional weight loss of 10% body weight within 6 months  Comments:  30 pound weight loss over the last year unintentional and significant anemia requiring transfusion.  Instructed to follow up with hem/onc Dr. Darling.    Normocytic anemia  Comments:  F/U with Dr. Darling in hematology oncology.   Orders:  -     CBC auto differential; Future; Expected date: 07/16/2018  -     Reticulocytes; Future; Expected date: 07/16/2018    Malnutrition of moderate degree  Comments:  Albumin 2.8 Follow up with PCP      Repeat echo in 3 months    Follow-up in about 4 weeks (around 8/13/2018).

## 2018-07-16 NOTE — PATIENT INSTRUCTIONS
Salt restriction of 2,000mg a day. Weigh yourself daily. Weigh yourself daily.  Take extra dose of Lasix (furosemide) if weight increases 2 or more pound in a 24 hr period, or 5 pounds over a 7 day period. Contact our office if weight does not return to baseline within 1-2 days.    Take second dose of lasix 6 hours after her morning dose.     Make a follow up with Dr. Darling in hematology/oncology    Stop amlodipine.  Please send a blood pressure log to me in 2 weeks.

## 2018-07-16 NOTE — TELEPHONE ENCOUNTER
Spoke with patient's daughter. She requested a call back from a nurse to discuss test results from a different physician.

## 2018-07-17 NOTE — TELEPHONE ENCOUNTER
Called by lab regarding elevated calcium level 12.6meq/l Called spoke to the patient's daughter she is presently assymptomatic. Will stop supplemental calcium and call  Primary care md in [place] for further direction.

## 2018-07-18 ENCOUNTER — OFFICE VISIT (OUTPATIENT)
Dept: FAMILY MEDICINE | Facility: CLINIC | Age: 83
End: 2018-07-18
Payer: MEDICARE

## 2018-07-18 VITALS
OXYGEN SATURATION: 99 % | HEART RATE: 84 BPM | SYSTOLIC BLOOD PRESSURE: 114 MMHG | BODY MASS INDEX: 26.05 KG/M2 | HEIGHT: 64 IN | RESPIRATION RATE: 20 BRPM | WEIGHT: 152.56 LBS | DIASTOLIC BLOOD PRESSURE: 88 MMHG | TEMPERATURE: 98 F

## 2018-07-18 DIAGNOSIS — D53.9 MACROCYTIC ANEMIA: ICD-10-CM

## 2018-07-18 DIAGNOSIS — J90 PLEURAL EFFUSION: Primary | ICD-10-CM

## 2018-07-18 DIAGNOSIS — I50.32 CHRONIC DIASTOLIC HEART FAILURE: ICD-10-CM

## 2018-07-18 PROBLEM — R63.4 UNINTENTIONAL WEIGHT LOSS OF 10% BODY WEIGHT WITHIN 6 MONTHS: Status: ACTIVE | Noted: 2018-07-18

## 2018-07-18 PROCEDURE — 99215 OFFICE O/P EST HI 40 MIN: CPT | Mod: S$PBB,,, | Performed by: FAMILY MEDICINE

## 2018-07-18 PROCEDURE — 99999 PR PBB SHADOW E&M-EST. PATIENT-LVL III: CPT | Mod: PBBFAC,,, | Performed by: FAMILY MEDICINE

## 2018-07-18 PROCEDURE — 99213 OFFICE O/P EST LOW 20 MIN: CPT | Mod: PBBFAC,PO | Performed by: FAMILY MEDICINE

## 2018-07-18 NOTE — PROGRESS NOTES
Chief Complaint   Patient presents with    Hospital Follow Up       Ruth Lan is a 83 y.o. female who presents per the Chief Complaint.  Pt is known to me and was last seen by me on 6/4/2018.  All known chronic medical issues have been documented.       Shortness of Breath   This is a new problem. The current episode started 1 to 4 weeks ago. The problem occurs daily. The problem has been gradually improving. Pertinent negatives include no abdominal pain, chest pain, claudication, coryza, ear pain, fever, headaches, hemoptysis, leg pain, leg swelling, neck pain, orthopnea, PND, rash, rhinorrhea, sore throat, sputum production, swollen glands, syncope, vomiting or wheezing. The symptoms are aggravated by any activity. The patient has no known risk factors for DVT/PE. She has tried prescription cough suppressants for the symptoms.        ROS  Review of Systems   Constitutional: Positive for appetite change, fatigue and unexpected weight change. Negative for activity change, chills, diaphoresis and fever.   HENT: Negative.  Negative for congestion, ear pain, hearing loss, nosebleeds, postnasal drip, rhinorrhea, sinus pressure, sneezing, sore throat and trouble swallowing.    Eyes: Negative for pain and visual disturbance.   Respiratory: Positive for cough and shortness of breath. Negative for apnea, hemoptysis, sputum production, choking, chest tightness, wheezing and stridor.    Cardiovascular: Negative for chest pain, palpitations, orthopnea, claudication, leg swelling, syncope and PND.   Gastrointestinal: Negative for abdominal distention, abdominal pain, anal bleeding, blood in stool, constipation, diarrhea, nausea, rectal pain and vomiting.   Genitourinary: Negative for difficulty urinating, dysuria, frequency and urgency.   Musculoskeletal: Positive for arthralgias and joint swelling. Negative for back pain, gait problem, myalgias and neck pain.   Skin: Negative.  Negative for rash.  "  Allergic/Immunologic: Negative for environmental allergies and food allergies.   Neurological: Negative.  Negative for dizziness, seizures, syncope, weakness, light-headedness and headaches.   Psychiatric/Behavioral: Negative.  Negative for confusion, decreased concentration, dysphoric mood and sleep disturbance. The patient is not nervous/anxious.        Physical Exam  Vitals:    07/18/18 1539   BP: 114/88   Pulse: 84   Resp: 20   Temp: 98 °F (36.7 °C)    Body mass index is 26.19 kg/m².  Weight: 69.2 kg (152 lb 8.9 oz)   Height: 5' 4" (162.6 cm)     Physical Exam   Constitutional: She is oriented to person, place, and time. She appears well-developed and well-nourished. She appears cachectic. She is active and cooperative.  Non-toxic appearance. She has a sickly appearance. She appears ill. No distress.   HENT:   Head: Normocephalic and atraumatic.   Right Ear: Hearing, tympanic membrane, external ear and ear canal normal.   Left Ear: Hearing, tympanic membrane, external ear and ear canal normal.   Nose: Nose normal. No rhinorrhea or nasal deformity.   Mouth/Throat: Uvula is midline, oropharynx is clear and moist and mucous membranes are normal.   Eyes: Conjunctivae, EOM and lids are normal. Pupils are equal, round, and reactive to light. No scleral icterus.   Neck: Normal range of motion. Neck supple. No thyroid mass and no thyromegaly present.   Cardiovascular: Normal rate, regular rhythm, S1 normal and S2 normal.  Exam reveals no gallop and no friction rub.    Murmur heard.   Systolic murmur is present with a grade of 5/6   Mechanical murmur noted   Pulmonary/Chest: Effort normal and breath sounds normal. She has no wheezes. She has no rales.   Abdominal: Soft. She exhibits no mass. There is no tenderness. There is no rebound and no guarding.   Musculoskeletal:        Right knee: She exhibits decreased range of motion, swelling and bony tenderness. She exhibits no effusion, no ecchymosis, no deformity, no " laceration, no erythema, normal alignment, no LCL laxity, normal patellar mobility and normal meniscus. Tenderness found. Medial joint line and lateral joint line tenderness noted. No MCL, no LCL and no patellar tendon tenderness noted.   Lymphadenopathy:        Head (right side): No submental, no submandibular and no tonsillar adenopathy present.        Head (left side): No submental, no submandibular and no tonsillar adenopathy present.     She has no cervical adenopathy.   Neurological: She is alert and oriented to person, place, and time.   Skin: Skin is warm, dry and intact. She is not diaphoretic. No pallor.   Psychiatric: She has a normal mood and affect. Her speech is normal and behavior is normal. Judgment and thought content normal. Cognition and memory are normal.       Assessment & Plan    1. Pleural effusion  Fluid drained in hospital; symptoms improved but still oxygen dependent.  Patient has appointment scheduled with Pulmonology on 8/9 to discuss further management; will repeat x-ray prior to that visit.  - Ambulatory referral to Pulmonology  - X-Ray Chest PA And Lateral; Future    2. Chronic diastolic heart failure  Stable; no therapeutic changes at this time.  Managed by Cardiology.    3. Macrocytic anemia  Will follow up with Hematology at earliest appointment; may be associated with nutritional deficit.  Patient has recently improved her nutritional intake.        Follow up documented    ACTIVE MEDICAL ISSUES:  Documented in Problem List    PAST MEDICAL HISTORY  Documented    PAST SURGICAL HISTORY:  Documented    SOCIAL HISTORY:  Documented    FAMILY HISTORY:  Documented    ALLERGIES AND MEDICATIONS: updated and reviewed.  Documented    Health Maintenance       Date Due Completion Date    Pneumococcal (65+) (1 of 2 - PCV13) 11/17/2025 (Originally 3/7/2000) ---    Influenza Vaccine 08/01/2018 11/22/2017 (Declined)    Override on 11/22/2017: Declined    Override on 11/18/2014: Declined (refused)     Override on 8/19/2014: Declined (refused)    DEXA SCAN 02/02/2019 2/2/2016    Lipid Panel 11/22/2022 11/22/2017    TETANUS VACCINE 11/22/2027 11/22/2017 (Declined)    Override on 11/22/2017: Declined

## 2018-07-19 ENCOUNTER — TELEPHONE (OUTPATIENT)
Dept: HEMATOLOGY/ONCOLOGY | Facility: CLINIC | Age: 83
End: 2018-07-19

## 2018-07-19 ENCOUNTER — TELEPHONE (OUTPATIENT)
Dept: PULMONOLOGY | Facility: CLINIC | Age: 83
End: 2018-07-19

## 2018-07-19 DIAGNOSIS — Z17.0 MALIGNANT NEOPLASM OF LOWER-INNER QUADRANT OF RIGHT BREAST OF FEMALE, ESTROGEN RECEPTOR POSITIVE: Primary | ICD-10-CM

## 2018-07-19 DIAGNOSIS — C50.311 MALIGNANT NEOPLASM OF LOWER-INNER QUADRANT OF RIGHT BREAST OF FEMALE, ESTROGEN RECEPTOR POSITIVE: Primary | ICD-10-CM

## 2018-07-19 NOTE — TELEPHONE ENCOUNTER
Called and spoke with patients daughter and assisted with scheduling her mother and appointment for her to come in at 12 noon to have labs and see Dr. Darling after.

## 2018-07-19 NOTE — TELEPHONE ENCOUNTER
----- Message from Tobin Darling MD sent at 7/19/2018  8:22 AM CDT -----  Can you put her in tomorrow to see me at 1 with labs beforehand-CBC, CMP, ferritin  ----- Message -----  From: Verna Monet NP  Sent: 7/19/2018   8:15 AM  To: Tobin Darling MD    Thanks!  I hope you have better luck than I have in moving her pulmonary appointment.  My MA has called and emailed and not been successful yet.    ----- Message -----  From: Tobin Darling MD  Sent: 7/18/2018   5:27 PM  To: Verna Monet NP    Thanks, I think the next step is going to be to repeat her thoracentesis for additional analysis since the initial tap was nondiagnostic.  Will likely have to move up her visit to pulmonary.  ----- Message -----  From: Verna Monet NP  Sent: 7/18/2018  11:58 AM  To: Tobin Darling MD

## 2018-07-19 NOTE — TELEPHONE ENCOUNTER
Spoke with daughter of pt. I advised the daughter of the pt that the pt has four different appointments made with four different Pulmonary providers. I advised the daughter that the pt can only see one provider. The daughter stated that she would like for her mother to be seen as soon as possible. Daughter stated that mother's appointment was made for September but that it was too far away and would like for her mother to be seen sooner. I advised to the daughter I was able to make an appointment for the 3rd of August with a provider and that it was the soonest that was available. The daughter stated that, that appointment time was fine and that canceling the rest of the other three appointments with the different providers was okay. I proceeded to cancel that other appointments as requested. The pt's daughter stated that she understood and agreed with verbal read back.

## 2018-07-20 ENCOUNTER — LAB VISIT (OUTPATIENT)
Dept: LAB | Facility: HOSPITAL | Age: 83
End: 2018-07-20
Attending: INTERNAL MEDICINE
Payer: MEDICARE

## 2018-07-20 ENCOUNTER — OFFICE VISIT (OUTPATIENT)
Dept: HEMATOLOGY/ONCOLOGY | Facility: CLINIC | Age: 83
End: 2018-07-20
Payer: MEDICARE

## 2018-07-20 VITALS
DIASTOLIC BLOOD PRESSURE: 50 MMHG | HEIGHT: 64 IN | SYSTOLIC BLOOD PRESSURE: 83 MMHG | TEMPERATURE: 98 F | WEIGHT: 149.69 LBS | HEART RATE: 80 BPM | BODY MASS INDEX: 25.56 KG/M2 | OXYGEN SATURATION: 94 % | RESPIRATION RATE: 16 BRPM

## 2018-07-20 DIAGNOSIS — Z17.0 MALIGNANT NEOPLASM OF LOWER-INNER QUADRANT OF RIGHT BREAST OF FEMALE, ESTROGEN RECEPTOR POSITIVE: ICD-10-CM

## 2018-07-20 DIAGNOSIS — R63.4 UNINTENTIONAL WEIGHT LOSS OF 10% BODY WEIGHT WITHIN 6 MONTHS: ICD-10-CM

## 2018-07-20 DIAGNOSIS — Z17.0 MALIGNANT NEOPLASM OF LOWER-INNER QUADRANT OF RIGHT BREAST OF FEMALE, ESTROGEN RECEPTOR POSITIVE: Primary | ICD-10-CM

## 2018-07-20 DIAGNOSIS — C50.311 MALIGNANT NEOPLASM OF LOWER-INNER QUADRANT OF RIGHT BREAST OF FEMALE, ESTROGEN RECEPTOR POSITIVE: ICD-10-CM

## 2018-07-20 DIAGNOSIS — J90 PLEURAL EFFUSION: ICD-10-CM

## 2018-07-20 DIAGNOSIS — C50.311 MALIGNANT NEOPLASM OF LOWER-INNER QUADRANT OF RIGHT BREAST OF FEMALE, ESTROGEN RECEPTOR POSITIVE: Primary | ICD-10-CM

## 2018-07-20 LAB
ALBUMIN SERPL BCP-MCNC: 3.1 G/DL
ALP SERPL-CCNC: 128 U/L
ALT SERPL W/O P-5'-P-CCNC: 12 U/L
ANION GAP SERPL CALC-SCNC: 11 MMOL/L
AST SERPL-CCNC: 16 U/L
BASOPHILS # BLD AUTO: 0.04 K/UL
BASOPHILS NFR BLD: 0.7 %
BILIRUB SERPL-MCNC: 0.4 MG/DL
BUN SERPL-MCNC: 60 MG/DL
CALCIUM SERPL-MCNC: 10 MG/DL
CHLORIDE SERPL-SCNC: 96 MMOL/L
CO2 SERPL-SCNC: 31 MMOL/L
CREAT SERPL-MCNC: 1.6 MG/DL
DIFFERENTIAL METHOD: ABNORMAL
EOSINOPHIL # BLD AUTO: 0.3 K/UL
EOSINOPHIL NFR BLD: 4.1 %
ERYTHROCYTE [DISTWIDTH] IN BLOOD BY AUTOMATED COUNT: 15.5 %
EST. GFR  (AFRICAN AMERICAN): 34.1 ML/MIN/1.73 M^2
EST. GFR  (NON AFRICAN AMERICAN): 29.6 ML/MIN/1.73 M^2
FERRITIN SERPL-MCNC: 118 NG/ML
GLUCOSE SERPL-MCNC: 86 MG/DL
HCT VFR BLD AUTO: 32.4 %
HGB BLD-MCNC: 9.8 G/DL
IMM GRANULOCYTES # BLD AUTO: 0.02 K/UL
IMM GRANULOCYTES NFR BLD AUTO: 0.3 %
LYMPHOCYTES # BLD AUTO: 0.8 K/UL
LYMPHOCYTES NFR BLD: 13.7 %
MCH RBC QN AUTO: 29.4 PG
MCHC RBC AUTO-ENTMCNC: 30.2 G/DL
MCV RBC AUTO: 97 FL
MONOCYTES # BLD AUTO: 0.7 K/UL
MONOCYTES NFR BLD: 11.5 %
NEUTROPHILS # BLD AUTO: 4.2 K/UL
NEUTROPHILS NFR BLD: 69.7 %
NRBC BLD-RTO: 0 /100 WBC
PLATELET # BLD AUTO: 292 K/UL
PMV BLD AUTO: 10 FL
POTASSIUM SERPL-SCNC: 3.8 MMOL/L
PROT SERPL-MCNC: 7.8 G/DL
RBC # BLD AUTO: 3.33 M/UL
SODIUM SERPL-SCNC: 138 MMOL/L
WBC # BLD AUTO: 6.08 K/UL

## 2018-07-20 PROCEDURE — 36415 COLL VENOUS BLD VENIPUNCTURE: CPT

## 2018-07-20 PROCEDURE — 99213 OFFICE O/P EST LOW 20 MIN: CPT | Mod: PBBFAC | Performed by: INTERNAL MEDICINE

## 2018-07-20 PROCEDURE — 80053 COMPREHEN METABOLIC PANEL: CPT

## 2018-07-20 PROCEDURE — 85025 COMPLETE CBC W/AUTO DIFF WBC: CPT

## 2018-07-20 PROCEDURE — 99214 OFFICE O/P EST MOD 30 MIN: CPT | Mod: S$PBB,,, | Performed by: INTERNAL MEDICINE

## 2018-07-20 PROCEDURE — 82728 ASSAY OF FERRITIN: CPT

## 2018-07-20 PROCEDURE — 99999 PR PBB SHADOW E&M-EST. PATIENT-LVL III: CPT | Mod: PBBFAC,,, | Performed by: INTERNAL MEDICINE

## 2018-07-20 NOTE — PROGRESS NOTES
Subjective:       Patient ID: Ruth Lan is a 83 y.o. female.    Chief Complaint: No chief complaint on file.    HPI 83-year-old white female who returns to clinic for follow-up of stage I ER positive carcinoma of the right breast.  She was intolerant of aromatase inhibitor therapy.    Over the last few months she has had some weight loss which was attributed to her not wanting to cook and remembering to eat.    On July 7th she was hospitalized with shortness of breath and was found to have large right pleural effusion.  In addition she was noted to have new onset significant anemia with a hemoglobin of 7.9.  She had evidence for heart failure and was treated with transfusion and diuresis.  Echocardiogram showed ejection fraction of 55% with severe mitral regurgitation and pulmonary hypertension with pulmonary artery systolic pressure 110 was also moderate tricuspid regurgitation.    Chest CT scan showed bilateral pleural effusions, vascular congestion and ground-glass changes in both lungs. In addition, there were multiple enlarged mediastinal lymph nodes, pretracheal lymph node - 1.8 cm, prevascular lymph nodes up to 1.2 cm.  The pleural effusion was tapped and was negative for malignancy.  It did show increased numbers of lymphocytes and some reactive mesothelial cells.    Laboratory studies showed an iron of 21 TIBC of 394 and iron saturation of 5%.  Vitamin B12 and folic acid levels were normal reticulocyte count was 6.8.    Follow-up laboratory studies on the 16th of July showed hemoglobin 9.5 with an MCV of 102 normal white blood cell count and platelet count.  Creatinine was 1.3 and calcium was 12.6.    Breathing has improved significantly.        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 The tumor was strongly ER/MT positive.  Underwent adjuvant XRT 3/21/13-5/14/13.  Started on letrozole 5/15/13 and then changed to anastrozole, then to Aromasin which was stopped in April 2016.  Review of Systems   Constitutional: Positive for unexpected weight change. Negative for activity change, appetite change and fever.   Respiratory: Positive for shortness of breath. Negative for cough.    Cardiovascular: Negative for chest pain.   Gastrointestinal: Negative for abdominal pain, diarrhea, nausea and vomiting.   Musculoskeletal: Positive for arthralgias (right knee). Negative for back pain.   Neurological: Negative for headaches.   Psychiatric/Behavioral: Positive for sleep disturbance. Negative for dysphoric mood.       Objective:      Physical Exam   Constitutional: She is oriented to person, place, and time. She appears well-developed and well-nourished. No distress.   HENT:   Mouth/Throat: No oropharyngeal exudate.   Dentures in place   Cardiovascular: Normal rate and regular rhythm.    Murmur heard.  Pulmonary/Chest: Effort normal and breath sounds normal. She has no wheezes. She has no rales. Right breast exhibits skin change. Right breast exhibits no mass and no nipple discharge. Left breast exhibits no mass, no nipple discharge and no skin change.       Abdominal: Soft. She exhibits no mass. There is no tenderness.   Lymphadenopathy:     She has no cervical adenopathy.   Neurological: She is alert and oriented to person, place, and time.   Psychiatric: Her behavior is normal. Thought content normal.   Slightly depressed affect       Assessment:     CBC shows hemoglobin 9.8 with normal. White blood cell count and platelet count, metabolic profile remarkable for BUN 60 creatinine 1.6, calcium 10  1. Malignant neoplasm of lower-inner quadrant of  right breast of female, estrogen receptor positive    2. Unintentional weight loss of 10% body weight within 6 months    3. Pleural effusion        Plan:       We will arrange for a PET scan to rule out underlying malignancy.  I will call with those results.  If that does not show anything that looks likely to be malignant then she will follow up with Pulmonary as planned.    Instructed her to decrease her Lasix to 40 mg once a day given her increased BUN and creatinine-her further instructions per Cardiology.    Distress Screening Results: Psychosocial Distress screening score of Distress Score: 3 noted and reviewed. No intervention indicated.

## 2018-07-26 ENCOUNTER — HOSPITAL ENCOUNTER (OUTPATIENT)
Dept: RADIOLOGY | Facility: HOSPITAL | Age: 83
Discharge: HOME | End: 2018-07-26
Attending: INTERNAL MEDICINE
Payer: MEDICARE

## 2018-07-26 DIAGNOSIS — R63.4 UNINTENTIONAL WEIGHT LOSS OF 10% BODY WEIGHT WITHIN 6 MONTHS: ICD-10-CM

## 2018-07-26 DIAGNOSIS — Z17.0 MALIGNANT NEOPLASM OF LOWER-INNER QUADRANT OF RIGHT BREAST OF FEMALE, ESTROGEN RECEPTOR POSITIVE: ICD-10-CM

## 2018-07-26 DIAGNOSIS — C50.311 MALIGNANT NEOPLASM OF LOWER-INNER QUADRANT OF RIGHT BREAST OF FEMALE, ESTROGEN RECEPTOR POSITIVE: ICD-10-CM

## 2018-07-26 DIAGNOSIS — J90 PLEURAL EFFUSION: ICD-10-CM

## 2018-07-26 LAB — POCT GLUCOSE: 121 MG/DL (ref 70–110)

## 2018-07-26 PROCEDURE — 78815 PET IMAGE W/CT SKULL-THIGH: CPT | Mod: TC,PI

## 2018-07-26 PROCEDURE — 78815 PET IMAGE W/CT SKULL-THIGH: CPT | Mod: 26,PI,, | Performed by: RADIOLOGY

## 2018-07-26 PROCEDURE — A9552 F18 FDG: HCPCS

## 2018-08-02 ENCOUNTER — HOSPITAL ENCOUNTER (OUTPATIENT)
Dept: RADIOLOGY | Facility: HOSPITAL | Age: 83
Discharge: HOME | End: 2018-08-02
Attending: FAMILY MEDICINE
Payer: MEDICARE

## 2018-08-02 DIAGNOSIS — J90 PLEURAL EFFUSION: ICD-10-CM

## 2018-08-02 PROBLEM — I70.0 AORTIC ATHEROSCLEROSIS: Status: ACTIVE | Noted: 2018-08-02

## 2018-08-02 PROCEDURE — 71046 X-RAY EXAM CHEST 2 VIEWS: CPT | Mod: 26,,, | Performed by: RADIOLOGY

## 2018-08-02 PROCEDURE — 71046 X-RAY EXAM CHEST 2 VIEWS: CPT | Mod: TC,FY,PO

## 2018-08-03 ENCOUNTER — OFFICE VISIT (OUTPATIENT)
Dept: PULMONOLOGY | Facility: CLINIC | Age: 83
End: 2018-08-03
Payer: MEDICARE

## 2018-08-03 ENCOUNTER — PATIENT MESSAGE (OUTPATIENT)
Dept: CARDIOLOGY | Facility: CLINIC | Age: 83
End: 2018-08-03

## 2018-08-03 VITALS
HEIGHT: 64 IN | OXYGEN SATURATION: 97 % | HEART RATE: 83 BPM | WEIGHT: 150.81 LBS | BODY MASS INDEX: 25.75 KG/M2 | DIASTOLIC BLOOD PRESSURE: 58 MMHG | SYSTOLIC BLOOD PRESSURE: 90 MMHG

## 2018-08-03 DIAGNOSIS — I50.32 CHRONIC DIASTOLIC HEART FAILURE: ICD-10-CM

## 2018-08-03 DIAGNOSIS — I34.0 SEVERE MITRAL REGURGITATION: ICD-10-CM

## 2018-08-03 DIAGNOSIS — J90 PLEURAL EFFUSION: Primary | ICD-10-CM

## 2018-08-03 DIAGNOSIS — Z85.3 HISTORY OF BREAST CANCER: ICD-10-CM

## 2018-08-03 PROBLEM — R09.02 HYPOXIA: Status: RESOLVED | Noted: 2018-07-07 | Resolved: 2018-08-03

## 2018-08-03 PROBLEM — Z99.81 SUPPLEMENTAL OXYGEN DEPENDENT: Status: RESOLVED | Noted: 2018-07-16 | Resolved: 2018-08-03

## 2018-08-03 PROCEDURE — 99999 PR PBB SHADOW E&M-EST. PATIENT-LVL III: CPT | Mod: PBBFAC,GC,,

## 2018-08-03 PROCEDURE — 99213 OFFICE O/P EST LOW 20 MIN: CPT | Mod: S$PBB,GC,,

## 2018-08-03 PROCEDURE — 99213 OFFICE O/P EST LOW 20 MIN: CPT | Mod: PBBFAC

## 2018-08-03 NOTE — PROGRESS NOTES
[facility]  Follow-up Consultation Note    [facility]   [address]   [place] [address]    Chief Complaint/Reason for Visit:  Pleural Effusion      Brief Clinical Summary:  This greg 84 yo white female with a hx of of stage IA breast cancer diagnosed in May of 2013 for which she underwent lumpectomy and sentinel LN biopsy. The tumor was ER/GA positive. She underwent XRT and was treated with anastrozole, then Aromasin which she no longer takes.     She was recently hospitalized in early July for dyspnea and a new large right pleural effusion. She was also anemic to 7.9. She was evaluated by the inpatient pulmonary consult service who performed a bedside thoracentesis. The fluid was ultimately a likely transudate with strong lymphocyte predominance. The cytology was negative for malignancy. She als found to have signifcant mitral valve disease and pulmonary hypertension during this admission..     Interval History:  Since her inpatient thoracentesis, Ms. Gonzalez reports no complaints of dyspnea, cough or chest discomfort. She has very limited mobility, but appears to able to ambulate without much dyspnea. She is still using a oxygen concentrator, however when her daughter checks her SpO2, she is consistently > 97% on RA. She has recently followed up with her oncologist Dr Tobin Darling who had ordered a PET CT to evaluate some recent weight loss and fatigue. The daughter reports she has heard from Dr Darling regarding the results and that he saw nothing of concern.  We have been asked to re-evalaute her in regard to her plueral fluid.    Review of Systems   Constitutional: Positive for weight loss. Negative for chills and fever.   Respiratory: Negative for cough, sputum production and shortness of breath.    Cardiovascular: Negative for chest pain, orthopnea and leg swelling.   Gastrointestinal: Negative for abdominal pain, nausea and vomiting.   Skin: Negative for rash.       "has a past medical history of Arthritis; Breast cancer; Heart murmur; Hyperlipidemia; and Hypertension.    Past medical, surgical, social and family histories have been updated in the EMR    Allergies: Reviewed    Current medications have been reviewed    Current pulmonary regimen : n/a    On examination:  BP (!) 90/58 (BP Location: Left arm, Patient Position: Sitting)   Pulse 83   Ht 5' 4" (1.626 m)   Wt 68.4 kg (150 lb 12.7 oz)   SpO2 97%   BMI 25.88 kg/m²   Physical Exam   Constitutional: She is oriented to person, place, and time. She appears well-developed and well-nourished. No distress.   HENT:   Head: Normocephalic and atraumatic.   Neck: Neck supple. No JVD present.   Cardiovascular: Normal rate and regular rhythm.    Murmur heard.  Pulmonary/Chest: Effort normal. No accessory muscle usage. No tachypnea. No respiratory distress. She has decreased breath sounds in the right lower field. She has no wheezes. She has no rhonchi.           Abdominal: Soft.   Musculoskeletal: She exhibits no edema.   Neurological: She is alert and oriented to person, place, and time.   Skin: Skin is warm and dry. Capillary refill takes less than 2 seconds.   Psychiatric: She has a normal mood and affect. Her behavior is normal.       Recent laboratory and radiographic results have been reviewed    Notable results include:     PET CT 7/20 Mildly avid mediastinal lymph nodes, could be inflammatory nature although metastatic spread not excluded.    Most recent PFT: n/a     Most recent echocardiogram:   7/8/18  CONCLUSIONS     1 - Normal left ventricular systolic function (EF 55-60%).     2 - Concentric hypertrophy.     3 - Biatrial enlargement.     4 - Severe mitral regurgitation.     5 - Moderate tricuspid regurgitation.     6 - Pulmonary hypertension. The estimated PA systolic pressure is 111 mmHg.     7 - S/P transcatheter AVR, NISHA = 1.35 cm2, AVAi = 0.77 cm2/m2, peak velocity = 3.23 m/s, mean gradient = 26 mmHg.     I have " personally reviewed and interpreted the following studies:  CXR 8/2 - persistent moderate right sided effusion, unchanged from her post thoracentesis film    Assessment and Plan:    Ruth Lan is a 83 y.o. female who was seen today for evaluation of: pleural effusion R>L    .  1. Pleural effusion    2. Severe mitral regurgitation    3. Chronic diastolic heart failure    4. History of breast cancer      - Her transudate pleural effusion is explainable given her severe valvular disease and chronic heart failure  - No current need to re-sample from our perspective  - Continue lasix 40 mg daily as she is doing and follow up with her cardiologist as planned  - Will not need to follow routinely with pulmonary clinic, however she is always welcome back if there are any additional concerns    Return to clinic: prn  Plan was discussed with staff pulmonologist Dr. Lemus    Thank you for the opportunity to evaluate Mrs. Lan.   Please feel free to call or message with any further concerns or questions.    Rodolfo De Luna MD  [facility] Pulmonary and Critical Care Fellow

## 2018-08-08 ENCOUNTER — TELEPHONE (OUTPATIENT)
Dept: FAMILY MEDICINE | Facility: CLINIC | Age: 83
End: 2018-08-08

## 2018-08-08 ENCOUNTER — TELEPHONE (OUTPATIENT)
Dept: ADMINISTRATIVE | Facility: CLINIC | Age: 83
End: 2018-08-08

## 2018-08-08 NOTE — TELEPHONE ENCOUNTER
therapy requesting order for speech therapy for cognitive function, verbal order given     nurse called to clarify furosemide dose, pt has been taking 40mg daily but  has note stating pt taking 20mg daily, informed her per note from 8/3/18 with pulmonologist pt is to take 40mg daily, please advise

## 2018-08-08 NOTE — TELEPHONE ENCOUNTER
----- Message from Ana Pichardo sent at 8/8/2018 12:18 PM CDT -----  Contact: [facility] is calling to get clarification on pts medications furosemide (LASIX) 40 MG tablet.  Please call [phone].

## 2018-08-08 NOTE — PROGRESS NOTES
Home Health SOC 07/12/2018 - 09/09/2018 with [facility] - Dr. Juanito Hudson. Patient received SN, PT, OT, MSW and HHA services.

## 2018-08-08 NOTE — TELEPHONE ENCOUNTER
----- Message from Cassi Guerrier sent at 8/8/2018 12:30 PM CDT -----  Contact: [facility] calling to get a verbal orders for Speech Therapy. Please call [phone].

## 2018-08-16 ENCOUNTER — OFFICE VISIT (OUTPATIENT)
Dept: CARDIOLOGY | Facility: CLINIC | Age: 83
End: 2018-08-16
Payer: MEDICARE

## 2018-08-16 VITALS
BODY MASS INDEX: 25.75 KG/M2 | SYSTOLIC BLOOD PRESSURE: 113 MMHG | HEART RATE: 75 BPM | WEIGHT: 150.81 LBS | DIASTOLIC BLOOD PRESSURE: 55 MMHG | OXYGEN SATURATION: 98 % | HEIGHT: 64 IN

## 2018-08-16 DIAGNOSIS — I70.0 AORTIC ATHEROSCLEROSIS: ICD-10-CM

## 2018-08-16 DIAGNOSIS — D64.9 NORMOCYTIC ANEMIA: ICD-10-CM

## 2018-08-16 DIAGNOSIS — I34.0 SEVERE MITRAL REGURGITATION: ICD-10-CM

## 2018-08-16 DIAGNOSIS — I50.32 CHRONIC DIASTOLIC HEART FAILURE: Primary | ICD-10-CM

## 2018-08-16 DIAGNOSIS — E78.00 PURE HYPERCHOLESTEROLEMIA: Chronic | ICD-10-CM

## 2018-08-16 DIAGNOSIS — I10 ESSENTIAL HYPERTENSION: Chronic | ICD-10-CM

## 2018-08-16 DIAGNOSIS — N18.30 STAGE 3 CHRONIC KIDNEY DISEASE: ICD-10-CM

## 2018-08-16 DIAGNOSIS — Z95.2 S/P AORTIC VALVE REPLACEMENT: Chronic | ICD-10-CM

## 2018-08-16 PROCEDURE — 99214 OFFICE O/P EST MOD 30 MIN: CPT | Mod: PBBFAC | Performed by: NURSE PRACTITIONER

## 2018-08-16 PROCEDURE — 99214 OFFICE O/P EST MOD 30 MIN: CPT | Mod: S$PBB,,, | Performed by: NURSE PRACTITIONER

## 2018-08-16 PROCEDURE — 99999 PR PBB SHADOW E&M-EST. PATIENT-LVL IV: CPT | Mod: PBBFAC,,, | Performed by: NURSE PRACTITIONER

## 2018-08-16 RX ORDER — LISINOPRIL 2.5 MG/1
2.5 TABLET ORAL DAILY
Qty: 90 TABLET | Refills: 3 | Status: SHIPPED | OUTPATIENT
Start: 2018-08-16 | End: 2018-09-04 | Stop reason: SDUPTHER

## 2018-08-16 NOTE — PATIENT INSTRUCTIONS
Decrease the lisinopril to 2.5mg by mouth daily.  You can cut the 5mg tablets in half and take 1/2 tablet a day until you run out.  Then start the new prescription.

## 2018-08-16 NOTE — PROGRESS NOTES
Ms. Lan is a patient of Dr. Zelaya and was last seen in [facility] 7/16/2018.    Subjective:   Patient ID:  Ruth Lan is a 83 y.o. female who presents for follow-up of Acute on chronic diastolic heart failure (4 weeks fu)    Problems:  HFpEF, EF 55-60%  CAD by CT scan 7/7/18  Cardiomegaly  Pulmonary hypertension,  mmHg  bioprosthetic TAVR in 2003   breast CA (s/p x2 R breast lumpectomy and radiation in 2012)   HTN   HLD  MR   50 pack year h/o smoking, quit in 2002  CKD stage III    HPI  Ms. Lan is in clinic today for routine follow up.  Patient denies chest pain with exertion or at rest, palpitations, SOB, dizziness, syncope, edema, orthopnea, PND, or claudication.  Reports no routine exercise.  Reports following a low salt diet. She is no longer using oxygen during the day and reports no SOB at rest.  She does have VALLE but it takes more exertion than previous to cause her to become SOB.    Review of Systems   Constitution: Negative for decreased appetite, diaphoresis, weakness, malaise/fatigue, weight gain and weight loss.   Eyes: Negative for visual disturbance.   Cardiovascular: Positive for dyspnea on exertion. Negative for chest pain, claudication, irregular heartbeat, leg swelling, near-syncope, orthopnea, palpitations, paroxysmal nocturnal dyspnea and syncope.        Denies chest pressure   Respiratory: Negative for cough, hemoptysis, shortness of breath, sleep disturbances due to breathing and snoring.    Endocrine: Negative for cold intolerance and heat intolerance.   Hematologic/Lymphatic: Negative for bleeding problem. Does not bruise/bleed easily.   Musculoskeletal: Negative for myalgias.   Gastrointestinal: Negative for bloating, abdominal pain, anorexia, change in bowel habit, constipation, diarrhea, nausea and vomiting.   Neurological: Negative for difficulty with concentration, disturbances in coordination, excessive daytime sleepiness, dizziness, headaches,  "light-headedness, loss of balance and numbness.   Psychiatric/Behavioral: The patient does not have insomnia.        Allergies and current medications updated and reviewed:  Review of patient's allergies indicates:   Allergen Reactions    Etodolac Other (See Comments)     Dehydration    Nsaids (non-steroidal anti-inflammatory drug)      COLITIS/DEHYDRATION     Current Outpatient Medications   Medication Sig    FLAXSEED ORAL Take by mouth once daily.    acetaminophen (TYLENOL) 500 MG tablet Take 500 mg by mouth every 6 (six) hours as needed for Pain.    aspirin 81 mg Tab Take 81 mg by mouth Daily. 1 Tablet Oral Every day    DOCOSAHEXANOIC ACID/EPA (FISH OIL ORAL) 2 (two) times daily. 2   Every day    furosemide (LASIX) 40 MG tablet Take 1 tablet (40 mg total) by mouth 2 (two) times daily. (Patient taking differently: Take 40 mg by mouth once daily. )    lisinopril (PRINIVIL,ZESTRIL) 5 MG tablet Take 1 tablet (5 mg total) by mouth once daily.    multivitamin (ONE DAILY MULTIVITAMIN) per tablet Take 1 tablet by mouth once daily.    pravastatin (PRAVACHOL) 80 MG tablet Take 1 tablet (80 mg total) by mouth once daily.    tolterodine (DETROL LA) 4 MG 24 hr capsule Take 1 capsule (4 mg total) by mouth once daily.     No current facility-administered medications for this visit.      Objective:     Right Arm BP - Sittin/52 (18 1347)  Left Arm BP - Sittin/55 (18 1347)    BP (!) 113/55 (BP Location: Left arm, Patient Position: Sitting, BP Method: Large (Automatic))   Pulse 75   Ht 5' 4" (1.626 m)   Wt 68.4 kg (150 lb 12.7 oz)   SpO2 98%   BMI 25.88 kg/m²     Physical Exam   Constitutional: She is oriented to person, place, and time. Vital signs are normal. She appears well-developed and well-nourished. She is active. No distress.   HENT:   Head: Normocephalic and atraumatic.   Eyes: Conjunctivae and lids are normal. No scleral icterus.   Neck: Neck supple. Normal carotid pulses, no " hepatojugular reflux and no JVD present. Carotid bruit is not present.   Cardiovascular: Normal rate, regular rhythm, S1 normal, S2 normal and intact distal pulses. PMI is not displaced. Exam reveals no gallop and no friction rub.   Murmur heard.   Harsh midsystolic murmur is present with a grade of 2/6 at the upper right sternal border radiating to the neck.  Pulses:       Carotid pulses are 2+ on the right side, and 2+ on the left side.       Radial pulses are 2+ on the right side, and 2+ on the left side.        Dorsalis pedis pulses are 2+ on the right side, and 2+ on the left side.        Posterior tibial pulses are 1+ on the right side, and 1+ on the left side.   Pulmonary/Chest: Effort normal. No respiratory distress. She has decreased breath sounds in the right lower field. She has no wheezes. She has no rhonchi. She has no rales. She exhibits no tenderness.   Abdominal: Soft. Normal appearance and bowel sounds are normal. She exhibits no distension, no fluid wave, no abdominal bruit, no ascites and no pulsatile midline mass. There is no hepatosplenomegaly. There is no tenderness.   Musculoskeletal: She exhibits no edema.   Neurological: She is alert and oriented to person, place, and time. Gait normal.   Skin: Skin is warm, dry and intact. No rash noted. She is not diaphoretic. Nails show no clubbing.   Psychiatric: She has a normal mood and affect. Her speech is normal and behavior is normal. Judgment and thought content normal. Cognition and memory are normal.   Nursing note and vitals reviewed.      Chemistry        Component Value Date/Time     07/20/2018 1218    K 3.8 07/20/2018 1218    CL 96 07/20/2018 1218    CO2 31 (H) 07/20/2018 1218    BUN 60 (H) 07/20/2018 1218    CREATININE 1.6 (H) 07/20/2018 1218    GLU 86 07/20/2018 1218        Component Value Date/Time    CALCIUM 10.0 07/20/2018 1218    ALKPHOS 128 07/20/2018 1218    AST 16 07/20/2018 1218    ALT 12 07/20/2018 1218    BILITOT 0.4  07/20/2018 1218    ESTGFRAFRICA 34.1 (A) 07/20/2018 1218    EGFRNONAA 29.6 (A) 07/20/2018 1218        Lab Results   Component Value Date    HGBA1C 5.0 07/16/2018       Recent Labs   Lab  11/22/17   1108  07/07/18   0956  07/10/18   0500   07/20/18   1218   WHITE BLOOD CELL COUNT  5.60  8.40   --    < >  6.08   HEMOGLOBIN  13.5  7.9 L   --    < >  9.8 L   HEMATOCRIT  41.8  24.8 L   --    < >  32.4 L   MCV  98  97   --    < >  97   PLATELETS  196  248   --    < >  292   BNP   --   815 H  717 H   --    --    TSH  3.427   --    --    --    --    CHOLESTEROL  195   --    --    --    --    HDL  60   --    --    --    --    LDL CHOLESTEROL  111.4   --    --    --    --    TRIGLYCERIDES  118   --    --    --    --    HDL/CHOLESTEROL RATIO  30.8   --    --    --    --     < > = values in this interval not displayed.            Test(s) Reviewed  I have reviewed the following in detail:  [] Stress test   [] Angiography   [x] Echocardiogram   [x] Labs   [] Other:         Assessment/Plan:     Chronic diastolic heart failure  Comments:  NYHA class II sx. Well compensated. No changes.   Orders:  -     Comprehensive metabolic panel; Future; Expected date: 11/17/2018  -     Magnesium; Future; Expected date: 11/19/2018    Essential hypertension  Comments:  Hypotension with SBP <100 at times. Decrease lisinopril to 2.5mg. Request BP log in 2 weeks.   Orders:  -     lisinopril (PRINIVIL,ZESTRIL) 2.5 MG tablet; Take 1 tablet (2.5 mg total) by mouth once daily.  Dispense: 90 tablet; Refill: 3    Severe mitral regurgitation    S/P aortic valve replacement    Pure hypercholesterolemia  Comments:  . Encouraged mediterranean diet. Continue pravastatin 80mg  Orders:  -     Lipid panel; Future; Expected date: 11/17/2018    Aortic atherosclerosis    Stage 3 chronic kidney disease    Normocytic anemia       The patient was discussed and examined by Dr. Zelaya    Follow-up in about 3 months (around 11/16/2018).

## 2018-08-20 ENCOUNTER — PATIENT MESSAGE (OUTPATIENT)
Dept: FAMILY MEDICINE | Facility: CLINIC | Age: 83
End: 2018-08-20

## 2018-08-22 NOTE — TELEPHONE ENCOUNTER
Patient came in today to have her oxygen saturation level assessed as advised by provider to hopefully have oxygen discontinued. Before starting patient's Oxygen saturation level was 96% heart rate 88. As she started to walk Oxygen sat=96% Heart Rate=92. The further she walked around the clinic Oxygen saturation level ranged from 92%-94% Heart rate ranged from . When she stop for a few seconds Oxygen saturation level would increase back to 96% and heart rate decreased to 88. Please advise.

## 2018-08-23 RX ORDER — FUROSEMIDE 40 MG/1
40 TABLET ORAL 2 TIMES DAILY
Qty: 180 TABLET | Refills: 1 | Status: SHIPPED | OUTPATIENT
Start: 2018-08-23 | End: 2018-09-04 | Stop reason: SDUPTHER

## 2018-09-04 DIAGNOSIS — E78.00 PURE HYPERCHOLESTEROLEMIA: ICD-10-CM

## 2018-09-04 DIAGNOSIS — N39.42 URINARY INCONTINENCE WITHOUT SENSORY AWARENESS: ICD-10-CM

## 2018-09-04 DIAGNOSIS — I10 ESSENTIAL HYPERTENSION: Chronic | ICD-10-CM

## 2018-09-04 RX ORDER — FUROSEMIDE 40 MG/1
40 TABLET ORAL 2 TIMES DAILY
Qty: 180 TABLET | Refills: 3 | Status: SHIPPED | OUTPATIENT
Start: 2018-09-04 | End: 2019-09-11 | Stop reason: HOSPADM

## 2018-09-04 RX ORDER — PRAVASTATIN SODIUM 80 MG/1
80 TABLET ORAL DAILY
Qty: 90 TABLET | Refills: 3 | Status: SHIPPED | OUTPATIENT
Start: 2018-09-04 | End: 2019-01-01 | Stop reason: SDUPTHER

## 2018-09-04 RX ORDER — TOLTERODINE 4 MG/1
4 CAPSULE, EXTENDED RELEASE ORAL DAILY
Qty: 90 CAPSULE | Refills: 3 | Status: SHIPPED | OUTPATIENT
Start: 2018-09-04 | End: 2019-01-01 | Stop reason: SDUPTHER

## 2018-09-04 RX ORDER — LISINOPRIL 2.5 MG/1
2.5 TABLET ORAL DAILY
Qty: 90 TABLET | Refills: 3 | Status: SHIPPED | OUTPATIENT
Start: 2018-09-04 | End: 2018-01-01

## 2018-09-04 NOTE — TELEPHONE ENCOUNTER
----- Message from Shahida Morse sent at 9/4/2018  8:11 AM CDT -----  Contact: daughter  Nery Unger   [phone]  Daughter is checking on the status on paper work that was dropped off to be filled out, she needs these by today. Pls call carlos Horn [phone],. Thanks......Nicki

## 2018-09-04 NOTE — TELEPHONE ENCOUNTER
Spoke with patient's daughter,Sandra. Notified of paperwork completed. Verbalized understanding. States that she also needs a printed script for all of patient's medications due to having to change pharmacies. They have to use the facility she is going in pharmacy. Please advise.

## 2018-09-07 ENCOUNTER — TELEPHONE (OUTPATIENT)
Dept: FAMILY MEDICINE | Facility: CLINIC | Age: 83
End: 2018-09-07

## 2018-09-07 NOTE — TELEPHONE ENCOUNTER
----- Message from Mariaelena Grande sent at 9/7/2018  1:34 PM CDT -----  Contact: [facility] [phone]  [facility] called to notify that the patient's daughter refused home health on today.

## 2018-09-26 NOTE — TELEPHONE ENCOUNTER
Discussed with daughter.  Had discussed with patient regarding getting on an antidepressant as she had been somewhat depressed since moving to the assisted living facility.  However she did want to take.  However after she discuss this with her daughter she would like to try the antidepressant.  Will call in venlafaxine 37.5 mg at bedtime daily.  The daughter will keep in touch with me regarding response.

## 2018-09-26 NOTE — PROGRESS NOTES
Subjective:       Patient ID: Ruth Lan is a 83 y.o. female.    Chief Complaint:  Memory Loss      History of Present Illness  HPI   This is a 83-year-old female who returns in followup.  Her daughter was present today.  She was last seen by me in March 2016.  She had had a history of memory difficulties since 2013. Workup in the past had revealed low therapeutic B12 levels. She did receive a six-month course of IM B12. The patient has a history of breast cancer. According to the daughter, the patient first started noticing problems after she completed radiation in June 2013. 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 She underwent adjuvant XRT 3/21/13-5/14/13.     The patient was living alone had a couple of falls since the beginning this year and subsequently was hospitalized for pulmonary problems.  She is now living in an assisted living center and is doing much better she is socializing and keeping.  She is able to manage her own medications though this is being supervised by the nursing staff there.  She uses a walker stability and she is able to take care of her day-to-day needs without any problems.  She no longer drives.  Her appetite has improved and she has been sleeping well. This is confirmed by her daughter. A CT scan of the brain done in 2015 showed age-appropriate changes with mild small ischemic changes. It was otherwise unremarkable.  She is no longer on oral vitamin B12 since she moved into assisted living center.      Review of Systems  Review of Systems   Constitutional: Negative.    HENT: Negative.  Negative for hearing loss.    Eyes: Negative.  Negative for visual disturbance.   Respiratory: Negative.  Negative for shortness of breath.    Cardiovascular: Negative.  Negative for chest pain and palpitations.   Gastrointestinal: Negative.    Genitourinary: Negative.    Musculoskeletal:  Positive for arthralgias (knee pain-right). Negative for back pain, gait problem and neck pain.   Skin: Negative.    Neurological: Negative.  Negative for tremors, seizures, syncope, speech difficulty, weakness, numbness and headaches.   Psychiatric/Behavioral: Negative.  Negative for confusion and decreased concentration.       Objective:      Neurologic Exam     Cranial Nerves     CN III, IV, VI   Pupils are equal, round, and reactive to light.  Extraocular motions are normal.   Fundus examination:  Sharp disc margins.  Normal vessels on nondilated exam.     Sensory Exam   Light touch normal.   Vibration normal.   Proprioception normal.   Pinprick normal.     Gait, Coordination, and Reflexes     Gait  Gait: normal    Coordination   Romberg: negative  Finger to nose coordination: normal  Heel to shin coordination: normal  Tandem walking coordination: normal    Tremor   Resting tremor: absent  Intention tremor: absent  Action tremor: absent    Reflexes   Right brachioradialis: 2+  Left brachioradialis: 2+  Right biceps: 2+  Left biceps: 2+  Right triceps: 2+  Left triceps: 2+  Right patellar: 2+  Left patellar: 2+  Right achilles: 2+  Left achilles: 2+  Right plantar: normal  Left plantar: normal      Physical Exam   Constitutional: She appears well-developed and well-nourished.   HENT:   Head: Normocephalic and atraumatic.   Right Ear: Hearing normal.   Left Ear: Hearing normal.   Eyes: EOM are normal. Pupils are equal, round, and reactive to light. Right eye exhibits no nystagmus. Left eye exhibits no nystagmus.   Neck: Normal range of motion. Neck supple. Carotid bruit is not present.   Cardiovascular: Normal rate, regular rhythm, S1 normal and S2 normal.   Neurological: She is alert. She has normal reflexes. She displays no atrophy. No cranial nerve deficit (Visual fields at bedside testing essentially normal.  No facial asymmetry noted with facial movements and sensory exam being normal/symmetrical.   Corneals/gag reflexes normal.  Tongue & palate movements normal.  Shoulder shrug was normal.) or sensory deficit. She exhibits normal muscle tone. She has a normal Finger-Nose-Finger Test, a normal Heel to Shin Test, a normal Romberg Test and a normal Tandem Gait Test. She displays a negative Romberg sign. Gait normal. Coordination and gait normal.   Reflex Scores:       Tricep reflexes are 2+ on the right side and 2+ on the left side.       Bicep reflexes are 2+ on the right side and 2+ on the left side.       Brachioradialis reflexes are 2+ on the right side and 2+ on the left side.       Patellar reflexes are 2+ on the right side and 2+ on the left side.       Achilles reflexes are 2+ on the right side and 2+ on the left side.  Mental status examination: Patient is fully oriented and able to give an adequate history.  Recall of recent and past information is good.  Immediate recall is normal.  Attention span and concentration was normal.  No difficulty with spelling backwards and serial sevens.  Judgment and insight is normal.  Language functions are intact with no evidence of aphasia or dysarthria.  Comprehension is unimpaired.  Affect is appropriate, mood was even.  No thought disorder is noted.   Vitals reviewed.        Assessment:        1. MCI (mild cognitive impairment)    2. Anxiety    3. Attention or concentration deficit    4. Vitamin B12 deficiency    5. Aortic atherosclerosis    6. Chronic diastolic heart failure    7. Essential hypertension    8. Pure hypercholesterolemia    9. Stage 3 chronic kidney disease    10. History of breast cancer           Plan:       Discussed with patient and daughter regarding the memory difficulties. Etiology would include a combination of multiple factors including postradiation, anemia, B12 deficiency.  Symptoms have improved and she has been fairly stable with no progression especially since she she is now in the assisted living center.. Advised to continue with B12  sublingual supplementation 2500 mcg daily. May use melatonin OTC for sleep. Continue with present of her activities. Observation for now and followup in12 months, if stable.

## 2018-09-26 NOTE — TELEPHONE ENCOUNTER
----- Message from Elidia Hopson sent at 9/26/2018  3:52 PM CDT -----  Contact: pt's daughter mario [phone]            Name of Who is Calling: pt      What is the request in detail: pt's daughter needs to discuss medication. Call mario      Can the clinic reply by [facility]: no      What Number to Call Back if not in [facility]: pt's daughter mario [phone]

## 2018-09-26 NOTE — PATIENT INSTRUCTIONS
Discussed with patient and daughter regarding the memory difficulties. Etiology would include a combination of multiple factors including postradiation, anemia, B12 deficiency.  Symptoms have improved and she has been fairly stable with no progression especially since she she is now in the assisted living center.. Advised to continue with B12 sublingual supplementation 2500 mcg daily. May use melatonin OTC for sleep. Continue with present of her activities.

## 2018-10-18 NOTE — PROGRESS NOTES
"Subjective:       Patient ID: Ruth Lan is a 83 y.o. female.    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  She was intolerant of aromatase inhibitor therapy.    Mammogram from today was unremarkable.    Patient with pleural effusion earlier this year (see below).  She has had complete resolution of her symptoms and no shortness of breath. She has no pain other than knee pain. No fever, chills, nausea, vomiting or change in bowel habits.   She has moved into an assisted living facility and subsequently has gained weight and is much more mobile with walker and partaking in social activities there including "wine down wednesdays"" and bingo.   Daughter notes that patient's mood has much improved and she is almost back to her baseline.      During spring/early summer 2018 she has had some weight loss which was attributed to her not wanting to cook and remembering to eat.    On July 7th she was hospitalized with shortness of breath and was found to have large right pleural effusion.  In addition she was noted to have new onset significant anemia with a hemoglobin of 7.9.  She had evidence for heart failure and was treated with transfusion and diuresis.  Echocardiogram showed ejection fraction of 55% with severe mitral regurgitation and pulmonary hypertension with pulmonary artery systolic pressure 110 was also moderate tricuspid regurgitation.    Chest CT scan showed bilateral pleural effusions, vascular congestion and ground-glass changes in both lungs. In addition, there were multiple enlarged mediastinal lymph nodes, pretracheal lymph node - 1.8 cm, prevascular lymph nodes up to 1.2 cm.  The pleural effusion was tapped and was negative for malignancy.  It did show increased numbers of lymphocytes and some reactive mesothelial cells.    Laboratory studies " showed an iron of 21 TIBC of 394 and iron saturation of 5%.  Vitamin B12 and folic acid levels were normal reticulocyte count was 6.8.    Follow-up laboratory studies on the 16th of July showed hemoglobin 9.5 with an MCV of 102 normal white blood cell count and platelet count.  Creatinine was 1.3 and calcium was 12.6.    PET from 7/20/18:  Mildly avid mediastinal lymph nodes, could be inflammatory nature although metastatic spread not excluded.        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 The tumor was strongly ER/RI positive.  Underwent adjuvant XRT 3/21/13-5/14/13.  Started on letrozole 5/15/13 and then changed to anastrozole, then to Aromasin which was stopped in April 2016.        Review of Systems   Constitutional: Negative for activity change, appetite change, chills, diaphoresis, fatigue, fever and unexpected weight change.   HENT: Negative for congestion, rhinorrhea, sore throat and trouble swallowing.    Eyes: Negative for visual disturbance.   Respiratory: Negative for cough, chest tightness and shortness of breath.    Cardiovascular: Negative.    Gastrointestinal: Negative.    Genitourinary: Negative for dysuria and hematuria.   Musculoskeletal: Positive for arthralgias (knees).   Skin: Negative for pallor and rash.   Neurological: Negative for dizziness, syncope, weakness and light-headedness.   Psychiatric/Behavioral: Negative.        Objective:      Physical Exam   Constitutional: She is oriented to person, place, and time. She appears well-developed and well-nourished. No distress.   Presents with daughter  ECOG 2; exam conducted  in wheelchair.    HENT:   Head: Normocephalic and atraumatic.   Eyes: Conjunctivae are normal. No scleral icterus.   Neck: Normal range of motion. Neck supple.   Cardiovascular: Normal rate and regular rhythm.   Pulmonary/Chest: Effort normal and breath sounds normal. No respiratory distress.   Right and left breast without mass, nodule or skin changes. No axillary or supraclavicular adenopathy.   Abdominal: Soft. Bowel sounds are normal. She exhibits no distension and no mass. There is no tenderness.   Musculoskeletal: Normal range of motion.   No spinal or paraspinal tenderness to palpation     Neurological: She is alert and oriented to person, place, and time. No cranial nerve deficit.   Skin: Skin is warm and dry.   Psychiatric: She has a normal mood and affect. Her behavior is normal. Judgment and thought content normal.   Vitals reviewed.      Assessment:       1. Malignant neoplasm of lower-inner quadrant of right breast of female, estrogen receptor positive        Plan:       Patient clinically without evidence of disease and has recuperated from earlier illness/pleural effusion in the year. Overall doing quite well.  RTC in one year with annual mammogram.   Distress Screening Results: Psychosocial Distress screening score of Distress Score: 0 noted and reviewed. No intervention indicated.

## 2018-10-23 PROBLEM — I48.91 ATRIAL FIBRILLATION WITH RAPID VENTRICULAR RESPONSE: Status: ACTIVE | Noted: 2018-01-01

## 2018-10-23 PROBLEM — J90 PLEURAL EFFUSION, RIGHT: Chronic | Status: ACTIVE | Noted: 2018-07-07

## 2018-10-23 PROBLEM — I27.20 PULMONARY HTN: Chronic | Status: ACTIVE | Noted: 2018-07-09

## 2018-10-23 PROBLEM — I50.32 CHRONIC DIASTOLIC HEART FAILURE: Chronic | Status: ACTIVE | Noted: 2018-07-08

## 2018-10-23 PROBLEM — N18.4 STAGE 4 CHRONIC KIDNEY DISEASE: Chronic | Status: ACTIVE | Noted: 2017-06-26

## 2018-10-23 PROBLEM — Z66 DNR (DO NOT RESUSCITATE): Chronic | Status: ACTIVE | Noted: 2018-07-09

## 2018-10-23 PROBLEM — R73.9 HYPERGLYCEMIA: Status: RESOLVED | Noted: 2018-07-16 | Resolved: 2018-01-01

## 2018-10-23 PROBLEM — N18.30 STAGE 3 CHRONIC KIDNEY DISEASE: Chronic | Status: ACTIVE | Noted: 2017-06-26

## 2018-10-23 NOTE — ED NOTES
Pt c/o of pain in sacrum area from when she fell. Pillow wedge placed and pt turned to left side.

## 2018-10-23 NOTE — ED NOTES
CT paged and informed that pt is okay to be transported to CT at this time because of improvement in HR.

## 2018-10-23 NOTE — ED PROVIDER NOTES
Encounter Date: 10/23/2018    SCRIBE #1 NOTE: I, Eliceo Razo, am scribing for, and in the presence of,  Dr. Jet Valdivia. I have scribed the entire note.       History     Chief Complaint   Patient presents with    Fall     pt presents to ED today via ESM who reports pt was ambulating to bingo with walker,felt dizzy, fell on buttocks then fell backwards and hit head on floor. pt presented to EMS in AFib with RVR with heart rate of 14o's 20 mg of cardizem administered PTA . Pt arrives AAO with no noted bleeding.      84 yo female with PMH of HTN, HLD, heart murmur, mitral regurgitation presents the ED via EMS from [facility] assisted living for evaluation of fall x2 today and new onset AFib with RVR.  Patient states she fell earlier today when she went to sit on her walker in the seat broke.  She states she fell on her buttocks.  She states approximately 1 hr prior to arrival she was walking with her walker when she reportedly felt dizzy and fell backwards landing on her buttocks and hitting the back of her head.  She states she is a little unclear of the events but denies LOC.  She is complaining of buttock pain. Denies headache, head pain, chest pain, prior AFib, fever, nausea, vomiting, shortness of breath.       The history is provided by the patient and a relative. No  was used.     Review of patient's allergies indicates:   Allergen Reactions    Etodolac Other (See Comments)     Dehydration    Nsaids (non-steroidal anti-inflammatory drug)      COLITIS/DEHYDRATION     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, ER/VT positive    Heart murmur     Hyperlipidemia     Hypertension      Past Surgical History:   Procedure Laterality Date    BIOPSY, LYMPH NODE, SENTINEL Right 12/6/2012    [facility] 2ND FLR    BREAST BIOPSY  11/2012     Right breast- invasive ductal carcinoma    CARDIAC VALVE REPLACEMENT      aortic, pig valve    CARDIAC VALVE SURGERY  2004    AVR    CATARACT EXTRACTION W/  INTRAOCULAR LENS IMPLANT      OU     EYE SURGERY      cataracts- bilateral    INSERTION, LOCALIZATION WIRE Right 12/6/2012    Performed [facility] at [facility] OR 2ND FLR    UG-[id number] Right 1/14/2013    Performed by [facility]in [facility] at [facility] OR 2ND FLR    TISSUE AORTIC VALVE REPLACEMENT  2003    TUBAL LIGATION       Family History   Problem Relation Age of Onset    Cancer Maternal Aunt         cervical or breast    Breast cancer Maternal Grandmother     Heart disease Father     No Known Problems Mother     No Known Problems Sister     No Known Problems Brother     No Known Problems Maternal Uncle     No Known Problems Paternal Aunt     No Known Problems Paternal Uncle     No Known Problems Maternal Grandfather     No Known Problems Paternal Grandmother     No Known Problems Paternal Grandfather     Blindness Neg Hx     Cataracts Neg Hx     Diabetes Neg Hx     Glaucoma Neg Hx     Ovarian cancer Neg Hx     Amblyopia Neg Hx     Hypertension Neg Hx     Macular degeneration Neg Hx     Retinal detachment Neg Hx     Strabismus Neg Hx     Stroke Neg Hx     Thyroid disease Neg Hx      Social History     Tobacco Use    Smoking status: Former Smoker     Packs/day: 1.00     Years: 50.00     Pack years: 50.00     Last attempt to quit: 11/15/2002     Years since quitting: 15.9    Smokeless tobacco: Never Used   Substance Use Topics    Alcohol use: Yes     Alcohol/week: 1.5 oz     Types: 3 Standard drinks or equivalent per week     Comment: 3 OZ WINE     Drug use: No     Review of Systems   Constitutional: Negative for chills and fever.   Respiratory: Negative for cough and shortness of breath.    Cardiovascular: Negative for chest pain and leg swelling.   Gastrointestinal: Negative for abdominal pain,  diarrhea, nausea and vomiting.   Genitourinary: Negative for dysuria and hematuria.   Musculoskeletal: Positive for arthralgias.   Skin: Negative for wound.   Neurological: Positive for dizziness (Prior to fall). Negative for syncope and headaches.   All other systems reviewed and are negative.      Physical Exam     Initial Vitals [10/23/18 1600]   BP Pulse Resp Temp SpO2   98/66 (!) 131 (!) 21 97.7 °F (36.5 °C) 100 %      MAP       --         Physical Exam    Nursing note and vitals reviewed.  Constitutional: Vital signs are normal. She appears well-developed and well-nourished. No distress.   HENT:   Head: Normocephalic and atraumatic.   Nose: Nose normal.   Mouth/Throat: Mucous membranes are dry.   Eyes: Conjunctivae and lids are normal.   Neck: Normal range of motion and phonation normal.   Cardiovascular: Normal heart sounds. An irregularly irregular rhythm present.  Tachycardia present.    Pulmonary/Chest: Effort normal and breath sounds normal. She has no decreased breath sounds. She has no wheezes. She has no rales.   Abdominal: Soft. Normal appearance and bowel sounds are normal. She exhibits no distension. There is no tenderness.   Musculoskeletal: Normal range of motion.        Lumbar back: She exhibits pain. She exhibits no tenderness and no bony tenderness.        Back:    Neurological: She is alert and oriented to person, place, and time.   Skin: Skin is warm and dry.   Psychiatric: She has a normal mood and affect. Her speech is normal and behavior is normal. Judgment and thought content normal. Cognition and memory are normal.         ED Course   Critical Care  Date/Time: 10/23/2018 6:17 PM  Performed by: Rafia Ramirez MD  Authorized by: Rafia Ramirez MD   Total critical care time (exclusive of procedural time) : 0 minutes  Critical care was necessary to treat or prevent imminent or life-threatening deterioration of the following conditions: cardiac failure and circulatory  failure.  Critical care was time spent personally by me on the following activities: blood draw for specimens, development of treatment plan with patient or surrogate, discussions with consultants, evaluation of patient's response to treatment, examination of patient, obtaining history from patient or surrogate, ordering and performing treatments and interventions, ordering and review of laboratory studies, ordering and review of radiographic studies, pulse oximetry, review of old charts and re-evaluation of patient's condition.  Comments: Patient with atrial fibrillation with RVR requiring cardizem infusion for rate control        Labs Reviewed   CBC W/ AUTO DIFFERENTIAL - Abnormal; Notable for the following components:       Result Value    RBC 3.69 (*)     Hemoglobin 11.2 (*)     Hematocrit 35.0 (*)     Lymph # 0.8 (*)     Lymph% 12.2 (*)     All other components within normal limits   COMPREHENSIVE METABOLIC PANEL - Abnormal; Notable for the following components:    Glucose 204 (*)     BUN, Bld 67 (*)     Creatinine 1.9 (*)     Albumin 3.2 (*)     Alkaline Phosphatase 203 (*)     AST 69 (*)     ALT 78 (*)     eGFR if  28 (*)     eGFR if non  24 (*)     All other components within normal limits   LACTIC ACID, PLASMA   MAGNESIUM   TROPONIN I   URINALYSIS   TSH          Imaging Results          CT Head Without Contrast (Final result)  Result time 10/23/18 20:08:33    Final result by Sudheer Ayala MD (10/23/18 20:08:33)                 Impression:      No acute large vascular territory infarct or intracranial hemorrhage identified.    Mild senescent changes as above.      Electronically signed by: Sudheer Ayala MD  Date:    10/23/2018  Time:    20:08             Narrative:    EXAMINATION:  CT HEAD WITHOUT CONTRAST    CLINICAL HISTORY:  Syncope/fainting;    TECHNIQUE:  Low dose axial CT images obtained throughout the head without intravenous contrast. Sagittal and coronal  reconstructions were performed.    COMPARISON:  Head CT 09/13/2013    FINDINGS:  Intracranial compartment:    Age-appropriate mild generalized cerebral volume loss.  Mild degree of supratentorial white matter chronic small vessel ischemic change.  No extra-axial blood or fluid collections.    No definite new focal areas of abnormal parenchymal attenuation.  No parenchymal mass, hemorrhage, edema or major vascular distribution infarct.  Skull base atherosclerotic vascular calcifications noted.    Skull/extracranial contents (limited evaluation): No fracture. Mastoid air cells and paranasal sinuses are essentially clear.                               X-Ray Chest 1 View (Final result)  Result time 10/23/18 16:48:07    Final result by Lizzette Fry MD (10/23/18 16:48:07)                 Impression:      Please see above.      Electronically signed by: Lizzette Fry MD  Date:    10/23/2018  Time:    16:48             Narrative:    EXAMINATION:  XR CHEST 1 VIEW    CLINICAL HISTORY:  Tachycardia, unspecified    TECHNIQUE:  Single frontal view of the chest was performed.    COMPARISON:  Chest radiograph: 08/02/2018.  07/08/2018.  07/07/2018.    PET/CT: 07/26/2018.    Chest CT: 07/07/2018.    FINDINGS:  Additional history: Breast carcinoma, recent weight loss.  Mediastinal lymph nodes were weakly avid on PET-CT and July 2018.    Single AP view of the chest was obtained with the patient in left posterior oblique rotation.  The patient is status post aortic valve replacement.    The patient has known right pleural fluid and partial atelectasis of the right lung accounting for soft tissue opacity occupying the base of the right hemithorax.  The source of the patient's chronic right pleural fluid is unclear; malignancy is not excluded.    There is abundant calcification in the mitral annulus, a normal senescent change.    I detect no convincing evidence of new focal pulmonary disease.  I cannot exclude interstitial lung  disease such as mild interstitial pulmonary edema in this 83-year-old woman with known aortic valve prosthesis and calcific atherosclerosis of the aorta and its branches.    I detect no pneumothorax, left pleural fluid, pneumomediastinum, pneumoperitoneum or significant osseous abnormality.                               X-Ray Sacrum And Coccyx (Final result)  Result time 10/23/18 16:47:38    Final result by Sly Hu MD (10/23/18 16:47:38)                 Impression:      No acute fracture      Electronically signed by: Sly Hu MD  Date:    10/23/2018  Time:    16:47             Narrative:    EXAMINATION:  XR SACRUM AND COCCYX    CLINICAL HISTORY:  Unspecified fall, initial encounter    TECHNIQUE:  Three views of the sacrum were obtained.    COMPARISON:  None    No comparison.    FINDINGS:  There is no acute fracture or subluxation.  Soft tissues are unremarkable.                                 Medical Decision Making:   Initial Assessment:   83-year-old female with 2 falls today in new onset AFib with RVR. Patient was given 20 mg Cardizem by EMS in transport which lowered her rate to between 100- 120 from 160 initially.  Upon arriving at the ED her heart rate had increased into the 140s to 150s.   Patient asymptomatic.  Dry mucous membranes on exam  Differential Diagnosis:   New onset atrial fibrillation with RVR, fracture, intracranial hemorrhage  Clinical Tests:   Lab Tests: Ordered and Reviewed  Radiological Study: Ordered and Reviewed  ED Management:  Patient given 500 mL fluid bolus and attempt to reduce heart rate and she looked slightly dry.  This did not decrease her rate so additional 15 mg of Cardizem was given and rate slowed to 80s to 90s.  X-ray shows no fractures to his sacrum or coccyx.  BUN and creatinine elevated 67/1.9. Decreased GFR 24, mildly elevated liver enzymes.  5:57 PM  Patient HR elevated in 130-140's.  Cardizem drip started.   6:39 PM  Patient signed out to   Ashley  8:00 PM   Patient handed off to Dr. Valdivia for follow up CT scan results and disposition    8:17 PM   Assumed care from Dr. Ramirez at shift change.   Patient in no apparent distress, receiving Digoxin.  CT head without acute process.   Will plan to admit patient.  8:22 PM  Paged Dr. Billingslye.  8:27 PM  Spoke to Dr. Billingsley who will admit patient.  9:05 PM  HR in 80's. Patient informed of results.              Attending Attestation:     Physician Attestation Statement for NP/PA:   I have conducted a face to face encounter with this patient in addition to the NP/PA, due to NP/PA Request    Other NP/PA Attestation Additions:      Medical Decision Making: Patient is an 83-year-old female who presents today after fall.  Unknown if there was syncope or if was a mechanical fall.  Patient found to be in AFib with RVR.  No previous history of atrial fibrillation.  Patient is tachycardic in the emergency department.  She is nontoxic appearing.  No acute distress. Irregularly irregular.  Lungs clear.  She remains tachycardic after IV fluids, multiple rounds of Cardizem.  She was started on a Cardizem infusion.                 ED Course as of Oct 23 1944   Tue Oct 23, 2018   1613 EKG with atrial fibrillation with RVR.  Rate of 124 bpm.  No STEMI.  [LD]   1738 Patient still tachycardic after IVFs.  Will try cardizem  [LD]   1919 15 mg IV cardizem did not control rate.  Cardizem infusion started  [LD]   1943 Patient still tachycardic on cardizem infusion, IV digoxin ordered.   [LD]      ED Course User Index  [LD] Rafia Ramirez MD     Clinical Impression:   The primary encounter diagnosis was New onset atrial fibrillation. Diagnoses of Tachycardia, Fall, and Atrial fibrillation with RVR were also pertinent to this visit.      Disposition:   Disposition: Admitted  Condition: Queenie Gregg PA-C  10/29/18 2807       Rafia Ramirez MD  10/30/18 1279

## 2018-10-23 NOTE — ED NOTES
Pt presents to the ED w/ c/o dizziness. Pt reports that she had a fall today and hit the back of her head. Per EMS, pt was noted to be in new onset of afib RVR and was given 20mg of cardizem pta. Pt denies chest pain or sob. EMS reports that pt responded to medication pta but on arrival HR was in the 130s. Pt reports that she became dizzy and fell and hit her head while at bingo pta. Pt reports that earlier today she was sitting in her walker when it broke and she landed on her buttocks. Tenderness noted to sacrum area. Denies neck pain at this time.

## 2018-10-24 PROBLEM — I48.91 NEW ONSET ATRIAL FIBRILLATION: Status: ACTIVE | Noted: 2018-01-01

## 2018-10-24 NOTE — HPI
Ruth Lan is a 83 y.o. 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  She is hard of hearing.  She lives at [facility] in [place], [place].  The assisted living facility gives her her medications.  She ambulates with a rollator.  Her primary care physician is Dr. Azikiwe Lombard.  Her neurologist is Dr. Be Lassiter.  Her oncologist is Dr. Tobin Darling.  She is DNR.   On 10/23/18, she went to sit on her rollator and her seat broke.  Later in the day, she was ambulating to bingo with her rollator, felt dizzy, fell on her buttocks, then fell backward and hit her head on the floor.  EMS found her to be in atrial fibrillation with rapid ventricular response, with pulse in the 140s.  In the ED, chest X-ray showed chronic right pleural effusion.  BNP was 952, higher than on 7/7/18 when she was hospitalized for decompensated heart failure and required IV diuresis, however this time she was not hypoxic, with oxygen saturation of 100% on room air.  She was given IV diltiazem, IV digoxin, then put on diltiazem drip for persistent rapid ventricular  response.  She was admitted to [facility].  Rate improved on diltiazem drip.  She denied chest pain, shortness of breath, or palpitations.  She had back pain due to her fall.  On physical exam, she expressed the desire to return home.

## 2018-10-24 NOTE — CONSULTS
[facility]  Cardiology  Consult Note    Patient Name: Ruth Lan  MRN: [id number]  Admission Date: 10/23/2018  Hospital Length of Stay: 1 days  Code Status: DNR   Attending Provider: Jett Scales MD   Consulting Provider: Carlos Elliott NP  Primary Care Physician: Azikiwe K Lombard, MD  Principal Problem:Atrial fibrillation with rapid ventricular response    Patient information was obtained from patient, past medical records and ER records.     Inpatient consult to [facility]  Consult performed by: Carlos Elloitt NP  Consult ordered by: Jett Scales MD        Subjective:     Chief Complaint:  Atrial fib      HPI:   Ruth Lan is a 83 y.o. Female with hypertension, chronic diastolic heart failure, left atrial enlargement, history of porcine aortic valve replacement on 9/27/04, severe mitral regurgitation, moderate tricuspid regurgitation, pulmonary hypertension, chronic kidney disease stage 4, 50 pack year smoking history (quit on 11/15/02), anxiety, and mild cognitive impairment. She lives at [facility] who administers her medications.  She ambulates with a rollator. She is DNR. Yesterday, she was ambulating to [facility] with her rollator, felt dizzy, fell on her buttocks, then fell backward and hit her head on the floor.  EMS found her to be in AF RVR with HR in the 140s.  She was given IV diltiazem, IV digoxin, then put on diltiazem drip for persistent RVR. She is rate controlled this AM in the 80s and has been started on Cardizem CD, Cardizem gtt is to be discontinued in 1 hour.  She denied chest pain, shortness of breath, or palpitations.  She  denies frequent falls. Prior to yesterday, she sustained a fall in July- mechanical. Since that time she reports marked improvement in strength with PT.         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, ER/TN positive    Chronic diastolic heart failure     Heart murmur     Hyperlipidemia     Hypertension     Left atrial enlargement     Severe mitral regurgitation        Past Surgical History:   Procedure Laterality Date    BIOPSY, LYMPH NODE, SENTINEL Right 12/6/2012    Performed by [facility]ri Epstein [facility] at [facility] OR 2ND FLR    BREAST BIOPSY  11/2012    Right breast- invasive ductal carcinoma    CATARACT EXTRACTION W/  INTRAOCULAR LENS IMPLANT Right 10/21/2008    OU     CATARACT EXTRACTION W/  INTRAOCULAR LENS IMPLANT Left     INSERTION, LOCALIZATION WIRE Right 12/6/2012    Performed by Dem[facility]ri Epstein MD at [facility] OR 2ND FLR    JK-PATHHZPT-CSMTWG Right 1/14/2013    Performed by [facility]ri Epstein MD at [facility] OR 2ND FLR    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    TUBAL LIGATION         Review of patient's allergies indicates:   Allergen Reactions    Etodolac Other (See Comments)     Dehydration    Nsaids (non-steroidal anti-inflammatory drug)      COLITIS/DEHYDRATION       No current facility-administered medications on file prior to encounter.      Current Outpatient Medications on File Prior to Encounter   Medication Sig    acetaminophen (TYLENOL) 500 MG tablet Take 500 mg by mouth every 6 (six) hours as needed for Pain.    aspirin 81 mg Tab Take 81 mg by mouth Daily. 1 Tablet Oral Every day    cyanocobalamin, vitamin B-12, (VITAMIN B-12) 2,500 mcg Subl Place under the tongue once daily.    DOCOSAHEXANOIC ACID/EPA (FISH OIL ORAL) 2 (two) times daily. 2   Every day    FLAXSEED ORAL Take by mouth once daily.    furosemide (LASIX) 40 MG tablet Take 1 tablet (40 mg total) by mouth 2 (two) times daily. (Patient taking differently: Take 40 mg by mouth once daily. )    lisinopril (PRINIVIL,ZESTRIL) 2.5 MG tablet Take 2.5 mg by mouth once daily.    multivitamin (ONE DAILY MULTIVITAMIN) per tablet Take 1 tablet by mouth once daily.    pravastatin  (PRAVACHOL) 80 MG tablet Take 1 tablet (80 mg total) by mouth once daily.    tolterodine (DETROL LA) 4 MG 24 hr capsule Take 1 capsule (4 mg total) by mouth once daily.    venlafaxine (EFFEXOR-XR) 37.5 MG 24 hr capsule Take 1 capsule (37.5 mg total) by mouth once daily. (Patient taking differently: Take 37.5 mg by mouth every evening. )     Family History     Problem Relation (Age of Onset)    Breast cancer Maternal Grandmother    Cancer Maternal Aunt    Heart disease Father    No Known Problems Mother, Sister, Brother, Maternal Uncle, Paternal Aunt, Paternal Uncle, Maternal Grandfather, Paternal Grandmother, Paternal Grandfather        Tobacco Use    Smoking status: Former Smoker     Packs/day: 1.00     Years: 50.00     Pack years: 50.00     Last attempt to quit: 11/15/2002     Years since quitting: 15.9    Smokeless tobacco: Never Used   Substance and Sexual Activity    Alcohol use: Yes     Alcohol/week: 1.5 oz     Types: 3 Standard drinks or equivalent per week     Comment: 3 OZ WINE     Drug use: No    Sexual activity: No     Review of Systems   Constitution: Negative for diaphoresis, weakness and malaise/fatigue.   HENT: Negative.    Eyes: Negative.    Cardiovascular: Negative for chest pain, dyspnea on exertion, irregular heartbeat, leg swelling, near-syncope, orthopnea, palpitations, paroxysmal nocturnal dyspnea and syncope.   Respiratory: Negative for cough and shortness of breath.    Endocrine: Negative.    Hematologic/Lymphatic: Negative.  Does not bruise/bleed easily.   Skin: Negative.    Musculoskeletal: Positive for arthritis and joint pain (knee).   Gastrointestinal: Negative.    Genitourinary: Negative.    Neurological: Positive for light-headedness (resolved).   Psychiatric/Behavioral: Positive for memory loss.   Allergic/Immunologic: Negative.      Objective:     Vital Signs (Most Recent):  Temp: 98.5 °F (36.9 °C) (10/24/18 0704)  Pulse: 80 (10/24/18 0820)  Resp: (!) 21 (10/24/18 0820)  BP:  (!) 107/59 (10/24/18 0800)  SpO2: (!) 94 % (10/24/18 0820) Vital Signs (24h Range):  Temp:  [97.7 °F (36.5 °C)-98.7 °F (37.1 °C)] 98.5 °F (36.9 °C)  Pulse:  [] 80  Resp:  [17-33] 21  SpO2:  [90 %-100 %] 94 %  BP: ()/(50-77) 107/59     Weight: 72.1 kg (158 lb 15.2 oz)  Body mass index is 25.66 kg/m².    SpO2: (!) 94 %  O2 Device (Oxygen Therapy): nasal cannula      Intake/Output Summary (Last 24 hours) at 10/24/2018 1018  Last data filed at 10/24/2018 0900  Gross per 24 hour   Intake 552.93 ml   Output 10 ml   Net 542.93 ml       Lines/Drains/Airways     Peripheral Intravenous Line                 Peripheral IV - Single Lumen 10/23/18 1554 Right Antecubital less than 1 day         Peripheral IV - Single Lumen 10/23/18 1803 Left Antecubital less than 1 day                Physical Exam   Constitutional: She is oriented to person, place, and time. She appears well-developed and well-nourished. No distress.   HENT:   Head: Normocephalic and atraumatic.   Eyes: Right eye exhibits no discharge. Left eye exhibits no discharge.   Neck: No JVD present.   Cardiovascular: Normal rate. An irregularly irregular rhythm present. Exam reveals no gallop and no friction rub.   Murmur heard.  Pulmonary/Chest: Effort normal and breath sounds normal.   Abdominal: Soft. Bowel sounds are normal.   Musculoskeletal: She exhibits no edema.   Neurological: She is alert and oriented to person, place, and time.   Skin: Skin is warm and dry. She is not diaphoretic.   Psychiatric: She has a normal mood and affect. Her behavior is normal. Judgment and thought content normal.       Significant Labs:   BMP:   Recent Labs   Lab 10/23/18  1606 10/24/18  0343   * 141*    139   K 4.1 4.2    102   CO2 25 25   BUN 67* 56*   CREATININE 1.9* 1.5*   CALCIUM 9.3 9.7   MG 2.0 1.9   , CMP   Recent Labs   Lab 10/23/18  1606 10/24/18  0343    139   K 4.1 4.2    102   CO2 25 25   * 141*   BUN 67* 56*   CREATININE  1.9* 1.5*   CALCIUM 9.3 9.7   PROT 7.3  --    ALBUMIN 3.2*  --    BILITOT 0.5  --    ALKPHOS 203*  --    AST 69*  --    ALT 78*  --    ANIONGAP 13 12   ESTGFRAFRICA 28* 37*   EGFRNONAA 24* 32*   , CBC   Recent Labs   Lab 10/23/18  1606 10/24/18  0543   WBC 6.90 8.49   HGB 11.2* 10.7*   HCT 35.0* 33.0*    175   , INR No results for input(s): INR, PROTIME in the last 48 hours., Lipid Panel No results for input(s): CHOL, HDL, LDLCALC, TRIG, CHOLHDL in the last 48 hours., Troponin No results for input(s): TROPONINI in the last 48 hours. and All pertinent lab results from the last 24 hours have been reviewed.    Significant Imaging: Echocardiogram:   2D echo with color flow doppler:   Results for orders placed or performed during the hospital encounter of 10/17/18   2D Echo w/ Color Flow Doppler   Result Value Ref Range    Calculated EF 55 55 - 65    Mitral Valve Regurgitation SEVERE (A)     Est. PA Systolic Pressure 43.2 (A)     Tricuspid Valve Regurgitation MILD TO MODERATE      Assessment and Plan:     * Atrial fibrillation with rapid ventricular response    New onset AF RVR with severe LAE and valvular disease  Rate control strategy with Cardizem CD  Anticoagulate with Coumadin or Eliquis    CHADSVASC 5 points  HASBLED 2 points, moderate risk for bleeding  Discussed risks/benefits of anticoagulation with patient and daughter    Follow up with primary cardiologist in 2-3 weeks and consider DCCV.     HR 70s-80s this AM; SBP 100s at rest     Denies history of frequent falls, isolated fall yesterday when in AF with rate 140s- dizziness was isolated as well and likely related to RVR      Severe mitral regurgitation    Stable, chronic with normal LVEF      Essential hypertension    BP well controlled, Continue Cardizem CD, hold Lisinopril      History of aortic valve replacement with porcine valve on 9/27/04    Stable on recent echo          VTE Risk Mitigation (From admission, onward)        Ordered     enoxaparin  injection 30 mg  Daily      10/23/18 2209     IP VTE HIGH RISK PATIENT  Once      10/23/18 2209          Thank you for your consult. I will follow-up with patient. Please contact us if you have any additional questions.    Carlos Elliott NP  Cardiology   [facility]

## 2018-10-24 NOTE — PLAN OF CARE
Problem: Occupational Therapy Goal  Goal: Occupational Therapy Goal  Goals to be met by: 11/10/2018    Patient will increase functional independence with ADLs by performing:    UE Dressing with Modified [place].  LE Dressing with Modified [place].  Grooming while standing with Modified [place].  Toileting from bedside commode with Modified [place] for hygiene and clothing management.   Rolling to Bilateral with Modified [place].   Supine to sit with Modified [place].  Step transfer with Modified [place]  Toilet transfer to bedside commode with Modified [place].  Upper extremity exercise program x10 reps per handout, with supervision.    Outcome: Ongoing (interventions implemented as appropriate)  OT initial eval completed and treatment initiated.  Pt.would benefit from SNF. DME needs for home: [facility].  Continue with OT POC.

## 2018-10-24 NOTE — PLAN OF CARE
Problem: Patient Care Overview  Goal: Plan of Care Review  Outcome: Ongoing (interventions implemented as appropriate)   10/24/18 3130   Coping/Psychosocial   Plan Of Care Reviewed With patient;daughter   Pt resting in bed. Daughter at bedside earlier this shift. Received from ED this shift with afib RVR. Remains in this rhythm with HR in the 80s-130s, not sustaining a controlled rate. Cardizem maxed. Team aware. BP stable. Asymptomatic. Plan to start PO antiarrhythmics in the AM.

## 2018-10-24 NOTE — HOSPITAL COURSE
HR 60-80s this morning. RVR overnight. Metoprolol 25 mg po BID started for better rate control. Diltiazem continued at 240 mg po daily. BP stable. The patient denies chest pain, SOB and dizziness. Plan to wean O2, ambulate patient, monitor telemetry. She was also started on Eliquis for stroke prophylaxis.    No

## 2018-10-24 NOTE — ASSESSMENT & PLAN NOTE
New onset AF RVR with severe LAE and valvular disease  Rate control strategy with Cardizem CD  Anticoagulate with Coumadin or Eliquis    CHADSVASC 5 points  HASBLED 2 points, moderate risk for bleeding  Discussed risks/benefits of anticoagulation with patient and daughter    Follow up with primary cardiologist in 2-3 weeks and consider DCCV.     HR 70s-80s this AM; SBP 100s at rest     Denies history of frequent falls, isolated fall yesterday when in AF with rate 140s- dizziness was isolated as well

## 2018-10-24 NOTE — PROGRESS NOTES
[facility] Medicine  Progress Note    Patient Name: Ruth Lan  MRN: [id number]  Patient Class: IP- Inpatient   Admission Date: 10/23/2018  Length of Stay: 1 days  Attending Physician: Jett Scales MD  Primary Care Provider: Azikiwe K Lombard, MD        Subjective:     Principal Problem:Atrial fibrillation with rapid ventricular response    HPI:  Ruth Lan is a 83 y.o. 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  She is hard of hearing.  She lives at [facility] in [place], [place].  The assisted living facility gives her her medications.  She ambulates with a rollator.  Her primary care physician is Dr. Azikiwe Lombard.  Her neurologist is Dr. Be Lassiter.  Her oncologist is Dr. Tobin Darling.  She is DNR.   On 10/23/18, she went to sit on her rollator and her seat broke.  Later in the day, she was ambulating to [place] with her rollator, felt dizzy, fell on her buttocks, then fell backward and hit her head on the floor.  EMS found her to be in atrial fibrillation with rapid ventricular response, with  pulse in the 140s.  In the ED, chest X-ray showed chronic right pleural effusion.  BNP was 952, higher than on 7/7/18 when she was hospitalized for decompensated heart failure and required IV diuresis, however this time she was not hypoxic, with oxygen saturation of 100% on room air.  She was given IV diltiazem, IV digoxin, then put on diltiazem drip for persistent rapid ventricular response.  She was admitted to [facility].  Rate improved on diltiazem drip.  She denied chest pain, shortness of breath, or palpitations.  She had back pain due to her fall.  On physical exam, she expressed the desire to return home.    Hospital Course:  HR 70-80s this morning. Pt evaluated by cardiology. Diltiazem 240 mg po daily started. Plan to dc IV Diltiazem and assess patient response. Apixaban 2.5 mg po BID for anticoagulation. Pt denies chest pain, SOB and dizziness. Resting SBP low 100s. Plan to monitor overnight, possible discharge on tomorrow. Transfer to telemetry bed.     Interval History: Pt lying in bed resting. Denies chest pain, SOB and palpitations. HR 70-80s. POC discussed with daughter at bedside. Pt reports overall feeling well     Review of Systems   Constitutional: Negative for chills and fever.   HENT: Negative for trouble swallowing and voice change.    Eyes: Negative for pain and redness.   Respiratory: Negative for cough and shortness of breath.    Cardiovascular: Negative for chest pain, palpitations and leg swelling.   Gastrointestinal: Negative for nausea and vomiting.   Genitourinary: Negative for difficulty urinating and dysuria.   Musculoskeletal: Positive for back pain. Negative for neck pain and neck stiffness. Arthralgias: right knee pain.   Skin: Negative for rash and wound.   Neurological: Negative for seizures and syncope.     Objective:     Vital Signs (Most Recent):  Temp: 97.3 °F (36.3 °C) (10/24/18 1104)  Pulse: 83 (10/24/18 1245)  Resp: (!) 22 (10/24/18 1245)  BP: (!) 95/51  (10/24/18 1245)  SpO2: 99 % (10/24/18 1245) Vital Signs (24h Range):  Temp:  [97.3 °F (36.3 °C)-98.7 °F (37.1 °C)] 97.3 °F (36.3 °C)  Pulse:  [] 83  Resp:  [17-55] 22  SpO2:  [77 %-100 %] 99 %  BP: ()/(50-77) 95/51     Weight: 72.1 kg (158 lb 15.2 oz)  Body mass index is 25.66 kg/m².    Intake/Output Summary (Last 24 hours) at 10/24/2018 1255  Last data filed at 10/24/2018 0900  Gross per 24 hour   Intake 552.93 ml   Output 10 ml   Net 542.93 ml      Physical Exam   Constitutional: She appears well-developed. She is cooperative. No distress.   HENT:   Head: Normocephalic and atraumatic.   Eyes: Conjunctivae are normal. No scleral icterus.   Neck: Neck supple.   Cardiovascular: Normal rate. An irregularly irregular rhythm present.   Murmur heard.   Systolic murmur is present.  Pulmonary/Chest: Effort normal. No respiratory distress.   Abdominal: Soft. She exhibits no distension. There is no tenderness. There is no guarding.   Musculoskeletal: She exhibits no deformity.   Neurological: She is alert. She displays no seizure activity.   Skin: Skin is warm and dry.   Psychiatric: She has a normal mood and affect. Thought content normal. She exhibits abnormal recent memory.   Nursing note and vitals reviewed.      Significant Labs:   BMP:   Recent Labs   Lab 10/24/18  0343   *      K 4.2      CO2 25   BUN 56*   CREATININE 1.5*   CALCIUM 9.7   MG 1.9     CBC:   Recent Labs   Lab 10/23/18  1606 10/24/18  0543   WBC 6.90 8.49   HGB 11.2* 10.7*   HCT 35.0* 33.0*    175     TSH:   Recent Labs   Lab 10/23/18  2242   TSH 3.015       Significant Imaging: I have reviewed all pertinent imaging results/findings within the past 24 hours.    Assessment/Plan:      * Atrial fibrillation with rapid ventricular response    Cardiology consulted. Wean diltiazem drip.  [id number] score is 4, start apixaban 2.5 mg po BID     DNR (do not resuscitate)    Continue.       Chronic diastolic heart failure     Pleural effusion, right  Severe mitral regurgitation  Moderate tricuspid regurgitation  Pulmonary HTN  Continue home furosemide 40 mg daily.         Stage 4 chronic kidney disease    Stable.       Primary osteoarthritis of both knees    Acetaminophen prn.  PT/OT also due to back pain after her fall.       MCI (mild cognitive impairment)    Delirium precautions.       Essential hypertension    Hold home lisinopril 2.5 mg daily.       Anxiety    Continue home venlafaxine nightly.         VTE Risk Mitigation (From admission, onward)        Ordered     apixaban tablet 2.5 mg  2 times daily      10/24/18 1103     IP VTE HIGH RISK PATIENT  Once      10/23/18 2209          Critical care time spent on the evaluation and treatment of severe organ dysfunction, review of pertinent labs and imaging studies, discussions with consulting providers and discussions with patient/family: 30 minutes.    Jada Valentino NP  Department of Hospital Medicine   [facility]

## 2018-10-24 NOTE — EICU
eICU Note    Subjective: Post syncope and fall ; lives in assisted living; AF RVR detected; rate controlled with diltiazem followed by digoxin and now off diltiazem      Objective:Awake, resting  Data review done     Video review done      Assessment:Af RVR ? Trigger     Plan:  1Monitor rate   2Mild CHF noted with high BNP, prior mitral valve disease and pl eff  3Pl eff etiology may need workup- radiology reports have not excluded underlying malignancy on old CT and CXR     DVT prophylaxis enoxaparin  GI prophylaxis NA  Ventilator settings NA    Communication with RN

## 2018-10-24 NOTE — ED NOTES
Report received. Care assumed. Pt AAOx4. Respirations even and unlabored. Pt continues to be Afib RVR with a HR in 150's. Patient repositioned and given call light.  Will continue to monitor closely.

## 2018-10-24 NOTE — H&P
[facility]  History & Physical    Patient Name: Ruth Lan  MRN: [id number]  Admission Date: 10/23/2018  Attending Physician: Jett Scales MD   Primary Care Provider: Azikiwe K Lombard, MD         Patient information was obtained from patient, past medical records and ER records.     Subjective:     Principal Problem:Atrial fibrillation with rapid ventricular response    Chief Complaint:   Chief Complaint   Patient presents with    Fall     pt presents to ED today via [facility] who reports pt was ambulating to [place] with walker,felt dizzy, fell on buttocks then fell backwards and hit head on floor. pt presented to EMS in AFib with RVR with heart rate of 14o's 20 mg of cardizem administered PTA . Pt arrives AAO with no noted bleeding.         HPI: Ruth Lan is a 83 y.o. 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  She is hard of hearing.  She lives at [facility] in [place], [place].  The assisted living facility gives her her medications.  She ambulates with a rollator.  Her primary care physician  is Dr. Azikiwe Lombard.  Her neurologist is Dr. Be Lassiter.  Her oncologist is Dr. Tobin Darling.  She is DNR.   On 10/23/18, she went to sit on her rollator and her seat broke.  Later in the day, she was ambulating to [facility] with her rollator, felt dizzy, fell on her buttocks, then fell backward and hit her head on the floor.  EMS found her to be in atrial fibrillation with rapid ventricular response, with pulse in the 140s.  In the ED, chest X-ray showed chronic right pleural effusion.  BNP was 952, higher than on 7/7/18 when she was hospitalized for decompensated heart failure and required IV diuresis, however this time she was not hypoxic, with oxygen saturation of 100% on room air.  She was given IV diltiazem, IV digoxin, then put on diltiazem drip for persistent rapid ventricular response.  She was admitted to [facility].  Rate improved on diltiazem drip.  She denied chest pain, shortness of breath, or palpitations.  She had back pain due to her fall.  On physical exam, she expressed the desire to return home.    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, ER/[place] positive    Chronic diastolic heart failure     Heart murmur     Hyperlipidemia     Hypertension     Left atrial enlargement     Severe mitral regurgitation        Past Surgical History:   Procedure Laterality Date    BIOPSY, LYMPH NODE, SENTINEL Right 12/6/2012    Performed by Dem[facility]ri Epstein [facility] at [facility] 2ND FLR    BREAST BIOPSY  11/2012    Right breast- invasive ductal carcinoma    CATARACT EXTRACTION W/  INTRAOCULAR LENS IMPLANT Right 10/21/2008    OU     CATARACT EXTRACTION W/  INTRAOCULAR LENS IMPLANT Left     INSERTION, LOCALIZATION WIRE Right 12/6/2012    Performed by Demetri Epstein MD at [facility] OR 2ND FLR    [id number] Right 1/14/2013    Performed by Demetri AMAYA  [facility]    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    TUBAL LIGATION         Review of patient's allergies indicates:   Allergen Reactions    Etodolac Other (See Comments)     Dehydration    Nsaids (non-steroidal anti-inflammatory drug)      COLITIS/DEHYDRATION       No current facility-administered medications on file prior to encounter.      Current Outpatient Medications on File Prior to Encounter   Medication Sig    acetaminophen (TYLENOL) 500 MG tablet Take 500 mg by mouth every 6 (six) hours as needed for Pain.    aspirin 81 mg Tab Take 81 mg by mouth Daily. 1 Tablet Oral Every day    cyanocobalamin, vitamin B-12, (VITAMIN B-12) 2,500 mcg Subl Place under the tongue once daily.    DOCOSAHEXANOIC ACID/EPA (FISH OIL ORAL) 2 (two) times daily. 2   Every day    FLAXSEED ORAL Take by mouth once daily.    furosemide (LASIX) 40 MG tablet Take 1 tablet (40 mg total) by mouth 2 (two) times daily. (Patient taking differently: Take 40 mg by mouth once daily. )    lisinopril (PRINIVIL,ZESTRIL) 2.5 MG tablet Take 2.5 mg by mouth once daily.    multivitamin (ONE DAILY MULTIVITAMIN) per tablet Take 1 tablet by mouth once daily.    pravastatin (PRAVACHOL) 80 MG tablet Take 1 tablet (80 mg total) by mouth once daily.    tolterodine (DETROL LA) 4 MG 24 hr capsule Take 1 capsule (4 mg total) by mouth once daily.    venlafaxine (EFFEXOR-XR) 37.5 MG 24 hr capsule Take 1 capsule (37.5 mg total) by mouth once daily. (Patient taking differently: Take 37.5 mg by mouth every evening. )     Family History     Problem Relation (Age of Onset)    Breast cancer Maternal Grandmother    Cancer Maternal Aunt    Heart disease Father    No Known Problems Mother, Sister, Brother, Maternal Uncle, Paternal Aunt, Paternal Uncle, Maternal Grandfather, Paternal Grandmother, Paternal Grandfather        Tobacco Use    Smoking status: Former Smoker     Packs/day: 1.00     Years: 50.00     Pack years: 50.00      Last attempt to quit: 11/15/2002     Years since quitting: 15.9    Smokeless tobacco: Never Used   Substance and Sexual Activity    Alcohol use: Yes     Alcohol/week: 1.5 oz     Types: 3 Standard drinks or equivalent per week     Comment: 3 OZ WINE     Drug use: No    Sexual activity: No     Review of Systems   Constitutional: Negative for chills and fever.   HENT: Negative for trouble swallowing and voice change.    Eyes: Negative for pain and redness.   Respiratory: Negative for cough and shortness of breath.    Cardiovascular: Negative for chest pain, palpitations and leg swelling.   Gastrointestinal: Negative for nausea and vomiting.   Genitourinary: Negative for difficulty urinating and dysuria.   Musculoskeletal: Positive for arthralgias (right knee pain) and back pain. Negative for neck pain and neck stiffness.   Skin: Negative for rash and wound.   Neurological: Negative for seizures and syncope.     Objective:     Vital Signs (Most Recent):  Temp: 97.8 °F (36.6 °C) (10/23/18 2307)  Pulse: 79 (10/23/18 2307)  Resp: (!) 23 (10/23/18 2307)  BP: (!) 116/56 (10/23/18 2307)  SpO2: 100 % (10/23/18 2307) Vital Signs (24h Range):  Temp:  [97.7 °F (36.5 °C)-98.7 °F (37.1 °C)] 97.8 °F (36.6 °C)  Pulse:  [] 79  Resp:  [17-28] 23  SpO2:  [91 %-100 %] 100 %  BP: ()/(52-76) 116/56     Weight: 72.1 kg (158 lb 15.2 oz)  Body mass index is 25.66 kg/m².    Physical Exam   Constitutional: She appears well-developed. She is cooperative. No distress.   HENT:   Head: Normocephalic and atraumatic.   Eyes: Conjunctivae are normal. No scleral icterus.   Neck: Neck supple.   Cardiovascular: Normal rate. An irregularly irregular rhythm present.   Murmur heard.   Systolic murmur is present.  Pulmonary/Chest: Effort normal. No respiratory distress.   Abdominal: Soft. She exhibits no distension. There is no tenderness. There is no guarding.   Musculoskeletal: She exhibits edema (nonpitting right ankle edema). She exhibits  no deformity.   Neurological: She is alert. She displays no seizure activity.   Skin: Skin is warm and dry.   Psychiatric: She has a normal mood and affect. Thought content normal. She exhibits abnormal recent memory.   Nursing note and vitals reviewed.          Significant Labs: All pertinent labs within the past 24 hours have been reviewed.    Significant Imaging: I have reviewed all pertinent imaging results/findings within the past 24 hours.   EKG 10/23/18: Atrial rhythm with rapid ventricular rhythm  CT Head Without Contrast 10/23/18: FINDINGS:  Intracranial compartment:  Age-appropriate mild generalized cerebral volume loss.  Mild degree of supratentorial white matter chronic small vessel ischemic change.  No extra-axial blood or fluid collections.  No definite new focal areas of abnormal parenchymal attenuation.  No parenchymal mass, hemorrhage, edema or major vascular distribution infarct.  Skull base atherosclerotic vascular calcifications noted.  Skull/extracranial contents (limited evaluation): No fracture. Mastoid air cells and paranasal sinuses are essentially clear.  Impression:  No acute large vascular territory infarct or intracranial hemorrhage identified.  Mild senescent changes as above.  X-Ray Chest 1 View 10/23/18: FINDINGS:  Additional history: Breast carcinoma, recent weight loss.  Mediastinal lymph nodes were weakly avid on PET-CT and July 2018.  Single AP view of the chest was obtained with the patient in left posterior oblique rotation.  The patient is status post aortic valve replacement.  The patient has known right pleural fluid and partial atelectasis of the right lung accounting for soft tissue opacity occupying the base of the right hemithorax.  The source of the patient's chronic right pleural fluid is unclear; malignancy is not excluded.  There is abundant calcification in the mitral annulus, a normal senescent change.  I detect no convincing evidence of new focal pulmonary disease.   I cannot exclude interstitial lung disease such as mild interstitial pulmonary edema in this 83-year-old woman with known aortic valve prosthesis and calcific atherosclerosis of the aorta and its branches.  I detect no pneumothorax, left pleural fluid, pneumomediastinum, pneumoperitoneum or significant osseous abnormality.  X-Ray Sacrum And Coccyx 10/23/18: FINDINGS:  There is no acute fracture or subluxation.  Soft tissues are unremarkable.    Assessment/Plan:     * Atrial fibrillation with rapid ventricular response    Wean diltiazem drip.  [id number] score is 4 so should have anticoagulation started.  She was advised of this, but will need further discussion prior to discharge, due to risk of falls.       DNR (do not resuscitate)    Continue.       Chronic diastolic heart failure    Pleural effusion, right  Severe mitral regurgitation  Moderate tricuspid regurgitation  Pulmonary HTN  Continue home furosemide 40 mg daily.         Stage 4 chronic kidney disease    Stable.       Primary osteoarthritis of both knees    Acetaminophen prn.  PT/OT also due to back pain after her fall.       MCI (mild cognitive impairment)    Delirium precautions.       Essential hypertension    Hold home lisinopril 2.5 mg daily.       Anxiety    Continue home venlafaxine nightly.         VTE Risk Mitigation (From admission, onward)        Ordered     enoxaparin injection 30 mg  Daily      10/23/18 2209     IP VTE HIGH RISK PATIENT  Once      10/23/18 2209        Critical care time spent on the evaluation and treatment of severe organ dysfunction, review of pertinent labs and imaging studies, discussions with consulting providers and discussions with patient/family: 45 minutes.     Tashi Billingsley MD  Department of Hospital Medicine   [facility]

## 2018-10-24 NOTE — PT/OT/SLP EVAL
Physical Therapy Evaluation/Treatment    Patient Name:  Ruth Lan   MRN:  [id number]    Recommendations:     Discharge Recommendations:  nursing facility, skilled   Discharge Equipment Recommendations: bedside commode  Barriers to discharge: Decreased caregiver support    Assessment:     Ruth Lan is a 83 y.o. female admitted with a medical diagnosis of Atrial fibrillation with rapid ventricular response.  She presents with the following impairments/functional limitations:  pain, impaired self care skills, impaired endurance, impaired functional mobilty, gait instability, impaired cognition, decreased safety awareness, decreased lower extremity function, impaired balance, decreased coordination, weakness, impaired cardiopulmonary response to activity(mild memory loss) Recommend SNF.    Rehab Prognosis:  good; patient would benefit from acute skilled PT services to address these deficits and reach maximum level of function.      Recent Surgery: * No surgery found *      Plan:     During this hospitalization, patient to be seen 6 x/week to address the above listed problems via gait training, therapeutic activities, therapeutic exercises, neuromuscular re-education  · Plan of Care Expires:  11/24/18   Plan of Care Reviewed with: patient    Subjective     Communicated with nurse prior to session.  Patient found supine with HOB elevated upon PT entry to room, agreeable to evaluation.      Chief Complaint: back pain from fall and chronic R knee pain   Patient comments/goals: to return home  Pain/Comfort:  · Pain Rating 1: (5-6/10)  · Location - Side 1: Bilateral  · Location - Orientation 1: lower  · Location 1: back  · Pain Addressed 1: Reposition, Distraction, Nurse notified  · Pain Rating Post-Intervention 1: (6-7/10)  · Pain Rating 2: (8-9/10)  · Location - Side 2: Right  · Location - Orientation 2: generalized  · Location 2: knee  · Pain Addressed 2: Reposition, Distraction, Nurse notified  · Pain  Rating Post-Intervention 2: (9-10/10)    Patients cultural, spiritual, Voodoo conflicts given the current situation: none reported    Living Environment:  Pt lives at [facility] in single apt; has WIS with shower chair  Prior to admission, patients level of function was Guero with ADLs using rollator.  Patient has the following equipment: cane, straight, wheelchair, shower chair, rollator, walker, rolling, raised toilet.  Upon discharge, patient will have limited assistance- just for medication.    Objective:     Patient found with: peripheral IV, oxygen(ICU monitoring)     General Precautions: Standard, fall   Orthopedic Precautions:(back precautions)   Braces: N/A     Exams:    · Pt alert and oriented to person  · Follows one step commands  · BLE AROM WFL R knee fl~60* 2/2 pain  - slow movement 2/2 back pain and R knee pain; ankle's~0* df  BLE strength~ ~at least 3/5 R 2/2 pain R knee through observation; LLE~3+ to 4- grossly 2/2 back pain    Functional Mobility:  · Bed Mobility:     · Rolling Left:  moderate assistance and using side rail  · Scooting: minimum assistance and using side rail  · Supine to Sit: maximal assistance and of 2 persons  · Sit to Supine: NT  · Transfers:     · Sit to Stand:  minimum assistance with rolling walker  · Bed to Chair: minimum assistance with  rolling walker  using  Step Transfer  · Toilet Transfer: minimum assistance with  rolling walker  using  Step Transfer  · Gait: Pt took ~6-8 small steps from bed<>BSC, bed<>bedside chair; pt moving slowly and guarded, leading with LLE, keeping trunk flexed and RW forward  · Balance: staticdynamic sit~good, static stand~poor+; dynamic stand~poor    AM-PAC 6 CLICK MOBILITY  Total Score:13       Therapeutic Activities and Exercises:   Pt educated in role of PT/POC; bed mob as above with increased time, slow movement; instructed in back sparing technique of logrolling requiring modA; pt scooted to edge of bed with prompting; sit to  stand at RW with Annie and amb to [facility] as above but in the middle of the task, pt performed unsafe practice of releasing the walker prematurely before being directly in front of the commode, having urgency to urinate; after self care with OT pt used RW to stand and take steps back to the bed with Annie; then positioned bedside chair and once again stood and took steps to the bedside chair as described above with VCs/TCs to try to correct; pt sat and scooted back in the chair and set up for lunch; all maneuvers took increased time 2/2 slow movement and guarding with back and R knee pain; nurse administered pain meds in middle of session.    Patient left up in chair with all lines intact, call button in reach and nurse notified.    GOALS:   Multidisciplinary Problems     Physical Therapy Goals        Problem: Physical Therapy Goal    Goal Priority Disciplines Outcome Goal Variances Interventions   Physical Therapy Goal     PT, PT/OT Ongoing (interventions implemented as appropriate)     Description:  Goals to be met by: 2018     Patient will increase functional independence with mobility by performin. Supine to sit with Modified Pepin  2. Sit to supine with Modified Pepin  3. Rolling to Left and Right with Modified Pepin.  4. Sit to stand transfer with Supervision  5. Bed to chair transfer with Supervision using Rolling Walker  6. Gait  x 75 feet with Supervision using Rolling Walker.   7. Lower extremity exercise program x10 reps with supervision                      History:     Past Medical History:   Diagnosis Date    Arthritis     right knee    Breast cancer 2012    right breast invasive ductal carcinoma with mucinous features and DCIS, ER/[place] positive    Chronic diastolic heart failure     Heart murmur     Hyperlipidemia     Hypertension     Left atrial enlargement     Severe mitral regurgitation        Past Surgical History:   Procedure Laterality Date    BIOPSY, LYMPH  NODE, SENTINEL Right 12/6/2012    Performed by [facility] at [facility] OR 2ND FLR    BREAST BIOPSY  11/2012    Right breast- invasive ductal carcinoma    CATARACT EXTRACTION W/  INTRAOCULAR LENS IMPLANT Right 10/21/2008    OU     CATARACT EXTRACTION W/  INTRAOCULAR LENS IMPLANT Left     INSERTION, LOCALIZATION WIRE Right 12/6/2012    Performed by [facility] at [facility] OR 2ND FLR    [id number] Right 1/14/2013    Performed by [facility] at [facility] OR 2ND FLR    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    TUBAL LIGATION         Clinical Decision Making:     History  Co-morbidities and personal factors that may impact the plan of care Examination  Body Structures and Functions, activity limitations and participation restrictions that may impact the plan of care Clinical Presentation   Decision Making/ Complexity Score   Co-morbidities:   [] Time since onset of injury / illness / exacerbation  [x] Status of current condition  []Patient's cognitive status and safety concerns    [x] Multiple Medical Problems (see med hx)  Personal Factors:   [] Patient's age  [] Prior Level of function   [x] Patient's home situation (environment and family support)  [] Patient's level of motivation  [] Expected progression of patient      HISTORY:(criteria)    [] 82668 - no personal factors/history    [] 96879 - has 1-2 personal factor/comorbidity     [] 24985 - has >3 personal factor/comorbidity     Body Regions:  [x] Objective examination findings  [] Head     []  Neck  [] Trunk   [] Upper Extremity  [] Lower Extremity    Body Systems:  [x] For communication ability, affect, cognition, language, and learning style: the assessment of the ability to make needs known, consciousness, orientation (person, place, and time), expected emotional /behavioral responses, and learning preferences (eg, learning barriers, education  needs)  [x] For the neuromuscular system: a general assessment of  gross coordinated movement (eg, balance, gait, locomotion, transfers, and transitions) and motor function  (motor control and motor learning)  [x] For the musculoskeletal system: the assessment of gross symmetry, gross range of motion, gross strength, height, and weight  [] For the integumentary system: the assessment of pliability(texture), presence of scar formation, skin color, and skin integrity  [x] For cardiovascular/pulmonary system: the assessment of heart rate, respiratory rate, blood pressure, and edema     Activity limitations:    [] Patient's cognitive status and saf ety concerns          [x] Status of current condition      [] Weight bearing restriction  [] Cardiopulmunary Restriction    Participation Restrictions:   [] Goals and goal agreement with the patient     [] Rehab potential (prognosis) and probable outcome      Examination of Body System: (criteria)    [] 76530 - addressing 1-2 elements    [] 63181 - addressing a total of 3 or more elements     [x] 60738 -  Addressing a total of 4 or more elements         Clinical Presentation: (criteria)  Stable - 24635     On examination of body system using standardized tests and measures patient presents with 4 or more elements from any of the following: body structures and functions, activity limitations, and/or participation restrictions.  Leading to a clinical presentation that is considered stable and/or uncomplicated                              Clinical Decision Making  (Eval Complexity):  Low- 20094     Time Tracking:     PT Received On: 10/24/18  PT Start Time: 1103     PT Stop Time: 1156  PT Total Time (min): 53 min     Billable Minutes: Evaluation 15 minutes and Gait Training 8 minutes      Adelina Spain, PT  10/24/2018

## 2018-10-24 NOTE — NURSING
0440 paged dr gage. No answer.     0500 paged dr gage again. No answer.     0539 utilized eicu. Reported to dr ferrera pt HR 120s-130s, maxed on cardizem with more prolonged periods of tachycardia. Ordered to preform leg raise test- no improvement in hr. Camera into room. New orders for labs and fluid bolus. Will continue to monitor.

## 2018-10-24 NOTE — PLAN OF CARE
Problem: Patient Care Overview  Goal: Plan of Care Review  Outcome: Ongoing (interventions implemented as appropriate)  Pt is aaox4. VSS. Pt has heart rate have been in the 80's this morning and has been transitioned off the cardizem drip to PO cardizem. Pt has been sitting up in the chair and worked with pt and ot. Safety maintained. Tele orders were received and report was called to NURY Schmidt.

## 2018-10-24 NOTE — ASSESSMENT & PLAN NOTE
Wean diltiazem drip.  [id number] score is 4 so should have anticoagulation started.  She was advised of this, but will need further discussion prior to discharge, due to risk of falls.

## 2018-10-24 NOTE — SUBJECTIVE & OBJECTIVE
Interval History: Pt lying in bed resting. Denies chest pain, SOB and palpitations. HR 70-80s. POC discussed with daughter at bedside. Pt reports overall feeling well     Review of Systems   Constitutional: Negative for chills and fever.   HENT: Negative for trouble swallowing and voice change.    Eyes: Negative for pain and redness.   Respiratory: Negative for cough and shortness of breath.    Cardiovascular: Negative for chest pain, palpitations and leg swelling.   Gastrointestinal: Negative for nausea and vomiting.   Genitourinary: Negative for difficulty urinating and dysuria.   Musculoskeletal: Positive for back pain. Negative for neck pain and neck stiffness. Arthralgias: right knee pain.   Skin: Negative for rash and wound.   Neurological: Negative for seizures and syncope.     Objective:     Vital Signs (Most Recent):  Temp: 97.3 °F (36.3 °C) (10/24/18 1104)  Pulse: 83 (10/24/18 1245)  Resp: (!) 22 (10/24/18 1245)  BP: (!) 95/51 (10/24/18 1245)  SpO2: 99 % (10/24/18 1245) Vital Signs (24h Range):  Temp:  [97.3 °F (36.3 °C)-98.7 °F (37.1 °C)] 97.3 °F (36.3 °C)  Pulse:  [] 83  Resp:  [17-55] 22  SpO2:  [77 %-100 %] 99 %  BP: ()/(50-77) 95/51     Weight: 72.1 kg (158 lb 15.2 oz)  Body mass index is 25.66 kg/m².    Intake/Output Summary (Last 24 hours) at 10/24/2018 1255  Last data filed at 10/24/2018 0900  Gross per 24 hour   Intake 552.93 ml   Output 10 ml   Net 542.93 ml      Physical Exam   Constitutional: She appears well-developed. She is cooperative. No distress.   HENT:   Head: Normocephalic and atraumatic.   Eyes: Conjunctivae are normal. No scleral icterus.   Neck: Neck supple.   Cardiovascular: Normal rate. An irregularly irregular rhythm present.   Murmur heard.   Systolic murmur is present.  Pulmonary/Chest: Effort normal. No respiratory distress.   Abdominal: Soft. She exhibits no distension. There is no tenderness. There is no guarding.   Musculoskeletal: She exhibits no deformity.    Neurological: She is alert. She displays no seizure activity.   Skin: Skin is warm and dry.   Psychiatric: She has a normal mood and affect. Thought content normal. She exhibits abnormal recent memory.   Nursing note and vitals reviewed.      Significant Labs:   BMP:   Recent Labs   Lab 10/24/18  0343   *      K 4.2      CO2 25   BUN 56*   CREATININE 1.5*   CALCIUM 9.7   MG 1.9     CBC:   Recent Labs   Lab 10/23/18  1606 10/24/18  0543   WBC 6.90 8.49   HGB 11.2* 10.7*   HCT 35.0* 33.0*    175     TSH:   Recent Labs   Lab 10/23/18  2242   TSH 3.015       Significant Imaging: I have reviewed all pertinent imaging results/findings within the past 24 hours.

## 2018-10-24 NOTE — PT/OT/SLP EVAL
Occupational Therapy   Evaluation    Name: Ruth Lan  MRN: [id number]  Admitting Diagnosis:  Atrial fibrillation with rapid ventricular response      Recommendations:     Discharge Recommendations: nursing facility, skilled  Discharge Equipment Recommendations:  bedside commode  Barriers to discharge:  Decreased caregiver support    History:     Occupational Profile:  Living Environment: Pt. Lives in [facility] facility ; has her own apartment with walk n shower.  Previous level of function: pt. Reportedly was indep-Turner with  ADLs, ambulated Turner with rollator; meals provided by staff-pt goes to dining room.  Roles and Routines: active in facility activities especially likes bingo.  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, ER/OR positive    Chronic diastolic heart failure     Heart murmur     Hyperlipidemia     Hypertension     Left atrial enlargement     Severe mitral regurgitation        Past Surgical History:   Procedure Laterality Date    BIOPSY, LYMPH NODE, SENTINEL Right 12/6/2012    Performed by [facility] at [facility] 2ND FLR    BREAST BIOPSY  11/2012    Right breast- invasive ductal carcinoma    CATARACT EXTRACTION W/  INTRAOCULAR LENS IMPLANT Right 10/21/2008    OU     CATARACT EXTRACTION W/  INTRAOCULAR LENS IMPLANT Left     INSERTION, LOCALIZATION WIRE Right 12/6/2012    Performed by [facility] at [facility] OR 2ND FLR    [id number] Right 1/14/2013    Performed by [facility] at [facility] OR 2ND FLR    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    TUBAL LIGATION         Subjective     Chief Complaint: back pain from fall and  R knee pain (hx)  Patient/Family Comments/goals: to go home    Pain/Comfort:  · Pain Rating 1: 6/10  · Location - Side 1: Bilateral  · Location - Orientation 1: lower  · Location 1: back  · Pain Addressed 1: Reposition, Distraction, Nurse notified  · Pain Rating Post-Intervention 1: 7/10  · Pain Rating 2: 9/10  · Location - Side 2: Right  · Location - Orientation 2: generalized  · Location 2: knee  · Pain Addressed 2: Reposition, Distraction, Nurse notified  · Pain Rating Post-Intervention 2: 10/10    Patients cultural, spiritual, Sabianism conflicts given the current situation: na    Objective:     Communicated with: nurse prior to session.  Patient found all lines intact, call button in reach, bed alarm on and nurse notified and telemetry, oxygen, pulse ox (continuous), peripheral IV upon OT entry to room.    General Precautions: Standard, fall   Orthopedic Precautions:(back precautions 2/2 pain)   Braces: N/A     Occupational Performance:    Bed Mobility:    · Patient completed Rolling/Turning to Left with  moderate assistance and with side rail  · Patient completed Scooting/Bridging with minimum assistance and with side rail  · Patient completed Supine to Sit with maximal assistance and 2 persons  · Patient completed Sit to Supine with NT    Functional Mobility/Transfers:  · Patient completed Sit <> Stand Transfer with minimum assistance  with  rolling walker   · Patient completed Bed <> Chair Transfer using Step Transfer technique with minimum assistance with rolling walker  · Patient completed Toilet Transfer Step Transfer technique with minimum assistance with  rolling walker and bedside commode  · Functional Mobility: pt ambulated few steps with min  Assist using RW to BS chair, slow moving and guarded in pain in back and R knee.    Activities of Daily Living:  · Feeding:  tray setup  seated BS chair  · Grooming: supervision washed face seated EOB   · Lower Body Dressing: total assistance socks    · Toileting: maximal assistance positioning legs on BSC for periarea reach and clothes management standing with RW    Cognitive/Visual Perceptual:  Cognitive/Psychosocial Skills:     -       Oriented to: Person   -       Follows Commands/attention:Follows one-step commands  -       Communication: clear/fluent  -       Memory: Impaired LTM  -       Safety awareness/insight to disability: impaired   -       Mood/Affect/Coping skills/emotional control: Cooperative  Visual/Perceptual:      -Intact .    Physical Exam:  Balance:    -       sitting:  good; dynamic: fair standing;  Poor plus; dynamic:  poor  Postural examination/scapula alignment:    -       No postural abnormalities identified  Dominant hand:    -       right  Upper Extremity Range of Motion:     -       Right Upper Extremity: WFL .  -       Left Upper Extremity: WFL .   Upper Extremity Strength:    -       Right Upper Extremity: WFL  -       Left Upper Extremity: WFL    Strength:    -       Right Upper Extremity: WFL .  -       Left Upper Extremity: WFL .    AMPAC 6 Click ADL:  AMPAC Total Score: 16    Treatment & Education:  Role of OT and POC; BSc stand step with RW and min assist -urgency to urinate and increasing unsafe prematurely released walker reaching for BSC before she was directly in front of commode. Pt stood and sat for periarea hygiene with min assist for standing balance and safety awareness. Log roll tech to L side with rail for back pain /protection requiring mod assist.  Pt.very guarded with moving and requires extra time to transition to EOB and perform transfers OOB.  Nurse arrived with pain meds during session.   Education:    Patient left up in chair with all lines intact, call button in reach and nurse notified    Assessment:     Ruth Lan is a 83 y.o. female with a medical diagnosis of Atrial fibrillation with rapid ventricular response.  She presents with the following performance deficits affecting function:  "weakness, impaired functional mobilty, impaired balance, impaired cognition, impaired endurance, impaired self care skills, gait instability, decreased lower extremity function, decreased safety awareness, pain, impaired cardiopulmonary response to activity.      Rehab Prognosis: Good; patient would benefit from acute skilled OT services to address these deficits and reach maximum level of function.         Clinical Decision Makin.  OT Low:  "Pt evaluation falls under low complexity for evaluation coding due to performance deficits noted in 1-3 areas as stated above and 0 co-morbities affecting current functional status. Data obtained from problem focused assessments. No modifications or assistance was required for completion of evaluation. Only brief occupational profile and history review completed."     Plan:     Patient to be seen 5 x/week to address the above listed problems via self-care/home management, therapeutic activities, therapeutic exercises  · Plan of Care Expires: 18  · Plan of Care Reviewed with: patient    This Plan of care has been discussed with the patient who was involved in its development and understands and is in agreement with the identified goals and treatment plan    GOALS:   Multidisciplinary Problems     Occupational Therapy Goals        Problem: Occupational Therapy Goal    Goal Priority Disciplines Outcome Interventions   Occupational Therapy Goal     OT, PT/OT Ongoing (interventions implemented as appropriate)    Description:  Goals to be met by: 11/10/2018    Patient will increase functional independence with ADLs by performing:    UE Dressing with Modified [place].  LE Dressing with Modified [place].  Grooming while standing with Modified [place].  Toileting from bedside commode with Modified [place] for hygiene and clothing management.   Rolling to Bilateral with Modified [place].   Supine to sit with Modified [place].  Step transfer " with Modified [place]  Toilet transfer to bedside commode with Modified [place].  Upper extremity exercise program x10 reps per handout, with supervision.                      Time Tracking:     OT Date of Treatment: 10/24/18  OT Start Time: 1104  OT Stop Time: 1157  OT Total Time (min): 53 min    Billable Minutes:Evaluation 15  Self Care/Home Management 11  Therapeutic Activity 12  Total Time COTX with PT 53 minutes    Maggei Pinto OT  10/24/2018

## 2018-10-24 NOTE — ED NOTES
Pt appears to be resting comfortably at this time. Daughter at bedside. Will continue to monitor closely.

## 2018-10-24 NOTE — ED NOTES
Pt's O2 saturation is noted to be 88% on room air. Pt is in NAD. Pt placed on 2L oxygen via nasal cannula.

## 2018-10-24 NOTE — PLAN OF CARE
Problem: Physical Therapy Goal  Goal: Physical Therapy Goal  Goals to be met by: 2018     Patient will increase functional independence with mobility by performin. Supine to sit with Modified [place]  2. Sit to supine with Modified [place]  3. Rolling to Left and Right with Modified [place].  4. Sit to stand transfer with Supervision  5. Bed to chair transfer with Supervision using Rolling Walker  6. Gait  x 75 feet with Supervision using Rolling Walker.   7. Lower extremity exercise program x10 reps with supervision    Outcome: Ongoing (interventions implemented as appropriate)  Pt evaluated today; recommend SNF.

## 2018-10-24 NOTE — NURSING
Dr gage called back. Updated on pt status, still having runs of tachycardia, but not maintaining. Updated on eicu orders.

## 2018-10-24 NOTE — HPI
Ruth Lan is a 83 y.o. Female with hypertension, chronic diastolic heart failure, left atrial enlargement, history of porcine aortic valve replacement on 9/27/04, severe mitral regurgitation, moderate tricuspid regurgitation, pulmonary hypertension, chronic kidney disease stage 4, 50 pack year smoking history (quit on 11/15/02), anxiety, and mild cognitive impairment. She lives at [facility] who administers her medications.  She ambulates with a rollator. She is DNR. Yesterday, she was ambulating to [facility] with her rollator, felt dizzy, fell on her buttocks, then fell backward and hit her head on the floor.  EMS found her to be in AF RVR with HR in the 140s.  She was given IV diltiazem, IV digoxin, then put on diltiazem drip for persistent RVR. She is rate controlled this AM in the 80s and has been started on Cardizem CD, Cardizem gtt is to be discontinued in 1 hour.  She denied chest pain, shortness of breath, or palpitations.  She denies frequent falls. Prior to yesterday, she sustained a fall in July- mechanical. Since that time she reports marked improvement in strength with PT.

## 2018-10-24 NOTE — NURSING
Patient arrived to  from ICU via bed with ICU staff. Patient alert and oriented X4 Follows commands with prompting.  Verbalizes answers without difficulty High fall risk protocol established. Bed alarm activated. Vitals stable. For further information refer to admission assessment data sheets. Will cont to monitor patient and intervene prn.

## 2018-10-24 NOTE — NURSING
Notified dr gage of pt hr going to 120s and 130s but not maintaining with cardizem almost maxed. Will put in orders.

## 2018-10-24 NOTE — ED NOTES
Patient repositioned. Patient on cardiac monitor, automatic blood pressure cuff and pulse oximeter. Family at bedside. Will continue to monitor closely.

## 2018-10-24 NOTE — SUBJECTIVE & OBJECTIVE
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 OR 2ND FLR    BREAST BIOPSY  11/2012    Right breast- invasive ductal carcinoma    CATARACT EXTRACTION W/  INTRAOCULAR LENS IMPLANT Right 10/21/2008    OU     CATARACT EXTRACTION W/  INTRAOCULAR LENS IMPLANT Left     INSERTION, LOCALIZATION WIRE Right 12/6/2012    Performed by [facility] at [facility] 2ND FLR    [id number] Right 1/14/2013    Performed by [facility] at [facility] 2ND FLR    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    TUBAL LIGATION         Review of patient's allergies indicates:   Allergen Reactions    Etodolac Other (See Comments)     Dehydration    Nsaids (non-steroidal anti-inflammatory drug)      COLITIS/DEHYDRATION       No current facility-administered medications on file prior to encounter.      Current Outpatient Medications on File Prior to Encounter   Medication Sig    acetaminophen (TYLENOL) 500 MG tablet Take 500 mg by mouth every 6 (six) hours as needed for Pain.    aspirin 81 mg Tab Take 81 mg by mouth Daily. 1 Tablet Oral Every day    cyanocobalamin, vitamin B-12, (VITAMIN B-12) 2,500 mcg Subl Place under the tongue once daily.    DOCOSAHEXANOIC ACID/EPA (FISH OIL ORAL) 2 (two) times daily. 2   Every day    FLAXSEED ORAL Take by mouth once daily.    furosemide (LASIX) 40 MG tablet Take 1 tablet (40 mg total) by mouth 2 (two) times daily. (Patient taking differently: Take 40 mg by mouth once daily. )    lisinopril (PRINIVIL,ZESTRIL) 2.5 MG tablet  Take 2.5 mg by mouth once daily.    multivitamin (ONE DAILY MULTIVITAMIN) per tablet Take 1 tablet by mouth once daily.    pravastatin (PRAVACHOL) 80 MG tablet Take 1 tablet (80 mg total) by mouth once daily.    tolterodine (DETROL LA) 4 MG 24 hr capsule Take 1 capsule (4 mg total) by mouth once daily.    venlafaxine (EFFEXOR-XR) 37.5 MG 24 hr capsule Take 1 capsule (37.5 mg total) by mouth once daily. (Patient taking differently: Take 37.5 mg by mouth every evening. )     Family History     Problem Relation (Age of Onset)    Breast cancer Maternal Grandmother    Cancer Maternal Aunt    Heart disease Father    No Known Problems Mother, Sister, Brother, Maternal Uncle, Paternal Aunt, Paternal Uncle, Maternal Grandfather, Paternal Grandmother, Paternal Grandfather        Tobacco Use    Smoking status: Former Smoker     Packs/day: 1.00     Years: 50.00     Pack years: 50.00     Last attempt to quit: 11/15/2002     Years since quitting: 15.9    Smokeless tobacco: Never Used   Substance and Sexual Activity    Alcohol use: Yes     Alcohol/week: 1.5 oz     Types: 3 Standard drinks or equivalent per week     Comment: 3 OZ WINE     Drug use: No    Sexual activity: No     Review of Systems   Constitution: Negative for diaphoresis, weakness and malaise/fatigue.   HENT: Negative.    Eyes: Negative.    Cardiovascular: Negative for chest pain, dyspnea on exertion, irregular heartbeat, leg swelling, near-syncope, orthopnea, palpitations, paroxysmal nocturnal dyspnea and syncope.   Respiratory: Negative for cough and shortness of breath.    Endocrine: Negative.    Hematologic/Lymphatic: Negative.  Does not bruise/bleed easily.   Skin: Negative.    Musculoskeletal: Positive for arthritis and joint pain (knee).   Gastrointestinal: Negative.    Genitourinary: Negative.    Neurological: Positive for light-headedness (resolved).   Psychiatric/Behavioral: Positive for memory loss.   Allergic/Immunologic: Negative.       Objective:     Vital Signs (Most Recent):  Temp: 98.5 °F (36.9 °C) (10/24/18 0704)  Pulse: 80 (10/24/18 0820)  Resp: (!) 21 (10/24/18 0820)  BP: (!) 107/59 (10/24/18 0800)  SpO2: (!) 94 % (10/24/18 0820) Vital Signs (24h Range):  Temp:  [97.7 °F (36.5 °C)-98.7 °F (37.1 °C)] 98.5 °F (36.9 °C)  Pulse:  [] 80  Resp:  [17-33] 21  SpO2:  [90 %-100 %] 94 %  BP: ()/(50-77) 107/59     Weight: 72.1 kg (158 lb 15.2 oz)  Body mass index is 25.66 kg/m².    SpO2: (!) 94 %  O2 Device (Oxygen Therapy): nasal cannula      Intake/Output Summary (Last 24 hours) at 10/24/2018 1018  Last data filed at 10/24/2018 0900  Gross per 24 hour   Intake 552.93 ml   Output 10 ml   Net 542.93 ml       Lines/Drains/Airways     Peripheral Intravenous Line                 Peripheral IV - Single Lumen 10/23/18 1554 Right Antecubital less than 1 day         Peripheral IV - Single Lumen 10/23/18 1803 Left Antecubital less than 1 day                Physical Exam   Constitutional: She is oriented to person, place, and time. She appears well-developed and well-nourished. No distress.   HENT:   Head: Normocephalic and atraumatic.   Eyes: Right eye exhibits no discharge. Left eye exhibits no discharge.   Neck: No JVD present.   Cardiovascular: Normal rate. An irregularly irregular rhythm present. Exam reveals no gallop and no friction rub.   Murmur heard.  Pulmonary/Chest: Effort normal and breath sounds normal.   Abdominal: Soft. Bowel sounds are normal.   Musculoskeletal: She exhibits no edema.   Neurological: She is alert and oriented to person, place, and time.   Skin: Skin is warm and dry. She is not diaphoretic.   Psychiatric: She has a normal mood and affect. Her behavior is normal. Judgment and thought content normal.       Significant Labs:   BMP:   Recent Labs   Lab 10/23/18  1606 10/24/18  0343   * 141*    139   K 4.1 4.2    102   CO2 25 25   BUN 67* 56*   CREATININE 1.9* 1.5*   CALCIUM 9.3 9.7   MG 2.0 1.9   ,  CMP   Recent Labs   Lab 10/23/18  1606 10/24/18  0343    139   K 4.1 4.2    102   CO2 25 25   * 141*   BUN 67* 56*   CREATININE 1.9* 1.5*   CALCIUM 9.3 9.7   PROT 7.3  --    ALBUMIN 3.2*  --    BILITOT 0.5  --    ALKPHOS 203*  --    AST 69*  --    ALT 78*  --    ANIONGAP 13 12   ESTGFRAFRICA 28* 37*   EGFRNONAA 24* 32*   , CBC   Recent Labs   Lab 10/23/18  1606 10/24/18  0543   WBC 6.90 8.49   HGB 11.2* 10.7*   HCT 35.0* 33.0*    175   , INR No results for input(s): INR, PROTIME in the last 48 hours., Lipid Panel No results for input(s): CHOL, HDL, LDLCALC, TRIG, CHOLHDL in the last 48 hours., Troponin No results for input(s): TROPONINI in the last 48 hours. and All pertinent lab results from the last 24 hours have been reviewed.    Significant Imaging: Echocardiogram:   2D echo with color flow doppler:   Results for orders placed or performed during the hospital encounter of 10/17/18   2D Echo w/ Color Flow Doppler   Result Value Ref Range    Calculated EF 55 55 - 65    Mitral Valve Regurgitation SEVERE (A)     Est. PA Systolic Pressure 43.2 (A)     Tricuspid Valve Regurgitation MILD TO MODERATE

## 2018-10-24 NOTE — NURSING TRANSFER
Nursing Transfer Note      10/23/2018     Transfer From: ED    Transfer via stretcher    Transfer with 2LNC to O2, cardiac monitoring    Transported by ED RN    Medicines sent: diltiazem transfusing    Chart send with patient: Yes    Notified: daughter    Patient reassessed at: 10/23 @2230 (date, time)    Upon arrival to floor: cardiac monitor applied, patient oriented to room, call bell in reach and bed in lowest position

## 2018-10-24 NOTE — SUBJECTIVE & OBJECTIVE
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, ER/NE positive    Chronic diastolic heart failure     Heart murmur     Hyperlipidemia     Hypertension     Left atrial enlargement     Severe mitral regurgitation        Past Surgical History:   Procedure Laterality Date    BIOPSY, LYMPH NODE, SENTINEL Right 12/6/2012    Performed by Demetri Epstein MD at [facility] OR 2ND FLR    BREAST BIOPSY  11/2012    Right breast- invasive ductal carcinoma    CATARACT EXTRACTION W/  INTRAOCULAR LENS IMPLANT Right 10/21/2008    OU     CATARACT EXTRACTION W/  INTRAOCULAR LENS IMPLANT Left     INSERTION, LOCALIZATION WIRE Right 12/6/2012    Performed by [facility] at [facility] OR 2ND FLR    [id number] Right 1/14/2013    Performed by [facility]ri [facility] at [facility] OR 2ND FLR    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    TUBAL LIGATION         Review of patient's allergies indicates:   Allergen Reactions    Etodolac Other (See Comments)     Dehydration    Nsaids (non-steroidal anti-inflammatory drug)      COLITIS/DEHYDRATION       No current facility-administered medications on file prior to encounter.      Current Outpatient Medications on File Prior to Encounter   Medication Sig    acetaminophen (TYLENOL) 500 MG tablet Take 500 mg by mouth every 6 (six) hours as needed for Pain.    aspirin 81 mg Tab Take 81 mg by mouth Daily. 1 Tablet Oral Every day    cyanocobalamin, vitamin B-12, (VITAMIN B-12) 2,500 mcg Subl Place under the tongue once daily.    DOCOSAHEXANOIC ACID/EPA (FISH OIL ORAL) 2 (two) times daily. 2   Every day    FLAXSEED ORAL Take by mouth once daily.    furosemide (LASIX) 40 MG tablet Take 1 tablet (40 mg total) by mouth 2 (two) times daily. (Patient taking differently: Take 40 mg by mouth once daily. )    lisinopril (PRINIVIL,ZESTRIL) 2.5 MG tablet  Take 2.5 mg by mouth once daily.    multivitamin (ONE DAILY MULTIVITAMIN) per tablet Take 1 tablet by mouth once daily.    pravastatin (PRAVACHOL) 80 MG tablet Take 1 tablet (80 mg total) by mouth once daily.    tolterodine (DETROL LA) 4 MG 24 hr capsule Take 1 capsule (4 mg total) by mouth once daily.    venlafaxine (EFFEXOR-XR) 37.5 MG 24 hr capsule Take 1 capsule (37.5 mg total) by mouth once daily. (Patient taking differently: Take 37.5 mg by mouth every evening. )     Family History     Problem Relation (Age of Onset)    Breast cancer Maternal Grandmother    Cancer Maternal Aunt    Heart disease Father    No Known Problems Mother, Sister, Brother, Maternal Uncle, Paternal Aunt, Paternal Uncle, Maternal Grandfather, Paternal Grandmother, Paternal Grandfather        Tobacco Use    Smoking status: Former Smoker     Packs/day: 1.00     Years: 50.00     Pack years: 50.00     Last attempt to quit: 11/15/2002     Years since quitting: 15.9    Smokeless tobacco: Never Used   Substance and Sexual Activity    Alcohol use: Yes     Alcohol/week: 1.5 oz     Types: 3 Standard drinks or equivalent per week     Comment: 3 OZ WINE     Drug use: No    Sexual activity: No     Review of Systems   Constitutional: Negative for chills and fever.   HENT: Negative for trouble swallowing and voice change.    Eyes: Negative for pain and redness.   Respiratory: Negative for cough and shortness of breath.    Cardiovascular: Negative for chest pain, palpitations and leg swelling.   Gastrointestinal: Negative for nausea and vomiting.   Genitourinary: Negative for difficulty urinating and dysuria.   Musculoskeletal: Positive for arthralgias (right knee pain) and back pain. Negative for neck pain and neck stiffness.   Skin: Negative for rash and wound.   Neurological: Negative for seizures and syncope.     Objective:     Vital Signs (Most Recent):  Temp: 97.8 °F (36.6 °C) (10/23/18 2307)  Pulse: 79 (10/23/18 2307)  Resp: (!) 23  (10/23/18 2307)  BP: (!) 116/56 (10/23/18 2307)  SpO2: 100 % (10/23/18 2307) Vital Signs (24h Range):  Temp:  [97.7 °F (36.5 °C)-98.7 °F (37.1 °C)] 97.8 °F (36.6 °C)  Pulse:  [] 79  Resp:  [17-28] 23  SpO2:  [91 %-100 %] 100 %  BP: ()/(52-76) 116/56     Weight: 72.1 kg (158 lb 15.2 oz)  Body mass index is 25.66 kg/m².    Physical Exam   Constitutional: She appears well-developed. She is cooperative. No distress.   HENT:   Head: Normocephalic and atraumatic.   Eyes: Conjunctivae are normal. No scleral icterus.   Neck: Neck supple.   Cardiovascular: Normal rate. An irregularly irregular rhythm present.   Murmur heard.   Systolic murmur is present.  Pulmonary/Chest: Effort normal. No respiratory distress.   Abdominal: Soft. She exhibits no distension. There is no tenderness. There is no guarding.   Musculoskeletal: She exhibits edema (nonpitting right ankle edema). She exhibits no deformity.   Neurological: She is alert. She displays no seizure activity.   Skin: Skin is warm and dry.   Psychiatric: She has a normal mood and affect. Thought content normal. She exhibits abnormal recent memory.   Nursing note and vitals reviewed.          Significant Labs: All pertinent labs within the past 24 hours have been reviewed.    Significant Imaging: I have reviewed all pertinent imaging results/findings within the past 24 hours.   EKG 10/23/18: Atrial rhythm with rapid ventricular rhythm  CT Head Without Contrast 10/23/18: FINDINGS:  Intracranial compartment:  Age-appropriate mild generalized cerebral volume loss.  Mild degree of supratentorial white matter chronic small vessel ischemic change.  No extra-axial blood or fluid collections.  No definite new focal areas of abnormal parenchymal attenuation.  No parenchymal mass, hemorrhage, edema or major vascular distribution infarct.  Skull base atherosclerotic vascular calcifications noted.  Skull/extracranial contents (limited evaluation): No fracture. Mastoid air  cells and paranasal sinuses are essentially clear.  Impression:  No acute large vascular territory infarct or intracranial hemorrhage identified.  Mild senescent changes as above.  X-Ray Chest 1 View 10/23/18: FINDINGS:  Additional history: Breast carcinoma, recent weight loss.  Mediastinal lymph nodes were weakly avid on PET-CT and July 2018.  Single AP view of the chest was obtained with the patient in left posterior oblique rotation.  The patient is status post aortic valve replacement.  The patient has known right pleural fluid and partial atelectasis of the right lung accounting for soft tissue opacity occupying the base of the right hemithorax.  The source of the patient's chronic right pleural fluid is unclear; malignancy is not excluded.  There is abundant calcification in the mitral annulus, a normal senescent change.  I detect no convincing evidence of new focal pulmonary disease.  I cannot exclude interstitial lung disease such as mild interstitial pulmonary edema in this 83-year-old woman with known aortic valve prosthesis and calcific atherosclerosis of the aorta and its branches.  I detect no pneumothorax, left pleural fluid, pneumomediastinum, pneumoperitoneum or significant osseous abnormality.  X-Ray Sacrum And Coccyx 10/23/18: FINDINGS:  There is no acute fracture or subluxation.  Soft tissues are unremarkable.

## 2018-10-24 NOTE — HOSPITAL COURSE
10/24/2018 Per HPI.   10/25/2018 RVR noted overnight, Lopressor added to regimen this AM. Eliquis was initiated yesterday. Hemodynamically stable without complaint this AM.

## 2018-10-24 NOTE — ASSESSMENT & PLAN NOTE
Pleural effusion, right  Severe mitral regurgitation  Moderate tricuspid regurgitation  Pulmonary HTN  Continue home furosemide 40 mg daily.

## 2018-10-24 NOTE — PLAN OF CARE
Patient lives at [facility]  Daughter at bedside and involved in care    Assisted Living Facility gives patient her medications.  Daughter would like [facility]    Will fax hard scripts to [facility] and also give hard scripts to daughter to bring to [facility]       10/24/18 7494   Discharge Assessment   Assessment Type Discharge Planning Assessment   Confirmed/corrected address and phone number on facesheet? Yes   Assessment information obtained from? Patient;Caregiver   Prior to hospitilization cognitive status: Alert/Oriented   Prior to hospitalization functional status: Independent;Assistive Equipment   Current cognitive status: Alert/Oriented   Current Functional Status: Independent;Assistive Equipment   Lives With facility resident  (Lives at [facility])   Able to Return to Prior Arrangements yes   Is patient able to care for self after discharge? No   Patient's perception of discharge disposition assisted living   Readmission Within The Last 30 Days no previous admission in last 30 days   Patient currently being followed by outpatient case management? No   Equipment Currently Used at Home cane, straight;shower chair;rollator   Do you have any problems affording any of your prescribed medications? No   Is the patient taking medications as prescribed? yes   Does the patient have transportation home? Yes   Transportation Available family or friend will provide   Does the patient receive services at the [facility]? No   Discharge Plan A Assisted Living   Patient/Family In Agreement With Plan yes     Charissa Cotton RN, CCM, CMSRN  RN Transition Navigator  [phone]

## 2018-10-25 NOTE — SUBJECTIVE & OBJECTIVE
Interval History: Pt lying in bed resting, daughter at bedside. Pt denies complaints. POC discussed.     Review of Systems   Constitutional: Negative for chills and fever.   HENT: Negative for trouble swallowing and voice change.    Eyes: Negative for pain and redness.   Respiratory: Negative for cough and shortness of breath.    Cardiovascular: Negative for chest pain, palpitations and leg swelling.   Gastrointestinal: Negative for nausea and vomiting.   Genitourinary: Negative for difficulty urinating and dysuria.   Musculoskeletal: Positive for back pain. Negative for neck pain and neck stiffness. Arthralgias: right knee pain.   Skin: Negative for rash and wound.   Neurological: Negative for seizures and syncope.     Objective:     Vital Signs (Most Recent):  Temp: 98 °F (36.7 °C) (10/25/18 1151)  Pulse: 60 (10/25/18 1156)  Resp: 20 (10/25/18 1151)  BP: (!) 97/54 (10/25/18 1151)  SpO2: 95 % (10/25/18 0820) Vital Signs (24h Range):  Temp:  [96.5 °F (35.8 °C)-98.9 °F (37.2 °C)] 98 °F (36.7 °C)  Pulse:  [] 60  Resp:  [13-28] 20  SpO2:  [84 %-99 %] 95 %  BP: ()/(50-63) 97/54     Weight: 72.1 kg (158 lb 15.2 oz)  Body mass index is 25.66 kg/m².    Intake/Output Summary (Last 24 hours) at 10/25/2018 1227  Last data filed at 10/25/2018 0600  Gross per 24 hour   Intake 200 ml   Output 413 ml   Net -213 ml      Physical Exam   Constitutional: She appears well-developed. She is cooperative. No distress.   HENT:   Head: Normocephalic and atraumatic.   Eyes: Conjunctivae are normal. No scleral icterus.   Neck: Neck supple.   Cardiovascular: Normal rate. An irregularly irregular rhythm present.   Murmur heard.   Systolic murmur is present.  Pulmonary/Chest: Effort normal. No respiratory distress.   Abdominal: Soft. She exhibits no distension. There is no tenderness. There is no guarding.   Musculoskeletal: She exhibits no deformity.   Neurological: She is alert. She displays no seizure activity.   Skin: Skin is  warm and dry.   Psychiatric: She has a normal mood and affect. Thought content normal. She exhibits abnormal recent memory.   Nursing note and vitals reviewed.      Significant Labs:       Significant Imaging: I have reviewed all pertinent imaging results/findings within the past 24 hours.

## 2018-10-25 NOTE — PROGRESS NOTES
Pt continues to remove NC. Pt states that the NC makes her drool. Pt Sats are in the low 80s on RA. Pt educated on why O2 is needed. Placed pt back on NC 2lpm. Nurse notified. Will continue to monitor.

## 2018-10-25 NOTE — PLAN OF CARE
Problem: Patient Care Overview  Goal: Plan of Care Review   10/25/18 0428   Coping/Psychosocial   Plan Of Care Reviewed With patient    Pt asked to be walked to bath room. Nurse asked jason pt had walked last: pt reports a year ago.  Nurse refused to take pt to bathroom and put pt in a diaper.  Pt made repeated called to be walked to bathroom.  Nurse entered room to check tele box. Pt reported that she has been calling all night for someone to take her tot the bathroom.  Vivienne advised pt about 6 to 8 times that she has a diaper on and that we are not taking her out of bed because we do not want her to fall.     Pt heart rate was elevated at shift change.  Called Dr Scales, he advise taht there is metopropolol for HR >120.  By the time nurse go back to room, pt HR was below 120.     Pt reports pain to back, gave tylonol.        Tele: NSR,  HR 90,  No alarms.     Bed in lowest position, wheels locked, non skid socks, ID band worn, personal items and call bell with in reach, bed alarm set.

## 2018-10-25 NOTE — PLAN OF CARE
Problem: Patient Care Overview  Goal: Plan of Care Review  Pt on RA. SATS in the low 80s. Pt placed on NC 2lpm. Pt with no apparent distress noted. Will continue to monitor.

## 2018-10-25 NOTE — PROGRESS NOTES
[facility]  Cardiology  Progress Note    Patient Name: Ruth Lan  MRN: [id number]  Admission Date: 10/23/2018  Hospital Length of Stay: 2 days  Code Status: DNR   Attending Physician: Jett Scales MD   Primary Care Physician: Azikiwe K Lombard, MD  Expected Discharge Date:   Principal Problem:Atrial fibrillation with rapid ventricular response    Subjective:     Hospital Course:   10/24/2018 Per HPI.   10/25/2018 RVR noted overnight, Lopressor added to regimen this AM. Eliquis was initiated yesterday. Hemodynamically stable without complaint this AM.     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, ER/MI positive    Chronic diastolic heart failure     Heart murmur     Hyperlipidemia     Hypertension     Left atrial enlargement     Severe mitral regurgitation        Past Surgical History:   Procedure Laterality Date    BIOPSY, LYMPH NODE, SENTINEL Right 12/6/2012    Performed by [facility] at [facility] OR 2ND FLR    BREAST BIOPSY  11/2012    Right breast- invasive ductal carcinoma    CATARACT EXTRACTION W/  INTRAOCULAR LENS IMPLANT Right 10/21/2008    OU     CATARACT EXTRACTION W/  INTRAOCULAR LENS IMPLANT Left     INSERTION, LOCALIZATION WIRE Right 12/6/2012    Performed by [facility] MD at [facility] OR 2ND FLR    [id number] Right 1/14/2013    Performed by [facility] at [facility] OR 2ND FLR    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    TUBAL LIGATION         Review of patient's allergies indicates:   Allergen Reactions    Etodolac Other (See Comments)     Dehydration    Nsaids (non-steroidal anti-inflammatory drug)      COLITIS/DEHYDRATION       No current facility-administered medications on file prior to encounter.      Current Outpatient Medications on File Prior to Encounter   Medication Sig     acetaminophen (TYLENOL) 500 MG tablet Take 500 mg by mouth every 6 (six) hours as needed for Pain.    aspirin 81 mg Tab Take 81 mg by mouth Daily. 1 Tablet Oral Every day    cyanocobalamin, vitamin B-12, (VITAMIN B-12) 2,500 mcg Subl Place under the tongue once daily.    DOCOSAHEXANOIC ACID/EPA (FISH OIL ORAL) 2 (two) times daily. 2   Every day    FLAXSEED ORAL Take by mouth once daily.    furosemide (LASIX) 40 MG tablet Take 1 tablet (40 mg total) by mouth 2 (two) times daily. (Patient taking differently: Take 40 mg by mouth once daily. )    lisinopril (PRINIVIL,ZESTRIL) 2.5 MG tablet Take 2.5 mg by mouth once daily.    multivitamin (ONE DAILY MULTIVITAMIN) per tablet Take 1 tablet by mouth once daily.    pravastatin (PRAVACHOL) 80 MG tablet Take 1 tablet (80 mg total) by mouth once daily.    tolterodine (DETROL LA) 4 MG 24 hr capsule Take 1 capsule (4 mg total) by mouth once daily.    venlafaxine (EFFEXOR-XR) 37.5 MG 24 hr capsule Take 1 capsule (37.5 mg total) by mouth once daily. (Patient taking differently: Take 37.5 mg by mouth every evening. )     Family History     Problem Relation (Age of Onset)    Breast cancer Maternal Grandmother    Cancer Maternal Aunt    Heart disease Father    No Known Problems Mother, Sister, Brother, Maternal Uncle, Paternal Aunt, Paternal Uncle, Maternal Grandfather, Paternal Grandmother, Paternal Grandfather        Tobacco Use    Smoking status: Former Smoker     Packs/day: 1.00     Years: 50.00     Pack years: 50.00     Last attempt to quit: 11/15/2002     Years since quitting: 15.9    Smokeless tobacco: Never Used   Substance and Sexual Activity    Alcohol use: Yes     Alcohol/week: 1.5 oz     Types: 3 Standard drinks or equivalent per week     Comment: 3 OZ WINE     Drug use: No    Sexual activity: No     Review of Systems   Constitution: Negative for diaphoresis, weakness and malaise/fatigue.   HENT: Negative.    Eyes: Negative.    Cardiovascular: Negative for  chest pain, dyspnea on exertion, irregular heartbeat, leg swelling, near-syncope, orthopnea, palpitations, paroxysmal nocturnal dyspnea and syncope.   Respiratory: Negative for cough and shortness of breath.    Endocrine: Negative.    Hematologic/Lymphatic: Negative.  Does not bruise/bleed easily.   Skin: Negative.    Musculoskeletal: Positive for arthritis and joint pain (knee).   Gastrointestinal: Negative.    Genitourinary: Negative.    Neurological: Positive for light-headedness (resolved).   Psychiatric/Behavioral: Positive for memory loss.   Allergic/Immunologic: Negative.      Objective:     Vital Signs (Most Recent):  Temp: 96.5 °F (35.8 °C) (10/25/18 0831)  Pulse: 79 (10/25/18 0831)  Resp: 16 (10/25/18 0831)  BP: (!) 105/54 (10/25/18 0831)  SpO2: 95 % (10/25/18 0820) Vital Signs (24h Range):  Temp:  [96.5 °F (35.8 °C)-98.9 °F (37.2 °C)] 96.5 °F (35.8 °C)  Pulse:  [] 79  Resp:  [13-55] 16  SpO2:  [77 %-99 %] 95 %  BP: ()/(50-68) 105/54     Weight: 72.1 kg (158 lb 15.2 oz)  Body mass index is 25.66 kg/m².    SpO2: 95 %  O2 Device (Oxygen Therapy): nasal cannula      Intake/Output Summary (Last 24 hours) at 10/25/2018 0956  Last data filed at 10/25/2018 0600  Gross per 24 hour   Intake 200 ml   Output 413 ml   Net -213 ml       Lines/Drains/Airways     Peripheral Intravenous Line                 Peripheral IV - Single Lumen 10/23/18 1554 Right Antecubital 1 day                Physical Exam   Constitutional: She is oriented to person, place, and time. She appears well-developed and well-nourished. No distress.   HENT:   Head: Normocephalic and atraumatic.   Eyes: Right eye exhibits no discharge. Left eye exhibits no discharge.   Neck: No JVD present.   Cardiovascular: Normal rate. An irregularly irregular rhythm present. Exam reveals no gallop and no friction rub.   Murmur heard.  Pulmonary/Chest: Effort normal and breath sounds normal.   Abdominal: Soft. Bowel sounds are normal.   Musculoskeletal:  She exhibits no edema.   Neurological: She is alert and oriented to person, place, and time.   Skin: Skin is warm and dry. She is not diaphoretic.   Psychiatric: She has a normal mood and affect. Her behavior is normal. Judgment and thought content normal.       Significant Labs:   BMP:   Recent Labs   Lab 10/23/18  1606 10/24/18  0343 10/25/18  0441   * 141* 136*    139 138   K 4.1 4.2 4.1    102 104   CO2 25 25 23   BUN 67* 56* 49*   CREATININE 1.9* 1.5* 1.4   CALCIUM 9.3 9.7 9.2   MG 2.0 1.9 1.7   , CMP   Recent Labs   Lab 10/23/18  1606 10/24/18  0343 10/25/18  0441    139 138   K 4.1 4.2 4.1    102 104   CO2 25 25 23   * 141* 136*   BUN 67* 56* 49*   CREATININE 1.9* 1.5* 1.4   CALCIUM 9.3 9.7 9.2   PROT 7.3  --   --    ALBUMIN 3.2*  --   --    BILITOT 0.5  --   --    ALKPHOS 203*  --   --    AST 69*  --   --    ALT 78*  --   --    ANIONGAP 13 12 11   ESTGFRAFRICA 28* 37* 40*   EGFRNONAA 24* 32* 35*   , CBC   Recent Labs   Lab 10/23/18  1606 10/24/18  0543   WBC 6.90 8.49   HGB 11.2* 10.7*   HCT 35.0* 33.0*    175   , INR No results for input(s): INR, PROTIME in the last 48 hours., Lipid Panel No results for input(s): CHOL, HDL, LDLCALC, TRIG, CHOLHDL in the last 48 hours., Troponin No results for input(s): TROPONINI in the last 48 hours. and All pertinent lab results from the last 24 hours have been reviewed.    Significant Imaging: Echocardiogram:   2D echo with color flow doppler:   Results for orders placed or performed during the hospital encounter of 10/17/18   2D Echo w/ Color Flow Doppler   Result Value Ref Range    Calculated EF 55 55 - 65    Mitral Valve Regurgitation SEVERE (A)     Est. PA Systolic Pressure 43.2 (A)     Tricuspid Valve Regurgitation MILD TO MODERATE      Assessment and Plan:     Brief HPI: Patient seen this morning on rounds without cardiac complaint. Daughter at bedside, patient anxious for discharge.     * Atrial fibrillation with rapid  ventricular response    New onset AF RVR with severe LAE and valvular disease  Rate control strategy with Cardizem CD, Lopressor added- RVR overnight  Anticoagulated with  Eliquis    CHADSVASC 5 points  HASBLED 2 points, moderate risk for bleeding  Discussed risks/benefits of anticoagulation with patient and daughter    Follow up with primary cardiologist in 2-3 weeks and consider DCCV.            Severe mitral regurgitation    Stable, chronic with normal LVEF      Essential hypertension    BP well controlled, Continue Cardizem CD, low dose Lopressor added for better rate control with AF     History of aortic valve replacement with porcine valve on 9/27/04    Stable on recent echo          VTE Risk Mitigation (From admission, onward)        Ordered     apixaban tablet 2.5 mg  2 times daily      10/24/18 1103     IP VTE HIGH RISK PATIENT  Once      10/23/18 2209          Carlos Elliott NP  Cardiology  [facility]

## 2018-10-25 NOTE — PLAN OF CARE
Problem: Patient Care Overview  Goal: Plan of Care Review  Outcome: Ongoing (interventions implemented as appropriate)  Plan of care reviewed with patient. Patient verbalized complete understanding. Fall precautions maintained. Bed in lowest position, locked, call light within reach, and bed alarm on. Side rails up x2 with slip resistant socks on. Nurse instructed patient to notify staff for any assistance and patient verbalized complete understanding. Patient on telemetry throughout shift in afib. will continue to monitor

## 2018-10-25 NOTE — ASSESSMENT & PLAN NOTE
New onset AF RVR with severe LAE and valvular disease  Rate control strategy with Cardizem CD, Lopressor added- RVR overnight  Anticoagulated with  Eliquis    CHADSVASC 5 points  HASBLED 2 points, moderate risk for bleeding  Discussed risks/benefits of anticoagulation with patient and daughter    Follow up with primary cardiologist in 2-3 weeks and consider DCCV.

## 2018-10-25 NOTE — PLAN OF CARE
Problem: Occupational Therapy Goal  Goal: Occupational Therapy Goal  Goals to be met by: 11/10/2018    Patient will increase functional independence with ADLs by performing:    UE Dressing with Modified [place].  LE Dressing with Modified [place].  Grooming while standing with Modified [place].  Toileting from bedside commode with Modified [place] for hygiene and clothing management.   Rolling to Bilateral with Modified [place].   Supine to sit with Modified [place].  Step transfer with Modified [place]  Toilet transfer to bedside commode with Modified [place].  Upper extremity exercise program x10 reps per handout, with supervision.     Outcome: Ongoing (interventions implemented as appropriate)  Pt agreeable to limited OT svcs this date. Pt found up in b/s chair & perf the following: sit-->stand via RW w/ CGA; amb via RW to/fro bathroom w/ CGA; toilet t/f w/ Min A; toileting tasks w/ Mod A.  Pt w/ bladder accident upon standing & refused to attempt changing socks. Pt also refused to participate in there ex after using toilet. Provided pt w/ hot pack for low back 2/2 pain. Edu/tx re: HEP & general safety techs. Pt verbalized understanding.     Pt w/ signif slow & unsafe sit-->stand. Pt declined attempts/cues to increase (I)/safety w/ transitions. Cont w/ OT per POC for d/c-->SNF w/ rec BSC.

## 2018-10-25 NOTE — ASSESSMENT & PLAN NOTE
BP well controlled, Continue Cardizem CD, low dose Lopressor added for better rate control with AF

## 2018-10-25 NOTE — PT/OT/SLP PROGRESS
Physical Therapy      Patient Name:  Ruth Lan   MRN:  [id number]    Patient not seen today secondary to just finishing up with O.T. And fatigued. Will follow-up later this afternoon.    Mae Keys, PTA

## 2018-10-25 NOTE — PT/OT/SLP PROGRESS
Occupational Therapy   Treatment    Name: Ruth Lan  MRN: [id number]  Admitting Diagnosis:  Atrial fibrillation with rapid ventricular response       Recommendations:     Discharge Recommendations: nursing facility, skilled  Discharge Equipment Recommendations:  bedside commode  Barriers to discharge:  Decreased caregiver support    Subjective     Communicated with: nsg prior to session.  Pain/Comfort:  · Pain Rating 1: 3/10  · Location - Side 1: Bilateral  · Location - Orientation 1: lower  · Location 1: back  · Pain Addressed 1: Reposition, Distraction, Cessation of Activity  · Pain Rating Post-Intervention 1: 3/10    Patients cultural, spiritual, Taoist conflicts given the current situation: na    Objective:     Patient found with:      General Precautions: Standard, fall   Orthopedic Precautions:N/A   Braces: N/A     Occupational Performance:    Bed Mobility:    ·      Functional Mobility/Transfers:  · Patient completed Sit <> Stand Transfer with minimum assistance  with  rolling walker   · Patient completed Toilet Transfer Step Transfer technique with contact guard assistance with  rolling walker  · Functional Mobility: via RW w/ SB-CGA    Activities of Daily Living:  · Lower Body Dressing: maximal assistance and total assistance doff/don socks  · Toileting: stand by assistance and contact guard assistance on toilet    Patient left up in chair with all lines intact, call button in reach, chair alarm on and nsg notified    [facility] 6 Click:  [facility] Total Score: 16    Treatment & Education:  Pt agreeable to limited OT svcs this date. Pt found up in b/s chair & perf the following: sit-->stand via RW w/ CGA; amb via RW to/fro bathroom w/ CGA; toilet t/f w/ Min A; toileting tasks w/ Mod A.  Pt w/ bladder accident upon standing & refused to attempt changing socks. Pt also refused to participate in there ex after using toilet. Provided pt w/ hot pack for low back 2/2 pain. Edu/tx re: HEP & general safety techs.  Pt verbalized understanding.     Education:    Assessment:   Pt w/ signif slow & unsafe sit-->stand. Pt declined attempts/cues to increase (I)/safety w/ transitions. Cont w/ OT per POC for d/c-->SNF w/ rec BSC.    Ruth Lan is a 83 y.o. female with a medical diagnosis of Atrial fibrillation with rapid ventricular response.  She presents with ..  Performance deficits affecting function are weakness, impaired endurance, gait instability, impaired functional mobilty, impaired self care skills, impaired balance, decreased coordination, decreased lower extremity function, decreased safety awareness, pain, impaired cardiopulmonary response to activity.      Rehab Prognosis:  good; patient would benefit from acute skilled OT services to address these deficits and reach maximum level of function.       Plan:     Patient to be seen 5 x/week to address the above listed problems via self-care/home management, therapeutic activities, therapeutic exercises  · Plan of Care Expires: 11/24/18  · Plan of Care Reviewed with: patient    This Plan of care has been discussed with the patient who was involved in its development and understands and is in agreement with the identified goals and treatment plan    GOALS:   Multidisciplinary Problems     Occupational Therapy Goals        Problem: Occupational Therapy Goal    Goal Priority Disciplines Outcome Interventions   Occupational Therapy Goal     OT, PT/OT Ongoing (interventions implemented as appropriate)    Description:  Goals to be met by: 11/10/2018    Patient will increase functional independence with ADLs by performing:    UE Dressing with Modified [place].  LE Dressing with Modified [place].  Grooming while standing with Modified [place].  Toileting from bedside commode with Modified [place] for hygiene and clothing management.   Rolling to Bilateral with Modified [place].   Supine to sit with Modified [place].  Step transfer with Modified  [place]  Toilet transfer to bedside commode with Modified [place].  Upper extremity exercise program x10 reps per handout, with supervision.                      Time Tracking:     OT Date of Treatment: 10/25/18  OT Start Time: 1125  OT Stop Time: 1159  OT Total Time (min): 34 min    Billable Minutes:Self Care/Home Management 18  Therapeutic Activity 15  Total Time 33    BI Garg  10/25/2018

## 2018-10-25 NOTE — PROGRESS NOTES
[facility]  Progress Note    Patient Name: Ruth Lan  MRN: [id number]  Patient Class: IP- Inpatient   Admission Date: 10/23/2018  Length of Stay: 2 days  Attending Physician: Jett Scales MD  Primary Care Provider: Azikiwe K Lombard, MD        Subjective:     Principal Problem:Atrial fibrillation with rapid ventricular response    HPI:  Ruth Lan is a 83 y.o. 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  She is hard of hearing.  She lives at [facility] in [place], [place].  The assisted living facility gives her her medications.  She ambulates with a rollator.  Her primary care physician is Dr. Azikiwe Lombard.  Her neurologist is Dr. Be Lassiter.  Her oncologist is Dr. Tobin Darling.  She is DNR.   On 10/23/18, she went to sit on her rollator and her seat broke.  Later in the day, she was ambulating to bingo with her rollator, felt dizzy, fell on her buttocks, then fell backward and hit her head on the floor.  EMS found her to be in atrial fibrillation with rapid ventricular response, with  pulse in the 140s.  In the ED, chest X-ray showed chronic right pleural effusion.  BNP was 952, higher than on 7/7/18 when she was hospitalized for decompensated heart failure and required IV diuresis, however this time she was not hypoxic, with oxygen saturation of 100% on room air.  She was given IV diltiazem, IV digoxin, then put on diltiazem drip for persistent rapid ventricular response.  She was admitted to [facility].  Rate improved on diltiazem drip.  She denied chest pain, shortness of breath, or palpitations.  She had back pain due to her fall.  On physical exam, she expressed the desire to return home.    Hospital Course:  HR 60-80s this morning. RVR overnight. Metoprolol 25 mg po BID started for better rate control. Diltiazem po increased to 360 mg po daily. BP stable. The patient denies chest pain, SOB and dizziness. Plan to wean O2, ambulate patient, monitor telemetry.     Interval History: Pt lying in bed resting, daughter at bedside. Pt denies complaints. POC discussed.     Review of Systems   Constitutional: Negative for chills and fever.   HENT: Negative for trouble swallowing and voice change.    Eyes: Negative for pain and redness.   Respiratory: Negative for cough and shortness of breath.    Cardiovascular: Negative for chest pain, palpitations and leg swelling.   Gastrointestinal: Negative for nausea and vomiting.   Genitourinary: Negative for difficulty urinating and dysuria.   Musculoskeletal: Positive for back pain. Negative for neck pain and neck stiffness. Arthralgias: right knee pain.   Skin: Negative for rash and wound.   Neurological: Negative for seizures and syncope.     Objective:     Vital Signs (Most Recent):  Temp: 98 °F (36.7 °C) (10/25/18 1151)  Pulse: 60 (10/25/18 1156)  Resp: 20 (10/25/18 1151)  BP: (!) 97/54 (10/25/18 1151)  SpO2: 95 % (10/25/18 0820) Vital Signs (24h Range):  Temp:  [96.5 °F (35.8 °C)-98.9 °F (37.2 °C)] 98 °F (36.7 °C)  Pulse:  []  60  Resp:  [13-28] 20  SpO2:  [84 %-99 %] 95 %  BP: ()/(50-63) 97/54     Weight: 72.1 kg (158 lb 15.2 oz)  Body mass index is 25.66 kg/m².    Intake/Output Summary (Last 24 hours) at 10/25/2018 1227  Last data filed at 10/25/2018 0600  Gross per 24 hour   Intake 200 ml   Output 413 ml   Net -213 ml      Physical Exam   Constitutional: She appears well-developed. She is cooperative. No distress.   HENT:   Head: Normocephalic and atraumatic.   Eyes: Conjunctivae are normal. No scleral icterus.   Neck: Neck supple.   Cardiovascular: Normal rate. An irregularly irregular rhythm present.   Murmur heard.   Systolic murmur is present.  Pulmonary/Chest: Effort normal. No respiratory distress.   Abdominal: Soft. She exhibits no distension. There is no tenderness. There is no guarding.   Musculoskeletal: She exhibits no deformity.   Neurological: She is alert. She displays no seizure activity.   Skin: Skin is warm and dry.   Psychiatric: She has a normal mood and affect. Thought content normal. She exhibits abnormal recent memory.   Nursing note and vitals reviewed.      Significant Labs:       Significant Imaging: I have reviewed all pertinent imaging results/findings within the past 24 hours.    Assessment/Plan:      * Atrial fibrillation with rapid ventricular response    RVR overnight, HR 60-80s this morning, BP stable     Lopressor 25 mg po BID for better rate control   Diltiazem 360 mg po daily   Anticoagulated with Apixaban 2.5 mg po BID        DNR (do not resuscitate)    Continue.       Chronic diastolic heart failure    Pleural effusion, right  Severe mitral regurgitation  Moderate tricuspid regurgitation  Pulmonary HTN  Continue home furosemide 40 mg daily.         Stage 4 chronic kidney disease    Stable.       Primary osteoarthritis of both knees    Acetaminophen prn.  PT/OT also due to back pain after her fall.       MCI (mild cognitive impairment)    Delirium precautions.       Essential hypertension     Hold home lisinopril 2.5 mg daily.       Anxiety    Continue home venlafaxine nightly.         VTE Risk Mitigation (From admission, onward)        Ordered     apixaban tablet 2.5 mg  2 times daily      10/24/18 1103     IP VTE HIGH RISK PATIENT  Once      10/23/18 8092              Jada Valentino NP  Department of Hospital Medicine   [facility]

## 2018-10-25 NOTE — PT/OT/SLP PROGRESS
"Physical Therapy Treatment    Patient Name:  Ruth Lan   MRN:  [id number]    Recommendations:     Discharge Recommendations:  nursing facility, skilled   Discharge Equipment Recommendations: bedside commode   Barriers to discharge: Decreased caregiver support    Assessment:     Ruth Lan is a 83 y.o. female admitted with a medical diagnosis of Atrial fibrillation with rapid ventricular response.  She presents with the following impairments/functional limitations:  weakness, impaired endurance, impaired self care skills, impaired functional mobilty, gait instability, impaired balance, decreased coordination, decreased lower extremity function, decreased upper extremity function, decreased ROM.  Pt ambulated 15 ft with RW with Gianna for increased stability and occasional RW management with turning.  Pt c/o increased R knee pain with therex secondary to "arthritis in that knee." Pt would continue to benefit from P.T. To address impairments listed above..    Rehab Prognosis:  Fair+; patient would benefit from acute skilled PT services to address these deficits and reach maximum level of function.      Recent Surgery: * No surgery found *      Plan:     During this hospitalization, patient to be seen 6 x/week to address the above listed problems via gait training, therapeutic activities, therapeutic exercises, neuromuscular re-education  · Plan of Care Expires:  11/24/18   Plan of Care Reviewed with: patient    Subjective     Communicated with RN (Teri) prior to session.  Patient found sitting up in b/s recliner upon PT entry to room, agreeable to treatment.        Patient comments/goals: Pt agreed to tx.  Pain/Comfort:  · Pain Rating 1: 0/10  · Pain Rating Post-Intervention 1: 4/10(PT c/o R knee pain with therex and LBP when returning supine in bed.)    Patients cultural, spiritual, Quaker conflicts given the current situation: none reported    Objective:     Patient found with: telemetry "     General Precautions: Standard, fall   Orthopedic Precautions:(back precautions)   Braces:       Functional Mobility:  · Bed Mobility:     · Scooting: stand by assistance and scooting back into bed  · Sit to Supine: moderate assistance and BLEs  · Transfers:     · Sit to Stand:  minimum assistance with rolling walker and x 2 reps  · Bed to Chair: minimum assistance with  rolling walker  using  Step Transfer  · Gait: 15ft with RW and Gianna ambulating around bed with Gianna for increased stability and occasional RW management on turns.  Pt ambulates with increased trunk flexion and RW out in front too far.  VC's for upright posture and proximity to RW, but unable to maintain.  · Balance: sitting good, standing fair, gait fair-      AM-PAC 6 CLICK MOBILITY  Turning over in bed (including adjusting bedclothes, sheets and blankets)?: 2  Sitting down on and standing up from a chair with arms (e.g., wheelchair, bedside commode, etc.): 3  Moving from lying on back to sitting on the side of the bed?: 2  Moving to and from a bed to a chair (including a wheelchair)?: 3  Need to walk in hospital room?: 3  Climbing 3-5 steps with a railing?: 1  Basic Mobility Total Score: 14       Therapeutic Activities and Exercises:   BLE therex supine heelslides, hip ABD/ADD, QS, glut sets x 15 reps with A R > L  Seated AP and LAQ with A on R, but unable to achieve full R knee extension secondary to pain.    Patient left supine with all lines intact, call button in reach, bed alarm on and RN notified..    GOALS:   Multidisciplinary Problems     Physical Therapy Goals        Problem: Physical Therapy Goal    Goal Priority Disciplines Outcome Goal Variances Interventions   Physical Therapy Goal     PT, PT/OT Ongoing (interventions implemented as appropriate)     Description:  Goals to be met by: 2018     Patient will increase functional independence with mobility by performin. Supine to sit with Modified [place]  2. Sit to  supine with Modified [place]  3. Rolling to Left and Right with Modified [place].  4. Sit to stand transfer with Supervision  5. Bed to chair transfer with Supervision using Rolling Walker  6. Gait  x 75 feet with Supervision using Rolling Walker.   7. Lower extremity exercise program x10 reps with supervision                      Time Tracking:     PT Received On: 10/25/18  PT Start Time: 1354     PT Stop Time: 1417  PT Total Time (min): 23 min     Billable Minutes: Gait Training 11 and Therapeutic Exercise 12    Treatment Type: Treatment  PT/PTA: PTA     PTA Visit Number: 1     Mae Keys PTA  10/25/2018

## 2018-10-25 NOTE — SUBJECTIVE & OBJECTIVE
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, ER/MO positive    Chronic diastolic heart failure     Heart murmur     Hyperlipidemia     Hypertension     Left atrial enlargement     Severe mitral regurgitation        Past Surgical History:   Procedure Laterality Date    BIOPSY, LYMPH NODE, SENTINEL Right 12/6/2012    Performed by [facility] at [facility] OR 2ND FLR    BREAST BIOPSY  11/2012    Right breast- invasive ductal carcinoma    CATARACT EXTRACTION W/  INTRAOCULAR LENS IMPLANT Right 10/21/2008    OU     CATARACT EXTRACTION W/  INTRAOCULAR LENS IMPLANT Left     INSERTION, LOCALIZATION WIRE Right 12/6/2012    Performed by Demetri Epstein MD at [facility] OR 2ND FLR    XP-[id number] Right 1/14/2013    Performed by [facility] at [facility] OR 2ND FLR    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    TUBAL LIGATION         Review of patient's allergies indicates:   Allergen Reactions    Etodolac Other (See Comments)     Dehydration    Nsaids (non-steroidal anti-inflammatory drug)      COLITIS/DEHYDRATION       No current facility-administered medications on file prior to encounter.      Current Outpatient Medications on File Prior to Encounter   Medication Sig    acetaminophen (TYLENOL) 500 MG tablet Take 500 mg by mouth every 6 (six) hours as needed for Pain.    aspirin 81 mg Tab Take 81 mg by mouth Daily. 1 Tablet Oral Every day    cyanocobalamin, vitamin B-12, (VITAMIN B-12) 2,500 mcg Subl Place under the tongue once daily.    DOCOSAHEXANOIC ACID/EPA (FISH OIL ORAL) 2 (two) times daily. 2   Every day    FLAXSEED ORAL Take by mouth once daily.    furosemide (LASIX) 40 MG tablet Take 1 tablet (40 mg total) by mouth 2 (two) times daily. (Patient taking differently: Take 40 mg by mouth once daily. )    lisinopril (PRINIVIL,ZESTRIL) 2.5 MG tablet  Take 2.5 mg by mouth once daily.    multivitamin (ONE DAILY MULTIVITAMIN) per tablet Take 1 tablet by mouth once daily.    pravastatin (PRAVACHOL) 80 MG tablet Take 1 tablet (80 mg total) by mouth once daily.    tolterodine (DETROL LA) 4 MG 24 hr capsule Take 1 capsule (4 mg total) by mouth once daily.    venlafaxine (EFFEXOR-XR) 37.5 MG 24 hr capsule Take 1 capsule (37.5 mg total) by mouth once daily. (Patient taking differently: Take 37.5 mg by mouth every evening. )     Family History     Problem Relation (Age of Onset)    Breast cancer Maternal Grandmother    Cancer Maternal Aunt    Heart disease Father    No Known Problems Mother, Sister, Brother, Maternal Uncle, Paternal Aunt, Paternal Uncle, Maternal Grandfather, Paternal Grandmother, Paternal Grandfather        Tobacco Use    Smoking status: Former Smoker     Packs/day: 1.00     Years: 50.00     Pack years: 50.00     Last attempt to quit: 11/15/2002     Years since quitting: 15.9    Smokeless tobacco: Never Used   Substance and Sexual Activity    Alcohol use: Yes     Alcohol/week: 1.5 oz     Types: 3 Standard drinks or equivalent per week     Comment: 3 OZ WINE     Drug use: No    Sexual activity: No     Review of Systems   Constitution: Negative for diaphoresis, weakness and malaise/fatigue.   HENT: Negative.    Eyes: Negative.    Cardiovascular: Negative for chest pain, dyspnea on exertion, irregular heartbeat, leg swelling, near-syncope, orthopnea, palpitations, paroxysmal nocturnal dyspnea and syncope.   Respiratory: Negative for cough and shortness of breath.    Endocrine: Negative.    Hematologic/Lymphatic: Negative.  Does not bruise/bleed easily.   Skin: Negative.    Musculoskeletal: Positive for arthritis and joint pain (knee).   Gastrointestinal: Negative.    Genitourinary: Negative.    Neurological: Positive for light-headedness (resolved).   Psychiatric/Behavioral: Positive for memory loss.   Allergic/Immunologic: Negative.       Objective:     Vital Signs (Most Recent):  Temp: 96.5 °F (35.8 °C) (10/25/18 0831)  Pulse: 79 (10/25/18 0831)  Resp: 16 (10/25/18 0831)  BP: (!) 105/54 (10/25/18 0831)  SpO2: 95 % (10/25/18 0820) Vital Signs (24h Range):  Temp:  [96.5 °F (35.8 °C)-98.9 °F (37.2 °C)] 96.5 °F (35.8 °C)  Pulse:  [] 79  Resp:  [13-55] 16  SpO2:  [77 %-99 %] 95 %  BP: ()/(50-68) 105/54     Weight: 72.1 kg (158 lb 15.2 oz)  Body mass index is 25.66 kg/m².    SpO2: 95 %  O2 Device (Oxygen Therapy): nasal cannula      Intake/Output Summary (Last 24 hours) at 10/25/2018 0956  Last data filed at 10/25/2018 0600  Gross per 24 hour   Intake 200 ml   Output 413 ml   Net -213 ml       Lines/Drains/Airways     Peripheral Intravenous Line                 Peripheral IV - Single Lumen 10/23/18 1554 Right Antecubital 1 day                Physical Exam   Constitutional: She is oriented to person, place, and time. She appears well-developed and well-nourished. No distress.   HENT:   Head: Normocephalic and atraumatic.   Eyes: Right eye exhibits no discharge. Left eye exhibits no discharge.   Neck: No JVD present.   Cardiovascular: Normal rate. An irregularly irregular rhythm present. Exam reveals no gallop and no friction rub.   Murmur heard.  Pulmonary/Chest: Effort normal and breath sounds normal.   Abdominal: Soft. Bowel sounds are normal.   Musculoskeletal: She exhibits no edema.   Neurological: She is alert and oriented to person, place, and time.   Skin: Skin is warm and dry. She is not diaphoretic.   Psychiatric: She has a normal mood and affect. Her behavior is normal. Judgment and thought content normal.       Significant Labs:   BMP:   Recent Labs   Lab 10/23/18  1606 10/24/18  0343 10/25/18  0441   * 141* 136*    139 138   K 4.1 4.2 4.1    102 104   CO2 25 25 23   BUN 67* 56* 49*   CREATININE 1.9* 1.5* 1.4   CALCIUM 9.3 9.7 9.2   MG 2.0 1.9 1.7   , CMP   Recent Labs   Lab 10/23/18  1606 10/24/18  4251  10/25/18  0441    139 138   K 4.1 4.2 4.1    102 104   CO2 25 25 23   * 141* 136*   BUN 67* 56* 49*   CREATININE 1.9* 1.5* 1.4   CALCIUM 9.3 9.7 9.2   PROT 7.3  --   --    ALBUMIN 3.2*  --   --    BILITOT 0.5  --   --    ALKPHOS 203*  --   --    AST 69*  --   --    ALT 78*  --   --    ANIONGAP 13 12 11   ESTGFRAFRICA 28* 37* 40*   EGFRNONAA 24* 32* 35*   , CBC   Recent Labs   Lab 10/23/18  1606 10/24/18  0543   WBC 6.90 8.49   HGB 11.2* 10.7*   HCT 35.0* 33.0*    175   , INR No results for input(s): INR, PROTIME in the last 48 hours., Lipid Panel No results for input(s): CHOL, HDL, LDLCALC, TRIG, CHOLHDL in the last 48 hours., Troponin No results for input(s): TROPONINI in the last 48 hours. and All pertinent lab results from the last 24 hours have been reviewed.    Significant Imaging: Echocardiogram:   2D echo with color flow doppler:   Results for orders placed or performed during the hospital encounter of 10/17/18   2D Echo w/ Color Flow Doppler   Result Value Ref Range    Calculated EF 55 55 - 65    Mitral Valve Regurgitation SEVERE (A)     Est. PA Systolic Pressure 43.2 (A)     Tricuspid Valve Regurgitation MILD TO MODERATE

## 2018-10-25 NOTE — ASSESSMENT & PLAN NOTE
RVR overnight, HR 60-80s this morning, BP stable     Lopressor 25 mg po BID for better rate control   Diltiazem 360 mg po daily   Anticoagulated with Apixaban 2.5 mg po BID

## 2018-10-25 NOTE — PLAN OF CARE
Problem: Patient Care Overview  Goal: Plan of Care Review  Outcome: Ongoing (interventions implemented as appropriate)  O2 saturation 95% on nasal cannula 2 lpm. Will continue to monitor.

## 2018-10-25 NOTE — PLAN OF CARE
Problem: Physical Therapy Goal  Goal: Physical Therapy Goal  Goals to be met by: 2018     Patient will increase functional independence with mobility by performin. Supine to sit with Modified [place]  2. Sit to supine with Modified [place]  3. Rolling to Left and Right with Modified [place].  4. Sit to stand transfer with Supervision  5. Bed to chair transfer with Supervision using Rolling Walker  6. Gait  x 75 feet with Supervision using Rolling Walker.   7. Lower extremity exercise program x10 reps with supervision     Outcome: Ongoing (interventions implemented as appropriate)  Continue working toward goals.

## 2018-10-26 PROBLEM — I48.91 ATRIAL FIBRILLATION WITH RAPID VENTRICULAR RESPONSE: Status: RESOLVED | Noted: 2018-01-01 | Resolved: 2018-01-01

## 2018-10-26 PROBLEM — I48.19 PERSISTENT ATRIAL FIBRILLATION: Status: ACTIVE | Noted: 2018-01-01

## 2018-10-26 NOTE — PLAN OF CARE
[facility] HOME HEALTH ORDERS  FACE TO FACE ENCOUNTER    Patient Name: Ruth Lan  YOB: 1935    PCP: Azikiwe K Lombard, MD   PCP Address: [address] / [place] [address]  PCP Phone Number: [phone]  PCP Fax: [phone]    Encounter Date: 10/26/2018    Admit to Home Health    Diagnoses:  Active Hospital Problems    Diagnosis  POA    Persistent atrial fibrillation [I48.1]  Yes    DNR (do not resuscitate) [Z66]  Yes     Chronic    Pulmonary HTN [I27.20]  Yes     Chronic    Chronic diastolic heart failure [I50.32]  Yes     Chronic    Pleural effusion, right [J90]  Yes     Chronic    Stage 4 chronic kidney disease [N18.4]  Yes     Chronic    Primary osteoarthritis of both knees [M17.0]  Yes     Chronic    Severe mitral regurgitation [I34.0]  Yes     Chronic    MCI (mild cognitive impairment) [G31.84]  Yes     Chronic    Essential hypertension [I10]  Yes     Chronic    Anxiety [F41.9]  Yes     Chronic    History of aortic valve replacement with porcine valve on 9/27/04 [Z95.3]  Not Applicable     Chronic      Resolved Hospital Problems    Diagnosis Date Resolved POA    *Atrial fibrillation with rapid ventricular response [I48.91] 10/26/2018 Yes       No future appointments.  Follow-up Information     Azikiwe K Lombard, MD. Schedule an appointment as soon as possible for a visit in 4 weeks.    Specialty:  Family Medicine  Contact information:  [facility] [place] [id number] [phone]             Verna Monet NP. Schedule an appointment as soon as possible for a visit in 2 weeks.    Specialty:  Cardiology  Contact information:  [facility]  [facility] [id number] [phone]                     I have seen and examined this patient face to face today. My clinical findings that support the need for the home health skilled services and home bound status are the following:  Weakness/numbness causing balance and gait disturbance due to  Weakness/Debility making it taxing to leave home.    Allergies:  Review of patient's allergies indicates:   Allergen Reactions    Etodolac Other (See Comments)     Dehydration    Nsaids (non-steroidal anti-inflammatory drug)      COLITIS/DEHYDRATION       Diet: cardiac diet    Activities: activity as tolerated    Nursing:   SN to complete comprehensive assessment including routine vital signs. Instruct on disease process and s/s of complications to report to MD. Review/verify medication list sent home with the patient at time of discharge  and instruct patient/caregiver as needed. Frequency may be adjusted depending on start of care date.    Notify MD if SBP > 160 or < 90; DBP > 90 or < 50; HR > 120 or < 50; Temp > 101; Other:         CONSULTS:    Physical Therapy to evaluate and treat. Evaluate for home safety and equipment needs; Establish/upgrade home exercise program. Perform / instruct on therapeutic exercises, gait training, transfer training, and Range of Motion.  Occupational Therapy to evaluate and treat. Evaluate home environment for safety and equipment needs. Perform/Instruct on transfers, ADL training, ROM, and therapeutic exercises.    MISCELLANEOUS CARE:  N/A    WOUND CARE ORDERS  n/a      Medications: Review discharge medications with patient and family and provide education.      Current Discharge Medication List      START taking these medications    Details   apixaban (ELIQUIS) 2.5 mg Tab Take 1 tablet (2.5 mg total) by mouth 2 (two) times daily.  Qty: 60 tablet, Refills: 2      diltiaZEM (CARDIZEM CD) 240 MG 24 hr capsule Take 1 capsule (240 mg total) by mouth once daily.  Qty: 30 capsule, Refills: 2      metoprolol tartrate (LOPRESSOR) 25 MG tablet Take 1 tablet (25 mg total) by mouth 2 (two) times daily.  Qty: 60 tablet, Refills: 2         CONTINUE these medications which have NOT CHANGED    Details   acetaminophen (TYLENOL) 500 MG tablet Take 500 mg by mouth every 6 (six) hours as needed for  Pain.      aspirin 81 mg Tab Take 81 mg by mouth Daily. 1 Tablet Oral Every day      cyanocobalamin, vitamin B-12, (VITAMIN B-12) 2,500 mcg Subl Place under the tongue once daily.      DOCOSAHEXANOIC ACID/EPA (FISH OIL ORAL) 2 (two) times daily. 2   Every day      FLAXSEED ORAL Take by mouth once daily.      furosemide (LASIX) 40 MG tablet Take 1 tablet (40 mg total) by mouth 2 (two) times daily.  Qty: 180 tablet, Refills: 3      multivitamin (ONE DAILY MULTIVITAMIN) per tablet Take 1 tablet by mouth once daily.      pravastatin (PRAVACHOL) 80 MG tablet Take 1 tablet (80 mg total) by mouth once daily.  Qty: 90 tablet, Refills: 3    Associated Diagnoses: Pure hypercholesterolemia      tolterodine (DETROL [place]) 4 MG 24 hr capsule Take 1 capsule (4 mg total) by mouth once daily.  Qty: 90 capsule, Refills: 3    Associated Diagnoses: Urinary incontinence without sensory awareness      venlafaxine (EFFEXOR-XR) 37.5 MG 24 hr capsule Take 1 capsule (37.5 mg total) by mouth once daily.  Qty: 30 capsule, Refills: 5    Associated Diagnoses: MCI (mild cognitive impairment); Anxiety         STOP taking these medications       lisinopril (PRINIVIL,ZESTRIL) 2.5 MG tablet Comments:   Reason for Stopping:               I certify that this patient is confined to her home and needs intermittent skilled nursing care, physical therapy and occupational therapy.

## 2018-10-26 NOTE — PLAN OF CARE
Problem: Occupational Therapy Goal  Goal: Occupational Therapy Goal  Goals to be met by: 11/10/2018    Patient will increase functional independence with ADLs by performing:    UE Dressing with Modified [place].  LE Dressing with Modified [place].  Grooming while standing with Modified [place].  Toileting from bedside commode with Modified [place] for hygiene and clothing management.   Rolling to Bilateral with Modified [place].   Supine to sit with Modified [place].  Step transfer with Modified [place]  Toilet transfer to bedside commode with Modified [place].  Upper extremity exercise program x10 reps per handout, with supervision.     Outcome: Ongoing (interventions implemented as appropriate)  Patient requires encouragement to participate in therapy. Patient displays low activity tolerance 2* c/o lower back pain. Patient will benefit from continued OT services.

## 2018-10-26 NOTE — DISCHARGE INSTRUCTIONS
About Arrhythmias (English) View Edit Remove   Atrial Fibrillation, Discharge Instructions for (English) View Edit Remove   Atrial Fibrillation, Understanding (English) View Edit Remove   Atrial Flutter/Fibrillation, What Is (English) View Edit Remove   BLOOD PRESSURE, DISCHARGE INSTRUCTIONS: TAKING YOUR (ENGLISH) View Edit Remove

## 2018-10-26 NOTE — PLAN OF CARE
Problem: Physical Therapy Goal  Goal: Physical Therapy Goal  Goals to be met by: 2018     Patient will increase functional independence with mobility by performin. Supine to sit with Modified [place]  2. Sit to supine with Modified [place]  3. Rolling to Left and Right with Modified [place].  4. Sit to stand transfer with Supervision  5. Bed to chair transfer with Supervision using Rolling Walker  6. Gait  x 75 feet with Supervision using Rolling Walker.   7. Lower extremity exercise program x10 reps with supervision     Outcome: Ongoing (interventions implemented as appropriate)  Continue working toward goals.

## 2018-10-26 NOTE — PLAN OF CARE
Problem: Patient Care Overview  Goal: Plan of Care Review  Outcome: Ongoing (interventions implemented as appropriate)  18 gauge IV removed from left A/C without difficulty. No redness or swelling noted to site at time of removal. Future appointments and new medications reviewed with patient and daughter, both verbalize understanding. Transport present to transfer patient via W/C to main lobby- patient's daughter transporting patient back to [facility] via personal vehicle.

## 2018-10-26 NOTE — PT/OT/SLP PROGRESS
Physical Therapy Treatment    Patient Name:  Ruth Lan   MRN:  [id number]    Recommendations:     Discharge Recommendations:  nursing facility, skilled   Discharge Equipment Recommendations: bedside commode   Barriers to discharge: Decreased caregiver support    Assessment:     Ruth Lan is a 83 y.o. female admitted with a medical diagnosis of Atrial fibrillation with rapid ventricular response.  She presents with the following impairments/functional limitations:  weakness, impaired endurance, impaired functional mobilty, impaired self care skills, gait instability, impaired balance, decreased upper extremity function, decreased lower extremity function, impaired coordination, decreased ROM.  Pt would continue to benefit from P.T. To address impairments listed above.    Rehab Prognosis:  Fair+; patient would benefit from acute skilled PT services to address these deficits and reach maximum level of function.      Recent Surgery: * No surgery found *      Plan:     During this hospitalization, patient to be seen 6 x/week to address the above listed problems via gait training, therapeutic activities, therapeutic exercises, neuromuscular re-education  · Plan of Care Expires:  11/24/18   Plan of Care Reviewed with: patient, daughter    Subjective     Communicated with RN (Luz Marina) prior to session.  Patient found supine upon PT entry to room, agreeable to treatment.      Patient comments/goals: Pt agreed to tx.  Pain/Comfort:  · Pain Rating 1: 0/10  · Pain Addressed 1: Nurse notified  · Pain Rating Post-Intervention 1: 0/10    Patients cultural, spiritual, Voodoo conflicts given the current situation: none reported    Objective:     Patient found with: telemetry, bed alarm     General Precautions: Standard, fall   Orthopedic Precautions:(back precautions)   Braces:       Functional Mobility:  · Bed Mobility:     · Rolling Right: stand by assistance  · Scooting: stand by assistance and to  EOB  · Supine to Sit: minimum assistance and for trunk  · Sit to Supine: stand by assistance and contact guard assistance  · Transfers:     · Sit to Stand:  minimum assistance with rolling walker from EOB and Gianna with RW and grab bar onto and off of toilet  · Toilet Transfer: minimum assistance with  rolling walker and grab bars  using  Step Transfer  · Gait: 6ft x 2 (to and from bathroom) with RW and Gianna/CGA for stability and RW management when turning to transfer to toilet  · Balance: sitting good, standing fair, gait fair/fair-      AM-PAC 6 CLICK MOBILITY  Turning over in bed (including adjusting bedclothes, sheets and blankets)?: 3  Sitting down on and standing up from a chair with arms (e.g., wheelchair, bedside commode, etc.): 3  Moving from lying on back to sitting on the side of the bed?: 3  Moving to and from a bed to a chair (including a wheelchair)?: 3  Need to walk in hospital room?: 3  Climbing 3-5 steps with a railing?: 1  Basic Mobility Total Score: 16       Therapeutic Activities and Exercises:   Seated BLE therex: AP, LAQ, hip flexion/ABD/ADd, and glut sets x 12 reps  Pt was able to perform hygiene standing with close CGA.    Patient left up in chair with all lines intact, call button in reach, chair alarm on and RN notified..    GOALS:   Multidisciplinary Problems     Physical Therapy Goals        Problem: Physical Therapy Goal    Goal Priority Disciplines Outcome Goal Variances Interventions   Physical Therapy Goal     PT, PT/OT Ongoing (interventions implemented as appropriate)     Description:  Goals to be met by: 2018     Patient will increase functional independence with mobility by performin. Supine to sit with Modified [place]  2. Sit to supine with Modified [place]  3. Rolling to Left and Right with Modified [place].  4. Sit to stand transfer with Supervision  5. Bed to chair transfer with Supervision using Rolling Walker  6. Gait  x 75 feet with Supervision  using Rolling Walker.   7. Lower extremity exercise program x10 reps with supervision                      Time Tracking:     PT Received On: 10/26/18  PT Start Time: 1435     PT Stop Time: 1458  PT Total Time (min): 23 min     Billable Minutes: Therapeutic Activity 14 and Therapeutic Exercise 9    Treatment Type: Treatment  PT/PTA: PTA     PTA Visit Number: 2     Mae Keys PTA  10/26/2018

## 2018-10-26 NOTE — PLAN OF CARE
Problem: Patient Care Overview  Goal: Plan of Care Review   10/26/18 0256   Coping/Psychosocial   Plan Of Care Reviewed With patient   2000 OC Lili rounded on pt, pt requested cough medicine. Nurse sent secure chat, Dr Billingsley ordered benzonatate.  2100 nurse was administering evening meds, pt was asked if she needed some cough medicine, pt refused.  Gave tylenol for back pain. Pt rested comfortable the remained of night.     Tele: NSR,  HR 70,  No alarms.     Bed in lowest position, wheels locked, non skid socks, ID band worn, personal items and call bell with in reach, bed alarm set.

## 2018-10-26 NOTE — PLAN OF CARE
Rounded on patient.    Patient lives at [facility]. Daughter at bedside.  Patient and daughter DO NOT want SNF.  Gave daughter Home Health list- she WANTS [facility].    Daughter has oxygen concentrator and portable oxygen at her home from a previous hospital stay (daughter never returned the oxygen- she thinks its from [employer])       10/26/18 1206   Discharge Reassessment   Assessment Type Discharge Planning Reassessment   Provided patient/caregiver education on the expected discharge date and the discharge plan Yes   Discharge Plan A Assisted Living   Patient choice form signed by patient/caregiver No   Can the patient answer the patient profile reliably? Yes, cognitively intact     Charissa Cotton, RN, CCM, CMSRN  RN Transition Navigator  [phone]

## 2018-10-26 NOTE — PT/OT/SLP PROGRESS
Occupational Therapy   Treatment    Name: Ruth Lan  MRN: [id number]  Admitting Diagnosis:  Atrial fibrillation with rapid ventricular response       Recommendations:     Discharge Recommendations: nursing facility, skilled  Discharge Equipment Recommendations:  bedside commode  Barriers to discharge:  Decreased caregiver support    Subjective     Communicated with: NurseNatalie prior to session.  Pain/Comfort:  · Pain Rating 1: 7/10  · Location - Orientation 1: lower  · Location 1: back  · Pain Addressed 1: Cessation of Activity, Nurse notified  · Pain Rating Post-Intervention 1: 9/10    Patients cultural, spiritual, Baptism conflicts given the current situation: none    Objective:     Patient found with: telemetry, bed alarm, oxygen    General Precautions: Standard, fall   Orthopedic Precautions:N/A   Braces: N/A       Bed Mobility:    · Patient completed Rolling/Turning to Left with  moderate assistance  · Patient completed Rolling/Turning to Right with moderate assistance  · Patient completed Scooting/Bridging with maximal assistance  · Patient completed Supine to Sit with minimum assistance  · Patient completed Sit to Supine with moderate assistance     Functional Mobility/Transfers:  · N/a    Activities of Daily Living:  · N/A    Patient left supine with all lines intact, call button in reach, bed alarm on and NSG notified    [facility] 6 Click:  [facility] Total Score: 14    Treatment & Education:  Patient was encourage to participate in therapy and agreed to try as tolerance permits. Patient completed activities as stated above. Patient scooted to EOB with Max A but performed x3 squat scoots laterally to HOB with Min A. Patient c/o increase in pain with movement and requested to end session.  Education:    Assessment:   Patient requires encouragement to participate in therapy. Patient displays low activity tolerance 2* c/o lower back pain. Patient will benefit from continued OT services.    Ruth JHOAN  Edyta is a 83 y.o. female with a medical diagnosis of Atrial fibrillation with rapid ventricular response. Performance deficits affecting function are weakness, gait instability, decreased upper extremity function, impaired endurance, impaired balance, decreased lower extremity function, impaired coordination, impaired self care skills, pain, impaired functional mobilty.      Rehab Prognosis:  Fair; patient would benefit from acute skilled OT services to address these deficits and reach maximum level of function.       Plan:     Patient to be seen 5 x/week to address the above listed problems via self-care/home management, therapeutic activities, therapeutic exercises  · Plan of Care Expires: 11/24/18  · Plan of Care Reviewed with: patient    This Plan of care has been discussed with the patient who was involved in its development and understands and is in agreement with the identified goals and treatment plan    GOALS:   Multidisciplinary Problems     Occupational Therapy Goals        Problem: Occupational Therapy Goal    Goal Priority Disciplines Outcome Interventions   Occupational Therapy Goal     OT, PT/OT Ongoing (interventions implemented as appropriate)    Description:  Goals to be met by: 11/10/2018    Patient will increase functional independence with ADLs by performing:    UE Dressing with Modified [place].  LE Dressing with Modified [place].  Grooming while standing with Modified [place].  Toileting from bedside commode with Modified [place] for hygiene and clothing management.   Rolling to Bilateral with Modified [place].   Supine to sit with Modified [place].  Step transfer with Modified [place]  Toilet transfer to bedside commode with Modified [place].  Upper extremity exercise program x10 reps per handout, with supervision.                      Time Tracking:     OT Date of Treatment: 10/26/18  OT Start Time: 1347  OT Stop Time: 1357  OT Total Time (min): 10  min    Billable Minutes:Therapeutic Activity 10    ASHIA Mari  10/26/2018

## 2018-10-26 NOTE — PHYSICIAN QUERY
PT Name: Ruth Lan  MR #: [id number]    Physician Query Form - Atrial Fibrillation Specificity     CDS/: Sarah Montiel               Contact information:[email or web address]     This form is a permanent document in the medical record.     Query Date: October 26, 2018    By submitting this query, we are merely seeking further clarification of documentation. Please utilize your independent clinical judgment when addressing the question(s) below.    The medical record contains the following:   Indicators     Supporting Clinical Findings Location in Medical Record   x Atrial Fibrillation AF RVR  EICU note 10-24    EKG results     x Medication Controlled with diltiazem followed by digoxin and now off diltiazem    EICU note 10-24    Treatment      Other         Provider, please further specify the Atrial Fibrillation diagnosis.    [  ] Paroxysmal  [x  ] Other (please specify): ___Persistent_________________________  [  ] Clinically Undetermined    Please document in your progress notes daily for the duration of treatment until resolved, and include in your discharge summary.

## 2018-10-26 NOTE — PLAN OF CARE
10/26/18 1056   Medicare Message   Important Message from Medicare regarding Discharge Appeal Rights Given to patient/caregiver;Explained to patient/caregiver;Signed/date by patient/caregiver   Date IMM was signed 10/26/18   Time IMM was signed [id number]

## 2018-10-26 NOTE — PLAN OF CARE
Patient and daughter declined SNF  Daughter would like [facility]- sent referral to [facility]  Dr. Scales sent discharge meds to [facility]- called [facility] and spoke to Frank- they will delivery them to [facility] this evening- informed them that patient will be discharged today.     Called [facility] and spoke to nurse- will have patient's nurse call report to Bharath [phone]    Daughter to transport patient to [facility]    Will refer to outpatient  as suggested.    Future Appointments   Date Time Provider Department Center   11/9/2018  9:20 AM Miguel Nguyễn MD [facility] CARDIO [place] [facility]   11/13/2018  1:00 PM Azikiwe K. Lombard, MD [facility] MED [place]        10/26/18 1537   Final Note   Assessment Type Final Discharge Note   Anticipated Discharge Disposition Home-[facility]   Hospital Follow Up  Appt(s) scheduled? Yes   Discharge plans and expectations educations in teach back method with documentation complete? Yes   Right Care Referral Info   Post Acute Recommendation Home-care   Referral Type home health   Facility Name [facility] Facility     Charissa Cotton, RN, CCM, CMSRN  RN Transition Navigator  [phone]

## 2018-10-26 NOTE — PT/OT/SLP PROGRESS
Occupational Therapy  Missed Visit    Patient Name:  Ruth Lan   MRN:  [id number]    Patient not seen today secondary to politely declining therapy. Patient's family would like her to return to [facility] with HH OT/PT. Will follow-up later as time permits.    ASHIA aMri  10/26/2018

## 2018-10-28 NOTE — DISCHARGE SUMMARY
[facility] Medicine  Discharge Summary      Patient Name: Ruth Lan  MRN: [id number]  Admission Date: 10/23/2018  Hospital Length of Stay: 3 days  Discharge Date and Time: 10/26/2018  4:50 PM  Attending Physician: Jett Scales MD   Discharging Provider: Jett Scales MD  Primary Care Provider: Azikiwe K Lombard, MD      HPI:   Ruth Lan is a 83 y.o. 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  She is hard of hearing.  She lives at [facility] in [place], [place].  The assisted living facility gives her her medications.  She ambulates with a rollator.  Her primary care physician is Dr. Azikiwe Lombard.  Her neurologist is Dr. Be Lassiter.  Her oncologist is Dr. Tobin Darling.  She is DNR.   On 10/23/18, she went to sit on her rollator and her seat broke.  Later in the day, she was ambulating to bingo with her rollator, felt dizzy, fell on her buttocks, then fell backward and hit her head on the floor.  EMS found her to be in atrial fibrillation with rapid ventricular response, with pulse in the  140s.  In the ED, chest X-ray showed chronic right pleural effusion.  BNP was 952, higher than on 7/7/18 when she was hospitalized for decompensated heart failure and required IV diuresis, however this time she was not hypoxic, with oxygen saturation of 100% on room air.  She was given IV diltiazem, IV digoxin, then put on diltiazem drip for persistent rapid ventricular response.  She was admitted to [facility].  Rate improved on diltiazem drip.  She denied chest pain, shortness of breath, or palpitations.  She had back pain due to her fall.  On physical exam, she expressed the desire to return home.    * No surgery found *      Hospital Course:   HR 60-80s this morning. RVR overnight. Metoprolol 25 mg po BID started for better rate control. Diltiazem continued at 240 mg po daily. BP stable. The patient denies chest pain, SOB and dizziness. Plan to wean O2, ambulate patient, monitor telemetry. She was also started on Eliquis for stroke prophylaxis.      Consults:   Consults (From admission, onward)        Status Ordering Provider     Inpatient consult to [facility]  Once     Provider:  Gurinder Venegas MD    Completed YULY CAMEJO     IP consult to case management  Once     Provider:  (Not yet assigned)    Completed SAMMY CUMMINGS new Assessment & Plan notes have been filed under this hospital service since the last note was generated.  Service: Hospital Medicine    Final Active Diagnoses:    Diagnosis Date Noted POA    Persistent atrial fibrillation [I48.1] 10/24/2018 Yes    DNR (do not resuscitate) [Z66] 07/09/2018 Yes     Chronic    Pulmonary HTN [I27.20] 07/09/2018 Yes     Chronic    Chronic diastolic heart failure [I50.32] 07/08/2018 Yes     Chronic    Pleural effusion, right [J90] 07/07/2018 Yes     Chronic    Stage 4 chronic kidney disease [N18.4] 06/26/2017 Yes     Chronic    Primary osteoarthritis of both knees [M17.0] 12/27/2016 Yes     Chronic    Severe  mitral regurgitation [I34.0] 03/31/2016 Yes     Chronic    MCI (mild cognitive impairment) [G31.84] 02/24/2015 Yes     Chronic    Essential hypertension [I10] 09/13/2013 Yes     Chronic    Anxiety [F41.9] 10/02/2012 Yes     Chronic    History of aortic valve replacement with porcine valve on 9/27/04 [Z95.3] 09/27/2004 Not Applicable     Chronic      Problems Resolved During this Admission:    Diagnosis Date Noted Date Resolved POA    PRINCIPAL PROBLEM:  Atrial fibrillation with rapid ventricular response [I48.91] 10/23/2018 10/26/2018 Yes       Discharged Condition: good    Disposition: Home-Health Care Svc    Follow Up:  Follow-up Information     Azikiwe K Lombard, MD On 11/13/2018.    Specialty:  Family Medicine  Why:  1pm  Contact information:  [address]  [place] [address]  [phone]             Miguel Nguyễn MD On 11/9/2018.    Specialty:  Cardiovascular Disease  Why:  9:20am  Contact information:  [address] [facility] [address]  [phone]             [facility].    Specialty:  Home Health Services  Why:  Home Health  Contact information:  [address]  [address]  [place] [address]  [phone]                 Patient Instructions:      Diet Cardiac     Notify your health care provider if you experience any of the following:  temperature >100.4     Notify your health care provider if you experience any of the following:  difficulty breathing or increased cough     Notify your health care provider if you experience any of the following:  persistent dizziness, light-headedness, or visual disturbances     Notify your health care provider if you experience any of the following:  increased confusion or weakness     Notify your health care provider if you experience any of the following:  persistent nausea and vomiting or diarrhea     Activity as tolerated       Significant Diagnostic Studies: Labs:   BMP:   Recent Labs   Lab 10/26/18  0426   *   NA  137   K 4.1      CO2 23   BUN 47*   CREATININE 1.4   CALCIUM 9.4   MG 1.6    and CBC No results for input(s): WBC, HGB, HCT, PLT in the last 48 hours.  Radiology:   Imaging Results          CT Head Without Contrast (Final result)  Result time 10/23/18 20:08:33    Final result by Sudheer Ayala MD (10/23/18 20:08:33)                 Impression:      No acute large vascular territory infarct or intracranial hemorrhage identified.    Mild senescent changes as above.      Electronically signed by: Sudheer Ayala MD  Date:    10/23/2018  Time:    20:08             Narrative:    EXAMINATION:  CT HEAD WITHOUT CONTRAST    CLINICAL HISTORY:  Syncope/fainting;    TECHNIQUE:  Low dose axial CT images obtained throughout the head without intravenous contrast. Sagittal and coronal reconstructions were performed.    COMPARISON:  Head CT 09/13/2013    FINDINGS:  Intracranial compartment:    Age-appropriate mild generalized cerebral volume loss.  Mild degree of supratentorial white matter chronic small vessel ischemic change.  No extra-axial blood or fluid collections.    No definite new focal areas of abnormal parenchymal attenuation.  No parenchymal mass, hemorrhage, edema or major vascular distribution infarct.  Skull base atherosclerotic vascular calcifications noted.    Skull/extracranial contents (limited evaluation): No fracture. Mastoid air cells and paranasal sinuses are essentially clear.                               X-Ray Chest 1 View (Final result)  Result time 10/23/18 16:48:07    Final result by Lizzette Fry MD (10/23/18 16:48:07)                 Impression:      Please see above.      Electronically signed by: Lizzette Fry MD  Date:    10/23/2018  Time:    16:48             Narrative:    EXAMINATION:  XR CHEST 1 VIEW    CLINICAL HISTORY:  Tachycardia, unspecified    TECHNIQUE:  Single frontal view of the chest was performed.    COMPARISON:  Chest radiograph: 08/02/2018.  07/08/2018.   07/07/2018.    PET/CT: 07/26/2018.    Chest CT: 07/07/2018.    FINDINGS:  Additional history: Breast carcinoma, recent weight loss.  Mediastinal lymph nodes were weakly avid on PET-CT and July 2018.    Single AP view of the chest was obtained with the patient in left posterior oblique rotation.  The patient is status post aortic valve replacement.    The patient has known right pleural fluid and partial atelectasis of the right lung accounting for soft tissue opacity occupying the base of the right hemithorax.  The source of the patient's chronic right pleural fluid is unclear; malignancy is not excluded.    There is abundant calcification in the mitral annulus, a normal senescent change.    I detect no convincing evidence of new focal pulmonary disease.  I cannot exclude interstitial lung disease such as mild interstitial pulmonary edema in this 83-year-old woman with known aortic valve prosthesis and calcific atherosclerosis of the aorta and its branches.    I detect no pneumothorax, left pleural fluid, pneumomediastinum, pneumoperitoneum or significant osseous abnormality.                               X-Ray Sacrum And Coccyx (Final result)  Result time 10/23/18 16:47:38    Final result by Sly Hu MD (10/23/18 16:47:38)                 Impression:      No acute fracture      Electronically signed by: Sly Hu MD  Date:    10/23/2018  Time:    16:47             Narrative:    EXAMINATION:  XR SACRUM AND COCCYX    CLINICAL HISTORY:  Unspecified fall, initial encounter    TECHNIQUE:  Three views of the sacrum were obtained.    COMPARISON:  None    No comparison.    FINDINGS:  There is no acute fracture or subluxation.  Soft tissues are unremarkable.                                Cardiac Graphics: Echocardiogram:   2D echo with color flow doppler:   Results for orders placed or performed during the hospital encounter of 10/17/18   2D Echo w/ Color Flow Doppler   Result Value Ref Range    Calculated  EF 55 55 - 65    Mitral Valve Regurgitation SEVERE (A)     Est. PA Systolic Pressure 43.2 (A)     Tricuspid Valve Regurgitation MILD TO MODERATE        Pending Diagnostic Studies:     None         Medications:  Reconciled Home Medications:      Medication List      START taking these medications    apixaban 2.5 mg Tab  Commonly known as:  ELIQUIS  Take 1 tablet (2.5 mg total) by mouth 2 (two) times daily.     diltiaZEM 240 MG 24 hr capsule  Commonly known as:  CARDIZEM CD  Take 1 capsule (240 mg total) by mouth once daily.     metoprolol tartrate 25 MG tablet  Commonly known as:  LOPRESSOR  Take 1 tablet (25 mg total) by mouth 2 (two) times daily.        CHANGE how you take these medications    furosemide 40 MG tablet  Commonly known as:  LASIX  Take 1 tablet (40 mg total) by mouth 2 (two) times daily.  What changed:  when to take this     venlafaxine 37.5 MG 24 hr capsule  Commonly known as:  EFFEXOR-XR  Take 1 capsule (37.5 mg total) by mouth once daily.  What changed:  when to take this        CONTINUE taking these medications    acetaminophen 500 MG tablet  Commonly known as:  TYLENOL  Take 500 mg by mouth every 6 (six) hours as needed for Pain.     aspirin 81 mg Tab  Take 81 mg by mouth Daily. 1 Tablet Oral Every day     cyanocobalamin (vitamin B-12) 2,500 mcg Subl  Commonly known as:  VITAMIN B-12  Place under the tongue once daily.     FISH OIL ORAL  2 (two) times daily. 2   Every day     FLAXSEED ORAL  Take by mouth once daily.     ONE DAILY MULTIVITAMIN per tablet  Generic drug:  multivitamin  Take 1 tablet by mouth once daily.     pravastatin 80 MG tablet  Commonly known as:  PRAVACHOL  Take 1 tablet (80 mg total) by mouth once daily.     tolterodine 4 MG 24 hr capsule  Commonly known as:  DETROL LA  Take 1 capsule (4 mg total) by mouth once daily.        STOP taking these medications    lisinopril 2.5 MG tablet  Commonly known as:  PRINIVIL,ZESTRIL            Indwelling Lines/Drains at time of discharge:    Lines/Drains/Airways          None          Time spent on the discharge of patient: 35 minutes  Patient was seen and examined on the date of discharge and determined to be suitable for discharge.         Jett Scales MD  Department of Hospital Medicine  [facility]

## 2018-11-01 PROBLEM — R79.89 POSITIVE D DIMER: Status: ACTIVE | Noted: 2018-01-01

## 2018-11-01 NOTE — PROGRESS NOTES
Ms. Lan is a patient of Dr. Zelaya and was last seen in [facility] Cardiology 7/16/2018.    Subjective:   Patient ID:  Ruth Lan is a 83 y.o. female who presents for follow-up of History of aortic valve replacement with porcine valve on 9/ (Hospital d/c 10/26/18); Hypertension; Tachycardia; and Atrial Fibrillation    Problems:  HFpEF, EF 55-60%  pAF  CAD by CT scan 7/7/18  Cardiomegaly  Pulmonary hypertension,  mmHg  bioprosthetic TAVR in 2003  Severe MR with MAC   breast CA (s/p x2 R breast lumpectomy and radiation in 2012)   HTN   HLD  MR   50 pack year h/o smoking, quit in 2002  CKD stage III  DNR Code Status    HPI  Ms. Lan is in clinic today for routine follow up. Fell recently after an episode of light headedness and as admitted at [facility] from 10/23-10/26. At that time noted to have new onset AF RVR, she was diuresed and rate controlled. Discharged on eliquis, metoprolol and diltiazem. She lives in an assisted living complex and her medications are taken care of by them. Sleeps flat with 1 pillow. Patient denies chest pain with exertion or at rest, palpitations, SOB, dizziness, syncope, edema, orthopnea, PND, or claudication.  Reports no routine exercise.  Reports following a low salt diet. She is no longer using oxygen during the day and reports no SOB at rest.      Review of Systems   Constitution: Negative for decreased appetite, diaphoresis, weakness, malaise/fatigue, weight gain and weight loss.   Eyes: Negative for visual disturbance.   Cardiovascular: Negative for chest pain, claudication, dyspnea on exertion, irregular heartbeat, leg swelling, near-syncope, orthopnea, palpitations, paroxysmal nocturnal dyspnea and syncope.        Denies chest pressure   Respiratory: Negative for cough, hemoptysis, shortness of breath, sleep disturbances due to breathing and snoring.    Endocrine: Negative for cold intolerance and heat intolerance.   Hematologic/Lymphatic: Negative for bleeding  "problem. Does not bruise/bleed easily.   Musculoskeletal: Negative for myalgias.   Gastrointestinal: Negative for bloating, abdominal pain, anorexia, change in bowel habit, constipation, diarrhea, nausea and vomiting.   Neurological: Negative for difficulty with concentration, disturbances in coordination, excessive daytime sleepiness, dizziness, headaches, light-headedness, loss of balance and numbness.   Psychiatric/Behavioral: The patient does not have insomnia.        Allergies and current medications updated and reviewed:  Review of patient's allergies indicates:   Allergen Reactions    Etodolac Other (See Comments)     Dehydration    Nsaids (non-steroidal anti-inflammatory drug)      COLITIS/DEHYDRATION     Current Outpatient Medications   Medication Sig    FLAXSEED ORAL Take by mouth once daily.    acetaminophen (TYLENOL) 500 MG tablet Take 500 mg by mouth every 6 (six) hours as needed for Pain.    aspirin 81 mg Tab Take 81 mg by mouth Daily. 1 Tablet Oral Every day    DOCOSAHEXANOIC ACID/EPA (FISH OIL ORAL) 2 (two) times daily. 2   Every day    furosemide (LASIX) 40 MG tablet Take 1 tablet (40 mg total) by mouth 2 (two) times daily. (Patient taking differently: Take 40 mg by mouth once daily. )    lisinopril (PRINIVIL,ZESTRIL) 5 MG tablet Take 1 tablet (5 mg total) by mouth once daily.    multivitamin (ONE DAILY MULTIVITAMIN) per tablet Take 1 tablet by mouth once daily.    pravastatin (PRAVACHOL) 80 MG tablet Take 1 tablet (80 mg total) by mouth once daily.    tolterodine (DETROL LA) 4 MG 24 hr capsule Take 1 capsule (4 mg total) by mouth once daily.     No current facility-administered medications for this visit.      Objective:     Right Arm BP - Sittin/71 (18 1102)  Left Arm BP - Sittin/61 (18 1102)    /61 (BP Location: Left arm, Patient Position: Sitting, BP Method: Large (Automatic))   Pulse 99   Ht 5' 3" (1.6 m)   BMI 28.16 kg/m²     Physical Exam "   Constitutional: She is oriented to person, place, and time. Vital signs are normal. She appears well-developed and well-nourished. She is active. No distress.   HENT:   Head: Normocephalic and atraumatic.   Eyes: Conjunctivae and lids are normal. No scleral icterus.   Neck: Neck supple. Normal carotid pulses, no hepatojugular reflux and no JVD present. Carotid bruit is not present.   Cardiovascular: Normal rate, regular rhythm, S1 normal, S2 normal and intact distal pulses. PMI is not displaced. Exam reveals no gallop and no friction rub.   Murmur heard.   Harsh midsystolic murmur is present with a grade of 2/6 at the upper right sternal border radiating to the neck.  Pulmonary/Chest: Effort normal. No respiratory distress. She has decreased breath sounds in the right lower field. She has no wheezes. She has no rhonchi. She has no rales. She exhibits no tenderness.   Abdominal: Soft. Normal appearance and bowel sounds are normal. She exhibits no distension, no fluid wave, no abdominal bruit, no ascites and no pulsatile midline mass. There is no hepatosplenomegaly. There is no tenderness.   Musculoskeletal: She exhibits edema (R>L, more in foot).   Neurological: She is alert and oriented to person, place, and time. Gait normal.   Skin: Skin is warm, dry and intact. No rash noted. She is not diaphoretic. Nails show no clubbing.   Psychiatric: She has a normal mood and affect. Her speech is normal and behavior is normal. Judgment and thought content normal. Cognition and memory are normal.   Nursing note and vitals reviewed.      Chemistry        Component Value Date/Time     10/26/2018 0426    K 4.1 10/26/2018 0426     10/26/2018 0426    CO2 23 10/26/2018 0426    BUN 47 (H) 10/26/2018 0426    CREATININE 1.4 10/26/2018 0426     (H) 10/26/2018 0426        Component Value Date/Time    CALCIUM 9.4 10/26/2018 0426    ALKPHOS 203 (H) 10/23/2018 1606    AST 69 (H) 10/23/2018 1606    ALT 78 (H) 10/23/2018  1606    BILITOT 0.5 10/23/2018 1606    ESTGFRAFRICA 40 (A) 10/26/2018 0426    EGFRNONAA 35 (A) 10/26/2018 0426        Lab Results   Component Value Date    HGBA1C 5.0 07/16/2018       Recent Labs   Lab 11/22/17  1108 07/07/18  0956 07/10/18  0500  10/23/18  2112 10/23/18  2242 10/24/18  0543   WHITE BLOOD CELL COUNT 5.60 8.40  --    < >  --   --  8.49   HEMOGLOBIN 13.5 7.9 L  --    < >  --   --  10.7 L   HEMATOCRIT 41.8 24.8 L  --    < >  --   --  33.0 L   MCV 98 97  --    < >  --   --  95   PLATELETS 196 248  --    < >  --   --  175   BNP  --  815 H 717 H  --  952 H  --   --    TSH 3.427  --   --    < >  --  3.015  --    CHOLESTEROL 195  --   --   --   --   --   --    HDL 60  --   --   --   --   --   --    LDL CHOLESTEROL 111.4  --   --   --   --   --   --    TRIGLYCERIDES 118  --   --   --   --   --   --    HDL/CHOLESTEROL RATIO 30.8  --   --   --   --   --   --     < > = values in this interval not displayed.            Test(s) Reviewed  I have reviewed the following in detail:  [] Stress test   [] Angiography   [x] Echocardiogram   [x] Labs   [] Other:         Assessment/Plan:   Essential hypertension  controlled    Severe mitral regurgitation  Compensated on exam, calcific MS also noted  No limitations    A-fib  New onset, LA enlarged in setting of MS making restoration to sinus a challenge   Increase eliquis to 5mg as 2.5 too low of a dose  We discussed bleeding risks with patient and her daughter  Continue BB and CCB    Positive D dimer  Will rule out PE with CT and DVT with ultrasound of LE  If positive will need to increase eliquis dose to DVT/PE dosing      Shortness of breath  -     Cancel: CAR Ultrasound doppler venous leg left; Future  -     Cancel: CAR Ultrasound doppler venous leg right; Future  -     CTA Chest Non Coronary; Future; Expected date: 11/01/2018    Essential hypertension    Severe mitral regurgitation    Atrial fibrillation, unspecified type    Positive D dimer    Other orders  -      apixaban (ELIQUIS) 5 mg Tab; Take 1 tablet (5 mg total) by mouth 2 (two) times daily.  Dispense: 60 tablet; Refill: 6          No Follow-up on file.

## 2018-11-01 NOTE — TELEPHONE ENCOUNTER
Reviewed pt's record - spoke with Yelena and informed her that Eliquis has been increased to 5 mg twice a day.

## 2018-11-01 NOTE — TELEPHONE ENCOUNTER
----- Message from Zoe Florez sent at 11/1/2018  4:33 PM CDT -----  Contact: Snow with [facility] [phone]  Pharmacist need clarification on the pt Eliquis 5 mg. She wants to know if the Eliquis 5 mg is an additional med for the Eliquis 2.5 mg that the pt is currently taking or a replacement. Call transferred to the nurse.  LOV 11/1/18 Dr. Ceballos    Thanks

## 2018-11-01 NOTE — ASSESSMENT & PLAN NOTE
New onset, LA enlarged in setting of MS making restoration to sinus a challenge   Increase eliquis to 5mg as 2.5 too low of a dose  We discussed bleeding risks with patient and her daughter  Continue BB and CCB

## 2018-11-01 NOTE — ASSESSMENT & PLAN NOTE
Will rule out PE with CT and DVT with ultrasound of LE  If positive will need to increase eliquis dose to DVT/PE dosing

## 2018-11-07 NOTE — TELEPHONE ENCOUNTER
Home Health SOC 10/27/2018 - 12/25/2018 with [facility] ([facility] - Dr. Jett Scales. Patient received SN, PT and OT services.

## 2018-11-08 NOTE — TELEPHONE ENCOUNTER
I spoke with Ms Vale's daughter, Tiana.  She continues to feel dizzy and fell again. Her antidepressant was stopped. We discussed referring her to EP to consider cardioversion and getting her off of the rate controlling medication which is lowering her blood pressure to see if it helps. I will also check a CBC.

## 2018-11-21 PROBLEM — I48.0 PAROXYSMAL ATRIAL FIBRILLATION: Status: ACTIVE | Noted: 2018-01-01

## 2018-11-21 PROBLEM — I48.20 CHRONIC ATRIAL FIBRILLATION: Status: ACTIVE | Noted: 2018-01-01

## 2018-11-21 NOTE — PROGRESS NOTES
Chief Complaint   Patient presents with    Hospital Follow Up       Ruth Lan is a 83 y.o. female who presents per the Chief Complaint.  Pt is known to me and was last seen by me on 7/18/2018.  All known chronic medical issues have been documented.       Hypertension   This is a chronic problem. The current episode started more than 1 year ago. The problem has been waxing and waning since onset. The problem is uncontrolled. Associated symptoms include anxiety. Pertinent negatives include no blurred vision, chest pain, headaches, malaise/fatigue, neck pain, orthopnea, palpitations, peripheral edema, PND, shortness of breath or sweats. There are no associated agents to hypertension. Risk factors for coronary artery disease include dyslipidemia, obesity and post-menopausal state. Past treatments include calcium channel blockers. The current treatment provides moderate improvement. There is no history of angina, kidney disease, CAD/MI, CVA, heart failure, left ventricular hypertrophy, PVD or retinopathy. There is no history of chronic renal disease, coarctation of the aorta, hyperaldosteronism, hypercortisolism, hyperparathyroidism, a hypertension causing med, pheochromocytoma, renovascular disease, sleep apnea or a thyroid problem.   Heart Problem   This is a recurrent problem. The current episode started more than 1 month ago. The problem occurs intermittently. The problem has been unchanged. Associated symptoms include arthralgias, fatigue and myalgias. Pertinent negatives include no abdominal pain, anorexia, change in bowel habit, chest pain, chills, congestion, coughing, diaphoresis, fever, headaches, joint swelling, nausea, neck pain, numbness, rash, sore throat, swollen glands, urinary symptoms, vertigo, visual change, vomiting or weakness. The symptoms are aggravated by standing and walking.        ROS  Review of Systems   Constitutional: Positive for activity change and fatigue. Negative for appetite  "change, chills, diaphoresis, fever, malaise/fatigue and unexpected weight change.   HENT: Negative.  Negative for congestion, ear pain, hearing loss, nosebleeds, postnasal drip, rhinorrhea, sinus pressure, sneezing, sore throat and trouble swallowing.    Eyes: Negative for blurred vision, pain and visual disturbance.   Respiratory: Negative for apnea, cough, choking, chest tightness, shortness of breath, wheezing and stridor.    Cardiovascular: Negative for chest pain, palpitations, orthopnea, leg swelling and PND.   Gastrointestinal: Negative for abdominal distention, abdominal pain, anal bleeding, anorexia, blood in stool, change in bowel habit, constipation, diarrhea, nausea, rectal pain and vomiting.   Genitourinary: Negative for difficulty urinating, dysuria, frequency and urgency.   Musculoskeletal: Positive for arthralgias and myalgias. Negative for back pain, gait problem, joint swelling and neck pain.   Skin: Negative.  Negative for rash.   Allergic/Immunologic: Negative for environmental allergies and food allergies.   Neurological: Negative.  Negative for dizziness, vertigo, seizures, syncope, weakness, light-headedness, numbness and headaches.   Psychiatric/Behavioral: Negative.  Negative for confusion, decreased concentration, dysphoric mood and sleep disturbance. The patient is not nervous/anxious.        Physical Exam  Vitals:    11/21/18 1057   BP: 96/62   Pulse: (!) 138   Resp: 17   Temp: 97.8 °F (36.6 °C)    Body mass index is 28.24 kg/m².  Weight: 72.3 kg (159 lb 6.3 oz)   Height: 5' 3" (160 cm)     Physical Exam   Constitutional: She is oriented to person, place, and time. She appears well-developed and well-nourished. She is active and cooperative.  Non-toxic appearance. She does not have a sickly appearance. She does not appear ill. No distress.   HENT:   Head: Normocephalic and atraumatic.   Right Ear: Hearing, tympanic membrane, external ear and ear canal normal.   Left Ear: Hearing, tympanic " membrane, external ear and ear canal normal.   Nose: Nose normal. No rhinorrhea or nasal deformity.   Mouth/Throat: Uvula is midline, oropharynx is clear and moist and mucous membranes are normal.   Eyes: Conjunctivae, EOM and lids are normal. Pupils are equal, round, and reactive to light. No scleral icterus.   Neck: Normal range of motion. Neck supple. No thyroid mass and no thyromegaly present.   Cardiovascular: Normal rate, regular rhythm, S1 normal and S2 normal. Exam reveals no gallop and no friction rub.   Murmur heard.   Systolic murmur is present with a grade of 5/6.  Mechanical murmur noted   Pulmonary/Chest: Effort normal and breath sounds normal. She has no wheezes. She has no rales.   Abdominal: Soft. She exhibits no mass. There is no tenderness. There is no rebound and no guarding.   Musculoskeletal:        Right knee: She exhibits decreased range of motion, swelling and bony tenderness. She exhibits no effusion, no ecchymosis, no deformity, no laceration, no erythema, normal alignment, no LCL laxity, normal patellar mobility and normal meniscus. Tenderness found. Medial joint line and lateral joint line tenderness noted. No MCL, no LCL and no patellar tendon tenderness noted.   Lymphadenopathy:        Head (right side): No submental, no submandibular and no tonsillar adenopathy present.        Head (left side): No submental, no submandibular and no tonsillar adenopathy present.     She has no cervical adenopathy.   Neurological: She is alert and oriented to person, place, and time.   Skin: Skin is warm, dry and intact. She is not diaphoretic. No pallor.   Psychiatric: She has a normal mood and affect. Her speech is normal and behavior is normal. Judgment and thought content normal. Cognition and memory are normal.       Assessment & Plan    1. Severe mitral regurgitation  Managed by Cardiology; patient is not a candidate for surgery or invasive procedures.      2. Essential hypertension  Patient was  counseled and encouraged to maintain a low sodium diet, as well as increasing physical activity.  Recommend random BP checks at home on a regular basis.  Repeat BP at end of visit was not necessary. Will continue medication at this time, and follow up in 3-6 months, or sooner if blood pressure begins to increase.  Advised to discuss with Cardiology regarding continuing medication.    3. Chronic diastolic heart failure  Stable; no therapeutic changes at this time.     4. Paroxysmal atrial fibrillation  Stable; no therapeutic changes at this time.     5. Pleural effusion, right  Stable; no therapeutic changes at this time.       Follow up documented    ACTIVE MEDICAL ISSUES:  Documented in Problem List    PAST MEDICAL HISTORY  Documented    PAST SURGICAL HISTORY:  Documented    SOCIAL HISTORY:  Documented    FAMILY HISTORY:  Documented    ALLERGIES AND MEDICATIONS: updated and reviewed.  Documented    Health Maintenance       Date Due Completion Date    Pneumococcal (65+) (1 of 2 - PCV13) 03/07/2000 ---    Lipid Panel 11/22/2018 11/22/2017    DEXA SCAN 02/02/2019 2/2/2016    TETANUS VACCINE 11/22/2027 11/22/2017 (Declined)    Override on 11/22/2017: Declined

## 2018-12-11 NOTE — PROGRESS NOTES
Subjective:    Patient ID:  Ruth Lan is a 83 y.o. female who presents for evaluation of No chief complaint on file.      83 yoF AS s/p AVR here for AF management. She has developed HF symptoms (LE edema and dyspnea) since her AF diagnosis. She has dementia and is DNR. She was started on eliquis 5 mg bid (over 80, Cr 1.6, 72 kg). She is here for CV consideration. CHADSVASC of 5. History taken from her daughter and chart review.     10/18:   CONCLUSIONS     1 - Severe left atrial enlargement.     2 - Normal left ventricular systolic function (EF 55-60%).     3 - No wall motion abnormalities.     4 - Normal right ventricular systolic function .     5 - S/P surgical AVR, NISHA = 0.98 cm2, AVAi = 0.55 cm2/m2, peak velocity = 2.7 m/s, mean gradient = 14 mmHg.     6 - Severe mitral regurgitation.     7 - Marked mitral annular calcification w a mean gradient of 4 mm Hg.     8 - Mild to moderate tricuspid regurgitation.     9 - Pulmonary hypertension. The estimated PA systolic pressure is 43 mmHg.     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  No date: Chronic diastolic heart failure  No date: Heart murmur  No date: Hyperlipidemia  No date: Hypertension  No date: Left atrial enlargement  No date: Severe mitral regurgitation    Past Surgical History:  12/6/2012: BIOPSY, LYMPH NODE, SENTINEL; Right      Comment:  Performed by Demetri Epstein MD at [facility] OR 2ND FLR  11/2012: BREAST BIOPSY      Comment:  Right breast- invasive ductal carcinoma  10/21/2008: CATARACT EXTRACTION W/  INTRAOCULAR LENS IMPLANT; Right      Comment:  OU   No date: CATARACT EXTRACTION W/  INTRAOCULAR LENS IMPLANT; Left  12/6/2012: INSERTION, LOCALIZATION WIRE; Right      Comment:  Performed by Demetri Epstein MD at [facility] OR 2ND FLR  1/14/2013: [id number];  Right      Comment:  Performed by Dem[facility]ri Epstein MD at [facility] OR [facility] FLR  2004: TISSUE AORTIC VALVE REPLACEMENT  No date: TUBAL LIGATION    Social History    Socioeconomic History      Marital status:       Spouse name: Not on file      Number of children: Not on file      Years of education: Not on file      Highest education level: Not on file    Social Needs      Financial resource strain: Not on file      Food insecurity - worry: Not on file      Food insecurity - inability: Not on file      Transportation needs - medical: Not on file      Transportation needs - non-medical: Not on file    Occupational History      Not on file    Tobacco Use      Smoking status: Former Smoker        Packs/day: 1.00        Years: 50.00        Pack years: 50        Quit date: 11/15/2002        Years since quittin.0      Smokeless tobacco: Never Used    Substance and Sexual Activity      Alcohol use: Yes        Alcohol/week: 1.5 oz        Types: 3 Standard drinks or equivalent per week        Comment: 3 OZ WINE       Drug use: No      Sexual activity: No    Other Topics      Concerns:        Not on file    Social History Narrative      Not on file      Review of patient's family history indicates:  Problem: Cancer      Relation: Maternal Aunt          Age of Onset: (Not Specified)          Comment: cervical or breast  Problem: Breast cancer      Relation: Maternal Grandmother          Age of Onset: (Not Specified)  Problem: Heart disease      Relation: Father          Age of Onset: (Not Specified)  Problem: No Known Problems      Relation: Mother          Age of Onset: (Not Specified)  Problem: No Known Problems      Relation: Sister          Age of Onset: (Not Specified)  Problem: No Known Problems      Relation: Brother          Age of Onset: (Not Specified)  Problem: No Known Problems      Relation: Maternal Uncle          Age of Onset: (Not Specified)  Problem: No Known Problems      Relation: Paternal  Aunt          Age of Onset: (Not Specified)  Problem: No Known Problems      Relation: Paternal Uncle          Age of Onset: (Not Specified)  Problem: No Known Problems      Relation: Maternal Grandfather          Age of Onset: (Not Specified)  Problem: No Known Problems      Relation: Paternal Grandmother          Age of Onset: (Not Specified)  Problem: No Known Problems      Relation: Paternal Grandfather          Age of Onset: (Not Specified)  Problem: Blindness      Relation: Neg Hx          Age of Onset: (Not Specified)  Problem: Cataracts      Relation: Neg Hx          Age of Onset: (Not Specified)  Problem: Diabetes      Relation: Neg Hx          Age of Onset: (Not Specified)  Problem: Glaucoma      Relation: Neg Hx          Age of Onset: (Not Specified)  Problem: Ovarian cancer      Relation: Neg Hx          Age of Onset: (Not Specified)  Problem: Amblyopia      Relation: Neg Hx          Age of Onset: (Not Specified)  Problem: Hypertension      Relation: Neg Hx          Age of Onset: (Not Specified)  Problem: Macular degeneration      Relation: Neg Hx          Age of Onset: (Not Specified)  Problem: Retinal detachment      Relation: Neg Hx          Age of Onset: (Not Specified)  Problem: Strabismus      Relation: Neg Hx          Age of Onset: (Not Specified)  Problem: Stroke      Relation: Neg Hx          Age of Onset: (Not Specified)  Problem: Thyroid disease      Relation: Neg Hx          Age of Onset: (Not Specified)          Review of Systems   Unable to perform ROS: dementia   Constitution: Negative.   HENT: Negative.    Eyes: Negative.    Cardiovascular: Negative for chest pain, dyspnea on exertion, leg swelling, near-syncope, palpitations and syncope.   Respiratory: Negative.  Negative for shortness of breath.    Endocrine: Negative.    Hematologic/Lymphatic: Negative.    Skin: Negative.    Musculoskeletal: Negative.    Gastrointestinal: Negative.    Genitourinary: Negative.    Neurological: Negative.   Negative for dizziness and light-headedness.   Psychiatric/Behavioral: Negative.    Allergic/Immunologic: Negative.         Objective:    Physical Exam   Constitutional: She is oriented to person, place, and time. She appears well-developed and well-nourished. No distress.   HENT:   Head: Normocephalic and atraumatic.   Eyes: EOM are normal. Pupils are equal, round, and reactive to light.   Neck: Normal range of motion. No JVD present. No thyromegaly present.   Cardiovascular: S1 normal and S2 normal. An irregularly irregular rhythm present. Tachycardia present. PMI is not displaced. Exam reveals no gallop and no friction rub.   Murmur heard.  Pulmonary/Chest: Effort normal and breath sounds normal. No respiratory distress. She has no wheezes. She has no rales.   Abdominal: Soft. Bowel sounds are normal. She exhibits no distension. There is no tenderness. There is no rebound and no guarding.   Musculoskeletal: Normal range of motion. She exhibits no edema or tenderness.   Neurological: She is alert and oriented to person, place, and time. No cranial nerve deficit.   Skin: Skin is warm and dry. No rash noted. No erythema.   Psychiatric: She has a normal mood and affect. Her behavior is normal. Judgment and thought content normal.   Vitals reviewed.    ECG: AF/RVR nl QRS        Assessment:       1. Paroxysmal atrial fibrillation    2. Persistent atrial fibrillation    3. Essential hypertension    4. Chronic diastolic heart failure         Plan:       83 yoF persistent AF here for AF management. I discussed AF and its basic pathophysiology, including its health implications and treatment options with the patient. Specifically, I addressed the need for CVA prophylaxis as well as the goal to reduce symptomatic arrhythmic episodes by pharmacologic and/or procedural methods. Despite her DNR status and dementia, I feel  That she could benefit from restoring sinus rhythm. She is not a candidate for an ablation. I offered  LAUREEN/CV. Will change to 2.5 mg bid apixaban. She can take extra lasix for the next three days due to LE edema.

## 2018-12-11 NOTE — PATIENT INSTRUCTIONS
Stop aspirin  Start apixaban 2.5 mg twice a day  Take extra lasix 40 mg in the morning for the next 3 days  The patient can take her prescribed vitamins    Emanuel Guerra MD

## 2018-12-11 NOTE — LETTER
December 11, 2018      Carley Zelaya MD  [address]  [facility] [address]           [facility] Arrhythmia  [address]  [facility] [address]  Phone: [phone]  Fax: [phone]          Patient: Ruth Lan   MR Number: [id number]   YOB: 1935   Date of Visit: 12/11/2018       Dear Dr. Carley Zelaya:    Thank you for referring Ruth Lan to me for evaluation. Attached you will find relevant portions of my assessment and plan of care.    If you have questions, please do not hesitate to call me. I look forward to following Ruth Lan along with you.    Sincerely,    Emanuel Guerra MD    Enclosure  CC:  No Recipients    If you would like to receive this communication electronically, please contact [email or web address] or [phone] to request more information on [employer] access.    For providers and/or their staff who would like to refer a patient to [facility], please contact us through our one-stop-shop provider referral line, [facility], at [phone].    If you feel you have received this communication in error or would no longer like to receive these types of communications, please e-mail [email or web address]

## 2018-12-17 NOTE — TELEPHONE ENCOUNTER
Attempt made to call patient regarding procedure scheduled tomorrow. Patient did not answer and voicemail to not set up on phone.

## 2018-12-17 NOTE — TELEPHONE ENCOUNTER
Reviewed pre op instructions with pt's nurse Kathleen at [facility] (assisted living home), including fasting after midnight and take usual meds with sip of water in AM. Nurse denies any questions/concerns at this time.     Confirmed pt not on potassium supplement with lasix. MD aware K 3.2. Will repeat level in AM.

## 2018-12-18 PROBLEM — I48.91 ATRIAL FIBRILLATION: Status: ACTIVE | Noted: 2018-01-01

## 2018-12-18 NOTE — PROGRESS NOTES
Patient admitted to recovery see [facility] for complete assessment pacu bcg' smaintained safety measures verified patient instructed on pain scale and patient sedated at this time also called patient's daughter and updated on patient location. Also called for ekg and ekg was done and placed in patient's chart. Also cozette NP here aware of patient status and condition.

## 2018-12-18 NOTE — ASSESSMENT & PLAN NOTE
1. 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    -The risks of transesophageal echo include but are not limited to:  Dental trauma, esophageal trauma/perforation, bleeding, laryngospasm/brochospasm, aspiration, sore throat/hoarseness, & dislodgement of the endotracheal tube/nasogastric tube (where applicable).    -The risks of moderate sedation include hypotension, respiratory depression, arrhythmias, bronchospasm, & death.    -Informed consent was obtained & the patient is agreeable to proceed with the procedure.    I will discuss with the attending physician. Attending addendum is to follow.     Further recommendations per attending addendum

## 2018-12-18 NOTE — HPI
83 yoF AS s/p AVR here for AF management. She has developed HF symptoms (LE edema and dyspnea) since her AF diagnosis. She is here for LAUREEN-CV consideration. CHADSVASC of 5.        10/18: TTE  CONCLUSIONS     1 - Severe left atrial enlargement.     2 - Normal left ventricular systolic function (EF 55-60%).     3 - No wall motion abnormalities.     4 - Normal right ventricular systolic function .     5 - S/P surgical AVR, NISHA = 0.98 cm2, AVAi = 0.55 cm2/m2, peak velocity = 2.7 m/s, mean gradient = 14 mmHg.     6 - Severe mitral regurgitation.     7 - Marked mitral annular calcification w a mean gradient of 4 mm Hg.     8 - Mild to moderate tricuspid regurgitation.     9 - Pulmonary hypertension. The estimated PA systolic pressure is 43 mmHg.         TRANSESOPHAGEAL ECHOCARDIOGRAPHY   PRE-PROCEDURE NOTE    12/18/2018    HPI:     Ruth Lan is a 83 y.o. woman with PMHx of AF.     Dysphagia or odynophagia:  No  Liver Disease, esophageal disease, or known varices:  No  Upper GI Bleeding: No  Snoring:  No  Sleep Apnea:  No  Prior neck surgery or radiation:  No  History of anesthetic difficulties:  No  Family history of anesthetic difficulties:  No  Last oral intake:  12 hours ago  Able to move neck in all directions:  Yes

## 2018-12-18 NOTE — H&P
[facility]  Cardiology  History and Physical     Patient Name: Ruth Lan  MRN: [id number]  Admission Date: 12/18/2018  Code Status: Prior   Attending Provider: Emanuel Guerra MD   Primary Care Physician: Azikiwe K Lombard, MD  Principal Problem:<principal problem not specified>    Patient information was obtained from patient and ER records.     Subjective:     Chief Complaint:  AF     HPI:  83 yoF AS s/p AVR here for AF management. She has developed HF symptoms (LE edema and dyspnea) since her AF diagnosis. She is here for LAUREEN-CV consideration. CHADSVASC of 5.        10/18: TTE  CONCLUSIONS     1 - Severe left atrial enlargement.     2 - Normal left ventricular systolic function (EF 55-60%).     3 - No wall motion abnormalities.     4 - Normal right ventricular systolic function .     5 - S/P surgical AVR, NISHA = 0.98 cm2, AVAi = 0.55 cm2/m2, peak velocity = 2.7 m/s, mean gradient = 14 mmHg.     6 - Severe mitral regurgitation.     7 - Marked mitral annular calcification w a mean gradient of 4 mm Hg.     8 - Mild to moderate tricuspid regurgitation.     9 - Pulmonary hypertension. The estimated PA systolic pressure is 43 mmHg.         TRANSESOPHAGEAL ECHOCARDIOGRAPHY   PRE-PROCEDURE NOTE    12/18/2018    HPI:     Ruth Lan is a 83 y.o. woman with PMHx of AF.     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, ER/HI positive    Chronic diastolic heart failure     Heart murmur     Hyperlipidemia      Hypertension     Left atrial enlargement     Severe mitral regurgitation        Past Surgical History:   Procedure Laterality Date    BIOPSY, LYMPH NODE, SENTINEL Right 12/6/2012    Performed by [facility] at [facility] OR 2ND FLR    BREAST BIOPSY  11/2012    Right breast- invasive ductal carcinoma    CATARACT EXTRACTION W/  INTRAOCULAR LENS IMPLANT Right 10/21/2008    OU     CATARACT EXTRACTION W/  INTRAOCULAR LENS IMPLANT Left     INSERTION, LOCALIZATION WIRE Right 12/6/2012    Performed by [facility] at [facility] OR 2ND FLR    [id number] Right 1/14/2013    Performed by [facility] at [facility] OR 2ND FLR    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    TUBAL LIGATION         Review of patient's allergies indicates:   Allergen Reactions    Etodolac Other (See Comments)     Dehydration    Nsaids (non-steroidal anti-inflammatory drug)      COLITIS/DEHYDRATION       No current facility-administered medications on file prior to encounter.      Current Outpatient Medications on File Prior to Encounter   Medication Sig    acetaminophen (TYLENOL) 500 MG tablet Take 500 mg by mouth every 6 (six) hours as needed for Pain.    apixaban (ELIQUIS) 2.5 mg Tab Take 1 tablet (2.5 mg total) by mouth 2 (two) times daily.    cyanocobalamin, vitamin B-12, (VITAMIN B-12) 2,500 mcg Subl Place under the tongue once daily.    diltiaZEM (CARDIZEM CD) 240 MG 24 hr capsule Take 1 capsule (240 mg total) by mouth once daily.    furosemide (LASIX) 40 MG tablet Take 1 tablet (40 mg total) by mouth 2 (two) times daily. (Patient taking differently: Take 40 mg by mouth once daily. )    metoprolol tartrate (LOPRESSOR) 25 MG tablet Take 1 tablet (25 mg total) by mouth 2 (two) times daily.    multivitamin (ONE DAILY MULTIVITAMIN) per tablet Take 1 tablet by mouth once daily.    multivitamin-Ca-iron-minerals (MULTIVITAMIN-CALCIUM AND IRON) Tab Take 1 tablet by mouth once daily.    pravastatin  (PRAVACHOL) 80 MG tablet Take 1 tablet (80 mg total) by mouth once daily.    tolterodine (DETROL LA) 4 MG 24 hr capsule Take 1 capsule (4 mg total) by mouth once daily.    venlafaxine (EFFEXOR-XR) 37.5 MG 24 hr capsule Take 1 capsule (37.5 mg total) by mouth once daily. (Patient taking differently: Take 37.5 mg by mouth every evening. )     Family History     Problem Relation (Age of Onset)    Breast cancer Maternal Grandmother    Cancer Maternal Aunt    Heart disease Father    No Known Problems Mother, Sister, Brother, Maternal Uncle, Paternal Aunt, Paternal Uncle, Maternal Grandfather, Paternal Grandmother, Paternal Grandfather        Tobacco Use    Smoking status: Former Smoker     Packs/day: 1.00     Years: 50.00     Pack years: 50.00     Last attempt to quit: 11/15/2002     Years since quittin.1    Smokeless tobacco: Never Used   Substance and Sexual Activity    Alcohol use: Yes     Alcohol/week: 1.5 oz     Types: 3 Standard drinks or equivalent per week     Comment: 3 OZ WINE     Drug use: No    Sexual activity: No     Review of Systems   All other systems reviewed and are negative.    Objective:     Vital Signs (Most Recent):    Vital Signs (24h Range):           There is no height or weight on file to calculate BMI.            No intake or output data in the 24 hours ending 18 1205    Lines/Drains/Airways          None          Physical Exam   Constitutional: She is oriented to person, place, and time. She appears well-developed and well-nourished.   HENT:   Head: Normocephalic and atraumatic.   Eyes: No scleral icterus.   Cardiovascular:   Irregular rhythm and tachycardic    Pulmonary/Chest: Effort normal and breath sounds normal.   Musculoskeletal: She exhibits edema.   Neurological: She is oriented to person, place, and time.   Skin: Skin is warm.   Psychiatric: She has a normal mood and affect.       Significant Labs: All pertinent lab results from the last 24 hours have been  reviewed.    Significant Imaging: Echocardiogram: Transthoracic echo (TTE) complete (Cupid Only): No results found for this or any previous visit.    Assessment and Plan:     Atrial fibrillation      1. 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    -The risks of transesophageal echo include but are not limited to:  Dental trauma, esophageal trauma/perforation, bleeding, laryngospasm/brochospasm, aspiration, sore throat/hoarseness, & dislodgement of the endotracheal tube/nasogastric tube (where applicable).    -The risks of moderate sedation include hypotension, respiratory depression, arrhythmias, bronchospasm, & death.    -Informed consent was obtained & the patient is agreeable to proceed with the procedure.    I will discuss with the attending physician. Attending addendum is to follow.     Further recommendations per attending addendum             VTE Risk Mitigation (From admission, onward)    None          Rhea Herring MD  Cardiology   [facility]

## 2018-12-18 NOTE — PLAN OF CARE
Problem: Adult Inpatient Plan of Care  Goal: Plan of Care Review  Outcome: Ongoing (interventions implemented as appropriate)  Report received from NURY Unger. Patient s/p dccv. Vs per flowsheet. Call light in reach. Will monitor.

## 2018-12-18 NOTE — SUBJECTIVE & OBJECTIVE
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, ER/LA positive    Chronic diastolic heart failure     Heart murmur     Hyperlipidemia     Hypertension     Left atrial enlargement     Severe mitral regurgitation        Past Surgical History:   Procedure Laterality Date    BIOPSY, LYMPH NODE, SENTINEL Right 12/6/2012    Performed by [facility] at [facility] 2ND FLR    BREAST BIOPSY  11/2012    Right breast- invasive ductal carcinoma    CATARACT EXTRACTION W/  INTRAOCULAR LENS IMPLANT Right 10/21/2008    OU     CATARACT EXTRACTION W/  INTRAOCULAR LENS IMPLANT Left     INSERTION, LOCALIZATION WIRE Right 12/6/2012    Performed by Dem[facility]ri [facility] at [facility] OR 2ND FLR    [id number] Right 1/14/2013    Performed by [facility] at [facility] OR 2ND FLR    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    TUBAL LIGATION         Review of patient's allergies indicates:   Allergen Reactions    Etodolac Other (See Comments)     Dehydration    Nsaids (non-steroidal anti-inflammatory drug)      COLITIS/DEHYDRATION       No current facility-administered medications on file prior to encounter.      Current Outpatient Medications on File Prior to Encounter   Medication Sig    acetaminophen (TYLENOL) 500 MG tablet Take 500 mg by mouth every 6 (six) hours as needed for Pain.    apixaban (ELIQUIS) 2.5 mg Tab Take 1 tablet (2.5 mg total) by mouth 2 (two) times daily.    cyanocobalamin, vitamin B-12, (VITAMIN B-12) 2,500 mcg Subl Place under the tongue once daily.    diltiaZEM (CARDIZEM CD) 240 MG 24 hr capsule Take 1 capsule (240 mg total) by mouth once daily.    furosemide (LASIX) 40 MG tablet Take 1 tablet (40 mg total) by mouth 2 (two) times daily. (Patient taking differently: Take 40 mg by mouth once daily. )    metoprolol tartrate (LOPRESSOR) 25 MG tablet Take  1 tablet (25 mg total) by mouth 2 (two) times daily.    multivitamin (ONE DAILY MULTIVITAMIN) per tablet Take 1 tablet by mouth once daily.    multivitamin-Ca-iron-minerals (MULTIVITAMIN-CALCIUM AND IRON) Tab Take 1 tablet by mouth once daily.    pravastatin (PRAVACHOL) 80 MG tablet Take 1 tablet (80 mg total) by mouth once daily.    tolterodine (DETROL LA) 4 MG 24 hr capsule Take 1 capsule (4 mg total) by mouth once daily.    venlafaxine (EFFEXOR-XR) 37.5 MG 24 hr capsule Take 1 capsule (37.5 mg total) by mouth once daily. (Patient taking differently: Take 37.5 mg by mouth every evening. )     Family History     Problem Relation (Age of Onset)    Breast cancer Maternal Grandmother    Cancer Maternal Aunt    Heart disease Father    No Known Problems Mother, Sister, Brother, Maternal Uncle, Paternal Aunt, Paternal Uncle, Maternal Grandfather, Paternal Grandmother, Paternal Grandfather        Tobacco Use    Smoking status: Former Smoker     Packs/day: 1.00     Years: 50.00     Pack years: 50.00     Last attempt to quit: 11/15/2002     Years since quittin.1    Smokeless tobacco: Never Used   Substance and Sexual Activity    Alcohol use: Yes     Alcohol/week: 1.5 oz     Types: 3 Standard drinks or equivalent per week     Comment: 3 OZ WINE     Drug use: No    Sexual activity: No     Review of Systems   All other systems reviewed and are negative.    Objective:     Vital Signs (Most Recent):    Vital Signs (24h Range):           There is no height or weight on file to calculate BMI.            No intake or output data in the 24 hours ending 18 1205    Lines/Drains/Airways          None          Physical Exam   Constitutional: She is oriented to person, place, and time. She appears well-developed and well-nourished.   HENT:   Head: Normocephalic and atraumatic.   Eyes: No scleral icterus.   Cardiovascular:   Irregular rhythm and tachycardic    Pulmonary/Chest: Effort normal and breath sounds normal.    Musculoskeletal: She exhibits edema.   Neurological: She is oriented to person, place, and time.   Skin: Skin is warm.   Psychiatric: She has a normal mood and affect.       Significant Labs: All pertinent lab results from the last 24 hours have been reviewed.    Significant Imaging: Echocardiogram: Transthoracic echo (TTE) complete (Cupid Only): No results found for this or any previous visit.

## 2018-12-18 NOTE — DISCHARGE SUMMARY
[facility]  Cardiac Electrophysiology  Discharge Summary      Patient Name: Ruth Lan  MRN: [id number]  Admission Date: 12/18/2018  Hospital Length of Stay: 0 days  Discharge Date and Time:  12/18/2018 3:49 PM  Attending Physician: Emanuel Guerra MD    Discharging Provider: Kaye Nino NP  Primary Care Physician: Azikiwe K Lombard, MD    HPI:   83 yoF AS s/p AVR with AF. She has developed HF symptoms (LE edema and dyspnea) since her AF diagnosis. Patient presented for planned LAUREEN/CV. Patient is currently on eliquis 2.5 mg BID. She denies any acute symptoms on admit. Daughter is with patient.   CHADSVASC of 5. History taken from her daughter and chart review.       Procedure(s) (LRB):  CARDIOVERSION (N/A)  ECHOCARDIOGRAM,TRANSESOPHAGEAL (N/A)     Hospital Course: LAUREEN done showed no YUNIER thrombus, hence proceeded to DCCV which converted patient from AF to sinus rhythm with PACs  Patient tolerated procedure. .   Plan to continue home medications including eliquis  Pt to follow up MD Guerra in 4 weeks     Discharge plans/instructions discussed with patient's daugher  who verbalized understanding  and agreement of plans of care. No further questions or concerns  voiced at this time.     New Medications:  -None.     Consults: anesthesia     Significant Diagnostic Studies: LAUREEN       Final Active Diagnoses:    Diagnosis Date Noted POA    PRINCIPAL PROBLEM:  Atrial fibrillation [I48.91] 12/18/2018 Yes      Problems Resolved During this Admission:     Discharged Condition: good    Disposition: Home or Self Care    Follow Up:  Follow-up Information     Emanuel Guerra MD In 4 weeks.    Specialties:  Electrophysiology, Cardiovascular Disease  Contact information:  [address] [place] [id number]  [phone]                 Patient Instructions:      Diet Cardiac     Notify your health care provider if you experience any of the following:  temperature >100.4     Notify your  health care provider if you experience any of the following:  persistent nausea and vomiting or diarrhea     Notify your health care provider if you experience any of the following:  severe uncontrolled pain     Notify your health care provider if you experience any of the following:  redness, tenderness, or signs of infection (pain, swelling, redness, odor or green/yellow discharge around incision site)     Notify your health care provider if you experience any of the following:  difficulty breathing or increased cough     Notify your health care provider if you experience any of the following:  severe persistent headache     Notify your health care provider if you experience any of the following:  worsening rash     Notify your health care provider if you experience any of the following:  persistent dizziness, light-headedness, or visual disturbances     Notify your health care provider if you experience any of the following:  increased confusion or weakness     Notify your health care provider if you experience any of the following:   Order Comments: For any concerning medical symptoms     Medications:  Reconciled Home Medications:      Medication List      CHANGE how you take these medications    furosemide 40 MG tablet  Commonly known as:  LASIX  Take 1 tablet (40 mg total) by mouth 2 (two) times daily.  What changed:  when to take this     venlafaxine 37.5 MG 24 hr capsule  Commonly known as:  EFFEXOR-XR  Take 1 capsule (37.5 mg total) by mouth once daily.  What changed:  when to take this        CONTINUE taking these medications    acetaminophen 500 MG tablet  Commonly known as:  TYLENOL  Take 500 mg by mouth every 6 (six) hours as needed for Pain.     apixaban 2.5 mg Tab  Commonly known as:  ELIQUIS  Take 1 tablet (2.5 mg total) by mouth 2 (two) times daily.     cyanocobalamin (vitamin B-12) 2,500 mcg Subl  Commonly known as:  VITAMIN B-12  Place under the tongue once daily.     diltiaZEM 240 MG 24 hr  capsule  Commonly known as:  CARDIZEM CD  Take 1 capsule (240 mg total) by mouth once daily.     metoprolol tartrate 25 MG tablet  Commonly known as:  LOPRESSOR  Take 1 tablet (25 mg total) by mouth 2 (two) times daily.     multivitamin-Ca-iron-minerals Tab  Commonly known as:  MULTIVITAMIN-CALCIUM AND IRON  Take 1 tablet by mouth once daily.     ONE DAILY MULTIVITAMIN per tablet  Generic drug:  multivitamin  Take 1 tablet by mouth once daily.     pravastatin 80 MG tablet  Commonly known as:  PRAVACHOL  Take 1 tablet (80 mg total) by mouth once daily.     tolterodine 4 MG 24 hr capsule  Commonly known as:  DETROL LA  Take 1 capsule (4 mg total) by mouth once daily.            Time spent on the discharge of patient: 15  minutes    Kaye Nino NP  Cardiac Electrophysiology  [facility]    STAFF MD Gigi Guerra

## 2018-12-18 NOTE — PROGRESS NOTES
Patient discharged per MD orders. Instructions given on medications, wound care, activity, signs of infection, when to call MD, and follow up appointments. Pt verbalized understanding.  Patient AAOx3, VSS, no c/o pain or discomfort at this time. Telemetry and PIV removed. Patient left unit via wheelchair with family.

## 2018-12-18 NOTE — ANESTHESIA POSTPROCEDURE EVALUATION
"Anesthesia Post Evaluation    Patient: Ruth Lan    Procedure(s) Performed: Procedure(s) (LRB):  CARDIOVERSION (N/A)  ECHOCARDIOGRAM,TRANSESOPHAGEAL (N/A)    Final Anesthesia Type: general  Patient location during evaluation: PACU  Patient participation: Yes- Able to Participate  Level of consciousness: awake and alert and oriented  Post-procedure vital signs: reviewed and stable  Pain management: adequate  Airway patency: patent  PONV status at discharge: No PONV  Anesthetic complications: no      Cardiovascular status: blood pressure returned to baseline  Respiratory status: unassisted  Hydration status: euvolemic  Follow-up not needed.        Visit Vitals  BP (!) 103/59 (BP Location: Left arm, Patient Position: Lying)   Pulse 72   Temp 36.2 °C (97.1 °F) (Temporal)   Resp 17   Ht 5' 4" (1.626 m)   Wt 71.7 kg (158 lb)   SpO2 (!) 91%   BMI 27.12 kg/m²       Pain/Angeles Score: Angeles Score: 10 (12/18/2018  3:26 PM)        "

## 2018-12-18 NOTE — TRANSFER OF CARE
"Anesthesia Transfer of Care Note    Patient: Ruth Lan    Procedure(s) Performed: Procedure(s) (LRB):  CARDIOVERSION (N/A)  ECHOCARDIOGRAM,TRANSESOPHAGEAL (N/A)    Patient location: PACU    Anesthesia Type: general    Transport from OR: Transported from OR on 2-3 L/min O2 by NC with adequate spontaneous ventilation    Post pain: adequate analgesia    Post assessment: no apparent anesthetic complications    Post vital signs: stable    Level of consciousness: responds to stimulation and sedated    Nausea/Vomiting: no nausea/vomiting    Complications: none    Transfer of care protocol was followed      Last vitals:   Visit Vitals  BP (!) 90/59 (BP Location: Right arm, Patient Position: Lying)   Pulse 107   Temp 36.2 °C (97.2 °F) (Oral)   Resp 18   Ht 5' 4" (1.626 m)   Wt 71.7 kg (158 lb)   SpO2 (!) 92%   BMI 27.12 kg/m²     "

## 2018-12-18 NOTE — ANESTHESIA PREPROCEDURE EVALUATION
12/18/2018  Ruth Lan is a 83 y.o., female with pmh listed below presenting for cardioversion and LAUREEN for A. Fib with RVR, History obtained from daughter and EPIC record.     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     2 - Normal left ventricular systolic function (EF 55-60%).     3 - No wall motion abnormalities.     4 - Normal right ventricular systolic function .     5 - S/P surgical AVR, NISHA = 0.98 cm2, AVAi = 0.55 cm2/m2, peak velocity = 2.7 m/s, mean gradient = 14 mmHg.     6 - Severe mitral regurgitation.     7 - Marked mitral annular calcification w a mean gradient of 4 mm Hg.     8 - Mild to moderate tricuspid regurgitation.     9 - Pulmonary hypertension. The estimated PA systolic pressure is 43 mmHg.    EKG (12/11/18):  Atrial fibrillation with rapid ventricular response  Nonspecific ST-t wave abnormality Now present  Abnormal ECG    Anesthesia Evaluation    I have reviewed the Patient Summary Reports.     I have reviewed the Medications.     Review of Systems  Anesthesia Hx:  No problems with previous Anesthesia  Denies Family Hx of Anesthesia complications.   Denies Personal Hx of Anesthesia complications.   Social:  Former Smoker 50 year pack history, quit in 2002   Hematology/Oncology:         -- Cancer in past history: Breast   EENT/Dental:EENT/Dental Normal   Cardiovascular:   Exercise tolerance: poor Hypertension Dysrhythmias atrial fibrillation    Pulmonary:  Pulmonary Normal    Hepatic/GI:  Hepatic/GI Normal     Musculoskeletal:   Arthritis     Neurological:   Denies Seizures.    Psych:  Psychiatric Normal           Physical Exam  General:  Well nourished    Airway/Jaw/Neck:  Airway Findings: Mouth Opening: Normal Tongue: Normal  General Airway Assessment: Adult  Mallampati: III  Improves to II with phonation.  TM Distance: Normal, at least 6 cm  Jaw/Neck Findings:  Neck ROM: Normal ROM      Dental:  Dental Findings: Upper Dentures, Lower Dentures   Chest/Lungs:  Chest/Lungs Findings: Clear to auscultation, Normal Respiratory Rate     Heart/Vascular:  Heart Findings: Rate: Normal  Rhythm: Regular Rhythm  Sounds: Normal        Mental Status:  Mental Status Findings: Normal        Anesthesia Plan  Type of Anesthesia, risks & benefits discussed:  Anesthesia Type:  general, MAC  Patient's Preference:   Intra-op Monitoring Plan: standard ASA monitors  Intra-op Monitoring Plan Comments:   Post Op Pain Control Plan: multimodal analgesia, IV/PO Opioids PRN and per primary service following discharge from PACU  Post Op Pain Control Plan Comments:   Induction:   IV  Beta Blocker:  Patient is on a Beta-Blocker and has received one dose within the past 24 hours (No further documentation required).       Informed Consent: Patient representative understands risks and agrees with Anesthesia plan.  Questions answered. Anesthesia consent signed with patient representative.  ASA Score: 3     Day of Surgery Review of History & Physical: I have interviewed and examined the patient. I have reviewed the patient's H&P dated: 12/11/18. There are no significant changes.  H&P update referred to the surgeon.     Anesthesia Plan Notes: Patient with current DNR in record and extensive advanced directives in place. Risks of sedation (apnea, cardiac arrest) discussed with daughter and she has verbally rescinded DNR for the immediate perioperative period. Patient and daughter understands risks of anesthesia and have decided to proceed with procedure.  Daughter signed consent as per patient's request.        Ready For Surgery From Anesthesia Perspective.

## 2018-12-18 NOTE — CARE UPDATE
Briefly I spoke with the daughter of Ms. Vale. We explained the we require the patient to be full code for the duration of the procedure LAUREEN-DCCV. It was explained in detail, sedation can have complications such as apnea that may require intubation. Patient's daughter (who has signed the consents) agrees to make the patient full code including intubation and CPR for the duration of the procedure.    Rhea Atwood DO  Cardiology Fellow

## 2018-12-18 NOTE — NURSING TRANSFER
Nursing Transfer Note      12/18/2018     Transfer To: short stay 7b    Transfer via stretcher    Transfer with nurse    Transported by amina teague    Medicines sent: silvadene    Chart send with patient: Yes    Notified: nurse    Patient reassessed at: see epic (date, time)    Upon arrival to floor: to room no complaints no distress noted.

## 2019-01-01 ENCOUNTER — OFFICE VISIT (OUTPATIENT)
Dept: ELECTROPHYSIOLOGY | Facility: CLINIC | Age: 84
End: 2019-01-01
Payer: MEDICARE

## 2019-01-01 ENCOUNTER — ANESTHESIA EVENT (OUTPATIENT)
Dept: MEDSURG UNIT | Facility: HOSPITAL | Age: 84
DRG: 308 | End: 2019-01-01
Payer: MEDICARE

## 2019-01-01 ENCOUNTER — HOSPITAL ENCOUNTER (OUTPATIENT)
Dept: CARDIOLOGY | Facility: CLINIC | Age: 84
Discharge: HOME | End: 2019-01-22
Payer: MEDICARE

## 2019-01-01 ENCOUNTER — HOSPITAL ENCOUNTER (INPATIENT)
Facility: HOSPITAL | Age: 84
LOS: 22 days | Discharge: HOME HEALTH | DRG: 308 | End: 2019-09-03
Attending: INTERNAL MEDICINE | Admitting: INTERNAL MEDICINE
Payer: MEDICARE

## 2019-01-01 ENCOUNTER — PATIENT OUTREACH (OUTPATIENT)
Dept: HOME HEALTH SERVICES | Facility: HOSPITAL | Age: 84
End: 2019-01-01

## 2019-01-01 ENCOUNTER — TELEPHONE (OUTPATIENT)
Dept: HEMATOLOGY/ONCOLOGY | Facility: CLINIC | Age: 84
End: 2019-01-01

## 2019-01-01 ENCOUNTER — TELEPHONE (OUTPATIENT)
Dept: FAMILY MEDICINE | Facility: CLINIC | Age: 84
End: 2019-01-01

## 2019-01-01 ENCOUNTER — TELEPHONE (OUTPATIENT)
Dept: ELECTROPHYSIOLOGY | Facility: CLINIC | Age: 84
End: 2019-01-01

## 2019-01-01 ENCOUNTER — TELEPHONE (OUTPATIENT)
Dept: HOME HEALTH SERVICES | Facility: HOSPITAL | Age: 84
End: 2019-01-01

## 2019-01-01 ENCOUNTER — DOCUMENTATION ONLY (OUTPATIENT)
Dept: INTERNAL MEDICINE | Facility: CLINIC | Age: 84
End: 2019-01-01

## 2019-01-01 ENCOUNTER — OFFICE VISIT (OUTPATIENT)
Dept: INTERNAL MEDICINE | Facility: CLINIC | Age: 84
End: 2019-01-01
Attending: FAMILY MEDICINE
Payer: MEDICARE

## 2019-01-01 ENCOUNTER — OFFICE VISIT (OUTPATIENT)
Dept: CARDIOLOGY | Facility: CLINIC | Age: 84
DRG: 308 | End: 2019-01-01
Payer: MEDICARE

## 2019-01-01 ENCOUNTER — ANESTHESIA (OUTPATIENT)
Dept: MEDSURG UNIT | Facility: HOSPITAL | Age: 84
DRG: 308 | End: 2019-01-01
Payer: MEDICARE

## 2019-01-01 ENCOUNTER — OFFICE VISIT (OUTPATIENT)
Dept: CARDIOLOGY | Facility: CLINIC | Age: 84
End: 2019-01-01
Payer: MEDICARE

## 2019-01-01 ENCOUNTER — HOSPITAL ENCOUNTER (OUTPATIENT)
Dept: CARDIOLOGY | Facility: CLINIC | Age: 84
Discharge: HOME | End: 2019-04-23
Payer: MEDICARE

## 2019-01-01 ENCOUNTER — HOSPITAL ENCOUNTER (INPATIENT)
Facility: HOSPITAL | Age: 84
LOS: 5 days | Discharge: NURSING, REHAB OR LONG-TERM CARE | DRG: 308 | End: 2019-08-12
Attending: EMERGENCY MEDICINE | Admitting: HOSPITALIST
Payer: MEDICARE

## 2019-01-01 ENCOUNTER — TELEPHONE (OUTPATIENT)
Dept: INTERNAL MEDICINE | Facility: CLINIC | Age: 84
End: 2019-01-01

## 2019-01-01 ENCOUNTER — DOCUMENTATION ONLY (OUTPATIENT)
Dept: AUDIOLOGY | Facility: CLINIC | Age: 84
End: 2019-01-01

## 2019-01-01 ENCOUNTER — HOSPITAL ENCOUNTER (OUTPATIENT)
Facility: HOSPITAL | Age: 84
DRG: 951 | End: 2019-09-09
Attending: EMERGENCY MEDICINE | Admitting: FAMILY MEDICINE
Payer: OTHER MISCELLANEOUS

## 2019-01-01 ENCOUNTER — PATIENT MESSAGE (OUTPATIENT)
Dept: INTERNAL MEDICINE | Facility: CLINIC | Age: 84
End: 2019-01-01

## 2019-01-01 ENCOUNTER — HOSPITAL ENCOUNTER (EMERGENCY)
Facility: HOSPITAL | Age: 84
Discharge: HOME | End: 2019-09-04
Attending: EMERGENCY MEDICINE
Payer: MEDICARE

## 2019-01-01 VITALS
WEIGHT: 149.88 LBS | TEMPERATURE: 97 F | BODY MASS INDEX: 24.97 KG/M2 | DIASTOLIC BLOOD PRESSURE: 37 MMHG | HEART RATE: 82 BPM | RESPIRATION RATE: 22 BRPM | HEIGHT: 65 IN | OXYGEN SATURATION: 100 % | SYSTOLIC BLOOD PRESSURE: 74 MMHG

## 2019-01-01 VITALS
SYSTOLIC BLOOD PRESSURE: 104 MMHG | HEART RATE: 76 BPM | DIASTOLIC BLOOD PRESSURE: 51 MMHG | HEIGHT: 64 IN | BODY MASS INDEX: 27.36 KG/M2 | OXYGEN SATURATION: 94 %

## 2019-01-01 VITALS
HEART RATE: 66 BPM | TEMPERATURE: 98 F | RESPIRATION RATE: 16 BRPM | DIASTOLIC BLOOD PRESSURE: 57 MMHG | BODY MASS INDEX: 23.79 KG/M2 | OXYGEN SATURATION: 94 % | DIASTOLIC BLOOD PRESSURE: 54 MMHG | WEIGHT: 134.25 LBS | SYSTOLIC BLOOD PRESSURE: 111 MMHG | HEART RATE: 80 BPM | TEMPERATURE: 98 F | HEIGHT: 63 IN | OXYGEN SATURATION: 94 % | RESPIRATION RATE: 18 BRPM | SYSTOLIC BLOOD PRESSURE: 118 MMHG

## 2019-01-01 VITALS
DIASTOLIC BLOOD PRESSURE: 60 MMHG | HEART RATE: 93 BPM | BODY MASS INDEX: 28.24 KG/M2 | WEIGHT: 159.38 LBS | SYSTOLIC BLOOD PRESSURE: 102 MMHG | HEIGHT: 63 IN

## 2019-01-01 VITALS
HEART RATE: 73 BPM | BODY MASS INDEX: 27.36 KG/M2 | DIASTOLIC BLOOD PRESSURE: 68 MMHG | SYSTOLIC BLOOD PRESSURE: 120 MMHG | HEIGHT: 64 IN

## 2019-01-01 VITALS
BODY MASS INDEX: 28.88 KG/M2 | WEIGHT: 156.94 LBS | SYSTOLIC BLOOD PRESSURE: 97 MMHG | OXYGEN SATURATION: 92 % | HEIGHT: 62 IN | HEART RATE: 143 BPM | DIASTOLIC BLOOD PRESSURE: 70 MMHG

## 2019-01-01 VITALS
DIASTOLIC BLOOD PRESSURE: 56 MMHG | BODY MASS INDEX: 26.64 KG/M2 | RESPIRATION RATE: 18 BRPM | WEIGHT: 150.38 LBS | OXYGEN SATURATION: 92 % | TEMPERATURE: 97 F | HEART RATE: 62 BPM | SYSTOLIC BLOOD PRESSURE: 108 MMHG | HEIGHT: 63 IN

## 2019-01-01 VITALS
TEMPERATURE: 98 F | HEART RATE: 83 BPM | HEIGHT: 64 IN | DIASTOLIC BLOOD PRESSURE: 50 MMHG | SYSTOLIC BLOOD PRESSURE: 100 MMHG | BODY MASS INDEX: 27.36 KG/M2 | OXYGEN SATURATION: 93 %

## 2019-01-01 DIAGNOSIS — I50.32 CHRONIC DIASTOLIC HEART FAILURE: Primary | Chronic | ICD-10-CM

## 2019-01-01 DIAGNOSIS — I10 ESSENTIAL HYPERTENSION: Chronic | ICD-10-CM

## 2019-01-01 DIAGNOSIS — Z92.89 HISTORY OF CARDIOVERSION: ICD-10-CM

## 2019-01-01 DIAGNOSIS — I48.19 PERSISTENT ATRIAL FIBRILLATION: ICD-10-CM

## 2019-01-01 DIAGNOSIS — I50.33 ACUTE ON CHRONIC DIASTOLIC HEART FAILURE: ICD-10-CM

## 2019-01-01 DIAGNOSIS — I34.0 SEVERE MITRAL REGURGITATION: Chronic | ICD-10-CM
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
## 2019-01-01 DIAGNOSIS — Z66 DNR (DO NOT RESUSCITATE): Chronic | ICD-10-CM

## 2019-01-01 DIAGNOSIS — I46.9 CARDIAC ARREST: Primary | ICD-10-CM

## 2019-01-01 DIAGNOSIS — C50.919 MALIGNANT NEOPLASM OF FEMALE BREAST, UNSPECIFIED ESTROGEN RECEPTOR STATUS, UNSPECIFIED LATERALITY, UNSPECIFIED SITE OF BREAST: ICD-10-CM

## 2019-01-01 DIAGNOSIS — I10 HYPERTENSION, ESSENTIAL: Chronic | ICD-10-CM

## 2019-01-01 DIAGNOSIS — G31.84 MCI (MILD COGNITIVE IMPAIRMENT): Chronic | ICD-10-CM

## 2019-01-01 DIAGNOSIS — E78.00 PURE HYPERCHOLESTEROLEMIA: ICD-10-CM

## 2019-01-01 DIAGNOSIS — I95.9 HYPOTENSION, UNSPECIFIED HYPOTENSION TYPE: ICD-10-CM

## 2019-01-01 DIAGNOSIS — I48.91 ATRIAL FIBRILLATION WITH RVR: ICD-10-CM

## 2019-01-01 DIAGNOSIS — I48.91 ATRIAL FIBRILLATION: ICD-10-CM

## 2019-01-01 DIAGNOSIS — C50.311 MALIGNANT NEOPLASM OF LOWER-INNER QUADRANT OF RIGHT BREAST OF FEMALE, ESTROGEN RECEPTOR POSITIVE: ICD-10-CM

## 2019-01-01 DIAGNOSIS — Z28.21 REFUSED PNEUMOCOCCAL VACCINATION: ICD-10-CM

## 2019-01-01 DIAGNOSIS — I48.91 ATRIAL FIBRILLATION, UNSPECIFIED TYPE: ICD-10-CM

## 2019-01-01 DIAGNOSIS — I48.19 PERSISTENT ATRIAL FIBRILLATION: Primary | ICD-10-CM

## 2019-01-01 DIAGNOSIS — I48.91 ATRIAL FIBRILLATION STATUS POST CARDIOVERSION: ICD-10-CM

## 2019-01-01 DIAGNOSIS — N18.4 STAGE 4 CHRONIC KIDNEY DISEASE: Chronic | ICD-10-CM

## 2019-01-01 DIAGNOSIS — I48.91 ATRIAL FIBRILLATION, UNSPECIFIED TYPE: Primary | ICD-10-CM

## 2019-01-01 DIAGNOSIS — I50.32 CHRONIC DIASTOLIC HEART FAILURE: Chronic | ICD-10-CM

## 2019-01-01 DIAGNOSIS — Z87.891 HISTORY OF SMOKING GREATER THAN 50 PACK YEARS: ICD-10-CM

## 2019-01-01 DIAGNOSIS — I70.0 AORTIC ATHEROSCLEROSIS: ICD-10-CM

## 2019-01-01 DIAGNOSIS — R79.89 ABNORMAL COMPLETE BLOOD COUNT: ICD-10-CM

## 2019-01-01 DIAGNOSIS — I48.91 A-FIB: Primary | ICD-10-CM

## 2019-01-01 DIAGNOSIS — Z17.0 MALIGNANT NEOPLASM OF LOWER-INNER QUADRANT OF RIGHT BREAST OF FEMALE, ESTROGEN RECEPTOR POSITIVE: ICD-10-CM

## 2019-01-01 DIAGNOSIS — N39.42 URINARY INCONTINENCE WITHOUT SENSORY AWARENESS: ICD-10-CM

## 2019-01-01 DIAGNOSIS — I50.32 CHRONIC DIASTOLIC HEART FAILURE: ICD-10-CM

## 2019-01-01 DIAGNOSIS — I50.32 CHRONIC DIASTOLIC CONGESTIVE HEART FAILURE: Primary | ICD-10-CM

## 2019-01-01 DIAGNOSIS — Z95.3 HISTORY OF AORTIC VALVE REPLACEMENT WITH PORCINE VALVE: Chronic | ICD-10-CM

## 2019-01-01 DIAGNOSIS — I10 HYPERTENSION, ESSENTIAL: ICD-10-CM

## 2019-01-01 DIAGNOSIS — R79.9 ABNORMAL BLOOD CHEMISTRY: Primary | ICD-10-CM

## 2019-01-01 DIAGNOSIS — E78.5 HYPERLIPIDEMIA, UNSPECIFIED HYPERLIPIDEMIA TYPE: ICD-10-CM

## 2019-01-01 DIAGNOSIS — I95.9 HYPOTENSION, UNSPECIFIED HYPOTENSION TYPE: Primary | ICD-10-CM

## 2019-01-01 LAB
ALBUMIN SERPL BCP-MCNC: 3.1 G/DL (ref 3.5–5.2)
ALP SERPL-CCNC: 167 U/L (ref 55–135)
ALT SERPL W/O P-5'-P-CCNC: 20 U/L (ref 10–44)
ANION GAP SERPL CALC-SCNC: 11 MMOL/L (ref 8–16)
ANION GAP SERPL CALC-SCNC: 12 MMOL/L (ref 8–16)
ANION GAP SERPL CALC-SCNC: 13 MMOL/L (ref 8–16)
ANION GAP SERPL CALC-SCNC: 14 MMOL/L (ref 8–16)
ANION GAP SERPL CALC-SCNC: 15 MMOL/L (ref 8–16)
ANION GAP SERPL CALC-SCNC: 8 MMOL/L (ref 8–16)
ASCENDING AORTA: 2.89 CM
AST SERPL-CCNC: 23 U/L (ref 10–40)
AV INDEX (PROSTH): 0.52
AV MEAN GRADIENT: 6 MMHG
AV PEAK GRADIENT: 12 MMHG
AV VALVE AREA: 1.57 CM2
AV VELOCITY RATIO: 0.33
BACTERIA #/AREA URNS AUTO: ABNORMAL /HPF
BACTERIA #/AREA URNS AUTO: ABNORMAL /HPF
BACTERIA UR CULT: ABNORMAL
BASOPHILS # BLD AUTO: 0.03 K/UL (ref 0–0.2)
BASOPHILS # BLD AUTO: 0.04 K/UL (ref 0–0.2)
BASOPHILS # BLD AUTO: 0.05 K/UL (ref 0–0.2)
BASOPHILS # BLD AUTO: 0.06 K/UL (ref 0–0.2)
BASOPHILS NFR BLD: 0.5 % (ref 0–1.9)
BASOPHILS NFR BLD: 0.5 % (ref 0–1.9)
BASOPHILS NFR BLD: 0.6 % (ref 0–1.9)
BASOPHILS NFR BLD: 0.7 % (ref 0–1.9)
BASOPHILS NFR BLD: 0.8 % (ref 0–1.9)
BASOPHILS NFR BLD: 0.8 % (ref 0–1.9)
BASOPHILS NFR BLD: 0.9 % (ref 0–1.9)
BASOPHILS NFR BLD: 0.9 % (ref 0–1.9)
BILIRUB SERPL-MCNC: 0.8 MG/DL (ref 0.1–1)
BILIRUB UR QL STRIP: NEGATIVE
BILIRUB UR QL STRIP: NEGATIVE
BNP SERPL-MCNC: 1271 PG/ML (ref 0–99)
BSA FOR ECHO PROCEDURE: 1.77 M2
BSA FOR ECHO PROCEDURE: 1.81 M2
BUN SERPL-MCNC: 18 MG/DL (ref 8–23)
BUN SERPL-MCNC: 21 MG/DL (ref 8–23)
BUN SERPL-MCNC: 25 MG/DL (ref 8–23)
BUN SERPL-MCNC: 28 MG/DL (ref 8–23)
BUN SERPL-MCNC: 28 MG/DL (ref 8–23)
BUN SERPL-MCNC: 30 MG/DL (ref 8–23)
BUN SERPL-MCNC: 32 MG/DL (ref 8–23)
BUN SERPL-MCNC: 35 MG/DL (ref 8–23)
BUN SERPL-MCNC: 37 MG/DL (ref 8–23)
CALCIUM SERPL-MCNC: 8.7 MG/DL (ref 8.7–10.5)
CALCIUM SERPL-MCNC: 8.9 MG/DL (ref 8.7–10.5)
CALCIUM SERPL-MCNC: 9 MG/DL (ref 8.7–10.5)
CALCIUM SERPL-MCNC: 9.1 MG/DL (ref 8.7–10.5)
CALCIUM SERPL-MCNC: 9.3 MG/DL (ref 8.7–10.5)
CALCIUM SERPL-MCNC: 9.5 MG/DL (ref 8.7–10.5)
CALCIUM SERPL-MCNC: 9.5 MG/DL (ref 8.7–10.5)
CHLORIDE SERPL-SCNC: 100 MMOL/L (ref 95–110)
CHLORIDE SERPL-SCNC: 100 MMOL/L (ref 95–110)
CHLORIDE SERPL-SCNC: 101 MMOL/L (ref 95–110)
CHLORIDE SERPL-SCNC: 101 MMOL/L (ref 95–110)
CHLORIDE SERPL-SCNC: 103 MMOL/L (ref 95–110)
CHLORIDE SERPL-SCNC: 104 MMOL/L (ref 95–110)
CHLORIDE SERPL-SCNC: 105 MMOL/L (ref 95–110)
CHLORIDE SERPL-SCNC: 105 MMOL/L (ref 95–110)
CHLORIDE SERPL-SCNC: 107 MMOL/L (ref 95–110)
CLARITY UR REFRACT.AUTO: ABNORMAL
CLARITY UR REFRACT.AUTO: CLEAR
CO2 SERPL-SCNC: 21 MMOL/L (ref 23–29)
CO2 SERPL-SCNC: 23 MMOL/L (ref 23–29)
CO2 SERPL-SCNC: 23 MMOL/L (ref 23–29)
CO2 SERPL-SCNC: 24 MMOL/L (ref 23–29)
CO2 SERPL-SCNC: 26 MMOL/L (ref 23–29)
CO2 SERPL-SCNC: 27 MMOL/L (ref 23–29)
CO2 SERPL-SCNC: 27 MMOL/L (ref 23–29)
CO2 SERPL-SCNC: 28 MMOL/L (ref 23–29)
CO2 SERPL-SCNC: 28 MMOL/L (ref 23–29)
CO2 SERPL-SCNC: 29 MMOL/L (ref 23–29)
COLOR UR AUTO: ABNORMAL
COLOR UR AUTO: YELLOW
CREAT SERPL-MCNC: 1.1 MG/DL (ref 0.5–1.4)
CREAT SERPL-MCNC: 1.2 MG/DL (ref 0.5–1.4)
CREAT SERPL-MCNC: 1.3 MG/DL (ref 0.5–1.4)
CREAT SERPL-MCNC: 1.4 MG/DL (ref 0.5–1.4)
CREAT SERPL-MCNC: 1.4 MG/DL (ref 0.5–1.4)
CREAT SERPL-MCNC: 1.5 MG/DL (ref 0.5–1.4)
CREAT SERPL-MCNC: 1.5 MG/DL (ref 0.5–1.4)
CREAT SERPL-MCNC: 1.6 MG/DL (ref 0.5–1.4)
CREAT SERPL-MCNC: 1.7 MG/DL (ref 0.5–1.4)
CREAT SERPL-MCNC: 1.7 MG/DL (ref 0.5–1.4)
CV ECHO LV RWT: 0.63 CM
DIFFERENTIAL METHOD: ABNORMAL
DOP CALC AO PEAK VEL: 1.71 M/S
DOP CALC AO VTI: 22.17 CM
DOP CALC LVOT AREA: 3 CM2
DOP CALC LVOT DIAMETER: 1.96 CM
DOP CALC LVOT PEAK VEL: 0.57 M/S
DOP CALC LVOT STROKE VOLUME: 34.89 CM3
DOP CALCLVOT PEAK VEL VTI: 11.57 CM
ECHO LV POSTERIOR WALL: 1.04 CM (ref 0.6–1.1)
EOSINOPHIL # BLD AUTO: 0.1 K/UL (ref 0–0.5)
EOSINOPHIL # BLD AUTO: 0.2 K/UL (ref 0–0.5)
EOSINOPHIL # BLD AUTO: 0.3 K/UL (ref 0–0.5)
EOSINOPHIL # BLD AUTO: 0.3 K/UL (ref 0–0.5)
EOSINOPHIL # BLD AUTO: 0.4 K/UL (ref 0–0.5)
EOSINOPHIL NFR BLD: 1.2 % (ref 0–8)
EOSINOPHIL NFR BLD: 1.5 % (ref 0–8)
EOSINOPHIL NFR BLD: 1.9 % (ref 0–8)
EOSINOPHIL NFR BLD: 2.4 % (ref 0–8)
EOSINOPHIL NFR BLD: 2.5 % (ref 0–8)
EOSINOPHIL NFR BLD: 2.5 % (ref 0–8)
EOSINOPHIL NFR BLD: 2.7 % (ref 0–8)
EOSINOPHIL NFR BLD: 3 % (ref 0–8)
EOSINOPHIL NFR BLD: 3.2 % (ref 0–8)
EOSINOPHIL NFR BLD: 4.3 % (ref 0–8)
EOSINOPHIL NFR BLD: 4.9 % (ref 0–8)
EOSINOPHIL NFR BLD: 5.8 % (ref 0–8)
ERYTHROCYTE [DISTWIDTH] IN BLOOD BY AUTOMATED COUNT: 16.7 % (ref 11.5–14.5)
ERYTHROCYTE [DISTWIDTH] IN BLOOD BY AUTOMATED COUNT: 16.7 % (ref 11.5–14.5)
ERYTHROCYTE [DISTWIDTH] IN BLOOD BY AUTOMATED COUNT: 16.8 % (ref 11.5–14.5)
ERYTHROCYTE [DISTWIDTH] IN BLOOD BY AUTOMATED COUNT: 16.8 % (ref 11.5–14.5)
ERYTHROCYTE [DISTWIDTH] IN BLOOD BY AUTOMATED COUNT: 16.9 % (ref 11.5–14.5)
ERYTHROCYTE [DISTWIDTH] IN BLOOD BY AUTOMATED COUNT: 16.9 % (ref 11.5–14.5)
ERYTHROCYTE [DISTWIDTH] IN BLOOD BY AUTOMATED COUNT: 17 % (ref 11.5–14.5)
ERYTHROCYTE [DISTWIDTH] IN BLOOD BY AUTOMATED COUNT: 17.7 % (ref 11.5–14.5)
ERYTHROCYTE [DISTWIDTH] IN BLOOD BY AUTOMATED COUNT: 18.6 % (ref 11.5–14.5)
ERYTHROCYTE [DISTWIDTH] IN BLOOD BY AUTOMATED COUNT: 19.1 % (ref 11.5–14.5)
ERYTHROCYTE [DISTWIDTH] IN BLOOD BY AUTOMATED COUNT: 19.6 % (ref 11.5–14.5)
ERYTHROCYTE [DISTWIDTH] IN BLOOD BY AUTOMATED COUNT: 19.8 % (ref 11.5–14.5)
EST. GFR  (AFRICAN AMERICAN): 31.5 ML/MIN/1.73 M^2
EST. GFR  (AFRICAN AMERICAN): 31.5 ML/MIN/1.73 M^2
EST. GFR  (AFRICAN AMERICAN): 33.9 ML/MIN/1.73 M^2
EST. GFR  (AFRICAN AMERICAN): 36.6 ML/MIN/1.73 M^2
EST. GFR  (AFRICAN AMERICAN): 36.6 ML/MIN/1.73 M^2
EST. GFR  (AFRICAN AMERICAN): 39.8 ML/MIN/1.73 M^2
EST. GFR  (AFRICAN AMERICAN): 39.8 ML/MIN/1.73 M^2
EST. GFR  (AFRICAN AMERICAN): 43.5 ML/MIN/1.73 M^2
EST. GFR  (AFRICAN AMERICAN): 48 ML/MIN/1.73 M^2
EST. GFR  (AFRICAN AMERICAN): 53.3 ML/MIN/1.73 M^2
EST. GFR  (NON AFRICAN AMERICAN): 27.3 ML/MIN/1.73 M^2
EST. GFR  (NON AFRICAN AMERICAN): 27.3 ML/MIN/1.73 M^2
EST. GFR  (NON AFRICAN AMERICAN): 29.4 ML/MIN/1.73 M^2
EST. GFR  (NON AFRICAN AMERICAN): 31.8 ML/MIN/1.73 M^2
EST. GFR  (NON AFRICAN AMERICAN): 31.8 ML/MIN/1.73 M^2
EST. GFR  (NON AFRICAN AMERICAN): 34.5 ML/MIN/1.73 M^2
EST. GFR  (NON AFRICAN AMERICAN): 34.5 ML/MIN/1.73 M^2
EST. GFR  (NON AFRICAN AMERICAN): 37.8 ML/MIN/1.73 M^2
EST. GFR  (NON AFRICAN AMERICAN): 41.6 ML/MIN/1.73 M^2
EST. GFR  (NON AFRICAN AMERICAN): 46.2 ML/MIN/1.73 M^2
FRACTIONAL SHORTENING: 23 % (ref 28–44)
GLUCOSE SERPL-MCNC: 104 MG/DL (ref 70–110)
GLUCOSE SERPL-MCNC: 105 MG/DL (ref 70–110)
GLUCOSE SERPL-MCNC: 106 MG/DL (ref 70–110)
GLUCOSE SERPL-MCNC: 108 MG/DL (ref 70–110)
GLUCOSE SERPL-MCNC: 109 MG/DL (ref 70–110)
GLUCOSE SERPL-MCNC: 111 MG/DL (ref 70–110)
GLUCOSE SERPL-MCNC: 111 MG/DL (ref 70–110)
GLUCOSE SERPL-MCNC: 133 MG/DL (ref 70–110)
GLUCOSE SERPL-MCNC: 142 MG/DL (ref 70–110)
GLUCOSE SERPL-MCNC: 152 MG/DL (ref 70–110)
GLUCOSE SERPL-MCNC: 161 MG/DL (ref 70–110)
GLUCOSE SERPL-MCNC: 179 MG/DL (ref 70–110)
GLUCOSE SERPL-MCNC: 192 MG/DL (ref 70–110)
GLUCOSE UR QL STRIP: NEGATIVE
GLUCOSE UR QL STRIP: NEGATIVE
HCT VFR BLD AUTO: 34.4 % (ref 37–48.5)
HCT VFR BLD AUTO: 34.9 % (ref 37–48.5)
HCT VFR BLD AUTO: 35 % (ref 37–48.5)
HCT VFR BLD AUTO: 35.5 % (ref 37–48.5)
HCT VFR BLD AUTO: 35.8 % (ref 37–48.5)
HCT VFR BLD AUTO: 35.9 % (ref 37–48.5)
HCT VFR BLD AUTO: 35.9 % (ref 37–48.5)
HCT VFR BLD AUTO: 36 % (ref 37–48.5)
HCT VFR BLD AUTO: 36.7 % (ref 37–48.5)
HCT VFR BLD AUTO: 37.4 % (ref 37–48.5)
HCT VFR BLD AUTO: 37.7 % (ref 37–48.5)
HCT VFR BLD AUTO: 40.5 % (ref 37–48.5)
HGB BLD-MCNC: 10 G/DL (ref 12–16)
HGB BLD-MCNC: 10.2 G/DL (ref 12–16)
HGB BLD-MCNC: 10.3 G/DL (ref 12–16)
HGB BLD-MCNC: 10.6 G/DL (ref 12–16)
HGB BLD-MCNC: 10.6 G/DL (ref 12–16)
HGB BLD-MCNC: 10.7 G/DL (ref 12–16)
HGB BLD-MCNC: 10.9 G/DL (ref 12–16)
HGB BLD-MCNC: 11.9 G/DL (ref 12–16)
HGB UR QL STRIP: ABNORMAL
HGB UR QL STRIP: ABNORMAL
HYALINE CASTS UR QL AUTO: 1 /LPF
HYALINE CASTS UR QL AUTO: 49 /LPF
IMM GRANULOCYTES # BLD AUTO: 0.01 K/UL (ref 0–0.04)
IMM GRANULOCYTES # BLD AUTO: 0.02 K/UL (ref 0–0.04)
IMM GRANULOCYTES # BLD AUTO: 0.03 K/UL (ref 0–0.04)
IMM GRANULOCYTES # BLD AUTO: 0.04 K/UL (ref 0–0.04)
IMM GRANULOCYTES NFR BLD AUTO: 0.2 % (ref 0–0.5)
IMM GRANULOCYTES NFR BLD AUTO: 0.3 % (ref 0–0.5)
IMM GRANULOCYTES NFR BLD AUTO: 0.4 % (ref 0–0.5)
IMM GRANULOCYTES NFR BLD AUTO: 0.5 % (ref 0–0.5)
INR PPP: 1.3 (ref 0.8–1.2)
INTERVENTRICULAR SEPTUM: 0.99 CM (ref 0.6–1.1)
KETONES UR QL STRIP: NEGATIVE
KETONES UR QL STRIP: NEGATIVE
LA MAJOR: 6.06 CM
LA MINOR: 5.8 CM
LA WIDTH: 3.65 CM
LEFT ATRIUM SIZE: 4.17 CM
LEFT ATRIUM VOLUME INDEX: 44.1 ML/M2
LEFT ATRIUM VOLUME: 76.68 CM3
LEFT INTERNAL DIMENSION IN SYSTOLE: 2.56 CM (ref 2.1–4)
LEFT VENTRICLE DIASTOLIC VOLUME INDEX: 25.52 ML/M2
LEFT VENTRICLE DIASTOLIC VOLUME: 44.4 ML
LEFT VENTRICLE MASS INDEX: 56 G/M2
LEFT VENTRICLE SYSTOLIC VOLUME INDEX: 13.6 ML/M2
LEFT VENTRICLE SYSTOLIC VOLUME: 23.58 ML
LEFT VENTRICULAR INTERNAL DIMENSION IN DIASTOLE: 3.31 CM (ref 3.5–6)
LEFT VENTRICULAR MASS: 97.12 G
LEUKOCYTE ESTERASE UR QL STRIP: ABNORMAL
LEUKOCYTE ESTERASE UR QL STRIP: ABNORMAL
LYMPHOCYTES # BLD AUTO: 1.1 K/UL (ref 1–4.8)
LYMPHOCYTES # BLD AUTO: 1.1 K/UL (ref 1–4.8)
LYMPHOCYTES # BLD AUTO: 1.2 K/UL (ref 1–4.8)
LYMPHOCYTES # BLD AUTO: 1.3 K/UL (ref 1–4.8)
LYMPHOCYTES # BLD AUTO: 1.4 K/UL (ref 1–4.8)
LYMPHOCYTES # BLD AUTO: 1.4 K/UL (ref 1–4.8)
LYMPHOCYTES NFR BLD: 15.6 % (ref 18–48)
LYMPHOCYTES NFR BLD: 17.4 % (ref 18–48)
LYMPHOCYTES NFR BLD: 17.7 % (ref 18–48)
LYMPHOCYTES NFR BLD: 17.8 % (ref 18–48)
LYMPHOCYTES NFR BLD: 18.2 % (ref 18–48)
LYMPHOCYTES NFR BLD: 18.2 % (ref 18–48)
LYMPHOCYTES NFR BLD: 19.2 % (ref 18–48)
LYMPHOCYTES NFR BLD: 19.4 % (ref 18–48)
LYMPHOCYTES NFR BLD: 20.1 % (ref 18–48)
LYMPHOCYTES NFR BLD: 21.5 % (ref 18–48)
LYMPHOCYTES NFR BLD: 21.5 % (ref 18–48)
LYMPHOCYTES NFR BLD: 21.9 % (ref 18–48)
MAGNESIUM SERPL-MCNC: 1.8 MG/DL (ref 1.6–2.6)
MAGNESIUM SERPL-MCNC: 1.9 MG/DL (ref 1.6–2.6)
MAGNESIUM SERPL-MCNC: 2 MG/DL (ref 1.6–2.6)
MAGNESIUM SERPL-MCNC: 2 MG/DL (ref 1.6–2.6)
MCH RBC QN AUTO: 27.2 PG (ref 27–31)
MCH RBC QN AUTO: 27.2 PG (ref 27–31)
MCH RBC QN AUTO: 27.3 PG (ref 27–31)
MCH RBC QN AUTO: 27.3 PG (ref 27–31)
MCH RBC QN AUTO: 27.5 PG (ref 27–31)
MCH RBC QN AUTO: 27.6 PG (ref 27–31)
MCH RBC QN AUTO: 27.8 PG (ref 27–31)
MCH RBC QN AUTO: 27.8 PG (ref 27–31)
MCH RBC QN AUTO: 27.9 PG (ref 27–31)
MCH RBC QN AUTO: 27.9 PG (ref 27–31)
MCHC RBC AUTO-ENTMCNC: 28.9 G/DL (ref 32–36)
MCHC RBC AUTO-ENTMCNC: 29.1 G/DL (ref 32–36)
MCHC RBC AUTO-ENTMCNC: 29.1 G/DL (ref 32–36)
MCHC RBC AUTO-ENTMCNC: 29.2 G/DL (ref 32–36)
MCHC RBC AUTO-ENTMCNC: 29.4 G/DL (ref 32–36)
MCHC RBC AUTO-ENTMCNC: 29.4 G/DL (ref 32–36)
MCHC RBC AUTO-ENTMCNC: 29.5 G/DL (ref 32–36)
MCHC RBC AUTO-ENTMCNC: 29.6 G/DL (ref 32–36)
MCHC RBC AUTO-ENTMCNC: 29.7 G/DL (ref 32–36)
MCHC RBC AUTO-ENTMCNC: 29.7 G/DL (ref 32–36)
MCHC RBC AUTO-ENTMCNC: 29.8 G/DL (ref 32–36)
MCHC RBC AUTO-ENTMCNC: 30.1 G/DL (ref 32–36)
MCV RBC AUTO: 92 FL (ref 82–98)
MCV RBC AUTO: 92 FL (ref 82–98)
MCV RBC AUTO: 93 FL (ref 82–98)
MCV RBC AUTO: 94 FL (ref 82–98)
MCV RBC AUTO: 95 FL (ref 82–98)
MCV RBC AUTO: 95 FL (ref 82–98)
MICROSCOPIC COMMENT: ABNORMAL
MICROSCOPIC COMMENT: ABNORMAL
MONOCYTES # BLD AUTO: 0.5 K/UL (ref 0.3–1)
MONOCYTES # BLD AUTO: 0.6 K/UL (ref 0.3–1)
MONOCYTES # BLD AUTO: 0.6 K/UL (ref 0.3–1)
MONOCYTES # BLD AUTO: 0.7 K/UL (ref 0.3–1)
MONOCYTES # BLD AUTO: 0.8 K/UL (ref 0.3–1)
MONOCYTES # BLD AUTO: 0.9 K/UL (ref 0.3–1)
MONOCYTES # BLD AUTO: 0.9 K/UL (ref 0.3–1)
MONOCYTES NFR BLD: 10 % (ref 4–15)
MONOCYTES NFR BLD: 11.3 % (ref 4–15)
MONOCYTES NFR BLD: 11.5 % (ref 4–15)
MONOCYTES NFR BLD: 11.5 % (ref 4–15)
MONOCYTES NFR BLD: 12 % (ref 4–15)
MONOCYTES NFR BLD: 12.1 % (ref 4–15)
MONOCYTES NFR BLD: 12.2 % (ref 4–15)
MONOCYTES NFR BLD: 12.3 % (ref 4–15)
MONOCYTES NFR BLD: 12.6 % (ref 4–15)
MONOCYTES NFR BLD: 8.9 % (ref 4–15)
MONOCYTES NFR BLD: 9.2 % (ref 4–15)
MONOCYTES NFR BLD: 9.5 % (ref 4–15)
MV MEAN GRADIENT: 2 MMHG
NEUTROPHILS # BLD AUTO: 3.5 K/UL (ref 1.8–7.7)
NEUTROPHILS # BLD AUTO: 3.6 K/UL (ref 1.8–7.7)
NEUTROPHILS # BLD AUTO: 4 K/UL (ref 1.8–7.7)
NEUTROPHILS # BLD AUTO: 4 K/UL (ref 1.8–7.7)
NEUTROPHILS # BLD AUTO: 4.2 K/UL (ref 1.8–7.7)
NEUTROPHILS # BLD AUTO: 4.2 K/UL (ref 1.8–7.7)
NEUTROPHILS # BLD AUTO: 4.3 K/UL (ref 1.8–7.7)
NEUTROPHILS # BLD AUTO: 4.7 K/UL (ref 1.8–7.7)
NEUTROPHILS # BLD AUTO: 5.4 K/UL (ref 1.8–7.7)
NEUTROPHILS # BLD AUTO: 5.4 K/UL (ref 1.8–7.7)
NEUTROPHILS NFR BLD: 61.5 % (ref 38–73)
NEUTROPHILS NFR BLD: 63 % (ref 38–73)
NEUTROPHILS NFR BLD: 63 % (ref 38–73)
NEUTROPHILS NFR BLD: 64 % (ref 38–73)
NEUTROPHILS NFR BLD: 64.9 % (ref 38–73)
NEUTROPHILS NFR BLD: 64.9 % (ref 38–73)
NEUTROPHILS NFR BLD: 65 % (ref 38–73)
NEUTROPHILS NFR BLD: 65.1 % (ref 38–73)
NEUTROPHILS NFR BLD: 66.3 % (ref 38–73)
NEUTROPHILS NFR BLD: 69.4 % (ref 38–73)
NEUTROPHILS NFR BLD: 70.9 % (ref 38–73)
NEUTROPHILS NFR BLD: 71.3 % (ref 38–73)
NITRITE UR QL STRIP: NEGATIVE
NITRITE UR QL STRIP: NEGATIVE
NRBC BLD-RTO: 0 /100 WBC
PH UR STRIP: 5 [PH] (ref 5–8)
PH UR STRIP: 7 [PH] (ref 5–8)
PHOSPHATE SERPL-MCNC: 2.9 MG/DL (ref 2.7–4.5)
PHOSPHATE SERPL-MCNC: 3.1 MG/DL (ref 2.7–4.5)
PHOSPHATE SERPL-MCNC: 3.3 MG/DL (ref 2.7–4.5)
PHOSPHATE SERPL-MCNC: 3.5 MG/DL (ref 2.7–4.5)
PHOSPHATE SERPL-MCNC: 3.6 MG/DL (ref 2.7–4.5)
PHOSPHATE SERPL-MCNC: 3.6 MG/DL (ref 2.7–4.5)
PISA TR MAX VEL: 3.32 M/S
PLATELET # BLD AUTO: 143 K/UL (ref 150–350)
PLATELET # BLD AUTO: 154 K/UL (ref 150–350)
PLATELET # BLD AUTO: 161 K/UL (ref 150–350)
PLATELET # BLD AUTO: 161 K/UL (ref 150–350)
PLATELET # BLD AUTO: 167 K/UL (ref 150–350)
PLATELET # BLD AUTO: 173 K/UL (ref 150–350)
PLATELET # BLD AUTO: 182 K/UL (ref 150–350)
PLATELET # BLD AUTO: 183 K/UL (ref 150–350)
PLATELET # BLD AUTO: 184 K/UL (ref 150–350)
PLATELET # BLD AUTO: 187 K/UL (ref 150–350)
PLATELET # BLD AUTO: 219 K/UL (ref 150–350)
PLATELET # BLD AUTO: 223 K/UL (ref 150–350)
PMV BLD AUTO: 10.3 FL (ref 9.2–12.9)
PMV BLD AUTO: 10.4 FL (ref 9.2–12.9)
PMV BLD AUTO: 10.4 FL (ref 9.2–12.9)
PMV BLD AUTO: 10.5 FL (ref 9.2–12.9)
PMV BLD AUTO: 10.7 FL (ref 9.2–12.9)
PMV BLD AUTO: 10.8 FL (ref 9.2–12.9)
PMV BLD AUTO: 11 FL (ref 9.2–12.9)
PMV BLD AUTO: 11.1 FL (ref 9.2–12.9)
POTASSIUM SERPL-SCNC: 3.4 MMOL/L (ref 3.5–5.1)
POTASSIUM SERPL-SCNC: 3.4 MMOL/L (ref 3.5–5.1)
POTASSIUM SERPL-SCNC: 3.6 MMOL/L (ref 3.5–5.1)
POTASSIUM SERPL-SCNC: 3.6 MMOL/L (ref 3.5–5.1)
POTASSIUM SERPL-SCNC: 3.8 MMOL/L (ref 3.5–5.1)
POTASSIUM SERPL-SCNC: 3.9 MMOL/L (ref 3.5–5.1)
POTASSIUM SERPL-SCNC: 4.1 MMOL/L (ref 3.5–5.1)
POTASSIUM SERPL-SCNC: 4.2 MMOL/L (ref 3.5–5.1)
POTASSIUM SERPL-SCNC: 4.2 MMOL/L (ref 3.5–5.1)
POTASSIUM SERPL-SCNC: 4.3 MMOL/L (ref 3.5–5.1)
POTASSIUM SERPL-SCNC: 4.4 MMOL/L (ref 3.5–5.1)
POTASSIUM SERPL-SCNC: 4.4 MMOL/L (ref 3.5–5.1)
POTASSIUM SERPL-SCNC: 4.6 MMOL/L (ref 3.5–5.1)
PROT SERPL-MCNC: 7.7 G/DL (ref 6–8.4)
PROT UR QL STRIP: NEGATIVE
PROT UR QL STRIP: NEGATIVE
PROTHROMBIN TIME: 12.8 SEC (ref 9–12.5)
RA MAJOR: 5.23 CM
RA PRESSURE: 15 MMHG
RA WIDTH: 2.74 CM
RBC # BLD AUTO: 3.62 M/UL (ref 4–5.4)
RBC # BLD AUTO: 3.73 M/UL (ref 4–5.4)
RBC # BLD AUTO: 3.79 M/UL (ref 4–5.4)
RBC # BLD AUTO: 3.83 M/UL (ref 4–5.4)
RBC # BLD AUTO: 3.84 M/UL (ref 4–5.4)
RBC # BLD AUTO: 3.85 M/UL (ref 4–5.4)
RBC # BLD AUTO: 3.85 M/UL (ref 4–5.4)
RBC # BLD AUTO: 3.89 M/UL (ref 4–5.4)
RBC # BLD AUTO: 3.9 M/UL (ref 4–5.4)
RBC # BLD AUTO: 3.96 M/UL (ref 4–5.4)
RBC # BLD AUTO: 3.99 M/UL (ref 4–5.4)
RBC # BLD AUTO: 4.31 M/UL (ref 4–5.4)
RBC #/AREA URNS AUTO: 61 /HPF (ref 0–4)
RBC #/AREA URNS AUTO: 9 /HPF (ref 0–4)
RIGHT VENTRICULAR END-DIASTOLIC DIMENSION: 3.19 CM
SINUS: 2.27 CM
SODIUM SERPL-SCNC: 137 MMOL/L (ref 136–145)
SODIUM SERPL-SCNC: 139 MMOL/L (ref 136–145)
SODIUM SERPL-SCNC: 140 MMOL/L (ref 136–145)
SODIUM SERPL-SCNC: 140 MMOL/L (ref 136–145)
SODIUM SERPL-SCNC: 141 MMOL/L (ref 136–145)
SODIUM SERPL-SCNC: 141 MMOL/L (ref 136–145)
SODIUM SERPL-SCNC: 142 MMOL/L (ref 136–145)
SP GR UR STRIP: 1.01 (ref 1–1.03)
SP GR UR STRIP: 1.01 (ref 1–1.03)
SQUAMOUS #/AREA URNS AUTO: 0 /HPF
SQUAMOUS #/AREA URNS AUTO: 1 /HPF
STJ: 2.28 CM
T4 FREE SERPL-MCNC: 0.97 NG/DL (ref 0.71–1.51)
TR MAX PG: 44 MMHG
TROPONIN I SERPL DL<=0.01 NG/ML-MCNC: 0.02 NG/ML (ref 0–0.03)
TSH SERPL DL<=0.005 MIU/L-ACNC: 7.13 UIU/ML (ref 0.4–4)
TV REST PULMONARY ARTERY PRESSURE: 59 MMHG
URN SPEC COLLECT METH UR: ABNORMAL
URN SPEC COLLECT METH UR: ABNORMAL
WBC # BLD AUTO: 5.41 K/UL (ref 3.9–12.7)
WBC # BLD AUTO: 5.79 K/UL (ref 3.9–12.7)
WBC # BLD AUTO: 5.88 K/UL (ref 3.9–12.7)
WBC # BLD AUTO: 6.33 K/UL (ref 3.9–12.7)
WBC # BLD AUTO: 6.38 K/UL (ref 3.9–12.7)
WBC # BLD AUTO: 6.51 K/UL (ref 3.9–12.7)
WBC # BLD AUTO: 6.57 K/UL (ref 3.9–12.7)
WBC # BLD AUTO: 6.7 K/UL (ref 3.9–12.7)
WBC # BLD AUTO: 6.85 K/UL (ref 3.9–12.7)
WBC # BLD AUTO: 7.08 K/UL (ref 3.9–12.7)
WBC # BLD AUTO: 7.57 K/UL (ref 3.9–12.7)
WBC # BLD AUTO: 7.58 K/UL (ref 3.9–12.7)
WBC #/AREA URNS AUTO: 3 /HPF (ref 0–5)
WBC #/AREA URNS AUTO: >100 /HPF (ref 0–5)
WBC CLUMPS UR QL AUTO: ABNORMAL

## 2019-01-01 PROCEDURE — 25000003 PHARM REV CODE 250: Performed by: NURSE PRACTITIONER

## 2019-01-01 PROCEDURE — 83735 ASSAY OF MAGNESIUM: CPT

## 2019-01-01 PROCEDURE — 99213 OFFICE O/P EST LOW 20 MIN: CPT | Mod: PBBFAC,25 | Performed by: INTERNAL MEDICINE

## 2019-01-01 PROCEDURE — 97110 THERAPEUTIC EXERCISES: CPT

## 2019-01-01 PROCEDURE — 84100 ASSAY OF PHOSPHORUS: CPT

## 2019-01-01 PROCEDURE — 97535 SELF CARE MNGMENT TRAINING: CPT

## 2019-01-01 PROCEDURE — 63600175 PHARM REV CODE 636 W HCPCS: Performed by: NURSE PRACTITIONER

## 2019-01-01 PROCEDURE — 80048 BASIC METABOLIC PNL TOTAL CA: CPT

## 2019-01-01 PROCEDURE — 85025 COMPLETE CBC W/AUTO DIFF WBC: CPT

## 2019-01-01 PROCEDURE — 81001 URINALYSIS AUTO W/SCOPE: CPT

## 2019-01-01 PROCEDURE — 25000003 PHARM REV CODE 250: Performed by: NURSE ANESTHETIST, CERTIFIED REGISTERED

## 2019-01-01 PROCEDURE — 63600175 PHARM REV CODE 636 W HCPCS: Performed by: HOSPITALIST

## 2019-01-01 PROCEDURE — 93010 ELECTROCARDIOGRAM REPORT: CPT | Mod: ,,, | Performed by: INTERNAL MEDICINE

## 2019-01-01 PROCEDURE — 93010 EKG 12-LEAD: ICD-10-PCS | Mod: ,,, | Performed by: INTERNAL MEDICINE

## 2019-01-01 PROCEDURE — 99285 EMERGENCY DEPT VISIT HI MDM: CPT | Mod: 25

## 2019-01-01 PROCEDURE — 99284 PR EMERGENCY DEPT VISIT,LEVEL IV: ICD-10-PCS | Mod: ,,, | Performed by: EMERGENCY MEDICINE

## 2019-01-01 PROCEDURE — 25000003 PHARM REV CODE 250: Performed by: HOSPITALIST

## 2019-01-01 PROCEDURE — 99214 OFFICE O/P EST MOD 30 MIN: CPT | Mod: S$PBB,,, | Performed by: INTERNAL MEDICINE

## 2019-01-01 PROCEDURE — 36415 COLL VENOUS BLD VENIPUNCTURE: CPT

## 2019-01-01 PROCEDURE — 37000008 HC ANESTHESIA 1ST 15 MINUTES: Performed by: INTERNAL MEDICINE

## 2019-01-01 PROCEDURE — 99999 PR PBB SHADOW E&M-EST. PATIENT-LVL III: CPT | Mod: PBBFAC,,, | Performed by: INTERNAL MEDICINE

## 2019-01-01 PROCEDURE — 11000004 HC SNF PRIVATE

## 2019-01-01 PROCEDURE — 99999 PR PBB SHADOW E&M-EST. PATIENT-LVL III: ICD-10-PCS | Mod: PBBFAC,,, | Performed by: INTERNAL MEDICINE

## 2019-01-01 PROCEDURE — 63600175 PHARM REV CODE 636 W HCPCS: Performed by: STUDENT IN AN ORGANIZED HEALTH CARE EDUCATION/TRAINING PROGRAM

## 2019-01-01 PROCEDURE — 94761 N-INVAS EAR/PLS OXIMETRY MLT: CPT

## 2019-01-01 PROCEDURE — 27000221 HC OXYGEN, UP TO 24 HOURS

## 2019-01-01 PROCEDURE — 99284 EMERGENCY DEPT VISIT MOD MDM: CPT | Mod: 25

## 2019-01-01 PROCEDURE — 99233 PR SUBSEQUENT HOSPITAL CARE,LEVL III: ICD-10-PCS | Mod: ,,, | Performed by: HOSPITALIST

## 2019-01-01 PROCEDURE — 99213 OFFICE O/P EST LOW 20 MIN: CPT | Mod: PBBFAC | Performed by: INTERNAL MEDICINE

## 2019-01-01 PROCEDURE — 99232 SBSQ HOSP IP/OBS MODERATE 35: CPT | Mod: ,,, | Performed by: INTERNAL MEDICINE

## 2019-01-01 PROCEDURE — 93010 EKG 12-LEAD: ICD-10-PCS | Mod: S$PBB,,, | Performed by: INTERNAL MEDICINE

## 2019-01-01 PROCEDURE — 85610 PROTHROMBIN TIME: CPT

## 2019-01-01 PROCEDURE — 92960 CARDIOVERSION ELECTRIC EXT: CPT | Performed by: INTERNAL MEDICINE

## 2019-01-01 PROCEDURE — 25000003 PHARM REV CODE 250: Performed by: INTERNAL MEDICINE

## 2019-01-01 PROCEDURE — 97803 MED NUTRITION INDIV SUBSEQ: CPT

## 2019-01-01 PROCEDURE — 97530 THERAPEUTIC ACTIVITIES: CPT

## 2019-01-01 PROCEDURE — 99223 1ST HOSP IP/OBS HIGH 75: CPT | Mod: AI,,, | Performed by: HOSPITALIST

## 2019-01-01 PROCEDURE — 99305 PR NURSING FACILITY CARE, INIT, MOD SEVERITY: ICD-10-PCS | Mod: AI,,, | Performed by: INTERNAL MEDICINE

## 2019-01-01 PROCEDURE — 99238 PR HOSPITAL DISCHARGE DAY,<30 MIN: ICD-10-PCS | Mod: ,,, | Performed by: HOSPITALIST

## 2019-01-01 PROCEDURE — 80053 COMPREHEN METABOLIC PANEL: CPT

## 2019-01-01 PROCEDURE — 99223 PR INITIAL HOSPITAL CARE,LEVL III: ICD-10-PCS | Mod: AI,,, | Performed by: HOSPITALIST

## 2019-01-01 PROCEDURE — 97116 GAIT TRAINING THERAPY: CPT

## 2019-01-01 PROCEDURE — 20600001 HC STEP DOWN PRIVATE ROOM

## 2019-01-01 PROCEDURE — 99233 SBSQ HOSP IP/OBS HIGH 50: CPT | Mod: ,,, | Performed by: HOSPITALIST

## 2019-01-01 PROCEDURE — 87186 SC STD MICRODIL/AGAR DIL: CPT | Mod: 59

## 2019-01-01 PROCEDURE — 99284 EMERGENCY DEPT VISIT MOD MDM: CPT | Mod: ,,, | Performed by: EMERGENCY MEDICINE

## 2019-01-01 PROCEDURE — 97161 PT EVAL LOW COMPLEX 20 MIN: CPT

## 2019-01-01 PROCEDURE — G0378 HOSPITAL OBSERVATION PER HR: HCPCS

## 2019-01-01 PROCEDURE — 92960 CARDIOVERSION ELECTRIC EXT: CPT

## 2019-01-01 PROCEDURE — 99291 PR CRITICAL CARE, E/M 30-74 MINUTES: ICD-10-PCS | Mod: ,,, | Performed by: EMERGENCY MEDICINE

## 2019-01-01 PROCEDURE — 99214 OFFICE O/P EST MOD 30 MIN: CPT | Mod: S$PBB,,, | Performed by: FAMILY MEDICINE

## 2019-01-01 PROCEDURE — D9220A PRA ANESTHESIA: Mod: CRNA,,, | Performed by: NURSE ANESTHETIST, CERTIFIED REGISTERED

## 2019-01-01 PROCEDURE — 93010 EKG 12-LEAD: ICD-10-PCS | Mod: 76,,, | Performed by: INTERNAL MEDICINE

## 2019-01-01 PROCEDURE — 99232 PR SUBSEQUENT HOSPITAL CARE,LEVL II: ICD-10-PCS | Mod: ,,, | Performed by: INTERNAL MEDICINE

## 2019-01-01 PROCEDURE — 93010 ELECTROCARDIOGRAM REPORT: CPT | Mod: S$PBB,,, | Performed by: INTERNAL MEDICINE

## 2019-01-01 PROCEDURE — 92507 TX SP LANG VOICE COMM INDIV: CPT

## 2019-01-01 PROCEDURE — 93005 ELECTROCARDIOGRAM TRACING: CPT

## 2019-01-01 PROCEDURE — 87088 URINE BACTERIA CULTURE: CPT

## 2019-01-01 PROCEDURE — 97166 OT EVAL MOD COMPLEX 45 MIN: CPT

## 2019-01-01 PROCEDURE — 99900035 HC TECH TIME PER 15 MIN (STAT)

## 2019-01-01 PROCEDURE — 99214 PR OFFICE/OUTPT VISIT, EST, LEVL IV, 30-39 MIN: ICD-10-PCS | Mod: S$PBB,,, | Performed by: FAMILY MEDICINE

## 2019-01-01 PROCEDURE — 99214 PR OFFICE/OUTPT VISIT, EST, LEVL IV, 30-39 MIN: ICD-10-PCS | Mod: S$PBB,,, | Performed by: INTERNAL MEDICINE

## 2019-01-01 PROCEDURE — G0180 PR HOME HEALTH MD CERTIFICATION: ICD-10-PCS | Mod: ,,, | Performed by: INTERNAL MEDICINE

## 2019-01-01 PROCEDURE — 99999 PR PBB SHADOW E&M-EST. PATIENT-LVL III: ICD-10-PCS | Mod: PBBFAC,GC,, | Performed by: STUDENT IN AN ORGANIZED HEALTH CARE EDUCATION/TRAINING PROGRAM

## 2019-01-01 PROCEDURE — 84484 ASSAY OF TROPONIN QUANT: CPT

## 2019-01-01 PROCEDURE — 92960 CARDIOVERSION ELECTRIC EXT: CPT | Mod: ,,, | Performed by: INTERNAL MEDICINE

## 2019-01-01 PROCEDURE — 99999 PR PBB SHADOW E&M-EST. PATIENT-LVL III: ICD-10-PCS | Mod: PBBFAC,,, | Performed by: FAMILY MEDICINE

## 2019-01-01 PROCEDURE — 92960 PR CARDIOVERSION, ELECTIVE;EXTERN: ICD-10-PCS | Mod: ,,, | Performed by: INTERNAL MEDICINE

## 2019-01-01 PROCEDURE — 93010 ELECTROCARDIOGRAM REPORT: CPT | Mod: 77,,, | Performed by: INTERNAL MEDICINE

## 2019-01-01 PROCEDURE — 63600175 PHARM REV CODE 636 W HCPCS: Performed by: NURSE ANESTHETIST, CERTIFIED REGISTERED

## 2019-01-01 PROCEDURE — 87086 URINE CULTURE/COLONY COUNT: CPT

## 2019-01-01 PROCEDURE — 99999 PR PBB SHADOW E&M-EST. PATIENT-LVL III: CPT | Mod: PBBFAC,GC,, | Performed by: STUDENT IN AN ORGANIZED HEALTH CARE EDUCATION/TRAINING PROGRAM

## 2019-01-01 PROCEDURE — 93010 EKG 12-LEAD: ICD-10-PCS | Mod: 77,,, | Performed by: INTERNAL MEDICINE

## 2019-01-01 PROCEDURE — 87077 CULTURE AEROBIC IDENTIFY: CPT | Mod: 59

## 2019-01-01 PROCEDURE — 11000001 HC ACUTE MED/SURG PRIVATE ROOM

## 2019-01-01 PROCEDURE — 92523 SPEECH SOUND LANG COMPREHEN: CPT

## 2019-01-01 PROCEDURE — 93005 ELECTROCARDIOGRAM TRACING: CPT | Mod: PBBFAC | Performed by: INTERNAL MEDICINE

## 2019-01-01 PROCEDURE — 99291 CRITICAL CARE FIRST HOUR: CPT | Mod: 25

## 2019-01-01 PROCEDURE — G0180 MD CERTIFICATION HHA PATIENT: HCPCS | Mod: ,,, | Performed by: INTERNAL MEDICINE

## 2019-01-01 PROCEDURE — 84443 ASSAY THYROID STIM HORMONE: CPT

## 2019-01-01 PROCEDURE — 99213 OFFICE O/P EST LOW 20 MIN: CPT | Mod: PBBFAC,27 | Performed by: FAMILY MEDICINE

## 2019-01-01 PROCEDURE — 99305 1ST NF CARE MODERATE MDM 35: CPT | Mod: AI,,, | Performed by: INTERNAL MEDICINE

## 2019-01-01 PROCEDURE — D9220A PRA ANESTHESIA: ICD-10-PCS | Mod: ANES,,, | Performed by: ANESTHESIOLOGY

## 2019-01-01 PROCEDURE — 99214 PR OFFICE/OUTPT VISIT, EST, LEVL IV, 30-39 MIN: ICD-10-PCS | Mod: S$PBB,GC,, | Performed by: STUDENT IN AN ORGANIZED HEALTH CARE EDUCATION/TRAINING PROGRAM

## 2019-01-01 PROCEDURE — D9220A PRA ANESTHESIA: Mod: ANES,,, | Performed by: ANESTHESIOLOGY

## 2019-01-01 PROCEDURE — 84439 ASSAY OF FREE THYROXINE: CPT

## 2019-01-01 PROCEDURE — 83880 ASSAY OF NATRIURETIC PEPTIDE: CPT

## 2019-01-01 PROCEDURE — 99238 HOSP IP/OBS DSCHRG MGMT 30/<: CPT | Mod: ,,, | Performed by: HOSPITALIST

## 2019-01-01 PROCEDURE — D9220A PRA ANESTHESIA: ICD-10-PCS | Mod: CRNA,,, | Performed by: NURSE ANESTHETIST, CERTIFIED REGISTERED

## 2019-01-01 PROCEDURE — 97162 PT EVAL MOD COMPLEX 30 MIN: CPT

## 2019-01-01 PROCEDURE — 37000009 HC ANESTHESIA EA ADD 15 MINS: Performed by: INTERNAL MEDICINE

## 2019-01-01 PROCEDURE — 99291 CRITICAL CARE FIRST HOUR: CPT | Mod: ,,, | Performed by: EMERGENCY MEDICINE

## 2019-01-01 PROCEDURE — 99213 OFFICE O/P EST LOW 20 MIN: CPT | Mod: PBBFAC,25 | Performed by: STUDENT IN AN ORGANIZED HEALTH CARE EDUCATION/TRAINING PROGRAM

## 2019-01-01 PROCEDURE — 99214 OFFICE O/P EST MOD 30 MIN: CPT | Mod: S$PBB,GC,, | Performed by: STUDENT IN AN ORGANIZED HEALTH CARE EDUCATION/TRAINING PROGRAM

## 2019-01-01 PROCEDURE — 97165 OT EVAL LOW COMPLEX 30 MIN: CPT

## 2019-01-01 PROCEDURE — 93010 ELECTROCARDIOGRAM REPORT: CPT | Mod: 76,,, | Performed by: INTERNAL MEDICINE

## 2019-01-01 PROCEDURE — 97802 MEDICAL NUTRITION INDIV IN: CPT

## 2019-01-01 PROCEDURE — 99999 PR PBB SHADOW E&M-EST. PATIENT-LVL III: CPT | Mod: PBBFAC,,, | Performed by: FAMILY MEDICINE

## 2019-01-01 RX ORDER — LANOLIN ALCOHOL/MO/W.PET/CERES
400 CREAM (GRAM) TOPICAL 2 TIMES DAILY
Status: COMPLETED | OUTPATIENT
Start: 2019-01-01 | End: 2019-01-01

## 2019-01-01 RX ORDER — ACETAMINOPHEN 500 MG
500 TABLET ORAL EVERY 4 HOURS PRN
Status: DISCONTINUED | OUTPATIENT
Start: 2019-01-01 | End: 2019-01-01 | Stop reason: HOSPADM

## 2019-01-01 RX ORDER — ETOMIDATE 2 MG/ML
INJECTION INTRAVENOUS
Status: DISCONTINUED | OUTPATIENT
Start: 2019-01-01 | End: 2019-01-01

## 2019-01-01 RX ORDER — POTASSIUM CHLORIDE 20 MEQ/1
20 TABLET, EXTENDED RELEASE ORAL
Status: COMPLETED | OUTPATIENT
Start: 2019-01-01 | End: 2019-01-01

## 2019-01-01 RX ORDER — CALCIUM CARBONATE 200(500)MG
500 TABLET,CHEWABLE ORAL 2 TIMES DAILY PRN
Status: CANCELLED | OUTPATIENT
Start: 2019-01-01

## 2019-01-01 RX ORDER — CIPROFLOXACIN 500 MG/1
500 TABLET ORAL
Status: CANCELLED | OUTPATIENT
Start: 2019-01-01 | End: 2019-01-01

## 2019-01-01 RX ORDER — AMIODARONE HYDROCHLORIDE 200 MG/1
400 TABLET ORAL 2 TIMES DAILY
Status: CANCELLED | OUTPATIENT
Start: 2019-01-01 | End: 2019-01-01

## 2019-01-01 RX ORDER — FUROSEMIDE 10 MG/ML
80 INJECTION INTRAMUSCULAR; INTRAVENOUS ONCE
Status: COMPLETED | OUTPATIENT
Start: 2019-01-01 | End: 2019-01-01

## 2019-01-01 RX ORDER — PRAVASTATIN SODIUM 40 MG/1
80 TABLET ORAL DAILY
Status: DISCONTINUED | OUTPATIENT
Start: 2019-01-01 | End: 2019-01-01 | Stop reason: HOSPADM

## 2019-01-01 RX ORDER — OLANZAPINE 2.5 MG/1
2.5 TABLET ORAL 2 TIMES DAILY PRN
Status: CANCELLED | OUTPATIENT
Start: 2019-01-01

## 2019-01-01 RX ORDER — AMIODARONE HYDROCHLORIDE 400 MG/1
TABLET ORAL
Qty: 60 TABLET | Refills: 11 | Status: ON HOLD
Start: 2019-01-01 | End: 2019-01-01 | Stop reason: HOSPADM

## 2019-01-01 RX ORDER — SODIUM CHLORIDE 0.9 % (FLUSH) 0.9 %
10 SYRINGE (ML) INJECTION
Status: DISCONTINUED | OUTPATIENT
Start: 2019-01-01 | End: 2019-01-01 | Stop reason: HOSPADM

## 2019-01-01 RX ORDER — CALCIUM CARBONATE 200(500)MG
500 TABLET,CHEWABLE ORAL 2 TIMES DAILY PRN
Status: DISCONTINUED | OUTPATIENT
Start: 2019-01-01 | End: 2019-01-01 | Stop reason: HOSPADM

## 2019-01-01 RX ORDER — SCOLOPAMINE TRANSDERMAL SYSTEM 1 MG/1
1 PATCH, EXTENDED RELEASE TRANSDERMAL
Status: DISCONTINUED | OUTPATIENT
Start: 2019-01-01 | End: 2019-01-01 | Stop reason: SDUPTHER

## 2019-01-01 RX ORDER — ACETAMINOPHEN 500 MG
500 TABLET ORAL EVERY 4 HOURS PRN
Status: CANCELLED | OUTPATIENT
Start: 2019-01-01

## 2019-01-01 RX ORDER — METOPROLOL TARTRATE 50 MG/1
50 TABLET ORAL 2 TIMES DAILY
Status: DISCONTINUED | OUTPATIENT
Start: 2019-01-01 | End: 2019-01-01

## 2019-01-01 RX ORDER — MULTIVIT WITH MINERALS/HERBS
1 TABLET ORAL DAILY
Qty: 90 TABLET | Refills: 1 | Status: SHIPPED | OUTPATIENT
Start: 2019-01-01 | End: 2019-09-11 | Stop reason: HOSPADM

## 2019-01-01 RX ORDER — CIPROFLOXACIN 500 MG/1
500 TABLET ORAL EVERY 12 HOURS
Status: DISCONTINUED | OUTPATIENT
Start: 2019-01-01 | End: 2019-01-01

## 2019-01-01 RX ORDER — POTASSIUM CHLORIDE 20 MEQ/1
20 TABLET, EXTENDED RELEASE ORAL DAILY
Qty: 30 TABLET | Refills: 3 | Status: SHIPPED | OUTPATIENT
Start: 2019-01-01 | End: 2019-09-11 | Stop reason: HOSPADM

## 2019-01-01 RX ORDER — LIDOCAINE HYDROCHLORIDE 10 MG/ML
INJECTION, SOLUTION INTRAVENOUS
Status: DISCONTINUED | OUTPATIENT
Start: 2019-01-01 | End: 2019-01-01

## 2019-01-01 RX ORDER — CIPROFLOXACIN 500 MG/1
500 TABLET ORAL DAILY
Status: ON HOLD
Start: 2019-01-01 | End: 2019-01-01 | Stop reason: HOSPADM

## 2019-01-01 RX ORDER — FUROSEMIDE 40 MG/1
40 TABLET ORAL 2 TIMES DAILY
Status: DISCONTINUED | OUTPATIENT
Start: 2019-01-01 | End: 2019-01-01

## 2019-01-01 RX ORDER — METOPROLOL TARTRATE 50 MG/1
50 TABLET ORAL EVERY 8 HOURS
Status: DISCONTINUED | OUTPATIENT
Start: 2019-01-01 | End: 2019-01-01

## 2019-01-01 RX ORDER — FUROSEMIDE 40 MG/1
40 TABLET ORAL DAILY
Status: DISCONTINUED | OUTPATIENT
Start: 2019-01-01 | End: 2019-01-01

## 2019-01-01 RX ORDER — PRAVASTATIN SODIUM 40 MG/1
80 TABLET ORAL DAILY
Status: CANCELLED | OUTPATIENT
Start: 2019-01-01

## 2019-01-01 RX ORDER — OLANZAPINE 2.5 MG/1
2.5 TABLET ORAL 2 TIMES DAILY PRN
Status: DISCONTINUED | OUTPATIENT
Start: 2019-01-01 | End: 2019-01-01

## 2019-01-01 RX ORDER — METOPROLOL TARTRATE 1 MG/ML
5 INJECTION, SOLUTION INTRAVENOUS ONCE
Status: COMPLETED | OUTPATIENT
Start: 2019-01-01 | End: 2019-01-01

## 2019-01-01 RX ORDER — PHENYLEPHRINE HYDROCHLORIDE 10 MG/ML
INJECTION INTRAVENOUS
Status: DISCONTINUED | OUTPATIENT
Start: 2019-01-01 | End: 2019-01-01

## 2019-01-01 RX ORDER — METOPROLOL TARTRATE 25 MG/1
25 TABLET, FILM COATED ORAL 2 TIMES DAILY
Qty: 180 TABLET | Refills: 3 | Status: ON HOLD | OUTPATIENT
Start: 2019-01-01 | End: 2019-01-01 | Stop reason: SDUPTHER

## 2019-01-01 RX ORDER — DILTIAZEM HCL/D5W 125 MG/125
7.5 PLASTIC BAG, INJECTION (ML) INTRAVENOUS CONTINUOUS
Status: DISCONTINUED | OUTPATIENT
Start: 2019-01-01 | End: 2019-01-01

## 2019-01-01 RX ORDER — ACETAMINOPHEN 500 MG
500 TABLET ORAL 2 TIMES DAILY PRN
Qty: 180 TABLET | Refills: 0 | Status: ON HOLD | OUTPATIENT
Start: 2019-01-01 | End: 2019-01-01 | Stop reason: HOSPADM

## 2019-01-01 RX ORDER — DILTIAZEM HYDROCHLORIDE 240 MG/1
240 CAPSULE, COATED, EXTENDED RELEASE ORAL DAILY
Qty: 90 CAPSULE | Refills: 3 | Status: ON HOLD | OUTPATIENT
Start: 2019-01-01 | End: 2019-01-01 | Stop reason: HOSPADM

## 2019-01-01 RX ORDER — AMIODARONE HYDROCHLORIDE 200 MG/1
400 TABLET ORAL 2 TIMES DAILY
Status: DISCONTINUED | OUTPATIENT
Start: 2019-01-01 | End: 2019-01-01 | Stop reason: HOSPADM

## 2019-01-01 RX ORDER — SCOLOPAMINE TRANSDERMAL SYSTEM 1 MG/1
1 PATCH, EXTENDED RELEASE TRANSDERMAL
Status: DISCONTINUED | OUTPATIENT
Start: 2019-01-01 | End: 2019-01-01 | Stop reason: HOSPADM

## 2019-01-01 RX ORDER — OLANZAPINE 2.5 MG/1
2.5 TABLET ORAL DAILY PRN
Status: DISCONTINUED | OUTPATIENT
Start: 2019-01-01 | End: 2019-01-01

## 2019-01-01 RX ORDER — FUROSEMIDE 40 MG/1
40 TABLET ORAL DAILY
Status: DISCONTINUED | OUTPATIENT
Start: 2019-01-01 | End: 2019-01-01 | Stop reason: HOSPADM

## 2019-01-01 RX ORDER — ONDANSETRON 2 MG/ML
4 INJECTION INTRAMUSCULAR; INTRAVENOUS DAILY PRN
Status: DISCONTINUED | OUTPATIENT
Start: 2019-01-01 | End: 2019-01-01 | Stop reason: HOSPADM

## 2019-01-01 RX ORDER — CIPROFLOXACIN 500 MG/1
500 TABLET ORAL
Status: COMPLETED | OUTPATIENT
Start: 2019-01-01 | End: 2019-01-01

## 2019-01-01 RX ORDER — DILTIAZEM HYDROCHLORIDE 240 MG/1
240 CAPSULE, COATED, EXTENDED RELEASE ORAL DAILY
Status: DISCONTINUED | OUTPATIENT
Start: 2019-01-01 | End: 2019-01-01

## 2019-01-01 RX ORDER — AMIODARONE HYDROCHLORIDE 200 MG/1
200 TABLET ORAL DAILY
Status: DISCONTINUED | OUTPATIENT
Start: 2019-01-01 | End: 2019-01-01 | Stop reason: HOSPADM

## 2019-01-01 RX ORDER — POTASSIUM CHLORIDE 20 MEQ/1
20 TABLET, EXTENDED RELEASE ORAL DAILY
Status: DISCONTINUED | OUTPATIENT
Start: 2019-01-01 | End: 2019-01-01

## 2019-01-01 RX ORDER — DILTIAZEM HYDROCHLORIDE 5 MG/ML
10 INJECTION INTRAVENOUS ONCE
Status: COMPLETED | OUTPATIENT
Start: 2019-01-01 | End: 2019-01-01

## 2019-01-01 RX ORDER — METOPROLOL TARTRATE 25 MG/1
25 TABLET, FILM COATED ORAL EVERY 6 HOURS
Status: DISCONTINUED | OUTPATIENT
Start: 2019-01-01 | End: 2019-01-01

## 2019-01-01 RX ORDER — POTASSIUM CHLORIDE 20 MEQ/1
40 TABLET, EXTENDED RELEASE ORAL ONCE
Status: COMPLETED | OUTPATIENT
Start: 2019-01-01 | End: 2019-01-01

## 2019-01-01 RX ORDER — TOLTERODINE 4 MG/1
4 CAPSULE, EXTENDED RELEASE ORAL DAILY
Qty: 90 CAPSULE | Refills: 3 | Status: SHIPPED | OUTPATIENT
Start: 2019-01-01 | End: 2019-09-11 | Stop reason: HOSPADM

## 2019-01-01 RX ORDER — RAMELTEON 8 MG/1
8 TABLET ORAL NIGHTLY PRN
Status: DISCONTINUED | OUTPATIENT
Start: 2019-01-01 | End: 2019-01-01

## 2019-01-01 RX ORDER — AMIODARONE HYDROCHLORIDE 200 MG/1
400 TABLET ORAL 2 TIMES DAILY
Status: DISPENSED | OUTPATIENT
Start: 2019-01-01 | End: 2019-01-01

## 2019-01-01 RX ORDER — METOPROLOL TARTRATE 50 MG/1
50 TABLET ORAL 2 TIMES DAILY
Status: DISCONTINUED | OUTPATIENT
Start: 2019-01-01 | End: 2019-01-01 | Stop reason: HOSPADM

## 2019-01-01 RX ORDER — AMIODARONE HYDROCHLORIDE 200 MG/1
200 TABLET ORAL DAILY
Qty: 30 TABLET | Refills: 3 | Status: SHIPPED | OUTPATIENT
Start: 2019-01-01 | End: 2019-09-11 | Stop reason: HOSPADM

## 2019-01-01 RX ORDER — EPHEDRINE SULFATE 50 MG/ML
5 INJECTION, SOLUTION INTRAVENOUS EVERY 5 MIN PRN
Status: DISCONTINUED | OUTPATIENT
Start: 2019-01-01 | End: 2019-01-01 | Stop reason: HOSPADM

## 2019-01-01 RX ORDER — DILTIAZEM HCL/D5W 125 MG/125
5 PLASTIC BAG, INJECTION (ML) INTRAVENOUS CONTINUOUS
Status: DISCONTINUED | OUTPATIENT
Start: 2019-01-01 | End: 2019-01-01

## 2019-01-01 RX ORDER — KETAMINE HYDROCHLORIDE 100 MG/ML
INJECTION, SOLUTION INTRAMUSCULAR; INTRAVENOUS
Status: DISCONTINUED | OUTPATIENT
Start: 2019-01-01 | End: 2019-01-01

## 2019-01-01 RX ORDER — FUROSEMIDE 10 MG/ML
80 INJECTION INTRAMUSCULAR; INTRAVENOUS ONCE
Status: DISCONTINUED | OUTPATIENT
Start: 2019-01-01 | End: 2019-01-01

## 2019-01-01 RX ORDER — PRAVASTATIN SODIUM 20 MG/1
80 TABLET ORAL DAILY
Status: DISCONTINUED | OUTPATIENT
Start: 2019-01-01 | End: 2019-01-01

## 2019-01-01 RX ORDER — FUROSEMIDE 10 MG/ML
80 INJECTION INTRAMUSCULAR; INTRAVENOUS 2 TIMES DAILY
Status: COMPLETED | OUTPATIENT
Start: 2019-01-01 | End: 2019-01-01

## 2019-01-01 RX ORDER — FUROSEMIDE 40 MG/1
40 TABLET ORAL 2 TIMES DAILY
Status: DISCONTINUED | OUTPATIENT
Start: 2019-01-01 | End: 2019-01-01 | Stop reason: HOSPADM

## 2019-01-01 RX ORDER — METOPROLOL TARTRATE 50 MG/1
50 TABLET ORAL 2 TIMES DAILY
Qty: 60 TABLET | Refills: 11 | Status: SHIPPED | OUTPATIENT
Start: 2019-01-01 | End: 2019-09-11 | Stop reason: HOSPADM

## 2019-01-01 RX ORDER — SILVER SULFADIAZINE 10 G/1000G
CREAM TOPICAL
Status: DISCONTINUED | OUTPATIENT
Start: 2019-01-01 | End: 2019-01-01 | Stop reason: HOSPADM

## 2019-01-01 RX ORDER — METOPROLOL TARTRATE 25 MG/1
25 TABLET, FILM COATED ORAL 2 TIMES DAILY
Status: DISCONTINUED | OUTPATIENT
Start: 2019-01-01 | End: 2019-01-01

## 2019-01-01 RX ORDER — CIPROFLOXACIN 500 MG/1
500 TABLET ORAL
Status: DISCONTINUED | OUTPATIENT
Start: 2019-01-01 | End: 2019-01-01 | Stop reason: HOSPADM

## 2019-01-01 RX ORDER — MULTIVITAMIN
1 TABLET ORAL DAILY
Qty: 90 TABLET | Refills: 1 | Status: SHIPPED | OUTPATIENT
Start: 2019-01-01 | End: 2019-09-11 | Stop reason: HOSPADM

## 2019-01-01 RX ORDER — AMIODARONE HYDROCHLORIDE 200 MG/1
400 TABLET ORAL 2 TIMES DAILY
Status: DISCONTINUED | OUTPATIENT
Start: 2019-01-01 | End: 2019-01-01

## 2019-01-01 RX ORDER — PRAVASTATIN SODIUM 80 MG/1
80 TABLET ORAL DAILY
Qty: 90 TABLET | Refills: 3 | Status: SHIPPED | OUTPATIENT
Start: 2019-01-01 | End: 2019-09-11 | Stop reason: HOSPADM

## 2019-01-01 RX ORDER — FUROSEMIDE 40 MG/1
40 TABLET ORAL 2 TIMES DAILY
Status: CANCELLED | OUTPATIENT
Start: 2019-01-01

## 2019-01-01 RX ORDER — AMIODARONE HYDROCHLORIDE 200 MG/1
200 TABLET ORAL DAILY
Status: DISCONTINUED | OUTPATIENT
Start: 2019-01-01 | End: 2019-01-01

## 2019-01-01 RX ORDER — SODIUM CHLORIDE 9 MG/ML
INJECTION, SOLUTION INTRAVENOUS CONTINUOUS PRN
Status: DISCONTINUED | OUTPATIENT
Start: 2019-01-01 | End: 2019-01-01

## 2019-01-01 RX ORDER — OLANZAPINE 2.5 MG/1
2.5 TABLET ORAL 2 TIMES DAILY PRN
Status: DISCONTINUED | OUTPATIENT
Start: 2019-01-01 | End: 2019-01-01 | Stop reason: HOSPADM

## 2019-01-01 RX ORDER — METOPROLOL TARTRATE 75 MG/1
75 TABLET, FILM COATED ORAL 2 TIMES DAILY
Qty: 540 TABLET | Refills: 3 | Status: ON HOLD
Start: 2019-01-01 | End: 2019-01-01 | Stop reason: HOSPADM

## 2019-01-01 RX ORDER — RAMELTEON 8 MG/1
8 TABLET ORAL NIGHTLY PRN
Status: CANCELLED | OUTPATIENT
Start: 2019-01-01

## 2019-01-01 RX ORDER — METOPROLOL TARTRATE 1 MG/ML
5 INJECTION, SOLUTION INTRAVENOUS EVERY 5 MIN PRN
Status: COMPLETED | OUTPATIENT
Start: 2019-01-01 | End: 2019-01-01

## 2019-01-01 RX ADMIN — AMIODARONE HYDROCHLORIDE 400 MG: 200 TABLET ORAL at 09:08

## 2019-01-01 RX ADMIN — AMIODARONE HYDROCHLORIDE 400 MG: 200 TABLET ORAL at 11:08

## 2019-01-01 RX ADMIN — APIXABAN 2.5 MG: 2.5 TABLET, FILM COATED ORAL at 08:08

## 2019-01-01 RX ADMIN — POTASSIUM CHLORIDE 20 MEQ: 1500 TABLET, EXTENDED RELEASE ORAL at 08:08

## 2019-01-01 RX ADMIN — APIXABAN 2.5 MG: 2.5 TABLET, FILM COATED ORAL at 09:08

## 2019-01-01 RX ADMIN — ACETAMINOPHEN 500 MG: 500 TABLET ORAL at 10:08

## 2019-01-01 RX ADMIN — PRAVASTATIN SODIUM 80 MG: 40 TABLET ORAL at 08:08

## 2019-01-01 RX ADMIN — THERA TABS 1 TABLET: TAB at 09:08

## 2019-01-01 RX ADMIN — APIXABAN 2.5 MG: 2.5 TABLET, FILM COATED ORAL at 11:08

## 2019-01-01 RX ADMIN — PRAVASTATIN SODIUM 80 MG: 20 TABLET ORAL at 12:08

## 2019-01-01 RX ADMIN — METOPROLOL TARTRATE 50 MG: 50 TABLET ORAL at 08:09

## 2019-01-01 RX ADMIN — FUROSEMIDE 40 MG: 40 TABLET ORAL at 09:08

## 2019-01-01 RX ADMIN — METOPROLOL TARTRATE 50 MG: 50 TABLET ORAL at 08:08

## 2019-01-01 RX ADMIN — PRAVASTATIN SODIUM 80 MG: 20 TABLET ORAL at 09:08

## 2019-01-01 RX ADMIN — METOPROLOL TARTRATE 5 MG: 5 INJECTION INTRAVENOUS at 09:08

## 2019-01-01 RX ADMIN — METOPROLOL TARTRATE 50 MG: 50 TABLET ORAL at 09:08

## 2019-01-01 RX ADMIN — FUROSEMIDE 40 MG: 40 TABLET ORAL at 08:08

## 2019-01-01 RX ADMIN — APIXABAN 5 MG: 5 TABLET, FILM COATED ORAL at 08:08

## 2019-01-01 RX ADMIN — DILTIAZEM HYDROCHLORIDE 7.5 MG/HR: 5 INJECTION INTRAVENOUS at 06:08

## 2019-01-01 RX ADMIN — POTASSIUM CHLORIDE 20 MEQ: 1500 TABLET, EXTENDED RELEASE ORAL at 09:08

## 2019-01-01 RX ADMIN — AMIODARONE HYDROCHLORIDE 200 MG: 200 TABLET ORAL at 08:08

## 2019-01-01 RX ADMIN — PRAVASTATIN SODIUM 80 MG: 40 TABLET ORAL at 09:08

## 2019-01-01 RX ADMIN — POTASSIUM CHLORIDE 20 MEQ: 20 TABLET, EXTENDED RELEASE ORAL at 12:08

## 2019-01-01 RX ADMIN — FUROSEMIDE 80 MG: 10 INJECTION, SOLUTION INTRAMUSCULAR; INTRAVENOUS at 09:08

## 2019-01-01 RX ADMIN — AMIODARONE HYDROCHLORIDE 200 MG: 200 TABLET ORAL at 02:08

## 2019-01-01 RX ADMIN — POTASSIUM CHLORIDE 20 MEQ: 20 TABLET, EXTENDED RELEASE ORAL at 02:08

## 2019-01-01 RX ADMIN — THERA TABS 1 TABLET: TAB at 08:08

## 2019-01-01 RX ADMIN — SODIUM CHLORIDE: 9 INJECTION, SOLUTION INTRAVENOUS at 01:08

## 2019-01-01 RX ADMIN — CEFTRIAXONE 2 G: 2 INJECTION, SOLUTION INTRAVENOUS at 05:08

## 2019-01-01 RX ADMIN — OLANZAPINE 2.5 MG: 2.5 TABLET, FILM COATED ORAL at 08:08

## 2019-01-01 RX ADMIN — CIPROFLOXACIN HYDROCHLORIDE 500 MG: 500 TABLET, FILM COATED ORAL at 06:08

## 2019-01-01 RX ADMIN — AMIODARONE HYDROCHLORIDE 1 MG/MIN: 1.8 INJECTION, SOLUTION INTRAVENOUS at 07:08

## 2019-01-01 RX ADMIN — AMIODARONE HYDROCHLORIDE 400 MG: 200 TABLET ORAL at 08:08

## 2019-01-01 RX ADMIN — DILTIAZEM HYDROCHLORIDE 5 MG/HR: 5 INJECTION INTRAVENOUS at 03:08

## 2019-01-01 RX ADMIN — LORAZEPAM 0.5 MG: 2 INJECTION INTRAMUSCULAR; INTRAVENOUS at 03:09

## 2019-01-01 RX ADMIN — AMIODARONE HYDROCHLORIDE 200 MG: 200 TABLET ORAL at 09:08

## 2019-01-01 RX ADMIN — RAMELTEON 8 MG: 8 TABLET, FILM COATED ORAL at 08:08

## 2019-01-01 RX ADMIN — OLANZAPINE 2.5 MG: 2.5 TABLET, FILM COATED ORAL at 10:08

## 2019-01-01 RX ADMIN — RAMELTEON 8 MG: 8 TABLET, FILM COATED ORAL at 11:08

## 2019-01-01 RX ADMIN — AMIODARONE HYDROCHLORIDE 200 MG: 200 TABLET ORAL at 10:09

## 2019-01-01 RX ADMIN — AMIODARONE HYDROCHLORIDE 150 MG: 1.5 INJECTION, SOLUTION INTRAVENOUS at 06:08

## 2019-01-01 RX ADMIN — FUROSEMIDE 40 MG: 40 TABLET ORAL at 10:08

## 2019-01-01 RX ADMIN — CIPROFLOXACIN HYDROCHLORIDE 500 MG: 500 TABLET, FILM COATED ORAL at 05:08

## 2019-01-01 RX ADMIN — ETOMIDATE 5 MG: 2 INJECTION, SOLUTION INTRAVENOUS at 01:08

## 2019-01-01 RX ADMIN — METOPROLOL TARTRATE 50 MG: 50 TABLET ORAL at 05:08

## 2019-01-01 RX ADMIN — AMIODARONE HYDROCHLORIDE 0.5 MG/MIN: 1.8 INJECTION, SOLUTION INTRAVENOUS at 02:08

## 2019-01-01 RX ADMIN — APIXABAN 2.5 MG: 2.5 TABLET, FILM COATED ORAL at 08:09

## 2019-01-01 RX ADMIN — POTASSIUM CHLORIDE 40 MEQ: 1500 TABLET, EXTENDED RELEASE ORAL at 12:08

## 2019-01-01 RX ADMIN — FUROSEMIDE 80 MG: 10 INJECTION, SOLUTION INTRAMUSCULAR; INTRAVENOUS at 05:08

## 2019-01-01 RX ADMIN — PHENYLEPHRINE HYDROCHLORIDE 200 MCG: 10 INJECTION INTRAVENOUS at 02:08

## 2019-01-01 RX ADMIN — CALCIUM CARBONATE (ANTACID) CHEW TAB 500 MG 500 MG: 500 CHEW TAB at 09:08

## 2019-01-01 RX ADMIN — PRAVASTATIN SODIUM 80 MG: 20 TABLET ORAL at 08:08

## 2019-01-01 RX ADMIN — METOPROLOL TARTRATE 50 MG: 50 TABLET ORAL at 03:08

## 2019-01-01 RX ADMIN — POTASSIUM CHLORIDE 20 MEQ: 20 TABLET, EXTENDED RELEASE ORAL at 09:08

## 2019-01-01 RX ADMIN — METOPROLOL TARTRATE 5 MG: 5 INJECTION INTRAVENOUS at 12:08

## 2019-01-01 RX ADMIN — RAMELTEON 8 MG: 8 TABLET, FILM COATED ORAL at 09:08

## 2019-01-01 RX ADMIN — POTASSIUM CHLORIDE 40 MEQ: 20 TABLET, EXTENDED RELEASE ORAL at 06:08

## 2019-01-01 RX ADMIN — POTASSIUM CHLORIDE 20 MEQ: 1500 TABLET, EXTENDED RELEASE ORAL at 02:08

## 2019-01-01 RX ADMIN — MORPHINE SULFATE 0.5 MG/HR: 10 INJECTION INTRAMUSCULAR; INTRAVENOUS; SUBCUTANEOUS at 08:09

## 2019-01-01 RX ADMIN — FUROSEMIDE 80 MG: 10 INJECTION, SOLUTION INTRAMUSCULAR; INTRAVENOUS at 02:08

## 2019-01-01 RX ADMIN — METOPROLOL TARTRATE 50 MG: 50 TABLET ORAL at 02:08

## 2019-01-01 RX ADMIN — ACETAMINOPHEN 500 MG: 500 TABLET ORAL at 08:08

## 2019-01-01 RX ADMIN — APIXABAN 5 MG: 5 TABLET, FILM COATED ORAL at 09:08

## 2019-01-01 RX ADMIN — METOPROLOL TARTRATE 50 MG: 50 TABLET ORAL at 10:09

## 2019-01-01 RX ADMIN — METOPROLOL TARTRATE 50 MG: 50 TABLET ORAL at 06:08

## 2019-01-01 RX ADMIN — TOPICAL ANESTHETIC 1 EACH: 200 SPRAY DENTAL; PERIODONTAL at 01:08

## 2019-01-01 RX ADMIN — OLANZAPINE 2.5 MG: 2.5 TABLET, FILM COATED ORAL at 09:08

## 2019-01-01 RX ADMIN — KETAMINE HYDROCHLORIDE 10 MG: 100 INJECTION, SOLUTION, CONCENTRATE INTRAMUSCULAR; INTRAVENOUS at 01:08

## 2019-01-01 RX ADMIN — THERA TABS 1 TABLET: TAB at 10:09

## 2019-01-01 RX ADMIN — PRAVASTATIN SODIUM 80 MG: 20 TABLET ORAL at 02:08

## 2019-01-01 RX ADMIN — MAGNESIUM OXIDE TAB 400 MG (241.3 MG ELEMENTAL MG) 400 MG: 400 (241.3 MG) TAB at 09:08

## 2019-01-01 RX ADMIN — APIXABAN 2.5 MG: 2.5 TABLET, FILM COATED ORAL at 02:08

## 2019-01-01 RX ADMIN — FUROSEMIDE 40 MG: 40 TABLET ORAL at 08:09

## 2019-01-01 RX ADMIN — AMIODARONE HYDROCHLORIDE 200 MG: 200 TABLET ORAL at 08:09

## 2019-01-01 RX ADMIN — SCOPOLAMINE 1 PATCH: 1 PATCH TRANSDERMAL at 08:09

## 2019-01-01 RX ADMIN — FUROSEMIDE 40 MG: 40 TABLET ORAL at 12:08

## 2019-01-01 RX ADMIN — MAGNESIUM OXIDE TAB 400 MG (241.3 MG ELEMENTAL MG) 400 MG: 400 (241.3 MG) TAB at 08:08

## 2019-01-01 RX ADMIN — FUROSEMIDE 40 MG: 40 TABLET ORAL at 11:08

## 2019-01-01 RX ADMIN — RAMELTEON 8 MG: 8 TABLET, FILM COATED ORAL at 10:08

## 2019-01-01 RX ADMIN — AMIODARONE HYDROCHLORIDE 400 MG: 200 TABLET ORAL at 05:08

## 2019-01-01 RX ADMIN — THERA TABS 1 TABLET: TAB at 08:09

## 2019-01-01 RX ADMIN — FUROSEMIDE 80 MG: 10 INJECTION, SOLUTION INTRAMUSCULAR; INTRAVENOUS at 12:08

## 2019-01-01 RX ADMIN — METOPROLOL TARTRATE 50 MG: 50 TABLET ORAL at 10:08

## 2019-01-01 RX ADMIN — DILTIAZEM HYDROCHLORIDE 10 MG: 5 INJECTION INTRAVENOUS at 02:08

## 2019-01-01 RX ADMIN — FUROSEMIDE 80 MG: 10 INJECTION, SOLUTION INTRAMUSCULAR; INTRAVENOUS at 11:08

## 2019-01-01 RX ADMIN — METOPROLOL TARTRATE 5 MG: 5 INJECTION INTRAVENOUS at 11:08

## 2019-01-01 RX ADMIN — OLANZAPINE 2.5 MG: 2.5 TABLET, FILM COATED ORAL at 01:08

## 2019-01-01 RX ADMIN — APIXABAN 5 MG: 5 TABLET, FILM COATED ORAL at 10:08

## 2019-01-01 RX ADMIN — APIXABAN 2.5 MG: 2.5 TABLET, FILM COATED ORAL at 10:09

## 2019-01-01 RX ADMIN — PHENYLEPHRINE HYDROCHLORIDE 100 MCG: 10 INJECTION INTRAVENOUS at 02:08

## 2019-01-01 RX ADMIN — ACETAMINOPHEN 500 MG: 500 TABLET ORAL at 01:08

## 2019-01-01 RX ADMIN — APIXABAN 2.5 MG: 2.5 TABLET, FILM COATED ORAL at 10:08

## 2019-01-01 RX ADMIN — FUROSEMIDE 80 MG: 10 INJECTION, SOLUTION INTRAMUSCULAR; INTRAVENOUS at 06:08

## 2019-01-01 RX ADMIN — ACETAMINOPHEN 500 MG: 500 TABLET ORAL at 09:08

## 2019-01-01 RX ADMIN — OLANZAPINE 2.5 MG: 2.5 TABLET, FILM COATED ORAL at 05:08

## 2019-01-01 RX ADMIN — MAGNESIUM OXIDE TAB 400 MG (241.3 MG ELEMENTAL MG) 400 MG: 400 (241.3 MG) TAB at 10:08

## 2019-01-01 RX ADMIN — METOPROLOL TARTRATE 75 MG: 50 TABLET ORAL at 11:08

## 2019-01-01 RX ADMIN — OLANZAPINE 2.5 MG: 2.5 TABLET, FILM COATED ORAL at 11:08

## 2019-01-01 RX ADMIN — FUROSEMIDE 40 MG: 40 TABLET ORAL at 10:09

## 2019-01-01 RX ADMIN — METOPROLOL TARTRATE 25 MG: 25 TABLET ORAL at 09:08

## 2019-01-01 RX ADMIN — LIDOCAINE HYDROCHLORIDE 50 MG: 10 INJECTION, SOLUTION INTRAVENOUS at 01:08

## 2019-01-01 RX ADMIN — POTASSIUM CHLORIDE 20 MEQ: 20 TABLET, EXTENDED RELEASE ORAL at 03:08

## 2019-01-04 NOTE — TELEPHONE ENCOUNTER
HHN states pts daughter bought compression stocking that were too small and cut into pts leg; asking for orders/medication to treat wound and also order for large compression stockings be faxed to their office at number below

## 2019-01-04 NOTE — TELEPHONE ENCOUNTER
----- Message from Kareen Milner sent at 1/4/2019 11:16 AM CST -----  Contact: chapraro ko  Pt n calling to speak to a nurse regarding ordering more stocking for pt. [phone]. Fax#[phone].

## 2019-01-07 NOTE — TELEPHONE ENCOUNTER
----- Message from Betzaida Loaiza sent at 1/4/2019  4:03 PM CST -----  Contact: [facility]  Pharmacy says they never received order for compression stockings. Asking to have it resent. Please call at [phone]. Fax: [phone].

## 2019-01-07 NOTE — TELEPHONE ENCOUNTER
----- Message from Kareen Milner sent at 1/7/2019  3:23 PM CST -----  Contact: [facility] calling regarding pt order for new stockings. [phone]

## 2019-01-07 NOTE — TELEPHONE ENCOUNTER
Order still pending another request will sen to provider , verbalized understand , fax # [phone].

## 2019-01-08 NOTE — TELEPHONE ENCOUNTER
----- Message from Clari Dejesus sent at 1/8/2019  1:06 PM CST -----  Contact: daughter - Tiana  Patient's daughter would like to speak with the doctor about getting something for the broken skin on patient's legs. Please call at [phone]

## 2019-01-08 NOTE — TELEPHONE ENCOUNTER
Spoke with daughter,Sandra.States that she received a call from [facility] and they are requesting for patient to have compression stockings. Also requesting an antibiotic for broken skin on patient's leg. It is not bad at this time but they would like to prevent it from getting worse. Please advise.

## 2019-01-22 NOTE — PROGRESS NOTES
Subjective:    Patient ID:  Ruth Lan is a 83 y.o. female who presents for evaluation of paroxysmal atrial fibrillation      HPI:  =======================  83 yoF AS s/p AVR here for f/u of AF   She has developed HF symptoms (LE edema and dyspnea) since her AF diagnosis 10/18.  She has dementia and is DNR.   She is  on eliquis 2.5 mg bid (over 80, Cr 1.5, 72 kg).   She is s/p DCCV on 12/18/18 . Overall feeling better since CV  CHADSVASC of 5. History taken from her daughter and chart review.     EKG today: NSR @ 93 with multiple PACs    10/18:   ===============================    1 - Severe left atrial enlargement.     2 - Normal left ventricular systolic function (EF 55-60%).     3 - No wall motion abnormalities.     4 - Normal right ventricular systolic function .     5 - S/P surgical AVR, NISHA = 0.98 cm2, AVAi = 0.55 cm2/m2, peak velocity = 2.7 m/s, mean gradient = 14 mmHg.     6 - Severe mitral regurgitation.     9 - Pulmonary hypertension. The estimated PA systolic pressure is 43 mmHg.     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, ER/[place] positive  No date: Chronic diastolic heart failure  No date: Heart murmur  No date: Hyperlipidemia  No date: Hypertension  No date: Left atrial enlargement  No date: Severe mitral regurgitation    Past Surgical History:  12/6/2012: BIOPSY, LYMPH NODE, SENTINEL; Right      Comment:  Performed by Demetri Epstein MD at [facility] OR 2ND FLR  11/2012: BREAST BIOPSY      Comment:  Right breast- invasive ductal carcinoma  10/21/2008: CATARACT EXTRACTION W/  INTRAOCULAR LENS IMPLANT; Right      Comment:  OU   No date: CATARACT EXTRACTION W/  INTRAOCULAR LENS IMPLANT; Left  12/6/2012: INSERTION, LOCALIZATION WIRE; Right      Comment:  Performed by Demetri Epstein MD at [facility] OR 2ND FLR  1/14/2013: [id number];  Right      Comment:  Performed by [facility] MD at [facility] OR [facility] FLR  2004: TISSUE AORTIC VALVE REPLACEMENT  No date: TUBAL LIGATION    Social History    Socioeconomic History      Marital status:       Spouse name: Not on file      Number of children: Not on file      Years of education: Not on file      Highest education level: Not on file    Social Needs      Financial resource strain: Not on file      Food insecurity - worry: Not on file      Food insecurity - inability: Not on file      Transportation needs - medical: Not on file      Transportation needs - non-medical: Not on file    Occupational History      Not on file    Tobacco Use      Smoking status: Former Smoker        Packs/day: 1.00        Years: 50.00        Pack years: 50        Quit date: 11/15/2002        Years since quittin.0      Smokeless tobacco: Never Used    Substance and Sexual Activity      Alcohol use: Yes        Alcohol/week: 1.5 oz        Types: 3 Standard drinks or equivalent per week        Comment: 3 OZ WINE       Drug use: No      Sexual activity: No    Other Topics      Concerns:        Not on file    Social History Narrative      Not on file      Review of patient's family history indicates:  Problem: Cancer      Relation: Maternal Aunt          Age of Onset: (Not Specified)          Comment: cervical or breast  Problem: Breast cancer      Relation: Maternal Grandmother          Age of Onset: (Not Specified)  Problem: Heart disease      Relation: Father          Age of Onset: (Not Specified)  Problem: No Known Problems      Relation: Mother          Age of Onset: (Not Specified)  Problem: No Known Problems      Relation: Sister          Age of Onset: (Not Specified)  Problem: No Known Problems      Relation: Brother          Age of Onset: (Not Specified)  Problem: No Known Problems      Relation: Maternal Uncle          Age of Onset: (Not Specified)  Problem: No Known Problems      Relation: Paternal  Aunt          Age of Onset: (Not Specified)  Problem: No Known Problems      Relation: Paternal Uncle          Age of Onset: (Not Specified)  Problem: No Known Problems      Relation: Maternal Grandfather          Age of Onset: (Not Specified)  Problem: No Known Problems      Relation: Paternal Grandmother          Age of Onset: (Not Specified)  Problem: No Known Problems      Relation: Paternal Grandfather          Age of Onset: (Not Specified)  Problem: Blindness      Relation: Neg Hx          Age of Onset: (Not Specified)            Age of Onset: (Not Specified)          Review of Systems   Unable to perform ROS: dementia   Constitution: Negative.   HENT: Negative.    Eyes: Negative.    Cardiovascular: Negative for chest pain, dyspnea on exertion, leg swelling, near-syncope, palpitations and syncope.   Respiratory: Negative.  Negative for shortness of breath.    Endocrine: Negative.    Hematologic/Lymphatic: Negative.    Skin: Negative.    Musculoskeletal: Negative.    Gastrointestinal: Negative.    Genitourinary: Negative.    Neurological: Negative.  Negative for dizziness and light-headedness.   Psychiatric/Behavioral: Negative.    Allergic/Immunologic: Negative.         Objective:    Physical Exam   Constitutional: She is oriented to person, place, and time. She appears well-developed and well-nourished. No distress.   HENT:   Head: Normocephalic and atraumatic.   Eyes: EOM are normal. Pupils are equal, round, and reactive to light.   Neck: Normal range of motion. No JVD present. No thyromegaly present.   Cardiovascular: S1 normal and S2 normal. An irregular rhythm present. Tachycardia present. PMI is not displaced. Exam reveals no gallop and no friction rub.   Murmur heard.  Pulmonary/Chest: Effort normal and breath sounds normal. No respiratory distress. She has no wheezes. She has no rales.   Abdominal: Soft. Bowel sounds are normal. She exhibits no distension. There is no tenderness. There is no rebound  and no guarding.   Musculoskeletal: Normal range of motion. She exhibits no edema or tenderness.   Neurological: She is alert and oriented to person, place, and time. No cranial nerve deficit.   Skin: Skin is warm and dry. No rash noted. No erythema.   Psychiatric: She has a normal mood and affect. Her behavior is normal. Judgment and thought content normal.   Vitals reviewed.        Assessment:       1. Persistent atrial fibrillation         Plan:       83 yoF persistent AF here for f/u of AF.  She is s/p DCCV on 12/18/18 .CHADSVASC of 5.   Daughter and patient deny any symptoms. Overall feeling better since CV.   Now in sinus with frequent PACs.   She is  on eliquis 2.5 mg bid (over 80, Cr 1.5, 72 kg) for stroke px.   Cont metoprolol and diltiazem for now.

## 2019-02-06 PROBLEM — E78.5 HYPERLIPIDEMIA: Status: ACTIVE | Noted: 2019-01-01

## 2019-02-06 PROBLEM — Z28.21 REFUSED PNEUMOCOCCAL VACCINATION: Status: ACTIVE | Noted: 2019-01-01

## 2019-02-06 NOTE — PATIENT INSTRUCTIONS
Schedule potassium for today.       See standing lab orders and please draw CBC and BMP - in 3 months

## 2019-02-06 NOTE — PROGRESS NOTES
Subjective:       Patient ID: Ruth Lan is a 83 y.o. female.    Chief Complaint: Establish Care    New pateint to [facility]      Review of Systems   Unable to perform ROS: Dementia   Constitutional: Negative for chills, fatigue, fever and unexpected weight change.   HENT: Negative for congestion and trouble swallowing.    Eyes: Negative for redness and visual disturbance.   Respiratory: Negative for cough, chest tightness and shortness of breath.    Cardiovascular: Positive for leg swelling. Negative for chest pain and palpitations.   Gastrointestinal: Negative for abdominal pain and blood in stool.   Genitourinary: Negative for difficulty urinating, hematuria and menstrual problem.   Musculoskeletal: Positive for arthralgias. Negative for back pain, gait problem, joint swelling, myalgias and neck pain.   Skin: Negative for color change and rash.   Neurological: Negative for tremors, speech difficulty, weakness, numbness and headaches.   Hematological: Negative for adenopathy. Does not bruise/bleed easily.   Psychiatric/Behavioral: Positive for decreased concentration. Negative for behavioral problems, confusion and sleep disturbance. The patient is not nervous/anxious.        Objective:      Physical Exam   Constitutional: She appears well-developed and well-nourished.   HENT:   Head: Normocephalic and atraumatic.   Eyes: Conjunctivae are normal. No scleral icterus.   Neck: Normal range of motion. Neck supple.   Cardiovascular: Normal rate and intact distal pulses. Exam reveals no gallop and no friction rub.   Murmur heard.  Pulmonary/Chest: Effort normal. No respiratory distress. She has decreased breath sounds. She has no wheezes. She has no rales.   Abdominal: She exhibits no distension and no abdominal bruit. There is no tenderness.   Musculoskeletal: She exhibits edema. She exhibits no deformity.   Neurological: She is alert. She displays no tremor. No cranial nerve deficit. Coordination and gait  abnormal. GCS eye subscore is 4. GCS verbal subscore is 5. GCS motor subscore is 6.   Skin: Skin is warm and dry. No rash noted. She is not diaphoretic. No erythema.   Psychiatric: Her affect is not blunt and not labile. Her speech is tangential. Her speech is not delayed. She is not combative. She is attentive.   Nursing note and vitals reviewed.      Assessment:       1. Abnormal blood chemistry    2. Refused pneumococcal vaccination    3. Abnormal complete blood count    4. Persistent atrial fibrillation    5. Chronic diastolic heart failure    6. History of aortic valve replacement with porcine valve on 9/27/04    7. Severe mitral regurgitation    8. Stage 4 chronic kidney disease    9. Hypertension, essential    10. Hyperlipidemia, unspecified hyperlipidemia type    11. Aortic atherosclerosis    12. Malignant neoplasm of lower-inner quadrant of right breast of female, estrogen receptor positive    13. History of smoking greater than 50 pack years    14. MCI (mild cognitive impairment)        Plan:   Ruth was seen today for establish care.    Diagnoses and all orders for this visit:    Abnormal blood chemistry  -     Basic metabolic panel; Standing  -     Potassium; Future    Refused pneumococcal vaccination    Abnormal complete blood count  -     CBC Without Differential; Standing    Persistent atrial fibrillation    Chronic diastolic heart failure    History of aortic valve replacement with porcine valve on 9/27/04    Severe mitral regurgitation    Stage 4 chronic kidney disease    Hypertension, essential    Hyperlipidemia, unspecified hyperlipidemia type    Aortic atherosclerosis    Malignant neoplasm of lower-inner quadrant of right breast of female, estrogen receptor positive    History of smoking greater than 50 pack years    MCI (mild cognitive impairment)      See meds, orders, follow up, routing and instructions sections of encounter.  An 83-year-old new patient.  She has a wealth of medical issues.   Her daughter   sees me for a number of years and wanted to establish care here.  Most recently,   she had some hospitalizations for atrial fibrillation after a fall.  She had   cardioversion in December.  This was apparently successful.  She is currently on   a beta-blocker and calcium channel blocker.    She had a pleural effusion in the summer, was placed on Lasix.  She is   continuing on this medication for the time being.  She had abnormalities of her   blood sugar, creatinine and CBC on recent evaluations.  We discussed workup for   these items.  At this time, she and her daughter do not want to have any   significantly invasive workup other than monitoring.  She will follow up with   the EPS cardiologist, regular cardiologist.  She currently lives in a [place]   nursing facility, states she is well taken care of.    Also, noted on another discharge summary dating back to July that she was placed   on O2 for desaturations.  They have since discontinued that as they do not feel   it is a particular need.  Her problem list is updated today.  The problems we   discussed and monitored are placed in plan note for today.  Continue all current   medications and follow up with me in approximately six months.      KENZIE/GABY  dd: 02/06/2019 14:40:28 (CST)  td: 02/07/2019 11:24:44 (CST)  Doc ID   #[id number]  Job ID #[id number]    CC:

## 2019-02-06 NOTE — PROGRESS NOTES
Subjective:   Patient ID:  Ruth Lan is a 83 y.o. female who presents for follow-up of Shortness of Breath (3 month f/u )      HPI: She is accompanied today by her daughter. She is residing in assisted living. She underwent a DCCV in December and has felt better since that time. Her leg edema and shortness of breath have resolved. Ruth Lan denies any chest pain, shortness of breath, PND, orthopnea, palpitations, leg edema, or syncope.  Her last echo was 10/18 and showed an LVEF 55-60% with a bio AVR mean gradient of 14 and severe MR with a pasp of 43.    EC (1/22/19); NSR with PAC's.      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, ER/[place] positive    Chronic diastolic heart failure     Heart murmur     Hyperlipidemia     Hypertension     Left atrial enlargement     Severe mitral regurgitation        Past Surgical History:   Procedure Laterality Date    BIOPSY, LYMPH NODE, SENTINEL Right 12/6/2012    Performed by [facility] MD at [facility] OR 2ND FLR    BREAST BIOPSY  11/2012    Right breast- invasive ductal carcinoma    CARDIOVERSION N/A 12/18/2018    Performed by Emanuel Guerra MD at [facility] EP LAB    CATARACT EXTRACTION W/  INTRAOCULAR LENS IMPLANT Right 10/21/2008    OU     CATARACT EXTRACTION W/  INTRAOCULAR LENS IMPLANT Left     ECHOCARDIOGRAM,TRANSESOPHAGEAL N/A 12/18/2018    Performed by [facility] at [facility] EP LAB    INSERTION, LOCALIZATION WIRE Right 12/6/2012    Performed by Demetri Epstein MD at [facility] OR 2ND FLR    [id number] Right 1/14/2013    Performed by Demetri Epstein MD at [facility] OR 2ND FLR    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    TUBAL LIGATION         Social History     Socioeconomic History    Marital status:      Spouse name: None    Number  of children: None    Years of education: None    Highest education level: None   Social Needs    Financial resource strain: None    Food insecurity - worry: None    Food insecurity - inability: None    Transportation needs - medical: None    Transportation needs - non-medical: None   Occupational History    None   Tobacco Use    Smoking status: Former Smoker     Packs/day: 1.00     Years: 50.00     Pack years: 50.00     Last attempt to quit: 11/15/2002     Years since quittin.2    Smokeless tobacco: Never Used   Substance and Sexual Activity    Alcohol use: Yes     Alcohol/week: 1.5 oz     Types: 3 Standard drinks or equivalent per week     Comment: 3 OZ WINE     Drug use: No    Sexual activity: No   Other Topics Concern    None   Social History Narrative    None       Family History   Problem Relation Age of Onset    Cancer Maternal Aunt         cervical or breast    Breast cancer Maternal Grandmother     Heart disease Father     No Known Problems Mother     No Known Problems Sister     No Known Problems Brother     No Known Problems Maternal Uncle     No Known Problems Paternal Aunt     No Known Problems Paternal Uncle     No Known Problems Maternal Grandfather     No Known Problems Paternal Grandmother     No Known Problems Paternal Grandfather     Blindness Neg Hx     Cataracts Neg Hx     Diabetes Neg Hx     Glaucoma Neg Hx     Ovarian cancer Neg Hx     Amblyopia Neg Hx     Hypertension Neg Hx     Macular degeneration Neg Hx     Retinal detachment Neg Hx     Strabismus Neg Hx     Stroke Neg Hx     Thyroid disease Neg Hx        Patient's Medications   New Prescriptions    No medications on file   Previous Medications    ACETAMINOPHEN (TYLENOL) 500 MG TABLET    Take 500 mg by mouth every 6 (six) hours as needed for Pain.    APIXABAN (ELIQUIS) 2.5 MG TAB    Take 1 tablet (2.5 mg total) by mouth 2 (two) times daily.    CYANOCOBALAMIN, VITAMIN B-12, (VITAMIN B-12) 2,500 MCG  SUBL    Place under the tongue once daily.    DILTIAZEM (CARDIZEM CD) 240 MG 24 HR CAPSULE    Take 1 capsule (240 mg total) by mouth once daily.    FUROSEMIDE (LASIX) 40 MG TABLET    Take 1 tablet (40 mg total) by mouth 2 (two) times daily.    METOPROLOL TARTRATE (LOPRESSOR) 25 MG TABLET    Take 1 tablet (25 mg total) by mouth 2 (two) times daily.    MULTIVITAMIN (ONE DAILY MULTIVITAMIN) PER TABLET    Take 1 tablet by mouth once daily.    MULTIVITAMIN-CA-IRON-MINERALS (MULTIVITAMIN-CALCIUM AND IRON) TAB    Take 1 tablet by mouth once daily.    PRAVASTATIN (PRAVACHOL) 80 MG TABLET    Take 1 tablet (80 mg total) by mouth once daily.    TOLTERODINE (DETROL LA) 4 MG 24 HR CAPSULE    Take 1 capsule (4 mg total) by mouth once daily.   Modified Medications    No medications on file   Discontinued Medications    No medications on file       Review of Systems   Constitution: Negative for weakness, malaise/fatigue and weight gain.   HENT: Negative for hearing loss.    Eyes: Negative for visual disturbance.   Cardiovascular: Negative for chest pain, claudication, dyspnea on exertion, leg swelling, near-syncope, orthopnea, palpitations, paroxysmal nocturnal dyspnea and syncope.   Respiratory: Negative for cough, shortness of breath, sleep disturbances due to breathing, snoring and wheezing.    Endocrine: Negative for cold intolerance, heat intolerance, polydipsia, polyphagia and polyuria.   Hematologic/Lymphatic: Negative for bleeding problem. Does not bruise/bleed easily.   Skin: Negative for rash and suspicious lesions.   Musculoskeletal: Positive for arthritis and joint pain. Negative for falls, muscle weakness and myalgias.   Gastrointestinal: Negative for abdominal pain, change in bowel habit, constipation, diarrhea, heartburn, hematochezia, melena and nausea.   Genitourinary: Negative for hematuria and nocturia.   Neurological: Negative for excessive daytime sleepiness, dizziness, headaches, light-headedness and loss of  "balance.   Psychiatric/Behavioral: Positive for memory loss. Negative for depression. The patient is not nervous/anxious.    Allergic/Immunologic: Negative for environmental allergies.       BP (!) 104/51 (BP Location: Left arm, Patient Position: Sitting, BP Method: Medium (Automatic))   Pulse 76   Ht 5' 4" (1.626 m)   SpO2 (!) 94%   BMI 27.36 kg/m²     Objective:   Physical Exam   Constitutional: She is oriented to person, place, and time. She appears well-developed and well-nourished.   Examined in her wheelchair due to inability to get up onto the exam table     HENT:   Head: Normocephalic and atraumatic.   Mouth/Throat: Oropharynx is clear and moist.   Eyes: Conjunctivae and EOM are normal. Pupils are equal, round, and reactive to light. No scleral icterus.   Neck: Normal range of motion. Neck supple. No hepatojugular reflux and no JVD present. No tracheal deviation present. No thyromegaly present.   Cardiovascular: Normal rate, regular rhythm and intact distal pulses. PMI is not displaced.   Murmur heard.  High-pitched blowing holosystolic murmur is present with a grade of 3/6 at the apex.  Pulses:       Carotid pulses are 2+ on the right side, and 2+ on the left side.       Radial pulses are 2+ on the right side, and 2+ on the left side.        Dorsalis pedis pulses are 2+ on the right side, and 2+ on the left side.        Posterior tibial pulses are 2+ on the right side, and 2+ on the left side.   Pulmonary/Chest: Effort normal and breath sounds normal.   Abdominal: Soft. Bowel sounds are normal. She exhibits no distension and no mass. There is no hepatosplenomegaly. There is no tenderness.   Musculoskeletal: She exhibits no edema or tenderness.   Lymphadenopathy:     She has no cervical adenopathy.   Neurological: She is alert and oriented to person, place, and time.   Skin: Skin is warm and dry. No rash noted. No cyanosis or erythema. Nails show no clubbing.   Psychiatric: She has a normal mood and " affect. Her speech is normal and behavior is normal.       Lab Results   Component Value Date     12/11/2018    K 3.2 (L) 12/11/2018    CL 98 12/11/2018    CO2 24 12/11/2018    BUN 29 (H) 12/11/2018    CREATININE 1.5 (H) 12/11/2018     (H) 12/11/2018    HGBA1C 5.0 07/16/2018    MG 1.6 10/26/2018    AST 69 (H) 10/23/2018    ALT 78 (H) 10/23/2018    ALBUMIN 3.2 (L) 10/23/2018    PROT 7.3 10/23/2018    BILITOT 0.5 10/23/2018    WBC 6.73 12/11/2018    HGB 11.4 (L) 12/11/2018    HCT 36.6 (L) 12/11/2018    MCV 95 12/11/2018     12/11/2018    INR 1.4 (H) 12/11/2018    TSH 3.015 10/23/2018    CHOL 195 11/22/2017    HDL 60 11/22/2017    LDLCALC 111.4 11/22/2017    TRIG 118 11/22/2017     (H) 10/23/2018       Assessment:     1. Chronic diastolic heart failure : She appears well compensated and euvolemic.   2. Persistent atrial fibrillation : She has been maintaining sinus rhythm since her cardioversion. She is anticoagulated with eliquis.   3. Essential hypertension : Blood pressure is at goal. I have made no changes. Continue current regimen.   4. History of aortic valve replacement with porcine valve on 9/27/04 : SBE prophylaxis is recommended prior to dental procedures.   5. Pure hypercholesterolemia : Continue pravastatin.   6. Severe mitral regurgitation    7. Malignant neoplasm of lower-inner quadrant of right breast of female, estrogen receptor positive    8. DNR (do not resuscitate) : She is now in assisted living and no longer requires home oxygen.       Plan:     Ruth was seen today for shortness of breath.    Diagnoses and all orders for this visit:    Chronic diastolic heart failure    Persistent atrial fibrillation    Essential hypertension    History of aortic valve replacement with porcine valve on 9/27/04    Pure hypercholesterolemia    Severe mitral regurgitation    Malignant neoplasm of lower-inner quadrant of right breast of female, estrogen receptor positive    DNR (do not  resuscitate)        Thank you for allowing me to participate in this patient's care. Please do not hesitate to contact me with any questions or concerns.

## 2019-04-23 NOTE — PROGRESS NOTES
Subjective:    Patient ID:  Ruth Lan is a 84 y.o. female who presents for follow-up of Atrial Fibrillation      HPI:  =======================  83 yoF AS s/p AVR here for f/u of AF   She has developed HF symptoms (LE edema and dyspnea) since her AF diagnosis 10/18.  She has dementia and is DNR.   She is  on eliquis 2.5 mg bid (over 80, Cr 1.5, 72 kg).   She is s/p DCCV on 12/18/18 . Overall feeling better since CV  CHADSVASC of 5. History taken from her daughter and chart review.     EKG today: NSR @ 93 with multiple PACs    Interval history: No recurrence, symptoms stable. Remains on eliquis 2.5 bid    10/18:   ===============================    1 - Severe left atrial enlargement.     2 - Normal left ventricular systolic function (EF 55-60%).     3 - No wall motion abnormalities.     4 - Normal right ventricular systolic function .     5 - S/P surgical AVR, NISHA = 0.98 cm2, AVAi = 0.55 cm2/m2, peak velocity = 2.7 m/s, mean gradient = 14 mmHg.     6 - Severe mitral regurgitation.     9 - Pulmonary hypertension. The estimated PA systolic pressure is 43 mmHg.     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, ER/KS positive  No date: Chronic diastolic heart failure  No date: Heart murmur  No date: Hyperlipidemia  No date: Hypertension  No date: Left atrial enlargement  No date: Severe mitral regurgitation    Past Surgical History:  12/6/2012: BIOPSY, LYMPH NODE, SENTINEL; Right      Comment:  Performed by Demetri Epstein MD at [facility] OR [facility] FLR  11/2012: BREAST BIOPSY      Comment:  Right breast- invasive ductal carcinoma  12/18/2018: CARDIOVERSION; N/A      Comment:  Performed by Emanuel Guerra MD at [facility] EP LAB  10/21/2008: CATARACT EXTRACTION W/  INTRAOCULAR LENS IMPLANT; Right      Comment:  OU    No date: CATARACT EXTRACTION W/  INTRAOCULAR LENS IMPLANT; Left  2018: ECHOCARDIOGRAM,TRANSESOPHAGEAL; N/A      Comment:  Performed by [facility] at [facility] LAB  2012: INSERTION, LOCALIZATION WIRE; Right      Comment:  Performed by Demetri Epstein MD at [facility] OR 2ND FLR  2013: [id number]; Right      Comment:  Performed by Demetri Epstein MD at [facility] OR 2ND FLR  2004: TISSUE AORTIC VALVE REPLACEMENT  No date: TUBAL LIGATION    Social History    Socioeconomic History      Marital status:       Spouse name: Not on file      Number of children: Not on file      Years of education: Not on file      Highest education level: Not on file    Occupational History      Not on file    Social Needs      Financial resource strain: Not on file      Food insecurity:        Worry: Not on file        Inability: Not on file      Transportation needs:        Medical: Not on file        Non-medical: Not on file    Tobacco Use      Smoking status: Former Smoker        Packs/day: 1.00        Years: 50.00        Pack years: 50        Quit date: 11/15/2002        Years since quittin.4      Smokeless tobacco: Never Used    Substance and Sexual Activity      Alcohol use: Yes        Alcohol/week: 1.5 oz        Types: 3 Standard drinks or equivalent per week        Comment: 3 OZ WINE       Drug use: No      Sexual activity: Never    Lifestyle      Physical activity:        Days per week: Not on file        Minutes per session: Not on file      Stress: Not on file    Relationships      Social connections:        Talks on phone: Not on file        Gets together: Not on file        Attends Jainism service: Not on file        Active member of club or organization: Not on file        Attends meetings of clubs or organizations: Not on file        Relationship status: Not on file    Other Topics      Concerns:        Not on file    Social History Narrative      Not on  file      Review of patient's family history indicates:  Problem: Cancer      Relation: Maternal Aunt          Age of Onset: (Not Specified)          Comment: cervical or breast  Problem: Breast cancer      Relation: Maternal Grandmother          Age of Onset: (Not Specified)  Problem: Heart disease      Relation: Father          Age of Onset: (Not Specified)  Problem: No Known Problems      Relation: Mother          Age of Onset: (Not Specified)  Problem: No Known Problems      Relation: Sister          Age of Onset: (Not Specified)  Problem: No Known Problems      Relation: Brother          Age of Onset: (Not Specified)  Problem: No Known Problems      Relation: Maternal Uncle          Age of Onset: (Not Specified)  Problem: No Known Problems      Relation: Paternal Aunt          Age of Onset: (Not Specified)  Problem: No Known Problems      Relation: Paternal Uncle          Age of Onset: (Not Specified)  Problem: No Known Problems      Relation: Maternal Grandfather          Age of Onset: (Not Specified)  Problem: No Known Problems      Relation: Paternal Grandmother          Age of Onset: (Not Specified)  Problem: No Known Problems      Relation: Paternal Grandfather          Age of Onset: (Not Specified)  Problem: Blindness      Relation: Neg Hx          Age of Onset: (Not Specified)  Problem: Cataracts      Relation: Neg Hx          Age of Onset: (Not Specified)  Problem: Diabetes      Relation: Neg Hx          Age of Onset: (Not Specified)  Problem: Glaucoma      Relation: Neg Hx          Age of Onset: (Not Specified)  Problem: Ovarian cancer      Relation: Neg Hx          Age of Onset: (Not Specified)  Problem: Amblyopia      Relation: Neg Hx          Age of Onset: (Not Specified)  Problem: Hypertension      Relation: Neg Hx          Age of Onset: (Not Specified)  Problem: Macular degeneration      Relation: Neg Hx          Age of Onset: (Not Specified)  Problem: Retinal detachment      Relation: Neg Hx           Age of Onset: (Not Specified)  Problem: Strabismus      Relation: Neg Hx          Age of Onset: (Not Specified)  Problem: Stroke      Relation: Neg Hx          Age of Onset: (Not Specified)  Problem: Thyroid disease      Relation: Neg Hx          Age of Onset: (Not Specified)        Review of Systems   Unable to perform ROS: dementia   Constitution: Negative.   HENT: Negative.    Eyes: Negative.    Cardiovascular: Negative for chest pain, dyspnea on exertion, leg swelling, near-syncope, palpitations and syncope.   Respiratory: Negative.  Negative for shortness of breath.    Endocrine: Negative.    Hematologic/Lymphatic: Negative.    Skin: Negative.    Musculoskeletal: Negative.    Gastrointestinal: Negative.    Genitourinary: Negative.    Neurological: Negative.  Negative for dizziness and light-headedness.   Psychiatric/Behavioral: Negative.    Allergic/Immunologic: Negative.         Objective:    Physical Exam   Constitutional: She is oriented to person, place, and time. She appears well-developed and well-nourished. No distress.   HENT:   Head: Normocephalic and atraumatic.   Eyes: Pupils are equal, round, and reactive to light. EOM are normal.   Neck: Normal range of motion. No JVD present. No thyromegaly present.   Cardiovascular: Normal rate, regular rhythm, S1 normal and S2 normal. PMI is not displaced. Exam reveals no gallop and no friction rub.   Murmur heard.  Pulmonary/Chest: Effort normal and breath sounds normal. No respiratory distress. She has no wheezes. She has no rales.   Abdominal: Soft. Bowel sounds are normal. She exhibits no distension. There is no tenderness. There is no rebound and no guarding.   Musculoskeletal: Normal range of motion. She exhibits no edema or tenderness.   Neurological: She is alert and oriented to person, place, and time. No cranial nerve deficit.   Skin: Skin is warm and dry. No rash noted. No erythema.   Psychiatric: She has a normal mood and affect. Her behavior is  normal. Judgment and thought content normal.   Vitals reviewed.    ECG: NSR nl FL, QRS        Assessment:       1. Persistent atrial fibrillation    2. Hypertension, essential         Plan:       84 yoF persistent AF s/p CV here for long term follow up. She has maintained NSR by ECG and by symptoms. Continue current therapy. RTC 1y or as needed

## 2019-07-03 NOTE — TELEPHONE ENCOUNTER
----- Message from Sandra Puente sent at 7/3/2019  8:28 AM CDT -----  Contact: [facility]/ [facility]/[phone]  Pt director called in regard to reviewing her incident report he notice that she had a fall on 6-27-19 but know injuries and she said she was just trying to get to her walker.  They would like to her a Rx for tylenol due to the pt having authorities.  The fax number is [phone]      Please advise

## 2019-07-03 NOTE — TELEPHONE ENCOUNTER
Spoke with Paresh and informed him of the response from Dr. Valadez. He is asking for an order to send to his company at [phone] (f).Kenya Abdul

## 2019-07-03 NOTE — TELEPHONE ENCOUNTER
Please give a verbal order for regular strength tylenol, as needed only, use according to label instructions. Thank you.

## 2019-07-08 NOTE — TELEPHONE ENCOUNTER
Please fax 3 scripts to [facility], nurses station, for multi vitamins, B12 and Tylenol arthritis (as needed usually 2-3 times a day)    The fax # is [phone]   Main # [phone]      Thank you

## 2019-07-08 NOTE — TELEPHONE ENCOUNTER
----- Message from Nani Bruce sent at 7/8/2019 12:29 PM CDT -----  Contact: Philippe Naranjo [phone]  fax [phone]  Requesting order be faxed for dispensing of tylenol.  Request call back.    Please call and advise  Thank you

## 2019-08-07 PROBLEM — I50.33 ACUTE ON CHRONIC DIASTOLIC HEART FAILURE: Status: ACTIVE | Noted: 2019-01-01

## 2019-08-07 PROBLEM — E87.6 HYPOKALEMIA: Status: ACTIVE | Noted: 2019-01-01

## 2019-08-07 NOTE — ED NOTES
LOC: The patient is awake, alert, and oriented to place, time, situation. Affect is appropriate.  Speech is appropriate and clear. Pt is hard of hearing.    APPEARANCE: Patient resting comfortably in no acute distress.  Patient is clean and well groomed.    SKIN: The skin is warm and dry; color consistent with ethnicity.  Patient has normal skin turgor and moist mucus membranes.  Skin intact with some minor abrasions and bruising to the left upper extremity related to previous fall.     MUSCULOSKELETAL: Patient moving upper and lower extremities without difficulty, but complains of generalized weakness.     RESPIRATORY: Airway is open and patent. Respirations spontaneous, even, easy, and non-labored.  Patient has a normal effort and rate.  No accessory muscle use noted. Denies cough.     CARDIAC:  Atrial fibrillation with a HR in the 120's noted; pt is normotensive. Peripheral edema noted to the bilateral lower extremities; +4 pitting edema noted to the LLE. No complaints of chest pain.      ABDOMEN: Soft and non tender to palpation.  No distention noted.     NEUROLOGIC: Eyes open spontaneously.  Behavior appropriate to situation.  Follows commands; facial expression symmetrical.  Purposeful motor response noted; normal sensation in all extremities.

## 2019-08-07 NOTE — ED PROVIDER NOTES
Encounter Date: 8/7/2019       History     Chief Complaint   Patient presents with    Atrial Fibrillation     Pt sent from clinic for afib.  Pt was there for annual check up.   Daughter states pt has hx afib with cardioversion last year.      84-year-old female who was sent to the ED from Cardiology Clinic after she was evaluated for a follow-up after apparent cardioversion previously.  Her caregiver had noticed some change in the patient's mentation and generalize health and was likewise concerned that the patient had recurrent atrial fibrillation.  In the clinic she was noted to have atrial fibrillation with a heart rate in the 153 range.  The patient also has some history of some falls and some generalized weakness recently.  Patient had a cardioversion recently for atrial fibrillation and apparently had done well until recently.  She is sent down to the ED by the Cardiology Clinic for admission for possible cardioversion tomorrow.        Review of patient's allergies indicates:   Allergen Reactions    Etodolac Other (See Comments)     Dehydration    Nsaids (non-steroidal anti-inflammatory drug)      COLITIS/DEHYDRATION     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, ER/[place] positive    Chronic diastolic heart failure     Heart murmur     Hyperlipidemia     Hypertension     Left atrial enlargement     Severe mitral regurgitation      Past Surgical History:   Procedure Laterality Date    BIOPSY, LYMPH NODE, SENTINEL Right 12/6/2012    Performed by [facility] at [facility] OR 2ND FLR    BREAST BIOPSY  11/2012    Right breast- invasive ductal carcinoma    CARDIOVERSION N/A 12/18/2018    Performed by Emanuel Guerra MD at [facility] EP LAB    CATARACT EXTRACTION W/  INTRAOCULAR LENS IMPLANT Right 10/21/2008    OU     CATARACT  EXTRACTION W/  INTRAOCULAR LENS IMPLANT Left     ECHOCARDIOGRAM,TRANSESOPHAGEAL N/A 2018    Performed by [facility] Diagnostic Provider at [facility] EP LAB    INSERTION, LOCALIZATION WIRE Right 2012    Performed by Demetri Epstein MD at [facility] OR 2ND FLR    [id number] Right 2013    Performed by Demetri Epstein MD at [facility] OR 2ND FLR    TISSUE AORTIC VALVE REPLACEMENT  2004    TUBAL LIGATION       Family History   Problem Relation Age of Onset    Cancer Maternal Aunt         cervical or breast    Breast cancer Maternal Grandmother     Heart disease Father     No Known Problems Mother     No Known Problems Sister     No Known Problems Brother     No Known Problems Maternal Uncle     No Known Problems Paternal Aunt     No Known Problems Paternal Uncle     No Known Problems Maternal Grandfather     No Known Problems Paternal Grandmother     No Known Problems Paternal Grandfather     Blindness Neg Hx     Cataracts Neg Hx     Diabetes Neg Hx     Glaucoma Neg Hx     Ovarian cancer Neg Hx     Amblyopia Neg Hx     Hypertension Neg Hx     Macular degeneration Neg Hx     Retinal detachment Neg Hx     Strabismus Neg Hx     Stroke Neg Hx     Thyroid disease Neg Hx      Social History     Tobacco Use    Smoking status: Former Smoker     Packs/day: 1.00     Years: 50.00     Pack years: 50.00     Last attempt to quit: 11/15/2002     Years since quittin.7    Smokeless tobacco: Never Used   Substance Use Topics    Alcohol use: Yes     Alcohol/week: 1.5 oz     Types: 3 Standard drinks or equivalent per week     Comment: 3 OZ WINE     Drug use: No     Review of Systems   Constitutional: Positive for activity change and fatigue. Negative for chills and diaphoresis.   HENT: Negative for nosebleeds, sore throat and trouble swallowing.    Eyes: Negative for visual disturbance.   Respiratory: Negative for chest tightness and shortness of breath.    Cardiovascular: Positive for  palpitations and leg swelling. Negative for chest pain.   Gastrointestinal: Negative for abdominal distention, abdominal pain, nausea and vomiting.   Genitourinary: Negative for difficulty urinating.   Musculoskeletal: Positive for gait problem. Negative for myalgias.        Recent falls   Skin: Negative for color change.   Neurological: Positive for weakness and light-headedness. Negative for syncope.       Physical Exam     Initial Vitals [08/07/19 1205]   BP Pulse Resp Temp SpO2   91/66 (!) 146 20 98.6 °F (37 °C) (!) 94 %      MAP       --         Physical Exam    Constitutional: She is cooperative. She does not appear ill.   Very hard of hearing   HENT:   Head: Normocephalic and atraumatic.   Mouth/Throat: Oropharynx is clear and moist. Mucous membranes are dry.   Very hard of hearing   Eyes: Conjunctivae and EOM are normal. No scleral icterus.   Neck: Normal range of motion. Neck supple. No JVD present.   Cardiovascular: Normal heart sounds. An irregularly irregular rhythm present.  Tachycardia present.    Bilateral swelling of both legs   Pulmonary/Chest: Breath sounds normal. No accessory muscle usage. No tachypnea. No respiratory distress.   Abdominal: She exhibits no distension. There is no tenderness.   Musculoskeletal:   Generalized muscle wasting as per age   Neurological: She is alert. She has normal strength. No cranial nerve deficit. GCS eye subscore is 4. GCS verbal subscore is 5. GCS motor subscore is 6.   Skin: Skin is warm and dry.         ED Course   Critical Care  Date/Time: 8/10/2019 9:34 AM  Performed by: Vini Whitehead MD  Authorized by: Josee Spann MD   Direct patient critical care time: 15 minutes  Additional history critical care time: 5 minutes  Ordering / reviewing critical care time: 15 minutes  Documentation critical care time: 15 minutes  Consulting other physicians critical care time: 5 minutes  Consult with family critical care time: 5 minutes  Total critical care time  (exclusive of procedural time) : 60 minutes  Critical care was necessary to treat or prevent imminent or life-threatening deterioration of the following conditions: cardiac failure and circulatory failure.  Critical care was time spent personally by me on the following activities: evaluation of patient's response to treatment, ordering and performing treatments and interventions, pulse oximetry, examination of patient, ordering and review of laboratory studies, re-evaluation of patient's condition, obtaining history from patient or surrogate, ordering and review of radiographic studies and review of old charts.        Labs Reviewed   CBC W/ AUTO DIFFERENTIAL   COMPREHENSIVE METABOLIC PANEL   TROPONIN I   B-TYPE NATRIURETIC PEPTIDE   PROTIME-INR          Imaging Results    None          Medical Decision Making:   History:   Old Medical Records: I decided to obtain old medical records.  Initial Assessment:   84-year-old female with past medical history of atrial fibrillation with recent cardioversion was seen in the clinic today as follow-up for the cardioversion and was noted to have recurrent atrial fibrillation she is sent down to the ED for admission  Clinical Tests:   Lab Tests: Ordered and Reviewed  Radiological Study: Ordered and Reviewed  Medical Tests: Reviewed  ED Management:  Will do labs and a chest x-ray and will consult Cardiology Arrythmia              Attending Attestation:             Attending ED Notes:   A 4-year-old female brought into the ED by her daughter after the patient had been seen up in the Cardiology Clinic apparently the patient has history of atrial fibrillation and had a cardioversion recently however now has recurrence of this has been taking medications as prescribed.  Patient noted to be tachycardic and hypotensive at times with blood pressures in the 90s patient has had lab drawn and patient was given a slight amount of fluid but continued in atrial fib with a pressure in the 90s  to low 100s.  Patient required multiple re-evaluations to her stay in the emergency room because of her continued tachycardia and hypotension a cardiology was consulted and the electrophysiology team came and saw the patient.  The patient appeared to be in no distress during all this time we were unable to start medications here in the ED because of this hypotension and we are awaiting the consultation by the EP team.             Clinical Impression:       ICD-10-CM ICD-9-CM   1. Hypotension, unspecified hypotension type I95.9 458.9   2. Atrial fibrillation I48.91 427.31   3. Atrial fibrillation with RVR I48.91 427.31   4. Atrial fibrillation, unspecified type I48.91 427.31   5. History of cardioversion Z98.890 V15.1         Disposition:   Disposition: Admitted  Condition: Serious                        Vini Whitehead MD  08/10/19 0935

## 2019-08-07 NOTE — CONSULTS
[facility]  Cardiac Electrophysiology  Consult Note    Admission Date: 8/7/2019  Code Status: Full Code   Attending Provider: Josee Spann MD  Consulting Provider: Ronnie Hoffman MD  Principal Problem:Atrial fibrillation with RVR    Inpatient consult to Electrophysiology  Consult performed by: Ronnie Hoffman MD  Consult ordered by: Josee Spann MD        Subjective:     Chief Complaint:  afib w RVR     HPI:   85 y/o F history of persistent Afib CHADSVASC 4 on apixban s/p DCCV 12/2018, HFpEF, AS s/p AVR, HTN, DM2 presenting from clinic in setting of Afib w RVR to 140s. Patient followed in EP clinic, previously responded to DCCV 12/2018 w maintenance of NSR at subsequent visits. Patient suffers from dementia and poor historian, majority of history from daughter Sandra who says has had worsened fatigue/some SOB recently but no known CP, palpitations. Reports adherence w meds including AC but cannot name them (supplied by assisted living facility). In ED remains in Afib w HR to low 100s. Denies additional complaints at this time.    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, ER/[place] positive    Chronic diastolic heart failure     Heart murmur     Hyperlipidemia     Hypertension     Left atrial enlargement     Severe mitral regurgitation        Past Surgical History:   Procedure Laterality Date    BIOPSY, LYMPH NODE, SENTINEL Right 12/6/2012    Performed by [facility]ri Epstein MD at [facility] OR 2ND FLR    BREAST BIOPSY  11/2012    Right breast- invasive ductal carcinoma    CARDIOVERSION N/A 12/18/2018    Performed by Emanuel Guerra MD at [facility] EP LAB    CATARACT EXTRACTION W/  INTRAOCULAR LENS IMPLANT Right 10/21/2008    OU     CATARACT EXTRACTION W/  INTRAOCULAR LENS IMPLANT Left      ECHOCARDIOGRAM,TRANSESOPHAGEAL N/A 12/18/2018    Performed by [facility] EP LAB    INSERTION, LOCALIZATION WIRE Right 12/6/2012    Performed by Dmeetri Epstein MD at [facility] OR 2ND FLR    [id number] Right 1/14/2013    Performed by [facility] at [facility] OR 2ND FLR    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    TUBAL LIGATION         Review of patient's allergies indicates:   Allergen Reactions    Etodolac Other (See Comments)     Dehydration    Nsaids (non-steroidal anti-inflammatory drug)      COLITIS/DEHYDRATION       No current facility-administered medications on file prior to encounter.      Current Outpatient Medications on File Prior to Encounter   Medication Sig    acetaminophen (TYLENOL) 500 MG tablet Take 500 mg by mouth every 6 (six) hours as needed for Pain.    acetaminophen (TYLENOL) 500 MG tablet Take 1 tablet (500 mg total) by mouth 2 (two) times daily as needed for Pain.    apixaban (ELIQUIS) 2.5 mg Tab Take 1 tablet (2.5 mg total) by mouth 2 (two) times daily.    b complex vitamins (B COMPLEX-VITAMIN B12) tablet Take 1 tablet by mouth once daily.    diltiaZEM (CARDIZEM CD) 240 MG 24 hr capsule Take 1 capsule (240 mg total) by mouth once daily.    furosemide (LASIX) 40 MG tablet Take 1 tablet (40 mg total) by mouth 2 (two) times daily.    metoprolol tartrate (LOPRESSOR) 25 MG tablet Take 1 tablet (25 mg total) by mouth 2 (two) times daily.    multivitamin (THERAGRAN) per tablet Take 1 tablet by mouth once daily.    pravastatin (PRAVACHOL) 80 MG tablet Take 1 tablet (80 mg total) by mouth once daily.    tolterodine (DETROL LA) 4 MG 24 hr capsule Take 1 capsule (4 mg total) by mouth once daily.    [DISCONTINUED] cyanocobalamin, vitamin B-12, (VITAMIN B-12) 2,500 mcg Subl Place under the tongue once daily.     Family History     Problem Relation (Age of Onset)    Breast cancer Maternal Grandmother    Cancer Maternal Aunt    Heart disease Father     No Known Problems Mother, Sister, Brother, Maternal Uncle, Paternal Aunt, Paternal Uncle, Maternal Grandfather, Paternal Grandmother, Paternal Grandfather        Tobacco Use    Smoking status: Former Smoker     Packs/day: 1.00     Years: 50.00     Pack years: 50.00     Last attempt to quit: 11/15/2002     Years since quittin.7    Smokeless tobacco: Never Used   Substance and Sexual Activity    Alcohol use: Yes     Alcohol/week: 1.5 oz     Types: 3 Standard drinks or equivalent per week     Comment: 3 OZ WINE     Drug use: No    Sexual activity: Never     Review of Systems   Constitution: Negative. Negative for chills and fever.   HENT: Negative.    Eyes: Negative.    Cardiovascular: Positive for dyspnea on exertion. Negative for chest pain, claudication and paroxysmal nocturnal dyspnea.   Respiratory: Negative for cough, shortness of breath and wheezing.    Endocrine: Negative.    Hematologic/Lymphatic: Does not bruise/bleed easily.   Skin: Negative for nail changes and rash.   Musculoskeletal: Negative.  Negative for back pain.   Gastrointestinal: Negative for abdominal pain, melena, nausea and vomiting.   Neurological: Negative for dizziness and headaches.   Psychiatric/Behavioral: Negative for altered mental status, depression and substance abuse.   Allergic/Immunologic: Negative.      Objective:     Vital Signs (Most Recent):  Temp: 98.6 °F (37 °C) (19 1205)  Pulse: (!) 112 (19 1456)  Resp: 17 (19 1350)  BP: 102/62 (19 1456)  SpO2: 95 % (19 1456) Vital Signs (24h Range):  Temp:  [98.6 °F (37 °C)] 98.6 °F (37 °C)  Pulse:  [112-146] 112  Resp:  [17-20] 17  SpO2:  [92 %-95 %] 95 %  BP: ()/(62-70) 102/62       Weight: 70.8 kg (156 lb)  Body mass index is 27.63 kg/m².    SpO2: 95 %       Physical Exam   Constitutional: She is oriented to person, place, and time. She appears well-developed and well-nourished.   HENT:   Head: Normocephalic and atraumatic.   Eyes: Pupils are  equal, round, and reactive to light. Conjunctivae and EOM are normal.   Neck: JVD present.   Cardiovascular: Normal heart sounds and intact distal pulses. Exam reveals no gallop and no friction rub.   No murmur heard.  Tachycardic, irregular rhythm   Pulmonary/Chest: Effort normal. No stridor. She has no wheezes. She has no rales. She exhibits no tenderness.   Abdominal: Soft. Bowel sounds are normal. She exhibits no distension and no mass. There is no tenderness. There is no guarding.   Musculoskeletal: She exhibits edema. She exhibits no tenderness or deformity.   Neurological: She is alert and oriented to person, place, and time.   Skin: Skin is warm and dry. No rash noted. No erythema.   Psychiatric: She has a normal mood and affect.       Significant Labs:   BMP:   Recent Labs   Lab 08/07/19  1300   *      K 3.4*      CO2 28   BUN 21   CREATININE 1.4   CALCIUM 9.1    and CBC:   Recent Labs   Lab 08/07/19  1300   WBC 5.79   HGB 11.9*   HCT 40.5          Significant Imaging: Echocardiogram:   Transthoracic echo (TTE) complete (Cupid Only):   Results for orders placed or performed during the hospital encounter of 08/07/19   Transthoracic echo (TTE) 2D with Color Flow   Result Value Ref Range    Ascending aorta 2.89 cm    STJ 2.28 cm    AV mean gradient 6 mmHg    Ao peak harjinder 1.71 m/s    Ao VTI 22.17 cm    IVS 0.99 0.6 - 1.1 cm    LA size 4.17 cm    Left Atrium Major Axis 6.06 cm    Left Atrium Minor Axis 5.80 cm    LVIDD 3.31 (A) 3.5 - 6.0 cm    LVIDS 2.56 2.1 - 4.0 cm    LVOT diameter 1.96 cm    LVOT peak VTI 11.57 cm    PW 1.04 0.6 - 1.1 cm    RA Major Axis 5.23 cm    RA Width 2.74 cm    RVDD 3.19 cm    Sinus 2.27 cm    TR Max Harjinder 3.32 m/s    LA WIDTH 3.65 cm    LV Diastolic Volume 44.40 mL    LV Systolic Volume 23.58 mL    LVOT peak harjinder 0.57 m/s    FS 23 %    LA volume 76.68 cm3    LV mass 97.12 g    Left Ventricle Relative Wall Thickness 0.63 cm    AV valve area 1.57 cm2    AV Velocity  Ratio 0.33     AV index (prosthetic) 0.52     LVOT area 3.0 cm2    LVOT stroke volume 34.89 cm3    AV peak gradient 12 mmHg    LV Systolic Volume Index 13.6 mL/m2    LV Diastolic Volume Index 25.52 mL/m2    LA Volume Index 44.1 mL/m2    LV Mass Index 56 g/m2    Triscuspid Valve Regurgitation Peak Gradient 44 mmHg    BSA 1.77 m2    Right Atrial Pressure (from IVC) 15 mmHg    TV rest pulmonary artery pressure 59 mmHg    Narrative    · Left ventricular systolic function. The estimated ejection fraction is   53%  · Concentric left ventricular remodeling.  · Local segmental wall motion abnormalities.  · Moderate left atrial enlargement.  · Low normal right ventricular systolic function.  · Mild right atrial enlargement.  · There is a bioprosthetic aortic valve present.  · Severe mitral regurgitation.  · Moderate to severe tricuspid regurgitation.  · Elevated central venous pressure (15 mm Hg).  · The estimated PA systolic pressure is 59 mm Hg  · Pulmonary hypertension present.                  Assessment and Plan:     * Atrial fibrillation with RVR  [id number] 4, previously responsive to DCCV  - AC: can continue dose reduced apixaban 2.5mg bid  - Would hold home CCB in setting of likely CHF exac  - Can continue home BB for now for rate control  - NPO @ 0000 (ordered) in anticipation of likely LAUREEN/DCCV  - Will consider amio for maintenance of NSR following cardioversion     Case discussed w attending, Dr. Guerra.    Thank you for your consult. I will follow-up with patient. Please contact us if you have any additional questions.    Ronnie Hoffman MD  Cardiac Electrophysiology  [facility]

## 2019-08-07 NOTE — CONSULTS
Pt seen in clinic see Note by Dr. Herring.     Admit patient   No aleksandar agents as blood pressure is low already  EP consult for LAUREEN/DCCV and amiodarone.   NPO after midnight.     Dillon Esteban MD  Cardiology Fellow  Pager [phone]

## 2019-08-07 NOTE — ASSESSMENT & PLAN NOTE
[id number] 4, previously responsive to DCCV  - AC: can continue dose reduced apixaban 2.5mg bid  - Would hold home CCB in setting of likely CHF exac  - Can continue home BB for now for rate control  - NPO @ 0000 (ordered) in anticipation of likely LAUREEN/DCCV  - Will consider amio for maintenance of NSR following cardioversion

## 2019-08-07 NOTE — ED NOTES
EKG done in clinic at 1154.  Shown to Dr. Lewis, states no repeat needed in triage.  No STEMI noted

## 2019-08-07 NOTE — ED TRIAGE NOTES
Pt sent to the ER by her cardiologist after she was found to be in atrial fibrillation while at a routine appointment.  Pt was hx of a-fib; most recent cardioversion was in December.  Pt denies chest pain or SOB currently but states she has been feeling fatigued as well as forgetful over the past three weeks.

## 2019-08-07 NOTE — PROGRESS NOTES
Cardiology Clinic Note  Reason for Visit: Follow up for AF    HPI:   83 y/o F here with increasing fatigue for the past 3 weeks. She felt similarly when she was last in AF, and symptoms of fatigue resolved after DCCV. Her EF on LAUREEN 55%, well functioning bioprosthetic aortic valve, has severe MR.       Her pulse is 145 bpm and has leg edema that is new bilaterally. She denies dyspnea, or palpitations.    On last admission she was DNR and it was temporarily rescinded for the LAUREEN-DCCV.    ROS:    Review of Systems   Constitution: Positive for malaise/fatigue.   Eyes: Negative.    Cardiovascular: Negative.    Respiratory: Negative.    Endocrine: Negative.    Gastrointestinal: Negative.    Genitourinary: Negative.    Neurological: Negative.      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, ER/[place] positive    Chronic diastolic heart failure     Heart murmur     Hyperlipidemia     Hypertension     Left atrial enlargement     Severe mitral regurgitation      Past Surgical History:   Procedure Laterality Date    BIOPSY, LYMPH NODE, SENTINEL Right 12/6/2012    Performed by [facility] at [facility] OR 2ND FLR    BREAST BIOPSY  11/2012    Right breast- invasive ductal carcinoma    CARDIOVERSION N/A 12/18/2018    Performed by [facility] at [facility] EP LAB    CATARACT EXTRACTION W/  INTRAOCULAR LENS IMPLANT Right 10/21/2008    OU     CATARACT EXTRACTION W/  INTRAOCULAR LENS IMPLANT Left     ECHOCARDIOGRAM,TRANSESOPHAGEAL N/A 12/18/2018    Performed by [facility] at [facility] EP LAB    INSERTION, LOCALIZATION WIRE Right 12/6/2012    Performed by [facility] Epstein MD at [facility] OR 2ND FLR    [id number] Right 1/14/2013    Performed by [facility] at [facility] OR 2ND FLR    TISSUE AORTIC VALVE  REPLACEMENT  2004    TUBAL LIGATION       Allergies:     Review of patient's allergies indicates:   Allergen Reactions    Etodolac Other (See Comments)     Dehydration    Nsaids (non-steroidal anti-inflammatory drug)      COLITIS/DEHYDRATION     Medications:     Current Outpatient Medications on File Prior to Visit   Medication Sig Dispense Refill    acetaminophen (TYLENOL) 500 MG tablet Take 500 mg by mouth every 6 (six) hours as needed for Pain.      acetaminophen (TYLENOL) 500 MG tablet Take 1 tablet (500 mg total) by mouth 2 (two) times daily as needed for Pain. 180 tablet 0    apixaban (ELIQUIS) 2.5 mg Tab Take 1 tablet (2.5 mg total) by mouth 2 (two) times daily. 60 tablet 11    b complex vitamins (B COMPLEX-VITAMIN B12) tablet Take 1 tablet by mouth once daily. 90 tablet 1    cyanocobalamin, vitamin B-12, (VITAMIN B-12) 2,500 mcg Subl Place under the tongue once daily.      diltiaZEM (CARDIZEM CD) 240 MG 24 hr capsule Take 1 capsule (240 mg total) by mouth once daily. 90 capsule 3    furosemide (LASIX) 40 MG tablet Take 1 tablet (40 mg total) by mouth 2 (two) times daily. (Patient taking differently: Take 40 mg by mouth once daily. ) 180 tablet 3    metoprolol tartrate (LOPRESSOR) 25 MG tablet Take 1 tablet (25 mg total) by mouth 2 (two) times daily. 180 tablet 3    multivitamin (THERAGRAN) per tablet Take 1 tablet by mouth once daily. 90 tablet 1    pravastatin (PRAVACHOL) 80 MG tablet Take 1 tablet (80 mg total) by mouth once daily. 90 tablet 3    tolterodine (DETROL LA) 4 MG 24 hr capsule Take 1 capsule (4 mg total) by mouth once daily. 90 capsule 3     No current facility-administered medications on file prior to visit.      Social History:     Social History     Tobacco Use    Smoking status: Former Smoker     Packs/day: 1.00     Years: 50.00     Pack years: 50.00     Last attempt to quit: 11/15/2002     Years since quittin.7    Smokeless tobacco: Never Used   Substance Use  "Topics    Alcohol use: Yes     Alcohol/week: 1.5 oz     Types: 3 Standard drinks or equivalent per week     Comment: 3 OZ WINE      Family History:     Family History   Problem Relation Age of Onset    Cancer Maternal Aunt         cervical or breast    Breast cancer Maternal Grandmother     Heart disease Father     No Known Problems Mother     No Known Problems Sister     No Known Problems Brother     No Known Problems Maternal Uncle     No Known Problems Paternal Aunt     No Known Problems Paternal Uncle     No Known Problems Maternal Grandfather     No Known Problems Paternal Grandmother     No Known Problems Paternal Grandfather     Blindness Neg Hx     Cataracts Neg Hx     Diabetes Neg Hx     Glaucoma Neg Hx     Ovarian cancer Neg Hx     Amblyopia Neg Hx     Hypertension Neg Hx     Macular degeneration Neg Hx     Retinal detachment Neg Hx     Strabismus Neg Hx     Stroke Neg Hx     Thyroid disease Neg Hx      Physical Exam:   BP 97/70 (BP Location: Left arm, Patient Position: Sitting, BP Method: Medium (Automatic))   Pulse (!) 143   Ht 5' 2" (1.575 m)   Wt 71.2 kg (156 lb 15.5 oz)   SpO2 (!) 92%   BMI 28.71 kg/m²      Physical Exam   Constitutional: She is oriented to person, place, and time. She appears well-developed and well-nourished. No distress.   Eyes: No scleral icterus.   Cardiovascular:   Irregular rhythm and tachycardic   Systolic murmur radiating into the axilla    Pulmonary/Chest: Effort normal and breath sounds normal. No respiratory distress. She has no wheezes. She has no rales.   Musculoskeletal: She exhibits edema.   2+ edema b/l LE extending to just below the knees    Neurological: She is alert and oriented to person, place, and time.   Skin: Skin is warm. She is not diaphoretic.       Labs:     Lab Results   Component Value Date     04/23/2019    K 3.6 04/23/2019     04/23/2019    CO2 26 04/23/2019    BUN 26 (H) 04/23/2019    CREATININE 1.3 04/23/2019 "    ANIONGAP 11 04/23/2019     Lab Results   Component Value Date    HGBA1C 5.0 07/16/2018     Lab Results   Component Value Date     (H) 10/23/2018     (H) 07/10/2018     (H) 07/07/2018    Lab Results   Component Value Date    WBC 5.09 04/23/2019    HGB 10.9 (L) 04/23/2019    HCT 34.0 (L) 04/23/2019     04/23/2019    GRAN 5.0 12/11/2018    GRAN 74.3 (H) 12/11/2018     Lab Results   Component Value Date    CHOL 195 11/22/2017    HDL 60 11/22/2017    LDLCALC 111.4 11/22/2017    TRIG 118 11/22/2017          Imaging:     Assessment:      1. Paroxysmal atrial fibrillation      83 y/o F with Af RVR, with evidence of HF on exam (elevated JVD and LE edema).  Will send to the ER for admission for LAUREEN-DCCV.  Patient of Dr. Guerra   Plan:     Sending to ER   Recommend admission to inpatient   Consult EP once admitted for TEE_DCCV and consideration of amiodarone post cardioversion     RTC 3 months           Signed:  Rhea Herring DO  Cardiology Fellow

## 2019-08-07 NOTE — SUBJECTIVE & OBJECTIVE
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, ER/[place] positive    Chronic diastolic heart failure     Heart murmur     Hyperlipidemia     Hypertension     Left atrial enlargement     Severe mitral regurgitation        Past Surgical History:   Procedure Laterality Date    BIOPSY, LYMPH NODE, SENTINEL Right 12/6/2012    Performed by Demetri Epstein MD at [facility] OR 2ND FLR    BREAST BIOPSY  11/2012    Right breast- invasive ductal carcinoma    CARDIOVERSION N/A 12/18/2018    Performed by Emanuel Guerra MD at [facility] EP LAB    CATARACT EXTRACTION W/  INTRAOCULAR LENS IMPLANT Right 10/21/2008    OU     CATARACT EXTRACTION W/  INTRAOCULAR LENS IMPLANT Left     ECHOCARDIOGRAM,TRANSESOPHAGEAL N/A 12/18/2018    Performed by [facility] Provider at [facility] EP LAB    INSERTION, LOCALIZATION WIRE Right 12/6/2012    Performed by Demetri Epstein MD at [facility] OR 2ND FLR    [id number] Right 1/14/2013    Performed by Dem[facility]ri Epstein MD at [facility] OR 2ND FLR    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    TUBAL LIGATION         Review of patient's allergies indicates:   Allergen Reactions    Etodolac Other (See Comments)     Dehydration    Nsaids (non-steroidal anti-inflammatory drug)      COLITIS/DEHYDRATION       No current facility-administered medications on file prior to encounter.      Current Outpatient Medications on File Prior to Encounter   Medication Sig    acetaminophen (TYLENOL) 500 MG tablet Take 500 mg by mouth every 6 (six) hours as needed for Pain.    acetaminophen (TYLENOL) 500 MG tablet Take 1 tablet (500 mg total) by mouth 2 (two) times daily as needed for Pain.    apixaban (ELIQUIS) 2.5 mg Tab Take 1 tablet (2.5 mg total) by mouth 2 (two) times daily.    b complex vitamins (B COMPLEX-VITAMIN B12) tablet  Take 1 tablet by mouth once daily.    diltiaZEM (CARDIZEM CD) 240 MG 24 hr capsule Take 1 capsule (240 mg total) by mouth once daily.    furosemide (LASIX) 40 MG tablet Take 1 tablet (40 mg total) by mouth 2 (two) times daily.    metoprolol tartrate (LOPRESSOR) 25 MG tablet Take 1 tablet (25 mg total) by mouth 2 (two) times daily.    multivitamin (THERAGRAN) per tablet Take 1 tablet by mouth once daily.    pravastatin (PRAVACHOL) 80 MG tablet Take 1 tablet (80 mg total) by mouth once daily.    tolterodine (DETROL LA) 4 MG 24 hr capsule Take 1 capsule (4 mg total) by mouth once daily.    [DISCONTINUED] cyanocobalamin, vitamin B-12, (VITAMIN B-12) 2,500 mcg Subl Place under the tongue once daily.     Family History     Problem Relation (Age of Onset)    Breast cancer Maternal Grandmother    Cancer Maternal Aunt    Heart disease Father    No Known Problems Mother, Sister, Brother, Maternal Uncle, Paternal Aunt, Paternal Uncle, Maternal Grandfather, Paternal Grandmother, Paternal Grandfather        Tobacco Use    Smoking status: Former Smoker     Packs/day: 1.00     Years: 50.00     Pack years: 50.00     Last attempt to quit: 11/15/2002     Years since quittin.7    Smokeless tobacco: Never Used   Substance and Sexual Activity    Alcohol use: Yes     Alcohol/week: 1.5 oz     Types: 3 Standard drinks or equivalent per week     Comment: 3 OZ WINE     Drug use: No    Sexual activity: Never     Review of Systems   Constitution: Negative. Negative for chills and fever.   HENT: Negative.    Eyes: Negative.    Cardiovascular: Positive for dyspnea on exertion. Negative for chest pain, claudication and paroxysmal nocturnal dyspnea.   Respiratory: Negative for cough, shortness of breath and wheezing.    Endocrine: Negative.    Hematologic/Lymphatic: Does not bruise/bleed easily.   Skin: Negative for nail changes and rash.   Musculoskeletal: Negative.  Negative for back pain.   Gastrointestinal: Negative for  abdominal pain, melena, nausea and vomiting.   Neurological: Negative for dizziness and headaches.   Psychiatric/Behavioral: Negative for altered mental status, depression and substance abuse.   Allergic/Immunologic: Negative.      Objective:     Vital Signs (Most Recent):  Temp: 98.6 °F (37 °C) (08/07/19 1205)  Pulse: (!) 112 (08/07/19 1456)  Resp: 17 (08/07/19 1350)  BP: 102/62 (08/07/19 1456)  SpO2: 95 % (08/07/19 1456) Vital Signs (24h Range):  Temp:  [98.6 °F (37 °C)] 98.6 °F (37 °C)  Pulse:  [112-146] 112  Resp:  [17-20] 17  SpO2:  [92 %-95 %] 95 %  BP: ()/(62-70) 102/62       Weight: 70.8 kg (156 lb)  Body mass index is 27.63 kg/m².    SpO2: 95 %       Physical Exam   Constitutional: She is oriented to person, place, and time. She appears well-developed and well-nourished.   HENT:   Head: Normocephalic and atraumatic.   Eyes: Pupils are equal, round, and reactive to light. Conjunctivae and EOM are normal.   Neck: JVD present.   Cardiovascular: Normal heart sounds and intact distal pulses. Exam reveals no gallop and no friction rub.   No murmur heard.  Tachycardic, irregular rhythm   Pulmonary/Chest: Effort normal. No stridor. She has no wheezes. She has no rales. She exhibits no tenderness.   Abdominal: Soft. Bowel sounds are normal. She exhibits no distension and no mass. There is no tenderness. There is no guarding.   Musculoskeletal: She exhibits edema. She exhibits no tenderness or deformity.   Neurological: She is alert and oriented to person, place, and time.   Skin: Skin is warm and dry. No rash noted. No erythema.   Psychiatric: She has a normal mood and affect.       Significant Labs:   BMP:   Recent Labs   Lab 08/07/19  1300   *      K 3.4*      CO2 28   BUN 21   CREATININE 1.4   CALCIUM 9.1    and CBC:   Recent Labs   Lab 08/07/19  1300   WBC 5.79   HGB 11.9*   HCT 40.5          Significant Imaging: Echocardiogram:   Transthoracic echo (TTE) complete (Cupid Only):    Results for orders placed or performed during the hospital encounter of 08/07/19   Transthoracic echo (TTE) 2D with Color Flow   Result Value Ref Range    Ascending aorta 2.89 cm    STJ 2.28 cm    AV mean gradient 6 mmHg    Ao peak harjinder 1.71 m/s    Ao VTI 22.17 cm    IVS 0.99 0.6 - 1.1 cm    LA size 4.17 cm    Left Atrium Major Axis 6.06 cm    Left Atrium Minor Axis 5.80 cm    LVIDD 3.31 (A) 3.5 - 6.0 cm    LVIDS 2.56 2.1 - 4.0 cm    LVOT diameter 1.96 cm    LVOT peak VTI 11.57 cm    PW 1.04 0.6 - 1.1 cm    RA Major Axis 5.23 cm    RA Width 2.74 cm    RVDD 3.19 cm    Sinus 2.27 cm    TR Max Harjinder 3.32 m/s    LA WIDTH 3.65 cm    LV Diastolic Volume 44.40 mL    LV Systolic Volume 23.58 mL    LVOT peak harjinder 0.57 m/s    FS 23 %    LA volume 76.68 cm3    LV mass 97.12 g    Left Ventricle Relative Wall Thickness 0.63 cm    AV valve area 1.57 cm2    AV Velocity Ratio 0.33     AV index (prosthetic) 0.52     LVOT area 3.0 cm2    LVOT stroke volume 34.89 cm3    AV peak gradient 12 mmHg    LV Systolic Volume Index 13.6 mL/m2    LV Diastolic Volume Index 25.52 mL/m2    LA Volume Index 44.1 mL/m2    LV Mass Index 56 g/m2    Triscuspid Valve Regurgitation Peak Gradient 44 mmHg    BSA 1.77 m2    Right Atrial Pressure (from IVC) 15 mmHg    TV rest pulmonary artery pressure 59 mmHg    Narrative    · Left ventricular systolic function. The estimated ejection fraction is   53%  · Concentric left ventricular remodeling.  · Local segmental wall motion abnormalities.  · Moderate left atrial enlargement.  · Low normal right ventricular systolic function.  · Mild right atrial enlargement.  · There is a bioprosthetic aortic valve present.  · Severe mitral regurgitation.  · Moderate to severe tricuspid regurgitation.  · Elevated central venous pressure (15 mm Hg).  · The estimated PA systolic pressure is 59 mm Hg  · Pulmonary hypertension present.

## 2019-08-07 NOTE — H&P
[facility] Hospital Medicine  History & Physical    Admitting Team: IM-C  Attending Physician: Josee Spann MD  Date of Admit: 8/7/2019    Chief Complaint     Atrial Fibrillation (Pt sent from clinic for afib.  Pt was there for annual check up.   Daughter states pt has hx afib with cardioversion last year. )   for 1 day    Subjective:      History of Present Illness:  Ruth Lan is a 84 y.o. female with chronic AF on Eliquis s/p DCCV 12/2018, HFpEF, HTN, AS s/p AVR 2004, severe MR, CKD4, HTN, dementia, and hx R breast cancer who presents after being sent to ED from arrhythmia clinic for AFib with RVR in 140's. History provided by daughter Sandra who states that her mother has not been herself for the past few weeks. She has had generalized weakness, including a fall at home (she lives in an assisted living facility), as well as shortness of breath. She has also had a lack of appetite as well as diminished memory lately. The patient denies having chest pain or palpitations.     In the ED, HR noted to be as high as 140's and now in 120's without intervention, EKG AF RVR, SBP in 100's, troponin negative, BNP 1271, CXR with pulmonary edema, renal function at baseline. Cardiology was consulted and recommend EP consult for LAUREEN/DCCV and consideration of amiodarone.       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, ER/[place] positive    Chronic diastolic heart failure     Heart murmur     Hyperlipidemia     Hypertension     Left atrial enlargement     Severe mitral regurgitation        Past Surgical History:  Past Surgical History:   Procedure Laterality Date    BIOPSY, LYMPH NODE, SENTINEL Right 12/6/2012    Performed by Dem[facility]ri Epstein MD at [facility] OR 2ND FLR    BREAST BIOPSY  11/2012    Right breast-  invasive ductal carcinoma    CARDIOVERSION N/A 12/18/2018    Performed by [facility] at [facility] EP LAB    CATARACT EXTRACTION W/  INTRAOCULAR LENS IMPLANT Right 10/21/2008    OU     CATARACT EXTRACTION W/  INTRAOCULAR LENS IMPLANT Left     ECHOCARDIOGRAM,TRANSESOPHAGEAL N/A 12/18/2018    Performed by [facility] Diagnostic Provider at [facility] EP LAB    INSERTION, LOCALIZATION WIRE Right 12/6/2012    Performed by [facility]ri Epstein MD at [facility] OR 2ND FLR    [id number] Right 1/14/2013    Performed by [facility] MD at [facility] OR 2ND FLR    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    TUBAL LIGATION         Allergies:  Review of patient's allergies indicates:   Allergen Reactions    Etodolac Other (See Comments)     Dehydration    Nsaids (non-steroidal anti-inflammatory drug)      COLITIS/DEHYDRATION       Home Medications:  Prior to Admission medications    Medication Sig Start Date End Date Taking? Authorizing Provider   acetaminophen (TYLENOL) 500 MG tablet Take 500 mg by mouth every 6 (six) hours as needed for Pain.    Historical Provider, MD   acetaminophen (TYLENOL) 500 MG tablet Take 1 tablet (500 mg total) by mouth 2 (two) times daily as needed for Pain. 7/8/19   Florentino Valadez MD   apixaban (ELIQUIS) 2.5 mg Tab Take 1 tablet (2.5 mg total) by mouth 2 (two) times daily. 12/11/18   Emanuel Guerra MD   b complex vitamins (B COMPLEX-VITAMIN B12) tablet Take 1 tablet by mouth once daily. 7/8/19   Florentino Valadez MD   diltiaZEM (CARDIZEM CD) 240 MG 24 hr capsule Take 1 capsule (240 mg total) by mouth once daily. 1/22/19 1/22/20  Marlee Lu MD   furosemide (LASIX) 40 MG tablet Take 1 tablet (40 mg total) by mouth 2 (two) times daily. 9/4/18 9/4/19  Azikiwe K. Lombard, MD   metoprolol tartrate (LOPRESSOR) 25 MG tablet Take 1 tablet (25 mg total) by mouth 2 (two) times daily. 1/22/19 1/22/20  Marlee Lu MD   multivitamin (THERAGRAN) per tablet Take 1 tablet  by mouth once daily. 19   Florentino Valadez MD   pravastatin (PRAVACHOL) 80 MG tablet Take 1 tablet (80 mg total) by mouth once daily. 19   Azikiwe K. Lombard, MD   tolterodine (DETROL LA) 4 MG 24 hr capsule Take 1 capsule (4 mg total) by mouth once daily. 19  Adri Paulino PA-C   cyanocobalamin, vitamin B-12, (VITAMIN B-12) 2,500 mcg Subl Place under the tongue once daily.  19  Historical Provider, MD       Family History:  Family History   Problem Relation Age of Onset    Cancer Maternal Aunt         cervical or breast    Breast cancer Maternal Grandmother     Heart disease Father     No Known Problems Mother     No Known Problems Sister     No Known Problems Brother     No Known Problems Maternal Uncle     No Known Problems Paternal Aunt     No Known Problems Paternal Uncle     No Known Problems Maternal Grandfather     No Known Problems Paternal Grandmother     No Known Problems Paternal Grandfather     Blindness Neg Hx     Cataracts Neg Hx     Diabetes Neg Hx     Glaucoma Neg Hx     Ovarian cancer Neg Hx     Amblyopia Neg Hx     Hypertension Neg Hx     Macular degeneration Neg Hx     Retinal detachment Neg Hx     Strabismus Neg Hx     Stroke Neg Hx     Thyroid disease Neg Hx        Social History:  Social History     Tobacco Use    Smoking status: Former Smoker     Packs/day: 1.00     Years: 50.00     Pack years: 50.00     Last attempt to quit: 11/15/2002     Years since quittin.7    Smokeless tobacco: Never Used   Substance Use Topics    Alcohol use: Yes     Alcohol/week: 1.5 oz     Types: 3 Standard drinks or equivalent per week     Comment: 3 OZ WINE     Drug use: No       Review of Systems:  As per HPI  General: no fever, no chills, no weight loss, no fatigue  Nose, Sinuses: no rhinorrhea, no facial pain, no epistaxis  Cardiovascular: no chest pain, no orthopnea  Respiratory: no cough, no wheezes, no chest pain  Urinary: no dysuria, no increased  "frequency, no hematuria  Hematologic: no easy bruising, no overt bleeding, no petechiae  Endocrine: no heat intolerance, no cold intolerance, no polyuria  Neurologic: no seizures, no tremors, no numbness  Psychiatric: no hallucination, no depression, no suicidal ideation  Skin: no rashes, no pruritus, no pallor  All other systems reviewed & are negative.        Objective:   Last 24 Hour Vital Signs:  BP  Min: 91/66  Max: 115/64  Temp  Av.6 °F (37 °C)  Min: 98.6 °F (37 °C)  Max: 98.6 °F (37 °C)  Pulse  Av.8  Min: 112  Max: 146  Resp  Av.5  Min: 17  Max: 20  SpO2  Av.8 %  Min: 92 %  Max: 95 %  Height  Av' 2" (157.5 cm)  Min: 5' 2" (157.5 cm)  Max: 5' 2" (157.5 cm)  Weight  Av kg (156 lb 7.7 oz)  Min: 70.8 kg (156 lb)  Max: 71.2 kg (156 lb 15.5 oz)  Body mass index is 28.53 kg/m².  No intake/output data recorded.    Physical Examination:  GEN: AAOx3, NAD  HEENT: NCAT, MMM, PERRL, EOMI, oropharynx clear  CV: irregularly irregular, tachy, no m/r, JVP elevated to the ear  RESP:  no wheezes, bibasilar crackles  ABD: soft, NTND, normoactive BS, no organomegaly  EXTR: no c/c, 2+ distal pulses x 4, 1+ pitting edema BLE to the shins  NEURO: PERRL, EOMI, 5/5 motor strength all four extremities, intact sensation to light touch, no focal deficits  SKIN: no rashes, lesions, or color changes  PSYCH: normal affect     Laboratory:  I have reviewed all pertinent laboratory data within the past 24 hours.    Other Results:  EKG (my interpretation): AFib with RVR    Radiology:  I have reviewed all pertinent radiology imaging within the past 24 hours.     Assessment/Plan:     Ruth Lan is a 84 y.o. female with:    Atrial fibrillation with RVR  -Presents with arrhythmia clinic for AF w/ RVR HR 140s in the setting of fatigue and weakness  -HR now in 120's without intervention  -Recent successful DCCV in 2018  -Home medications: diltiazem 240 mg daily + Lopressor 25 mg BID  -Resume home Eliquis "   -Consult EP for LAUREEN/DCCV, possible amio initiation. Hold diltiazem, okay to resume Lopressor.   -Cardiac monitoring  -Echo    Acute on chronic diastolic heart failure  Severe MR  AS s/p bioprosthetic AVR '04  -Presents with shortness of breath, edema on CXR, BNP 1271  -Likely precipitated by AF with RVR or vice versa  -Home medication: Lasix 40 mg BID  -Start IV Lasix 80 mg x 1 and assess urine output  -Strict I/O's, daily weights, fluid/sodium restriction  -TTE    Hypokalemia  -Replete prn    HTN  -Controlled; monitor    Generalized weakness  Falls  -PTOT consult  -Fall precautions    CKD4  -Cr at baseline; monitor    Dementia  -Delirium precautions    Diet- cardiac, fluid restrict 1.5 L  PPX- Eliquis  Dispo- pending LAUREEN/DCCV + diuresis  Discharge needs- PTOT consult; EP & Cards f/u      Josee Spann MD  Hospital Medicine Staff  [facility]

## 2019-08-07 NOTE — Clinical Note
Phone consent for anesthesia for LAUREEN/DCCV obtained per Dr Mares from patient's daughter Justina Perez. Witnessed via GAMALIEL Moya RN and MALCOM Del Castillo RN.

## 2019-08-07 NOTE — ED NOTES
Pt placed on continuous cardiac and pulse ox monitoring with blood pressure to cycle every 30 minutes. Atrial fibrillation with a HR in the 130's noted; VS otherwise stable. Bed locked in lowest position; side rails up and locked x 2; call light, bedside table, and personal belongings within reach.  Pt instructed to alert nurse for assistance before attempting to get out of bed; verbalizes understanding.  Pt denies needs or complaints at this time.  Daughter at bedside; will continue to monitor pt.

## 2019-08-08 NOTE — NURSING
Notified Dr. Suzanne MD that according to H&P note, that team preferred to avoid cardizem and do possible amnio initiation. However, patient has not responded lopressor nor cardizem push or gtt. HR is still sustaining anywhere from 130-150's. Dr. Palacios verbalized to take a look into patient's chart. Rapid response nurse Mesfin engel. Awaiting recommendations. Other VSS. Patient is in no distress at this time and reports no palpitations. Will continue to monitor.

## 2019-08-08 NOTE — CARE UPDATE
"RAPID RESPONSE NURSE PROACTIVE ROUNDING NOTE     Time of Visit: 0430    Admit Date: 2019  LOS: 1  Code Status: DNR   Date of Visit: 2019  : 1935  Age: 84 y.o.  Sex: female  Race: White  Bed: 350/350 A:   MRN: [id number]  Was the patient discharged from an ICU this admission? no   Was the patient discharged from a PACU within last 24 hours?  no  Did the patient receive conscious sedation/general anesthesia in last 24 hours?  no  Was the patient in the ED within the past 24 hours?  yes  Was the patient started on NIPPV within the past 24 hours?  no  Attending Physician: Josee Spann MD  Primary Service: [facility] HOSP MED C    ASSESSMENT     Diagnosis: Atrial fibrillation with RVR    Abnormal Vital Signs: BP 95/69 (BP Location: Left arm, Patient Position: Lying)   Pulse (!) 131   Temp 98.3 °F (36.8 °C) (Oral)   Resp 18   Ht 5' 3" (1.6 m)   Wt 73.9 kg (162 lb 14.7 oz)   SpO2 (!) 92%   Breastfeeding? No   BMI 28.86 kg/m²      Clinical Issues: Dysrythmia    Patient  has a past medical history of Arthritis, Atrial fibrillation, Breast cancer, Chronic diastolic heart failure, Heart murmur, Hyperlipidemia, Hypertension, Left atrial enlargement, and Severe mitral regurgitation.    Patient MEWS alert for elevated heart rate.  On assessment of telemetry, patient noted to be in a.fib RVR with rate 120s-140s.  BP has remained stable with elevated heart rate.  Patient had received lopressor 5mg IVP x3 and cardizem 10mg IVP x1.  None were successful in rate control, so patient was initiated on cardizem gtt at 5mg and then increased to 10mg.  Plan for LAUREEN and Cardioversion today per EP as patient has successfully been responsive to cardioversion in the past.  EP and primary team notes to avoid calcium channel blockers in presence of heart failure.  Bilateral lower extremity edema noted +4, minimal urine output (aproximately 250ml) since admission to inpatient.     INTERVENTIONS/ RECOMMENDATIONS     Patient does not " appear to be responsive to the cardizem infusion at this time, a.fib RVR with rates from 120s-140s.  Patient is currently hemodynamically stable with SBP 90s-110s, is on half dose of eliquis for anticoagulation.  Should consider discussing case with EP on-call as to appropriate medication recommendation, including initiation of amiodarone infusion if EP recommends rate control prior to cardioversion today.    Discussed plan of care with Sherice ARRINGTON.    PHYSICIAN ESCALATION     Yes/No  yes    Orders received and case discussed with Dr. Palacios.    Disposition: Remain in room 350.    FOLLOW-UP     Call back the Rapid Response Nurse, Mesfin Noble RN at [phone] for additional questions or concerns.

## 2019-08-08 NOTE — PLAN OF CARE
Problem: Adult Inpatient Plan of Care  Goal: Patient-Specific Goal (Individualization)  Outcome: Ongoing (interventions implemented as appropriate)  Patient remained free from falls/injury/trauma throughout shift. No complaints of chest pain or SOB. Patient's HR A-fib w/RVR throughout night ranging from the 120-150's. Patient received lopressor 5 mg IVP x3 and cardizem 10 mg IVP x1. Cardizem gtt initiated at 5 mg/hr and stopped per MD order. Patient HR still elevated in the 120-150's despite interventions. Patient NPO as of midnight for cardioversion today. Patient made DNR last night per patient request. Resuscitation form signed by Dr. Juan Carlos MD. Still needs attending physician's signature. Received lasix 80 mg x1 in ED. Urine output 550 overnight. Purewick applied. However, patient was not following how purewick works education on how it works. Bed pan provided. Fall risk precautions reviewed and maintained.  Patient does have a history of falls. Bed locked in lowest position, call bell placed within reach, bed alarm set, and avasys camera put in place. Plan of care reviewed with patient. Patient verbalized understanding. All questions encouraged and answered. Will continue to monitor.

## 2019-08-08 NOTE — NURSING
Notified Dr. Juan Carlos MD on call for [facility] that patient received 2 doses of metoprolol IV. However, patient is still in A-fib with HR varying from 100's to 140's. Ordered to give another dose of metoprolol IV 5 mg. Will administer and continue to monitor.

## 2019-08-08 NOTE — NURSING
Notified Dr. Suzanne MD that patient's HR is sustaining in the 140-150's. Ordered to go up on cardizem gtt to 10 mg/hr. Verbalized to change order in Epic to 10 mg/hr. Will execute orders and continue to monitor.

## 2019-08-08 NOTE — NURSING TRANSFER
Nursing Transfer Note      8/7/2019     Transfer From: ED    Transfer To: CSU 50    Transfer via stretcher    Transfer with cardiac monitoring, 2L to O2    Transported by Patient transport    Medicines sent: No    Chart send with patient: Yes    Notified: daughter    Patient reassessed at:  (8/7, 1900)    Upon arrival to floor: cardiac monitor applied, patient oriented to room, call bell in reach and bed in lowest position

## 2019-08-08 NOTE — NURSING
Notified Dr. Sirena MD with EP on call of patient's HR and any recommendations he has to offer. No new orders given at this time. He verbalized he would discuss it with day team. Patient's HR still 120-140's. Patient reports no distress at this time. BP stable. Will continue to monitor.

## 2019-08-08 NOTE — NURSING
Notified Dr. Juan Carlos MD in [facility] that patient's HR is sustaining in the 130-160's. Patient scheduled to receive 25 mg metoprolol PO. Ordered to give now and to notify back if HR still sustains. Will continue to monitor.

## 2019-08-08 NOTE — NURSING
Notified Dr. Suzanne MD that patient HR is still sustaining the 120-140's. Ordered to push 10 mg cardizem IVP. Will administer and continue to monitor.

## 2019-08-08 NOTE — NURSING
Notified Dr. Juan Carlos MD on call for [facility] that patient's HR is still sustaining in the 140-160's despite receiving 25 mg metoprolol PO. Ordered to push 5 mg IVP metoprolol x1. If HR still sustains above 110, push another. If HR does not decrease after that, then cardizem gtt will be started. Will execute orders and continue to monitor.

## 2019-08-08 NOTE — PLAN OF CARE
Brief Electrophysiology Note      Case discussed with EP attending and primary teams. LAUREEN aborted given patient hypervolemic. Will continue low dose dilt gtt and PO metop tartrate for rate control goal HR<100. NPO @ 0000 for LAUREEN/DCCV in AM.      Ronnie Hoffman MD

## 2019-08-08 NOTE — CARE UPDATE
Rapid Response Nurse Follow-up Note     Followed up with patient for proactive rounding.   No acute issues at this time. Reviewed plan of care with primary RNRafia. Pts -130s, AFib. Plan of care reviewed with KAITLIN Spann MD. Plan to continue diuresis today and increase PO Lopressor dose. DCCV rescheduled for tomorrow.    Please call Rapid Response RN, Reva Ramirez RN with any questions or concerns at [phone].

## 2019-08-08 NOTE — NURSING
Notified Dr. Juan Carlos MD that patient is requesting code status to be a DNR. Currently, patient is a Full code in Epic. Dr. Juan Carlos MD verbalized that she would come up and see patient to sign documentation when she gets the opportunity. Will continue to monitor.

## 2019-08-08 NOTE — PLAN OF CARE
met with pt and pts daughter, Tiana ([phone]) at the bedside.  Pt is an alert lady who is hard of hearing.  sw completed screening with pt and daughter.  Pt lives alone at [facility] assisted living.  If skilled is medically necessary pt is interested in [facility].  milka will follow.     08/08/19 1227   Post-Acute Status   Post-Acute Authorization Other

## 2019-08-08 NOTE — PLAN OF CARE
08/08/19 1004   Discharge Assessment   Assessment Type Discharge Planning Assessment   Confirmed/corrected address and phone number on facesheet? Yes   Assessment information obtained from? Patient;Caregiver;Medical Record   Expected Length of Stay (days) 2   Communicated expected length of stay with patient/caregiver yes   Prior to hospitilization cognitive status: Alert/Oriented   Prior to hospitalization functional status: Assistive Equipment;Needs Assistance   Current cognitive status: Alert/Oriented   Current Functional Status: Assistive Equipment;Needs Assistance   Lives With facility resident   Able to Return to Prior Arrangements yes   Is patient able to care for self after discharge? Unable to determine at this time (comments)   Patient's perception of discharge disposition assisted living   Readmission Within the Last 30 Days no previous admission in last 30 days   Patient currently being followed by outpatient case management? No   Patient currently receives any other outside agency services? No   Equipment Currently Used at Home shower chair;walker, rolling;rollator;wheelchair   Do you have any problems affording any of your prescribed medications? TBD   Is the patient taking medications as prescribed? yes   Does the patient have transportation home? Yes   Transportation Anticipated family or friend will provide   Does the patient receive services at the [facility]? No   Discharge Plan A Assisted Living;Home Health   DME Needed Upon Discharge  none   Patient/Family in Agreement with Plan yes   Admitted with A-Fib and CHF. Resident at [facility]. Plan is to return to assisted living. Pt has fallen x 2 in the last several weeks. Might benefit from HHC. [facility]. My Health Packet given to pt and SYEDA written on [place].

## 2019-08-08 NOTE — PROGRESS NOTES
[facility] Hospital Medicine  Progress Note    Admitting Team: IM-C  Attending Physician: Josee Spann MD  Date of Admit: 8/7/2019        Subjective:      History of Present Illness:  Ruth Lan is a 84 y.o. female with chronic AF on Eliquis s/p DCCV 12/2018, HFpEF, HTN, AS s/p AVR 2004, severe MR, CKD4, HTN, dementia, and hx R breast cancer who presents after being sent to ED from arrhythmia clinic for AFib with RVR in 140's. History provided by daughter Sandra who states that her mother has not been herself for the past few weeks. She has had generalized weakness, including a fall at home (she lives in an assisted living facility), as well as shortness of breath. She has also had a lack of appetite as well as diminished memory lately. The patient denies having chest pain or palpitations.     In the ED, HR noted to be as high as 140's and now in 120's without intervention, EKG AF RVR, SBP in 100's, troponin negative, BNP 1271, CXR with pulmonary edema, renal function at baseline. Cardiology was consulted and recommend EP consult for LAUREEN/DCCV and consideration of amiodarone.     Interval History  HR persistently in RVR overnight, up to 160's. Received IV dilt 10 mg, IV metoprolol 5 mg. HR remains in AF with RVR today. Increased PO Lopressor dose and started on diltiazem gtt. Will continue diuresis today; DCCV on hold until tomorrow.      Review of Systems:  As per HPI  General: no fever, no chills, no weight loss, no fatigue  Nose, Sinuses: no rhinorrhea, no facial pain, no epistaxis  Cardiovascular: no chest pain, no orthopnea  Respiratory: no cough, no wheezes, no chest pain  Urinary: no dysuria, no increased frequency, no hematuria  Hematologic: no easy bruising, no overt bleeding, no petechiae  Endocrine: no heat intolerance, no cold intolerance, no polyuria  Neurologic: no seizures, no tremors, no numbness  Psychiatric: no hallucination, no depression, no suicidal ideation  Skin: no  "rashes, no pruritus, no pallor  All other systems reviewed & are negative.        Objective:   Last 24 Hour Vital Signs:  BP  Min: 90/57  Max: 126/60  Temp  Av.2 °F (36.8 °C)  Min: 97.8 °F (36.6 °C)  Max: 98.6 °F (37 °C)  Pulse  Av.2  Min: 102  Max: 146  Resp  Av.3  Min: 16  Max: 26  SpO2  Av.4 %  Min: 81 %  Max: 97 %  Height  Av' 2.7" (159.3 cm)  Min: 5' 2" (157.5 cm)  Max: 5' 3" (160 cm)  Weight  Av.5 kg (159 lb 11.8 oz)  Min: 70.8 kg (156 lb)  Max: 74.4 kg (164 lb 0.4 oz)  Body mass index is 29.06 kg/m².  I/O last 3 completed shifts:  In: 250 [P.O.:250]  Out: 550 [Urine:550]    Physical Examination:  GEN: AAOx3, NAD  HEENT: NCAT, MMM, PERRL, EOMI, oropharynx clear  CV: irregularly irregular, tachy, no m/r, JVP elevated to the ear  RESP:  no wheezes, no crackles  ABD: soft, NTND, normoactive BS, no organomegaly  EXTR: no c/c, 2+ distal pulses x 4, mild pitting edema BLE to the shins  NEURO: PERRL, EOMI, 5/5 motor strength all four extremities, intact sensation to light touch, no focal deficits  SKIN: no rashes, lesions, or color changes  PSYCH: normal affect     Laboratory:  I have reviewed all pertinent laboratory data within the past 24 hours.    Other Results:  EKG (my interpretation): AFib with RVR    Radiology:  I have reviewed all pertinent radiology imaging within the past 24 hours.     Assessment/Plan:     Ruth Lan is a 84 y.o. female with:    Atrial fibrillation with RVR  -Presents with arrhythmia clinic for AF w/ RVR HR 140s in the setting of fatigue and weakness  -Recent successful DCCV in 2018  -Home medications: diltiazem 240 mg daily + Lopressor 25 mg BID  -Resume Eliquis; dose adjusted as patient has no indication for reduced dosing  -Consulted EP for LAUREEN/DCCV, possible amio initiation.  -EP recommending diltiazem gtt and uptitration of Lopressor as tolerated  -Cardiac monitoring    Acute on chronic diastolic heart failure  Acute hypoxic respiratory " failure  Severe MR  AS s/p bioprosthetic AVR '04  -Presents with shortness of breath, edema on CXR, BNP 1271  -Requiring O2; wean as tolerated  -Likely precipitated by AF with RVR or vice versa  -Home medication: Lasix 40 mg BID  -Echo: EF 55%, severe MR, moderate-severe TR, CVP 15, pHTN  -IV Lasix 80 mg BID  -Strict I/O's, daily weights, fluid/sodium restriction  -Dry weight ~159 lb    Urinary retention  -Bedoya placement    Hypokalemia  -Replete prn    HTN  -Controlled; monitor    Generalized weakness  Falls  -PTOT consult  -Fall precautions    CKD4  -Cr at baseline; monitor    Dementia  -Delirium precautions    Diet- cardiac, fluid restrict 1.5 L  PPX- Eliquis  Dispo- pending LAUREEN/DCCV + diuresis  Discharge needs- PTOT consult; EP & Cards f/u      Josee Spann MD  Hospital Medicine Staff  [facility]

## 2019-08-08 NOTE — ANESTHESIA PREPROCEDURE EVALUATION
[facility]  Anesthesia Pre-Operative Evaluation         Patient Name: Ruth Lan  YOB: 1935  MRN: [id number]    SUBJECTIVE:     Pre-operative evaluation for Procedure(s) (LRB):  CARDIOVERSION (N/A)  ECHOCARDIOGRAM,TRANSESOPHAGEAL (N/A)     08/08/2019    Ruth Lan is a 84 y.o. female w/ a significant PMHx of chronic AF on Eliquis s/p DCCV 12/2018, HFpEF, HTN, AS s/p AVR 2004, severe MR, CKD4, HTN, dementia, and hx R breast cancer who presents after being sent to ED from arrhythmia clinic for AFib with RVR in 140's. Patient now presents for the above procedure(s).    LDA:        Peripheral IV - Single Lumen 12/18/18 1236 Right Hand (Active)   Number of days: 232            Peripheral IV - Single Lumen 08/07/19 1300 18 G Right Antecubital (Active)   Site Assessment Clean;Dry;Intact;No redness;No swelling 8/8/2019  8:00 AM   Line Status Flushed 8/8/2019  8:00 AM   Dressing Status Clean 8/8/2019  8:00 AM   Dressing Change Due 08/11/19 8/8/2019  8:00 AM   Site Change Due 08/11/19 8/8/2019  8:00 AM   Reason Not Rotated Not due 8/8/2019  8:00 AM   Number of days: 0       Prev airway: CRNA; LMA; Postinduction; 4.0; Cuffed; Minimal occlusive pressure; Auscultation; Lips; None; 1    Drips: None documented.      Patient Active Problem List   Diagnosis    History of aortic valve replacement with porcine valve on 9/27/04    Pure hypercholesterolemia    Anxiety    Hypertension, essential    Normocytic anemia    Vitamin B12 deficiency    Attention or concentration deficit    Memory loss, short term    MCI (mild cognitive impairment)    History of breast cancer    Breast cancer of lower-inner quadrant of right female breast    Severe mitral regurgitation    Primary osteoarthritis of both knees    Stage 4 chronic kidney disease    Psychophysiological insomnia    Pleural effusion, right    Malnutrition of moderate degree    Chronic diastolic heart failure    Pulmonary  HTN    DNR (do not resuscitate)    History of smoking greater than 50 pack years    Unintentional weight loss of 10% body weight within 6 months    Aortic atherosclerosis    Persistent atrial fibrillation    Positive D dimer    Atrial fibrillation with RVR    Refused pneumococcal vaccination    Hyperlipidemia    Acute on chronic diastolic heart failure    Hypokalemia       Review of patient's allergies indicates:   Allergen Reactions    Etodolac Other (See Comments)     Dehydration    Nsaids (non-steroidal anti-inflammatory drug)      COLITIS/DEHYDRATION       Current Inpatient Medications:   apixaban  5 mg Oral BID    furosemide  80 mg Intravenous BID    magnesium oxide  400 mg Oral BID    metoprolol tartrate  50 mg Oral Q8H    multivitamin  1 tablet Oral Daily    potassium chloride  20 mEq Oral Q2H    pravastatin  80 mg Oral Daily       No current facility-administered medications on file prior to encounter.      Current Outpatient Medications on File Prior to Encounter   Medication Sig Dispense Refill    acetaminophen (TYLENOL) 500 MG tablet Take 500 mg by mouth every 6 (six) hours as needed for Pain.      acetaminophen (TYLENOL) 500 MG tablet Take 1 tablet (500 mg total) by mouth 2 (two) times daily as needed for Pain. 180 tablet 0    apixaban (ELIQUIS) 2.5 mg Tab Take 1 tablet (2.5 mg total) by mouth 2 (two) times daily. 60 tablet 11    b complex vitamins (B COMPLEX-VITAMIN B12) tablet Take 1 tablet by mouth once daily. 90 tablet 1    diltiaZEM (CARDIZEM CD) 240 MG 24 hr capsule Take 1 capsule (240 mg total) by mouth once daily. 90 capsule 3    furosemide (LASIX) 40 MG tablet Take 1 tablet (40 mg total) by mouth 2 (two) times daily. 180 tablet 3    metoprolol tartrate (LOPRESSOR) 25 MG tablet Take 1 tablet (25 mg total) by mouth 2 (two) times daily. 180 tablet 3    multivitamin (THERAGRAN) per tablet Take 1 tablet by mouth once daily. 90 tablet 1    pravastatin (PRAVACHOL) 80 MG  tablet Take 1 tablet (80 mg total) by mouth once daily. 90 tablet 3    tolterodine (DETROL LA) 4 MG 24 hr capsule Take 1 capsule (4 mg total) by mouth once daily. 90 capsule 3       Past Surgical History:   Procedure Laterality Date    BIOPSY, LYMPH NODE, SENTINEL Right 2012    Performed by Demetri Epstein MD at [facility] OR 2ND FLR    BREAST BIOPSY  2012    Right breast- invasive ductal carcinoma    CARDIOVERSION N/A 2018    Performed by Emanuel Guerra MD at [facility] EP LAB    CATARACT EXTRACTION W/  INTRAOCULAR LENS IMPLANT Right 10/21/2008    OU     CATARACT EXTRACTION W/  INTRAOCULAR LENS IMPLANT Left     ECHOCARDIOGRAM,TRANSESOPHAGEAL N/A 2018    Performed by [facility] Provider at [facility] EP LAB    INSERTION, LOCALIZATION WIRE Right 2012    Performed by Demetri Epstein MD at [facility] OR 2ND FLR    [id number] Right 2013    Performed by Demetri Epstein MD at [facility] OR 2ND FLR    TISSUE AORTIC VALVE REPLACEMENT  2004    TUBAL LIGATION         Social History     Socioeconomic History    Marital status:      Spouse name: Not on file    Number of children: Not on file    Years of education: Not on file    Highest education level: Not on file   Occupational History    Not on file   Social Needs    Financial resource strain: Not on file    Food insecurity:     Worry: Not on file     Inability: Not on file    Transportation needs:     Medical: Not on file     Non-medical: Not on file   Tobacco Use    Smoking status: Former Smoker     Packs/day: 1.00     Years: 50.00     Pack years: 50.00     Last attempt to quit: 11/15/2002     Years since quittin.7    Smokeless tobacco: Never Used   Substance and Sexual Activity    Alcohol use: Yes     Alcohol/week: 1.5 oz     Types: 3 Standard drinks or equivalent per week     Comment: 3 OZ WINE     Drug use: No    Sexual activity: Never   Lifestyle    Physical activity:     Days per week:  Not on file     Minutes per session: Not on file    Stress: Not on file   Relationships    Social connections:     Talks on phone: Not on file     Gets together: Not on file     Attends Christianity service: Not on file     Active member of club or organization: Not on file     Attends meetings of clubs or organizations: Not on file     Relationship status: Not on file   Other Topics Concern    Not on file   Social History Narrative    Not on file       OBJECTIVE:     Vital Signs Range (Last 24H):  Temp:  [36.4 °C (97.6 °F)-36.8 °C (98.3 °F)]   Pulse:  [102-144]   Resp:  [16-26]   BP: ()/(54-82)   SpO2:  [81 %-97 %]       Significant Labs:  Lab Results   Component Value Date    WBC 7.57 08/08/2019    HGB 10.7 (L) 08/08/2019    HCT 35.5 (L) 08/08/2019     08/08/2019    CHOL 195 11/22/2017    TRIG 118 11/22/2017    HDL 60 11/22/2017    ALT 20 08/07/2019    AST 23 08/07/2019     08/08/2019    K 3.6 08/08/2019     08/08/2019    CREATININE 1.1 08/08/2019    BUN 21 08/08/2019    CO2 21 (L) 08/08/2019    TSH 3.015 10/23/2018    INR 1.3 (H) 08/07/2019    HGBA1C 5.0 07/16/2018       Diagnostic Studies: No relevant studies.    EKG:   Results for orders placed or performed during the hospital encounter of 08/07/19   EKG 12-lead    Collection Time: 08/07/19  4:02 PM    Narrative    Test Reason : I48.91,    Vent. Rate : 126 BPM     Atrial Rate : 150 BPM     P-R Int : 136 ms          QRS Dur : 088 ms      QT Int : 268 ms       P-R-T Axes : 047 041 187 degrees     QTc Int : 388 ms    **Age and gender specific analysis**  Sinus tachycardia  Nonspecific ST and T wave abnormality  Abnormal ECG  When compared with ECG of 07-AUG-2019 11:54,  Sinus rhythm has replaced Atrial fibrillation    Referred By: [email or web address]   SELF           Confirmed By:        ECHOCARDIOGRAM:  TRANSTHORACIC ECHO (TTE) COMPLETE   Conclusion     · Left ventricular systolic function. The estimated ejection fraction is 53%  · Concentric left  ventricular remodeling.  · Local segmental wall motion abnormalities.  · Moderate left atrial enlargement.  · Low normal right ventricular systolic function.  · Mild right atrial enlargement.  · There is a bioprosthetic aortic valve present.  · Severe mitral regurgitation.  · Moderate to severe tricuspid regurgitation.  · Elevated central venous pressure (15 mm Hg).  · The estimated PA systolic pressure is 59 mm Hg  · Pulmonary hypertension present.         Results for orders placed or performed during the hospital encounter of 08/07/19   Transthoracic echo (TTE) 2D with Color Flow   Result Value Ref Range    Ascending aorta 2.89 cm    STJ 2.28 cm    AV mean gradient 6 mmHg    Ao peak harjinder 1.71 m/s    Ao VTI 22.17 cm    IVS 0.99 0.6 - 1.1 cm    LA size 4.17 cm    Left Atrium Major Axis 6.06 cm    Left Atrium Minor Axis 5.80 cm    LVIDD 3.31 (A) 3.5 - 6.0 cm    LVIDS 2.56 2.1 - 4.0 cm    LVOT diameter 1.96 cm    LVOT peak VTI 11.57 cm    PW 1.04 0.6 - 1.1 cm    RA Major Axis 5.23 cm    RA Width 2.74 cm    RVDD 3.19 cm    Sinus 2.27 cm    TR Max Harjinder 3.32 m/s    LA WIDTH 3.65 cm    LV Diastolic Volume 44.40 mL    LV Systolic Volume 23.58 mL    LVOT peak harjinder 0.57 m/s    FS 23 %    LA volume 76.68 cm3    LV mass 97.12 g    Left Ventricle Relative Wall Thickness 0.63 cm    AV valve area 1.57 cm2    AV Velocity Ratio 0.33     AV index (prosthetic) 0.52     LVOT area 3.0 cm2    LVOT stroke volume 34.89 cm3    AV peak gradient 12 mmHg    LV Systolic Volume Index 13.6 mL/m2    LV Diastolic Volume Index 25.52 mL/m2    LA Volume Index 44.1 mL/m2    LV Mass Index 56 g/m2    Triscuspid Valve Regurgitation Peak Gradient 44 mmHg    BSA 1.77 m2    Right Atrial Pressure (from IVC) 15 mmHg    TV rest pulmonary artery pressure 59 mmHg    Narrative    · Left ventricular systolic function. The estimated ejection fraction is   53%  · Concentric left ventricular remodeling.  · Local segmental wall motion abnormalities.  · Moderate left  atrial enlargement.  · Low normal right ventricular systolic function.  · Mild right atrial enlargement.  · There is a bioprosthetic aortic valve present.  · Severe mitral regurgitation.  · Moderate to severe tricuspid regurgitation.  · Elevated central venous pressure (15 mm Hg).  · The estimated PA systolic pressure is 59 mm Hg  · Pulmonary hypertension present.        LAUREEN:  No results found for this or any previous visit.      ASSESSMENT/PLAN:         Anesthesia Evaluation    I have reviewed the Patient Summary Reports.    I have reviewed the Nursing Notes.   I have reviewed the Medications.     Review of Systems  Anesthesia Hx:  No problems with previous Anesthesia  History of prior surgery of interest to airway management or planning: Previous anesthesia: General Denies Family Hx of Anesthesia complications.   Denies Personal Hx of Anesthesia complications.   Social:  Former Smoker 50 year pack history, quit in 2002   Hematology/Oncology:         -- Cancer in past history: Breast   EENT/Dental:EENT/Dental Normal   Cardiovascular:   Exercise tolerance: poor Hypertension Dysrhythmias atrial fibrillation    Pulmonary:  Pulmonary Normal    Hepatic/GI:  Hepatic/GI Normal    Musculoskeletal:   Arthritis     Neurological:   Denies Seizures.    Psych:  Psychiatric Normal           Physical Exam  General:  Well nourished    Airway/Jaw/Neck:  Airway Findings: Mouth Opening: Normal Tongue: Normal  General Airway Assessment: Adult  Mallampati: II  Improves to II with phonation.  TM Distance: 4 - 6 cm  Jaw/Neck Findings:  Neck ROM: Normal ROM     Eyes/Ears/Nose:  EYES/EARS/NOSE FINDINGS: Normal   Dental:  Dental Findings: Upper Dentures, Lower Dentures   Chest/Lungs:  Chest/Lungs Findings: Clear to auscultation, Normal Respiratory Rate, Rales, Basilar     Heart/Vascular:  Heart Findings: Rate: Tachycardia  Rhythm: Irregularly Irregular  Sounds: Normal  Vascular Findings:  Vascular Access: Peripheral IV(s)      Abdomen:  Abdomen Findings:  Normal, Nontender, Soft     Musculoskeletal:  Musculoskeletal Findings:    Skin:  Skin Findings:     Mental Status:  Mental Status Findings:  Cooperative, Alert and Oriented         Anesthesia Plan  Type of Anesthesia, risks & benefits discussed:  Anesthesia Type:  general  Patient's Preference:   Intra-op Monitoring Plan: standard ASA monitors  Intra-op Monitoring Plan Comments:   Post Op Pain Control Plan: multimodal analgesia and IV/PO Opioids PRN  Post Op Pain Control Plan Comments:   Induction:   IV  Beta Blocker:  Patient is on a Beta-Blocker and has received one dose within the past 24 hours (No further documentation required).       Informed Consent: Patient representative understands risks and agrees with Anesthesia plan.  Questions answered. Anesthesia consent signed with patient representative.  ASA Score: 3     Day of Surgery Review of History & Physical:    H&P update referred to the provider.         Ready For Surgery From Anesthesia Perspective.

## 2019-08-09 NOTE — PT/OT/SLP EVAL
Occupational Therapy   Evaluation and Treatment     Name: Ruth Lan  MRN: [id number]  Admitting Diagnosis:  Atrial fibrillation with RVR Day of Surgery    Recommendations:     Discharge Recommendations: nursing facility, skilled  Discharge Equipment Recommendations:  none  Barriers to discharge:  None    Assessment:     Ruth Lan is a 84 y.o. female with a medical diagnosis of Atrial fibrillation with RVR.  She presents with performance deficits affecting function: impaired endurance, weakness, impaired self care skills, impaired functional mobilty, gait instability, impaired balance, impaired cardiopulmonary response to activity, decreased safety awareness, impaired cognition.  Pt tolerated session well this date. Pt experienced confusion thoughtout therapy session despite redirection to situation. Pt is at high risk of falls and would benefit from SNF placement in order to improve return to PLOF. Pt would benefit from continued skilled acute OT services in order to maximize independence and safety with ADLs and functional mobility to ensure safe return to PLOF in the least restrictive environment.    Rehab Prognosis: Good; patient would benefit from acute skilled OT services to address these deficits and reach maximum level of function.       Plan:     Patient to be seen 4 x/week to address the above listed problems via self-care/home management, therapeutic exercises, therapeutic activities  · Plan of Care Expires: 09/07/19  · Plan of Care Reviewed with: patient, daughter    Subjective     Chief Complaint: confusion, disoriented  Patient/Family Comments/goals: to get better and not be afraid of falling     Occupational Profile:  Living Environment: Pt lives in a Assisted Living Facility. Pt reports multiple falls within the past month. Pt began requiring supervision for transfers and now primarily uses w/c for mobility due to fearfulness of falling. Staff members at the [facility] have been assisting pt  with bathing for pt's safety.   Previous level of function: PTA, pt was (I) with ADLs and mod (I) for functional mobility using rollator   Roles and Routines: home dweller   Equipment Used at Home:  walker, rolling, wheelchair, shower chair  Assistance upon Discharge: Pt will have some assistance from staff members at the assisted living facility.     Pain/Comfort:  · Pain Rating 1: (Pt reported R knee pain but did not rate )  · Pain Addressed 1: Reposition, Distraction, Cessation of Activity    Patients cultural, spiritual, Buddhist conflicts given the current situation: no    Objective:     Communicated with: RN prior to session.  Patient found HOB elevated with telemetry, oxygen, peripheral IV upon OT entry to room. Pt agreeable to therapy session. Pt's daughter present.     General Precautions: Standard, fall   Orthopedic Precautions:N/A   Braces: N/A     Occupational Performance:    Bed Mobility:    · Patient completed Scooting/Bridging with moderate assistance  · Patient completed Supine to Sit with moderate assistance    Functional Mobility/Transfers:  · Patient completed x 2 reps Sit <> Stand Transfer with minimum assistance  with  rolling walker   · Pt very fearful of falling with increased anxiety during sit <>stand transfer   · Patient completed Bed <> Chair Transfer using Stand Pivot technique with moderate assistance with rolling walker  · Verbal cues for technique and safety     Activities of Daily Living:  · Grooming: stand by assistance and set-up A   · Pt brushed hair and washed face while sitting UIC   · Pt put in bottom dentures with set-up A   · Upper Body Dressing: moderate assistance to don gown like robe while sitting EOB     Cognitive/Visual Perceptual:  Cognitive/Psychosocial Skills:     -       Oriented to: Person, Place  -       Follows Commands/attention:Follows two-step commands  -       Communication: clear/fluent  -       Memory: Poor immediate recall  -       Safety  awareness/insight to disability: impaired   -       Mood/Affect/Coping skills/emotional control: Appropriate to situation, confused   Visual/Perceptual:       - wears glasses    Physical Exam:  Balance:    - Static sit: CGA  -  Dynamic sit: CGA   - Static standing: min A with RW   - Dynamic Standing: moderate A    Postural examination/scapula alignment:    -       Rounded shoulders  -       Forward head  -       Abnormal trunk flexion  Skin integrity: Visible skin intact and Thin  Edema:  None noted  Sensation:    -       Intact  light/touch BUEs  Dominant hand:    -       right  Upper Extremity Range of Motion:     -       Right Upper Extremity: WFL  -       Left Upper Extremity: WFL  Upper Extremity Strength:    -       Right Upper Extremity: grossly 4-/5   -       Left Upper Extremity: grossly 4-/5    Strength:    -       Right Upper Extremity: WFL  -       Left Upper Extremity: WFL    AMPAC 6 Click ADL:  AMPAC Total Score: 13    Treatment & Education:  Pt educated by therapist on:   - Pt educated on role of OT, POC, and goals for therapy.    - Educated pt on being appropriate to transfer with nsg and PCT with moderate assistance for safety using RW  - Importance of OOB ax's with staff member assistance and sitting OOB majority of day.   - Pt completed ADLs and functional mobility for treatment session as noted above   - Pt verbalized understanding. Pt expressed no further concerns/questions.  - whiteboard updated     Education:    Patient left up in chair with chair alarm, all lines intact, call button in reach, RN notified and daughter present    GOALS:   Multidisciplinary Problems     Occupational Therapy Goals        Problem: Occupational Therapy Goal    Goal Priority Disciplines Outcome Interventions   Occupational Therapy Goal     OT, PT/OT Ongoing (interventions implemented as appropriate)    Description:  Goals to be met by: 8/23/19     Patient will increase functional independence with ADLs by  performing:    UE Dressing with Stand-by Assistance.  LE Dressing with Minimal Assistance.  Grooming while seated at sink with Stand-by Assistance.  Toileting from bedside commode with Minimal Assistance for hygiene and clothing management.   Toilet transfer to bedside commode with Minimal Assistance.                      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, [place] positive    Chronic diastolic heart failure     Heart murmur     Hyperlipidemia     Hypertension     Left atrial enlargement     Severe mitral regurgitation        Past Surgical History:   Procedure Laterality Date    BIOPSY, LYMPH NODE, SENTINEL Right 12/6/2012    Performed by [facility]ri Epstein [facility] at [facility] OR 2ND FLR    BREAST BIOPSY  11/2012    Right breast- invasive ductal carcinoma    CARDIOVERSION N/A 12/18/2018    Performed by Emanuel Guerra MD at [facility] EP LAB    CATARACT EXTRACTION W/  INTRAOCULAR LENS IMPLANT Right 10/21/2008    [facility]     CATARACT EXTRACTION W/  INTRAOCULAR LENS IMPLANT Left     ECHOCARDIOGRAM,TRANSESOPHAGEAL N/A 12/18/2018    Performed by [facility] Diagnostic Provider at [facility] EP LAB    INSERTION, LOCALIZATION WIRE Right 12/6/2012    Performed by Demetri Epstein MD at [facility] OR 2ND FLR    TP-[id number] Right 1/14/2013    Performed by Dem[facility]ri Epstein MD at [facility] OR 2ND FLR    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    TUBAL LIGATION         Time Tracking:     OT Date of Treatment: 08/09/19  OT Start Time: 0903  OT Stop Time: 0937  OT Total Time (min): 34 min    Billable Minutes:Evaluation 15  Self Care/Home Management 10    Antoinette Roland, OT  8/9/2019

## 2019-08-09 NOTE — PROGRESS NOTES
[facility] Hospital Medicine  Progress Note    Admitting Team: IM-C  Attending Physician: Josee Spann MD  Date of Admit: 8/7/2019        Subjective:      History of Present Illness:  Ruth Lan is a 84 y.o. female with chronic AF on Eliquis s/p DCCV 12/2018, HFpEF, HTN, AS s/p AVR 2004, severe MR, CKD4, HTN, and hx R breast cancer who presents after being sent to ED from arrhythmia clinic for AFib with RVR in 140's. History provided by daughter Sandra who states that her mother has not been herself for the past few weeks. She has had generalized weakness, including a fall at home (she lives in an assisted living facility), as well as shortness of breath. She has also had a lack of appetite as well as diminished memory lately. The patient denies having chest pain or palpitations.     In the ED, HR noted to be as high as 140's and now in 120's without intervention, EKG AF RVR, SBP in 100's, troponin negative, BNP 1271, CXR with pulmonary edema, renal function at baseline. Cardiology was consulted and recommend EP consult for LAUREEN/DCCV and consideration of amiodarone.     Interval History  Pt with agitation and confusion overnight, pulled out IV's, required 2 po doses of olanzapine. Pt more oriented this AM. HR persistently in RVR yesterday and overnight, remains in 130's. Cont diltiazem gtt. No longer requiring O2 today. Will continue diuresis today; LAUREEN/DCCV scheduled for 2pm.      Review of Systems:  As per HPI  General: no fever, no chills, no weight loss, no fatigue  Nose, Sinuses: no rhinorrhea, no facial pain, no epistaxis  Cardiovascular: no chest pain, no orthopnea  Respiratory: no cough, no wheezes, no chest pain  Urinary: no dysuria, no increased frequency, no hematuria  Hematologic: no easy bruising, no overt bleeding, no petechiae  Endocrine: no heat intolerance, no cold intolerance, no polyuria  Neurologic: no seizures, no tremors, no numbness  Psychiatric: no hallucination,  no depression, no suicidal ideation  Skin: no rashes, no pruritus, no pallor  All other systems reviewed & are negative.        Objective:   Last 24 Hour Vital Signs:  BP  Min: 92/55  Max: 118/53  Temp  Av.9 °F (36.6 °C)  Min: 97.5 °F (36.4 °C)  Max: 98.6 °F (37 °C)  Pulse  Av.5  Min: 78  Max: 143  Resp  Av.5  Min: 16  Max: 18  SpO2  Av.8 %  Min: 93 %  Max: 97 %  Body mass index is 29.06 kg/m².  I/O last 3 completed shifts:  In: 700 [P.O.:700]  Out: 1750 [Urine:1750]    Physical Examination:  GEN: AAOx3, NAD  HEENT: NCAT, MMM, PERRL, EOMI, oropharynx clear  CV: irregularly irregular, tachy, no m/r, JVD improved to mid-neck  RESP:  no wheezes, b/l crackles  ABD: soft, NTND, normoactive BS, no organomegaly  EXTR: no c/c, 2+ distal pulses x 4, mild pitting edema BLE to the shins  NEURO: PERRL, EOMI, 5/5 motor strength all four extremities, intact sensation to light touch, no focal deficits  SKIN: no rashes, lesions, or color changes  PSYCH: normal affect     Laboratory:  I have reviewed all pertinent laboratory data within the past 24 hours.    Other Results:  EKG (my interpretation): AFib with RVR    Radiology:  I have reviewed all pertinent radiology imaging within the past 24 hours.     Assessment/Plan:     Ruth Lan is a 84 y.o. female with:    Atrial fibrillation with RVR  -Presents with arrhythmia clinic for AF w/ RVR HR 140s in the setting of fatigue and weakness  -Recent successful DCCV in 2018  -Home medications: diltiazem 240 mg daily + Lopressor 25 mg BID  -Resume Eliquis; dose adjusted as patient has no indication for reduced dosing  -Consulted EP for LAUREEN/DCCV, possible amio initiation.  -EP recommending diltiazem gtt and uptitration of Lopressor as tolerated. LAUREEN/DCCV today  -Cardiac monitoring    Acute on chronic diastolic heart failure  Acute hypoxic respiratory failure, resolved  Severe MR  AS s/p bioprosthetic AVR '04  -Presents with shortness of breath, edema on CXR,  BNP 1271  -Weaned off O2  -Likely precipitated by AF with RVR or vice versa  -Home medication: Lasix 40 mg BID  -Echo: EF 55%, severe MR, moderate-severe TR, CVP 15, pHTN  -IV Lasix 80 mg BID  -Strict I/O's, daily weights, fluid/sodium restriction  -Dry weight ~159 lb    Urinary retention  -Bedoya placement    Delirium  -Check UA  -Delirium precautiosn  -Olanzapine prn; may titrate dose if needed    Hypokalemia  -Replete prn    HTN  -Controlled; monitor    Generalized weakness  Falls  -PTOT consult - SNF  -Fall precautions    CKD4  -Cr at baseline; monitor    Dementia  -Delirium precautions    Diet- cardiac, fluid restrict 1.5 L  PPX- Eliquis  Dispo- pending LAUREEN/DCCV + diuresis  Discharge needs- SNF placement; EP & Cards f/u      Josee Spann MD  Hospital Medicine Staff  [facility]

## 2019-08-09 NOTE — PLAN OF CARE
Problem: Adult Inpatient Plan of Care  Goal: Plan of Care Review  Patient remains free from falls and injuries through out shift. VSS. Patient AAO at this time. Patient denies chest pain and SOB. Cardizem gtt maintained as ordered. Patient went for scheduled LAUREEN/cardioversion. Patient's daughter at bedside. Plan of care reviewed with patient. Patient verbalizes understanding of plan.  Will continue to monitor.

## 2019-08-09 NOTE — ASSESSMENT & PLAN NOTE
1. LAUREEN for evaluation of YUNIER  -No absolute contraindications of esophageal stricture, tumor, perforation, laceration,or diverticulum and/or active GI bleed  -The risks, benefits & alternatives of the procedure were explained to the patient.   -The risks of transesophageal echo include but are not limited to:  Dental trauma, esophageal trauma/perforation, bleeding, laryngospasm/brochospasm, aspiration, sore throat/hoarseness, & dislodgement of the endotracheal tube/nasogastric tube (where applicable).    -The risks of moderate sedation include hypotension, respiratory depression, arrhythmias, bronchospasm, & death.    -Informed consent was obtained. The patient is agreeable to proceed with the procedure and all questions and concerns addressed.    Case discussed with an attending in echocardiography lab.     Further recommendations per attending addendum

## 2019-08-09 NOTE — PROGRESS NOTES
[facility]  Cardiac Electrophysiology  Progress Note    Admission Date: 8/7/2019  Code Status: DNR   Attending Physician: Josee Spann MD   Expected Discharge Date: 8/12/2019  Principal Problem:Atrial fibrillation with RVR    Subjective:     Interval History: No acute overnight events recorded. Severe dementia unable to tell me if symptoms present overnight; none currently. Remained in afib w rates in 110s.    Review of Systems   Constitution: Negative. Negative for chills and fever.   HENT: Negative.    Eyes: Negative.    Cardiovascular: Negative for chest pain, claudication and paroxysmal nocturnal dyspnea.   Respiratory: Negative for cough, shortness of breath and wheezing.    Endocrine: Negative.    Hematologic/Lymphatic: Does not bruise/bleed easily.   Skin: Negative for nail changes and rash.   Musculoskeletal: Negative.  Negative for back pain.   Gastrointestinal: Negative for abdominal pain, melena, nausea and vomiting.   Neurological: Negative for dizziness and headaches.   Psychiatric/Behavioral: Negative for altered mental status, depression and substance abuse.   Allergic/Immunologic: Negative.      Objective:     Vital Signs (Most Recent):  Temp: 97.9 °F (36.6 °C) (08/09/19 1134)  Pulse: (!) 124 (08/09/19 1134)  Resp: 16 (08/09/19 1134)  BP: 92/70 (08/09/19 1134)  SpO2: 97 % (08/09/19 0848) Vital Signs (24h Range):  Temp:  [97.5 °F (36.4 °C)-98.6 °F (37 °C)] 97.9 °F (36.6 °C)  Pulse:  [] 124  Resp:  [16-18] 16  SpO2:  [93 %-97 %] 97 %  BP: ()/(51-70) 92/70     Weight: 68.5 kg (151 lb 0.2 oz)  Body mass index is 26.75 kg/m².     SpO2: 97 %  O2 Device (Oxygen Therapy): nasal cannula    Physical Exam   Constitutional: She is oriented to person, place, and time. She appears well-developed and well-nourished.   HENT:   Head: Normocephalic and atraumatic.   Eyes: Pupils are equal, round, and reactive to light. Conjunctivae and EOM are normal.   Neck: JVD present.    Cardiovascular: Normal rate, regular rhythm, normal heart sounds and intact distal pulses. Exam reveals no gallop and no friction rub.   No murmur heard.  Pulmonary/Chest: Effort normal. No stridor. She has no wheezes. She has no rales. She exhibits no tenderness.   Abdominal: Soft. Bowel sounds are normal. She exhibits no distension and no mass. There is no tenderness. There is no guarding.   Musculoskeletal: She exhibits edema. She exhibits no tenderness or deformity.   Neurological: She is alert and oriented to person, place, and time.   Skin: Skin is warm and dry. No rash noted. No erythema.   Psychiatric: She has a normal mood and affect.       Significant Labs:   BMP:   Recent Labs   Lab 08/07/19  1300 08/08/19  0336 08/09/19  0353   * 142* 133*    141 140   K 3.4* 3.6 4.2    105 105   CO2 28 21* 23   BUN 21 21 28*   CREATININE 1.4 1.1 1.5*   CALCIUM 9.1 9.1 8.9   MG  --  1.9 2.0    and CBC:   Recent Labs   Lab 08/07/19  1300 08/08/19  0336 08/09/19  0353   WBC 5.79 7.57 6.33   HGB 11.9* 10.7* 10.6*   HCT 40.5 35.5* 35.8*    187 173     Lab Results   Component Value Date    TSH 3.015 10/23/2018       Significant Imaging: Echocardiogram:   Transthoracic echo (TTE) complete (Cupid Only):   Results for orders placed or performed during the hospital encounter of 08/07/19   Transthoracic echo (TTE) 2D with Color Flow   Result Value Ref Range    Ascending aorta 2.89 cm    STJ 2.28 cm    AV mean gradient 6 mmHg    Ao peak harjinder 1.71 m/s    Ao VTI 22.17 cm    IVS 0.99 0.6 - 1.1 cm    LA size 4.17 cm    Left Atrium Major Axis 6.06 cm    Left Atrium Minor Axis 5.80 cm    LVIDD 3.31 (A) 3.5 - 6.0 cm    LVIDS 2.56 2.1 - 4.0 cm    LVOT diameter 1.96 cm    LVOT peak VTI 11.57 cm    PW 1.04 0.6 - 1.1 cm    RA Major Axis 5.23 cm    RA Width 2.74 cm    RVDD 3.19 cm    Sinus 2.27 cm    TR Max Harjinder 3.32 m/s    LA WIDTH 3.65 cm    LV Diastolic Volume 44.40 mL    LV Systolic Volume 23.58 mL    LVOT peak  sarath 0.57 m/s    FS 23 %    LA volume 76.68 cm3    LV mass 97.12 g    Left Ventricle Relative Wall Thickness 0.63 cm    AV valve area 1.57 cm2    AV Velocity Ratio 0.33     AV index (prosthetic) 0.52     LVOT area 3.0 cm2    LVOT stroke volume 34.89 cm3    AV peak gradient 12 mmHg    LV Systolic Volume Index 13.6 mL/m2    LV Diastolic Volume Index 25.52 mL/m2    LA Volume Index 44.1 mL/m2    LV Mass Index 56 g/m2    Triscuspid Valve Regurgitation Peak Gradient 44 mmHg    BSA 1.77 m2    Right Atrial Pressure (from IVC) 15 mmHg    TV rest pulmonary artery pressure 59 mmHg    Narrative    · Left ventricular systolic function. The estimated ejection fraction is   53%  · Concentric left ventricular remodeling.  · Local segmental wall motion abnormalities.  · Moderate left atrial enlargement.  · Low normal right ventricular systolic function.  · Mild right atrial enlargement.  · There is a bioprosthetic aortic valve present.  · Severe mitral regurgitation.  · Moderate to severe tricuspid regurgitation.  · Elevated central venous pressure (15 mm Hg).  · The estimated PA systolic pressure is 59 mm Hg  · Pulmonary hypertension present.        Assessment and Plan:     * Atrial fibrillation with RVR  [id number], previously responsive to DCCV  - Patient now s/p LAUREEN/DCCV; converted to NSR w PACs  - AC: continue dose reduced apixaban 2.5mg bid  -Rate/rhythm: Can titrate off dilt gtt and PO metoprolol now that in NSR; will plan on PO amiodarone loading (400mg bid x 1 week, then 400mg daily x 1 week), followed by amiodarone 200mg daily for maintenance      - Follow up in EP clinic with Dr. Guerra in 1 month    Thank you for this interesting consult. EP consult will sign off. Please call with questions as needed. Case discussed with attending, Dr. Villalta with final attestation to follow.    Ronnie Hoffman MD  Cardiac Electrophysiology  [facility]

## 2019-08-09 NOTE — PLAN OF CARE
Problem: Adult Inpatient Plan of Care  Goal: Plan of Care Review  Outcome: Ongoing (interventions implemented as appropriate)  Pt AAO x 1 : to self. Remained free of falls/injuries. No c/o of SOB/pain. O2 @ 3 L, nasal cannula. Cardiac Monitoring, A. Fib 100-118. Pt receiving Cradizem drip @ 7.5 ml/hr. Diuresing with Furosemide IVP 80 mg BID.Pt was restless and anxious during shift, olanzapine administered for management. Pt NPO @ midnight for LAUREEN/DCCV on 8/9. Cath maintained, cath care performed. Tele sitter @ bedside, bed alarm activated, bed @ lowest position call light in reach. POC reviewed with pt. Will continue to monitor.

## 2019-08-09 NOTE — PT/OT/SLP EVAL
"Physical Therapy Evaluation    Patient Name:  Ruth Lan   MRN:  [id number]    Recommendations:     Discharge Recommendations:  nursing facility, skilled   Discharge Equipment Recommendations: none   Barriers to discharge: Decreased caregiver support    Assessment:     Ruth Lan is a 84 y.o. female admitted with a medical diagnosis of Atrial fibrillation with RVR.  She presents with the following impairments/functional limitations:  weakness, impaired endurance, impaired functional mobilty, gait instability, impaired balance, impaired self care skills, impaired cardiopulmonary response to activity, pain. Pt with fluctuating confusion throughout session, required frequent reorientation and encouragement to continue attention to task. Pt with weakness throughout, required assistance to transfer to chair. Upon discharge, pt would benefit from continued skilled therapy intervention at [facility] nursing facility to progress toward more independent mobility. At this time, pt would continue to benefit from acute skilled therapy intervention to address deficits and progress toward prior level of function.       Rehab Prognosis: Good; patient would benefit from acute skilled PT services to address these deficits and reach maximum level of function.    Recent Surgery: Procedure(s) (LRB):  CARDIOVERSION (N/A)  ECHOCARDIOGRAM,TRANSESOPHAGEAL (N/A) Day of Surgery    Plan:     During this hospitalization, patient to be seen 4 x/week to address the identified rehab impairments via gait training, therapeutic activities, therapeutic exercises, neuromuscular re-education and progress toward the following goals:    · Plan of Care Expires:  09/09/19    Subjective     Chief Complaint: Pt frequently stating "I just need someone to tell me why I am here." and "I don't know why you want to know this, I don't know what the point is"   Patient/Family Comments/goals: to get better and return home   Pain/Comfort:  · Pain Rating 1: " (Pt reported pain in R knee, did not quantify )  · Location - Side 1: Right  · Location 1: knee  · Pain Addressed 1: Reposition, Distraction, Cessation of Activity    Patients cultural, spiritual, Mandaeism conflicts given the current situation:      Living Environment:  Pt lives in assisted living facility, was ambulating with a rollator until ~5-7 weeks ago when she experienced 2 falls. Pt has been using a w/c since 2/2 fear of falling. Pt able to complete ADLs with supervision from assisted living staff.    Equipment used at home: shower chair, walker, rolling, wheelchair.  DME owned (not currently used): none.  Upon discharge, patient will have assistance from assisted living staff.    Objective:     Communicated with RN prior to session.  Patient found HOB elevated with telemetry, oxygen, peripheral IV  upon PT entry to room.    General Precautions: Standard, fall   Orthopedic Precautions:N/A   Braces: N/A     Exams:  · Cognitive Exam:  Patient is oriented to self and place, followed all commands, communicates clearly and fluently. Pt with fluctuating confusion, mild agitation, and memory impairment.   · Gross Motor Coordination:  WFL  · RLE ROM: WFL  · RLE Strength: WFL  · LLE ROM: WFL  · LLE Strength: WFL    Functional Mobility:  · Bed Mobility:     · Supine to Sit: moderate assistance  · Transfers:     · Sit to Stand:  2x From EOB with minimum assistance with rolling walker  · Gait: Pt ambulated 2 lateral steps to the L, then ambulated 3 steps to the L to chair with RW and moderate assistance. Pt with small step size, decreased foot clearance, flexed posture, unable to reach full upright position. Pt required cuing for step initiation, step placement, and hand over hand assistance for RW management and navigation. Pt with no LOB, no SOB, no dizziness.       Therapeutic Activities and Exercises:   Pt educated on role of PT/POC. Pt verbalized understanding.   Pt encouraged to only perform OOB mobility with  assistance from nursing/therapy. Pt agreeable.   Pt encouraged to ambulate daily with assistance/supervision from nursing/therapy. Pt agreeable.      AM-PAC 6 CLICK MOBILITY  Total Score:16     Patient left up in chair with all lines intact, call button in reach, chair alarm on and RN  notified.    GOALS:   Multidisciplinary Problems     Physical Therapy Goals        Problem: Physical Therapy Goal    Goal Priority Disciplines Outcome Goal Variances Interventions   Physical Therapy Goal     PT, PT/OT Ongoing (interventions implemented as appropriate)     Description:  Goals to be met by: 2019     Patient will increase functional independence with mobility by performin. Supine to sit with Set-up [place]  2. Sit to stand transfer with Stand-by Assistance  3. Gait  x 15 feet with Stand-by Assistance using Rolling Walker.   4. 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    Chronic diastolic heart failure     Heart murmur     Hyperlipidemia     Hypertension     Left atrial enlargement     Severe mitral regurgitation        Past Surgical History:   Procedure Laterality Date    BIOPSY, LYMPH NODE, SENTINEL Right 2012    Performed by Dem[facility]ri Epstein MD at [facility] OR 2ND FLR    BREAST BIOPSY  2012    Right breast- invasive ductal carcinoma    CARDIOVERSION N/A 2018    Performed by Emanuel Guerra MD at [facility] EP LAB    CATARACT EXTRACTION W/  INTRAOCULAR LENS IMPLANT Right 10/21/2008    OU     CATARACT EXTRACTION W/  INTRAOCULAR LENS IMPLANT Left     ECHOCARDIOGRAM,TRANSESOPHAGEAL N/A 2018    Performed by [facility] Provider at [facility] EP LAB    INSERTION, LOCALIZATION WIRE Right 2012     Performed by [facility] at [facility] OR 2ND FLR    UK-[id number] Right 1/14/2013    Performed by Demetri Epstein MD at [facility] OR 2ND FLR    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    TUBAL LIGATION         Time Tracking:     PT Received On: 08/09/19  PT Start Time: 0902     PT Stop Time: 0937  PT Total Time (min): 35 min     Billable Minutes: Evaluation 10 mins  and Gait Training 25 mins       Sabrina Thompson, PT  08/09/2019

## 2019-08-09 NOTE — CARE UPDATE
Rapid Response Nurse Follow-up Note     Followed up with patient for proactive rounding.   HR remains elevated 120's, Cardizem gtt 7.5mg/hr.   No acute issues at this time. Reviewed plan of care with primary RN  Please call Rapid Response RN, Jada May, RN with any questions or concerns at [phone].

## 2019-08-09 NOTE — TRANSFER OF CARE
"Anesthesia Transfer of Care Note    Patient: Ruth Lan    Procedure(s) Performed: Procedure(s) (LRB):  CARDIOVERSION (N/A)  ECHOCARDIOGRAM,TRANSESOPHAGEAL (N/A)    Patient location: PACU    Anesthesia Type: general    Transport from OR: Transported from OR on 2-3 L/min O2 by NC with adequate spontaneous ventilation    Post pain: adequate analgesia    Post assessment: no apparent anesthetic complications    Post vital signs: stable    Level of consciousness: awake    Nausea/Vomiting: no nausea/vomiting    Complications: none    Transfer of care protocol was followed      Last vitals:   Visit Vitals  BP (!) 81/49   Pulse (!) 51   Temp 36.6 °C (97.9 °F) (Temporal)   Resp 12   Ht 5' 3" (1.6 m)   Wt 68.5 kg (151 lb 0.2 oz)   SpO2 100%   Breastfeeding? No   BMI 26.75 kg/m²     "

## 2019-08-09 NOTE — PLAN OF CARE
Problem: Adult Inpatient Plan of Care  Goal: Plan of Care Review  Plan of care discussed with patient. Patient is free of fall/trauma/injury. Denies CP, SOB, or pain/discomfort. Patient started on diltiazem gtt, going at a rate of 7.5ml/hr. diuresing patient with lasix IVP. Inserted [place] today to monitor output. Patient scheduled for LAUREEN and cardioversion in morning. PRN olanzapine administered for anxiety/sleeping aid. Daughter requested for nurse to update, number in sticky notes.All questions addressed. Will continue to monitor

## 2019-08-09 NOTE — PLAN OF CARE
Problem: Occupational Therapy Goal  Goal: Occupational Therapy Goal  Goals to be met by: 8/23/19     Patient will increase functional independence with ADLs by performing:    UE Dressing with Stand-by Assistance.  LE Dressing with Minimal Assistance.  Grooming while seated at sink with Stand-by Assistance.  Toileting from bedside commode with Minimal Assistance for hygiene and clothing management.   Toilet transfer to bedside commode with Minimal Assistance.    Outcome: Ongoing (interventions implemented as appropriate)  Initial eval completed   POC implemented and goals established     Antoinette Roland OTR/L  [phone]  8/9/2019

## 2019-08-09 NOTE — NURSING
Patient Afib on tele 80 to 90s post cardioversion. Zana DIAS notified, states she will contact EP. No further intervention at this time. Will continue monitor.

## 2019-08-09 NOTE — HPI
85 y/o F history of persistent Afib CHADSVASC 4 on apixaban s/p DCCV 12/2018, HFpEF, AS s/p AVR, HTN, DM2 presenting from clinic in setting of Afib w RVR to 140s. Patient followed in EP clinic, previously responded to DCCV 12/2018 w maintenance of NSR at subsequent visits.    TTE 8/19  · Left ventricular systolic function. The estimated ejection fraction is 53%  · Concentric left ventricular remodeling.  · Local segmental wall motion abnormalities.  · Moderate left atrial enlargement.  · Low normal right ventricular systolic function.  · Mild right atrial enlargement.  · There is a bioprosthetic aortic valve present.  · Severe mitral regurgitation.  · Moderate to severe tricuspid regurgitation.  · Elevated central venous pressure (15 mm Hg).  · The estimated PA systolic pressure is 59 mm Hg  · Pulmonary hypertension present.        LAUREEN 12/18  · Normal left ventricular systolic function. The estimated ejection fraction is 55%  · Normal right ventricular systolic function.  · Severe biatrial enlargement.  · Severe mitral regurgitation.  · Grade 3 aortic atheroma  · No evidence of intracardiac thrombus.     Dysphagia or odynophagia:  No  Liver Disease, esophageal disease, or known varices:  No  Upper GI Bleeding: No  Snoring:  No  Sleep Apnea:  No  Prior neck surgery or radiation:  No  History of anesthetic difficulties:  No  Family history of anesthetic difficulties:  No  Last oral intake:  12 hours ago  Able to move neck in all directions:  Yes

## 2019-08-09 NOTE — NURSING TRANSFER
Nursing Transfer Note      8/9/2019     Transfer To: 350a    Transfer via stretcher    Transfer with cardiac monitoring; dentures in pink cup and glasses on chart sent on stretcher with pt.    Transported by PCT    Medicines sent: silver cream    Chart send with patient: Yes    Notified: daughter waiting in room    Patient reassessed at: to be done by receiving RN (date, time)

## 2019-08-09 NOTE — PLAN OF CARE
met with pts daughter at the bedside to discuss skilled options.  Pt lives at [facility] assisted living.   provided information regarding [facility] skilled and nursing home skilled facilities.  pts daughter state pt has refused nursing home placement in the past.  She will discuss with pt the possibility of her gong to [facility] skilled unit if accepted.  Pt has also received [facility] home health in the past.  sw will follow up next week.     08/09/19 7560   Post-Acute Status   Post-Acute Authorization Placement   Post-Acute Placement Status Patient List Provided

## 2019-08-09 NOTE — PLAN OF CARE
Problem: Physical Therapy Goal  Goal: Physical Therapy Goal  Goals to be met by: 2019     Patient will increase functional independence with mobility by performin. Supine to sit with Set-up [place]  2. Sit to stand transfer with Stand-by Assistance  3. Gait  x 15 feet with Stand-by Assistance using Rolling Walker.   4. Sitting at edge of bed x10 minutes with Supervision    Outcome: Ongoing (interventions implemented as appropriate)  Pt evaluated and appropriate goals established.

## 2019-08-09 NOTE — SUBJECTIVE & OBJECTIVE
Interval History: No acute overnight events recorded. Severe dementia unable to tell me if symptoms present overnight; none currently. Remained in afib w rates in 110s.    Review of Systems   Constitution: Negative. Negative for chills and fever.   HENT: Negative.    Eyes: Negative.    Cardiovascular: Negative for chest pain, claudication and paroxysmal nocturnal dyspnea.   Respiratory: Negative for cough, shortness of breath and wheezing.    Endocrine: Negative.    Hematologic/Lymphatic: Does not bruise/bleed easily.   Skin: Negative for nail changes and rash.   Musculoskeletal: Negative.  Negative for back pain.   Gastrointestinal: Negative for abdominal pain, melena, nausea and vomiting.   Neurological: Negative for dizziness and headaches.   Psychiatric/Behavioral: Negative for altered mental status, depression and substance abuse.   Allergic/Immunologic: Negative.      Objective:     Vital Signs (Most Recent):  Temp: 97.9 °F (36.6 °C) (08/09/19 1134)  Pulse: (!) 124 (08/09/19 1134)  Resp: 16 (08/09/19 1134)  BP: 92/70 (08/09/19 1134)  SpO2: 97 % (08/09/19 0848) Vital Signs (24h Range):  Temp:  [97.5 °F (36.4 °C)-98.6 °F (37 °C)] 97.9 °F (36.6 °C)  Pulse:  [] 124  Resp:  [16-18] 16  SpO2:  [93 %-97 %] 97 %  BP: ()/(51-70) 92/70     Weight: 68.5 kg (151 lb 0.2 oz)  Body mass index is 26.75 kg/m².     SpO2: 97 %  O2 Device (Oxygen Therapy): nasal cannula    Physical Exam   Constitutional: She is oriented to person, place, and time. She appears well-developed and well-nourished.   HENT:   Head: Normocephalic and atraumatic.   Eyes: Pupils are equal, round, and reactive to light. Conjunctivae and EOM are normal.   Neck: JVD present.   Cardiovascular: Normal rate, regular rhythm, normal heart sounds and intact distal pulses. Exam reveals no gallop and no friction rub.   No murmur heard.  Pulmonary/Chest: Effort normal. No stridor. She has no wheezes. She has no rales. She exhibits no tenderness.    Abdominal: Soft. Bowel sounds are normal. She exhibits no distension and no mass. There is no tenderness. There is no guarding.   Musculoskeletal: She exhibits edema. She exhibits no tenderness or deformity.   Neurological: She is alert and oriented to person, place, and time.   Skin: Skin is warm and dry. No rash noted. No erythema.   Psychiatric: She has a normal mood and affect.       Significant Labs:   BMP:   Recent Labs   Lab 08/07/19  1300 08/08/19  0336 08/09/19  0353   * 142* 133*    141 140   K 3.4* 3.6 4.2    105 105   CO2 28 21* 23   BUN 21 21 28*   CREATININE 1.4 1.1 1.5*   CALCIUM 9.1 9.1 8.9   MG  --  1.9 2.0    and CBC:   Recent Labs   Lab 08/07/19  1300 08/08/19  0336 08/09/19  0353   WBC 5.79 7.57 6.33   HGB 11.9* 10.7* 10.6*   HCT 40.5 35.5* 35.8*    187 173     Lab Results   Component Value Date    TSH 3.015 10/23/2018       Significant Imaging: Echocardiogram:   Transthoracic echo (TTE) complete (Cupid Only):   Results for orders placed or performed during the hospital encounter of 08/07/19   Transthoracic echo (TTE) 2D with Color Flow   Result Value Ref Range    Ascending aorta 2.89 cm    STJ 2.28 cm    AV mean gradient 6 mmHg    Ao peak harjinder 1.71 m/s    Ao VTI 22.17 cm    IVS 0.99 0.6 - 1.1 cm    LA size 4.17 cm    Left Atrium Major Axis 6.06 cm    Left Atrium Minor Axis 5.80 cm    LVIDD 3.31 (A) 3.5 - 6.0 cm    LVIDS 2.56 2.1 - 4.0 cm    LVOT diameter 1.96 cm    LVOT peak VTI 11.57 cm    PW 1.04 0.6 - 1.1 cm    RA Major Axis 5.23 cm    RA Width 2.74 cm    RVDD 3.19 cm    Sinus 2.27 cm    TR Max Harjinder 3.32 m/s    LA WIDTH 3.65 cm    LV Diastolic Volume 44.40 mL    LV Systolic Volume 23.58 mL    LVOT peak harjinder 0.57 m/s    FS 23 %    LA volume 76.68 cm3    LV mass 97.12 g    Left Ventricle Relative Wall Thickness 0.63 cm    AV valve area 1.57 cm2    AV Velocity Ratio 0.33     AV index (prosthetic) 0.52     LVOT area 3.0 cm2    LVOT stroke volume 34.89 cm3    AV peak  gradient 12 mmHg    LV Systolic Volume Index 13.6 mL/m2    LV Diastolic Volume Index 25.52 mL/m2    LA Volume Index 44.1 mL/m2    LV Mass Index 56 g/m2    Triscuspid Valve Regurgitation Peak Gradient 44 mmHg    BSA 1.77 m2    Right Atrial Pressure (from IVC) 15 mmHg    TV rest pulmonary artery pressure 59 mmHg    Narrative    · Left ventricular systolic function. The estimated ejection fraction is   53%  · Concentric left ventricular remodeling.  · Local segmental wall motion abnormalities.  · Moderate left atrial enlargement.  · Low normal right ventricular systolic function.  · Mild right atrial enlargement.  · There is a bioprosthetic aortic valve present.  · Severe mitral regurgitation.  · Moderate to severe tricuspid regurgitation.  · Elevated central venous pressure (15 mm Hg).  · The estimated PA systolic pressure is 59 mm Hg  · Pulmonary hypertension present.

## 2019-08-09 NOTE — SUBJECTIVE & OBJECTIVE
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, ER/SC positive    Chronic diastolic heart failure     Heart murmur     Hyperlipidemia     Hypertension     Left atrial enlargement     Severe mitral regurgitation        Past Surgical History:   Procedure Laterality Date    BIOPSY, LYMPH NODE, SENTINEL Right 12/6/2012    Performed by Dem[facility]ri Epstein MD at [facility] OR 2ND FLR    BREAST BIOPSY  11/2012    Right breast- invasive ductal carcinoma    CARDIOVERSION N/A 12/18/2018    Performed by Emanuel Guerra MD at [facility] EP LAB    CATARACT EXTRACTION W/  INTRAOCULAR LENS IMPLANT Right 10/21/2008    OU     CATARACT EXTRACTION W/  INTRAOCULAR LENS IMPLANT Left     ECHOCARDIOGRAM,TRANSESOPHAGEAL N/A 12/18/2018    Performed by [facility] at [facility] EP LAB    INSERTION, LOCALIZATION WIRE Right 12/6/2012    Performed by Demetri Epstein MD at [facility] OR 2ND FLR    [id number] Right 1/14/2013    Performed by Demetri Epstein MD at [facility] OR 2ND FLR    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    TUBAL LIGATION         Review of patient's allergies indicates:   Allergen Reactions    Etodolac Other (See Comments)     Dehydration    Nsaids (non-steroidal anti-inflammatory drug)      COLITIS/DEHYDRATION       No current facility-administered medications on file prior to encounter.      Current Outpatient Medications on File Prior to Encounter   Medication Sig    acetaminophen (TYLENOL) 500 MG tablet Take 500 mg by mouth every 6 (six) hours as needed for Pain.    acetaminophen (TYLENOL) 500 MG tablet Take 1 tablet (500 mg total) by mouth 2 (two) times daily as needed for Pain.    apixaban (ELIQUIS) 2.5 mg Tab Take 1 tablet (2.5 mg total) by mouth 2 (two) times daily.    b complex vitamins (B COMPLEX-VITAMIN B12) tablet  Take 1 tablet by mouth once daily.    diltiaZEM (CARDIZEM CD) 240 MG 24 hr capsule Take 1 capsule (240 mg total) by mouth once daily.    furosemide (LASIX) 40 MG tablet Take 1 tablet (40 mg total) by mouth 2 (two) times daily.    metoprolol tartrate (LOPRESSOR) 25 MG tablet Take 1 tablet (25 mg total) by mouth 2 (two) times daily.    multivitamin (THERAGRAN) per tablet Take 1 tablet by mouth once daily.    pravastatin (PRAVACHOL) 80 MG tablet Take 1 tablet (80 mg total) by mouth once daily.    tolterodine (DETROL LA) 4 MG 24 hr capsule Take 1 capsule (4 mg total) by mouth once daily.     Family History     Problem Relation (Age of Onset)    Breast cancer Maternal Grandmother    Cancer Maternal Aunt    Heart disease Father    No Known Problems Mother, Sister, Brother, Maternal Uncle, Paternal Aunt, Paternal Uncle, Maternal Grandfather, Paternal Grandmother, Paternal Grandfather        Tobacco Use    Smoking status: Former Smoker     Packs/day: 1.00     Years: 50.00     Pack years: 50.00     Last attempt to quit: 11/15/2002     Years since quittin.7    Smokeless tobacco: Never Used   Substance and Sexual Activity    Alcohol use: Yes     Alcohol/week: 1.5 oz     Types: 3 Standard drinks or equivalent per week     Comment: 3 OZ WINE     Drug use: No    Sexual activity: Never     Review of Systems   Constitution: Negative for chills, decreased appetite and diaphoresis.   HENT: Negative for congestion and ear discharge.    Eyes: Negative for blurred vision and discharge.   Cardiovascular: Positive for leg swelling. Negative for chest pain, dyspnea on exertion, irregular heartbeat and paroxysmal nocturnal dyspnea.   Respiratory: Negative for cough, hemoptysis and shortness of breath.    Gastrointestinal: Negative for abdominal pain.     Objective:     Vital Signs (Most Recent):  Temp: 97.9 °F (36.6 °C) (19 1134)  Pulse: (!) 124 (19 1134)  Resp: 16 (19 1134)  BP: 92/70 (19  1134)  SpO2: 97 % (08/09/19 0848) Vital Signs (24h Range):  Temp:  [97.5 °F (36.4 °C)-98.6 °F (37 °C)] 97.9 °F (36.6 °C)  Pulse:  [] 124  Resp:  [16-18] 16  SpO2:  [93 %-97 %] 97 %  BP: ()/(51-70) 92/70     Weight: 68.5 kg (151 lb 0.2 oz)  Body mass index is 26.75 kg/m².    SpO2: 97 %  O2 Device (Oxygen Therapy): nasal cannula      Intake/Output Summary (Last 24 hours) at 8/9/2019 1338  Last data filed at 8/9/2019 0800  Gross per 24 hour   Intake 0 ml   Output 1400 ml   Net -1400 ml       Lines/Drains/Airways     Drain                 Urethral Catheter 08/08/19 1 day          Peripheral Intravenous Line                 Peripheral IV - Single Lumen 12/18/18 1236 Right Hand 234 days         Peripheral IV - Single Lumen 08/07/19 1300 18 G Right Antecubital 2 days                Physical Exam   Constitutional: She is oriented to person, place, and time. She appears well-developed and well-nourished. No distress.   Eyes: Pupils are equal, round, and reactive to light. Conjunctivae are normal.   Neck: JVD present. No tracheal deviation present. No thyromegaly present.   Cardiovascular: Normal heart sounds and intact distal pulses. An irregularly irregular rhythm present. Tachycardia present. Exam reveals no gallop and no friction rub.   No murmur heard.  Pulses:       Radial pulses are 2+ on the right side, and 2+ on the left side.        Femoral pulses are 2+ on the right side, and 2+ on the left side.  Pulmonary/Chest: Effort normal and breath sounds normal. No respiratory distress. She has no wheezes. She has no rales.   Abdominal: Soft. Bowel sounds are normal. She exhibits no distension. There is no tenderness.   Musculoskeletal: She exhibits no edema or deformity.   Neurological: She is alert and oriented to person, place, and time. No cranial nerve deficit. Coordination normal.   Skin: Skin is warm and dry. She is not diaphoretic.   Psychiatric: She has a normal mood and affect. Her behavior is normal.        Significant Labs:   BMP:   Recent Labs   Lab 08/08/19  0336 08/09/19  0353   * 133*    140   K 3.6 4.2    105   CO2 21* 23   BUN 21 28*   CREATININE 1.1 1.5*   CALCIUM 9.1 8.9   MG 1.9 2.0       Significant Imaging: Echocardiogram:   2D echo with color flow doppler:   Results for orders placed or performed during the hospital encounter of 10/17/18   2D Echo w/ Color Flow Doppler   Result Value Ref Range    QEF 55 55 - 65    Mitral Valve Regurgitation SEVERE (A)     Est. PA Systolic Pressure 43.2 (A)     Tricuspid Valve Regurgitation MILD TO MODERATE     Narrative    Date of Procedure: 10/17/2018        TEST DESCRIPTION   Technical Quality: This is a technically adequate study.     Aorta: The aortic root is normal in size, measuring 2.1 cm at sinotubular junction and 2.3 cm at Sinuses of Valsalva. The proximal ascending aorta is normal in size, measuring 2.1 cm across.     Left Atrium: The left atrial volume index is severely enlarged, measuring 78.07 cc/m2.     Left Ventricle: The left ventricle is normal in size, with an end-diastolic diameter of 4.9 cm, and an end-systolic diameter of 3.1 cm. LV wall thickness is normal, with the septum and the posterior wall each measuring 0.9 cm across. Relative wall   thickness was normal at 0.37, and the LV mass index was 99.9 g/m2 consistent with normal left ventricular mass. There are no regional wall motion abnormalities. Left ventricular systolic function appears normal. Visually estimated ejection fraction is   55-60%. The LV Doppler derived stroke volume equals 61.0 ccs.    There is significant mitral annular calcification and therefore the E/E' ratio was not used to determine LV diastolic function or LA pressure.     Right Atrium: The right atrium is normal in size, measuring 5.8 cm in length and 3.6 cm in width in the apical view.     Right Ventricle: The right ventricle is normal in size measuring 4.2 cm at the base in the apical right  ventricle-focused view. Global right ventricular systolic function appears normal. Tricuspid annular plane systolic excursion (TAPSE) is 1.6 cm.   Tissue Doppler-derived tricuspid annular peak systolic velocity (S prime) is 9.4 cm/s. The estimated PA systolic pressure is 43 mmHg.     Aortic Valve:  There is a surgically replaced bioprosthesis in the aortic position. The aortic valve prosthesis is well seated. The peak velocity obtained across the aortic valve is 2.7 m/s, which translates to a peak gradient of 29 mmHg. The mean   gradient is 14 mmHg. Using a left ventricular outflow tract diameter of 2.1 cm, a left ventricular outflow tract velocity time integral of 18 cm, and a peak instantaneous transvalvular velocity time integral of 63 cm, the effective prosthetic valve area   is 0.98 cm2(p AVAi is 0.55 cm2/m2). The highest gradient across the prosthesis was obtained in the suprasternal view.     Mitral Valve:  There is marked mitral annular calcification and restricted movement of the leaflets. This results in calific mitral stenosis with a valve area of 1.6 cm2. The mean gradient is 4 mm Hg. There is severe mitral regurgitation.     Tricuspid Valve:  The tricuspid valve is normal in structure with normal leaflet mobility. There is mild to moderate tricuspid regurgitation.     Pulmonary Valve:  The pulmonic valve is not well seen.     IVC: IVC is normal in size and collapses > 50% with a sniff, suggesting normal right atrial pressure of 3 mmHg.     Intracavitary: There is no evidence of pericardial effusion, intracavity mass, thrombi, or vegetation.         CONCLUSIONS     1 - Severe left atrial enlargement.     2 - Normal left ventricular systolic function (EF 55-60%).     3 - No wall motion abnormalities.     4 - Normal right ventricular systolic function .     5 - S/P surgical AVR, NISHA = 0.98 cm2, AVAi = 0.55 cm2/m2, peak velocity = 2.7 m/s, mean gradient = 14 mmHg.     6 - Severe mitral regurgitation.     7  - Marked mitral annular calcification w a mean gradient of 4 mm Hg.     8 - Mild to moderate tricuspid regurgitation.     9 - Pulmonary hypertension. The estimated PA systolic pressure is 43 mmHg.             This document has been electronically    SIGNED BY: Lali Jerome MD On: 10/17/2018 10:01

## 2019-08-09 NOTE — CARE UPDATE
Brief Cardiology Update Note    Per primary team, patient has converted from sinus back to afib. Will follow up EKG, and start IV amio load given negative LAUREEN this afternoon. EP will follow up in AM.      Ronnie Hoffman MD

## 2019-08-09 NOTE — CONSULTS
[facility]  Cardiology  Consult Note    Patient Name: Ruth Lan  MRN: [id number]  Admission Date: 8/7/2019  Hospital Length of Stay: 2 days  Code Status: DNR   Attending Provider: Josee Spann MD   Consulting Provider: Karlene Dorsey MD  Primary Care Physician: Florentino Brar MD  Principal Problem:Atrial fibrillation with RVR    Patient information was obtained from patient and ER records.     Consults  Subjective:     Chief Complaint:  Afib     HPI:   83 y/o F history of persistent Afib CHADSVASC 4 on apixaban s/p DCCV 12/2018, HFpEF, AS s/p AVR, HTN, DM2 presenting from clinic in setting of Afib w RVR to 140s. Patient followed in EP clinic, previously responded to DCCV 12/2018 w maintenance of NSR at subsequent visits.    TTE 8/19  · Left ventricular systolic function. The estimated ejection fraction is 53%  · Concentric left ventricular remodeling.  · Local segmental wall motion abnormalities.  · Moderate left atrial enlargement.  · Low normal right ventricular systolic function.  · Mild right atrial enlargement.  · There is a bioprosthetic aortic valve present.  · Severe mitral regurgitation.  · Moderate to severe tricuspid regurgitation.  · Elevated central venous pressure (15 mm Hg).  · The estimated PA systolic pressure is 59 mm Hg  · Pulmonary hypertension present.        LAUREEN 12/18  · Normal left ventricular systolic function. The estimated ejection fraction is 55%  · Normal right ventricular systolic function.  · Severe biatrial enlargement.  · Severe mitral regurgitation.  · Grade 3 aortic atheroma  · No evidence of intracardiac thrombus.     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, ER/ID positive    Chronic diastolic heart failure     Heart murmur     Hyperlipidemia     Hypertension     Left atrial enlargement     Severe mitral regurgitation        Past Surgical History:   Procedure Laterality Date    BIOPSY, LYMPH NODE, SENTINEL Right 12/6/2012    Performed by Demetri Epstein MD at [facility] OR 2ND FLR    BREAST BIOPSY  11/2012    Right breast- invasive ductal carcinoma    CARDIOVERSION N/A 12/18/2018    Performed by Emanuel Guerra MD at [facility] EP LAB    CATARACT EXTRACTION W/  INTRAOCULAR LENS IMPLANT Right 10/21/2008    OU     CATARACT EXTRACTION W/  INTRAOCULAR LENS IMPLANT Left     ECHOCARDIOGRAM,TRANSESOPHAGEAL N/A 12/18/2018    Performed by [facility] Diagnostic Provider at [facility] EP LAB    INSERTION, LOCALIZATION WIRE Right 12/6/2012    Performed by Demetri Epstein MD at [facility] OR 2ND FLR    [id number] Right 1/14/2013    Performed by Demetri Epstein MD at [facility] OR 2ND FLR    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    TUBAL LIGATION         Review of patient's allergies indicates:   Allergen Reactions    Etodolac Other (See Comments)     Dehydration    Nsaids (non-steroidal anti-inflammatory drug)      COLITIS/DEHYDRATION       No current facility-administered medications on file prior to encounter.      Current Outpatient Medications on File Prior to Encounter   Medication Sig    acetaminophen (TYLENOL) 500 MG tablet Take 500 mg by mouth every 6 (six) hours as needed for Pain.    acetaminophen (TYLENOL) 500 MG tablet Take 1 tablet (500 mg total) by mouth 2 (two) times daily as needed for Pain.    apixaban (ELIQUIS) 2.5 mg Tab Take 1 tablet (2.5 mg total) by mouth 2 (two) times daily.    b complex vitamins (B  COMPLEX-VITAMIN B12) tablet Take 1 tablet by mouth once daily.    diltiaZEM (CARDIZEM CD) 240 MG 24 hr capsule Take 1 capsule (240 mg total) by mouth once daily.    furosemide (LASIX) 40 MG tablet Take 1 tablet (40 mg total) by mouth 2 (two) times daily.    metoprolol tartrate (LOPRESSOR) 25 MG tablet Take 1 tablet (25 mg total) by mouth 2 (two) times daily.    multivitamin (THERAGRAN) per tablet Take 1 tablet by mouth once daily.    pravastatin (PRAVACHOL) 80 MG tablet Take 1 tablet (80 mg total) by mouth once daily.    tolterodine (DETROL LA) 4 MG 24 hr capsule Take 1 capsule (4 mg total) by mouth once daily.     Family History     Problem Relation (Age of Onset)    Breast cancer Maternal Grandmother    Cancer Maternal Aunt    Heart disease Father    No Known Problems Mother, Sister, Brother, Maternal Uncle, Paternal Aunt, Paternal Uncle, Maternal Grandfather, Paternal Grandmother, Paternal Grandfather        Tobacco Use    Smoking status: Former Smoker     Packs/day: 1.00     Years: 50.00     Pack years: 50.00     Last attempt to quit: 11/15/2002     Years since quittin.7    Smokeless tobacco: Never Used   Substance and Sexual Activity    Alcohol use: Yes     Alcohol/week: 1.5 oz     Types: 3 Standard drinks or equivalent per week     Comment: 3 OZ WINE     Drug use: No    Sexual activity: Never     Review of Systems   Constitution: Negative for chills, decreased appetite and diaphoresis.   HENT: Negative for congestion and ear discharge.    Eyes: Negative for blurred vision and discharge.   Cardiovascular: Positive for leg swelling. Negative for chest pain, dyspnea on exertion, irregular heartbeat and paroxysmal nocturnal dyspnea.   Respiratory: Negative for cough, hemoptysis and shortness of breath.    Gastrointestinal: Negative for abdominal pain.     Objective:     Vital Signs (Most Recent):  Temp: 97.9 °F (36.6 °C) (19 113)  Pulse: (!) 124 (19)  Resp: 16 (19)  BP:  92/70 (08/09/19 1134)  SpO2: 97 % (08/09/19 0848) Vital Signs (24h Range):  Temp:  [97.5 °F (36.4 °C)-98.6 °F (37 °C)] 97.9 °F (36.6 °C)  Pulse:  [] 124  Resp:  [16-18] 16  SpO2:  [93 %-97 %] 97 %  BP: ()/(51-70) 92/70     Weight: 68.5 kg (151 lb 0.2 oz)  Body mass index is 26.75 kg/m².    SpO2: 97 %  O2 Device (Oxygen Therapy): nasal cannula      Intake/Output Summary (Last 24 hours) at 8/9/2019 1338  Last data filed at 8/9/2019 0800  Gross per 24 hour   Intake 0 ml   Output 1400 ml   Net -1400 ml       Lines/Drains/Airways     Drain                 Urethral Catheter 08/08/19 1 day          Peripheral Intravenous Line                 Peripheral IV - Single Lumen 12/18/18 1236 Right Hand 234 days         Peripheral IV - Single Lumen 08/07/19 1300 18 G Right Antecubital 2 days                Physical Exam   Constitutional: She is oriented to person, place, and time. She appears well-developed and well-nourished. No distress.   Eyes: Pupils are equal, round, and reactive to light. Conjunctivae are normal.   Neck: JVD present. No tracheal deviation present. No thyromegaly present.   Cardiovascular: Normal heart sounds and intact distal pulses. An irregularly irregular rhythm present. Tachycardia present. Exam reveals no gallop and no friction rub.   No murmur heard.  Pulses:       Radial pulses are 2+ on the right side, and 2+ on the left side.        Femoral pulses are 2+ on the right side, and 2+ on the left side.  Pulmonary/Chest: Effort normal and breath sounds normal. No respiratory distress. She has no wheezes. She has no rales.   Abdominal: Soft. Bowel sounds are normal. She exhibits no distension. There is no tenderness.   Musculoskeletal: She exhibits no edema or deformity.   Neurological: She is alert and oriented to person, place, and time. No cranial nerve deficit. Coordination normal.   Skin: Skin is warm and dry. She is not diaphoretic.   Psychiatric: She has a normal mood and affect. Her  behavior is normal.       Significant Labs:   BMP:   Recent Labs   Lab 08/08/19  0336 08/09/19  0353   * 133*    140   K 3.6 4.2    105   CO2 21* 23   BUN 21 28*   CREATININE 1.1 1.5*   CALCIUM 9.1 8.9   MG 1.9 2.0       Significant Imaging: Echocardiogram:   2D echo with color flow doppler:   Results for orders placed or performed during the hospital encounter of 10/17/18   2D Echo w/ Color Flow Doppler   Result Value Ref Range    QEF 55 55 - 65    Mitral Valve Regurgitation SEVERE (A)     Est. PA Systolic Pressure 43.2 (A)     Tricuspid Valve Regurgitation MILD TO MODERATE     Narrative    Date of Procedure: 10/17/2018        TEST DESCRIPTION   Technical Quality: This is a technically adequate study.     Aorta: The aortic root is normal in size, measuring 2.1 cm at sinotubular junction and 2.3 cm at Sinuses of Valsalva. The proximal ascending aorta is normal in size, measuring 2.1 cm across.     Left Atrium: The left atrial volume index is severely enlarged, measuring 78.07 cc/m2.     Left Ventricle: The left ventricle is normal in size, with an end-diastolic diameter of 4.9 cm, and an end-systolic diameter of 3.1 cm. LV wall thickness is normal, with the septum and the posterior wall each measuring 0.9 cm across. Relative wall   thickness was normal at 0.37, and the LV mass index was 99.9 g/m2 consistent with normal left ventricular mass. There are no regional wall motion abnormalities. Left ventricular systolic function appears normal. Visually estimated ejection fraction is   55-60%. The LV Doppler derived stroke volume equals 61.0 ccs.    There is significant mitral annular calcification and therefore the E/E' ratio was not used to determine LV diastolic function or LA pressure.     Right Atrium: The right atrium is normal in size, measuring 5.8 cm in length and 3.6 cm in width in the apical view.     Right Ventricle: The right ventricle is normal in size measuring 4.2 cm at the base in the  apical right ventricle-focused view. Global right ventricular systolic function appears normal. Tricuspid annular plane systolic excursion (TAPSE) is 1.6 cm.   Tissue Doppler-derived tricuspid annular peak systolic velocity (S prime) is 9.4 cm/s. The estimated PA systolic pressure is 43 mmHg.     Aortic Valve:  There is a surgically replaced bioprosthesis in the aortic position. The aortic valve prosthesis is well seated. The peak velocity obtained across the aortic valve is 2.7 m/s, which translates to a peak gradient of 29 mmHg. The mean   gradient is 14 mmHg. Using a left ventricular outflow tract diameter of 2.1 cm, a left ventricular outflow tract velocity time integral of 18 cm, and a peak instantaneous transvalvular velocity time integral of 63 cm, the effective prosthetic valve area   is 0.98 cm2(p AVAi is 0.55 cm2/m2). The highest gradient across the prosthesis was obtained in the suprasternal view.     Mitral Valve:  There is marked mitral annular calcification and restricted movement of the leaflets. This results in calific mitral stenosis with a valve area of 1.6 cm2. The mean gradient is 4 mm Hg. There is severe mitral regurgitation.     Tricuspid Valve:  The tricuspid valve is normal in structure with normal leaflet mobility. There is mild to moderate tricuspid regurgitation.     Pulmonary Valve:  The pulmonic valve is not well seen.     IVC: IVC is normal in size and collapses > 50% with a sniff, suggesting normal right atrial pressure of 3 mmHg.     Intracavitary: There is no evidence of pericardial effusion, intracavity mass, thrombi, or vegetation.         CONCLUSIONS     1 - Severe left atrial enlargement.     2 - Normal left ventricular systolic function (EF 55-60%).     3 - No wall motion abnormalities.     4 - Normal right ventricular systolic function .     5 - S/P surgical AVR, NISHA = 0.98 cm2, AVAi = 0.55 cm2/m2, peak velocity = 2.7 m/s, mean gradient = 14 mmHg.     6 - Severe mitral  regurgitation.     7 - Marked mitral annular calcification w a mean gradient of 4 mm Hg.     8 - Mild to moderate tricuspid regurgitation.     9 - Pulmonary hypertension. The estimated PA systolic pressure is 43 mmHg.             This document has been electronically    SIGNED BY: Lali Jerome MD On: 10/17/2018 10:01     Assessment and Plan:     * Atrial fibrillation with RVR  1. LAUREEN for evaluation of YUNIER  -No absolute contraindications of esophageal stricture, tumor, perforation, laceration,or diverticulum and/or active GI bleed  -The risks, benefits & alternatives of the procedure were explained to the patient.   -The risks of transesophageal echo include but are not limited to:  Dental trauma, esophageal trauma/perforation, bleeding, laryngospasm/brochospasm, aspiration, sore throat/hoarseness, & dislodgement of the endotracheal tube/nasogastric tube (where applicable).    -The risks of moderate sedation include hypotension, respiratory depression, arrhythmias, bronchospasm, & death.    -Informed consent was obtained. The patient is agreeable to proceed with the procedure and all questions and concerns addressed.    Case discussed with an attending in echocardiography lab.     Further recommendations per attending addendum          VTE Risk Mitigation (From admission, onward)        Ordered     apixaban tablet 5 mg  2 times daily      08/08/19 0956     Reason for no Mechanical VTE Prophylaxis  Once      08/07/19 1542     IP VTE HIGH RISK PATIENT  Once      08/07/19 1542          Thank you for your consult. I will follow-up with patient. Please contact us if you have any additional questions.    Karlene Dorsey MD  Cardiology   [facility]

## 2019-08-10 NOTE — PLAN OF CARE
Problem: Adult Inpatient Plan of Care  Goal: Plan of Care Review  Outcome: Ongoing (interventions implemented as appropriate)  Pt AAOx1; to self. Pt remains free of falls/injuries. No c/o pain or SOB. VSS. Cardioversion and LAUREEN performed 8/9. Cardiac rhythm A. Fib currently. Amio drip 1 mg/ min completed. Amio drip 0.5 mg/min initiated @ 16.7 ml/hr. 50 mg po Lopressor administered. Cath maintained, cath care performed. POC reviewed with pt. Telesitter @ bedside. Bed alarm activated. Bed @ low position. Call light in reach. Will continue to monitor.

## 2019-08-10 NOTE — PLAN OF CARE
Problem: Adult Inpatient Plan of Care  Goal: Plan of Care Review  Patient remains free from falls and injuries through out shift. VSS. Patient AAO at this time. Patient denies chest pain and SOB. Amiodarone gtt discontinued as ordered. PO amiodarone started.  Patient's daughter at bedside. Plan of care reviewed with patient. Patient verbalizes understanding of plan but reinforcement needed. Will continue to monitor.

## 2019-08-10 NOTE — PROGRESS NOTES
[facility] Hospital Medicine  Progress Note    Admitting Team: IM-C  Attending Physician: Josee Spann MD  Date of Admit: 8/7/2019        Subjective:      History of Present Illness:  Ruth Lan is a 84 y.o. female with chronic AF on Eliquis s/p DCCV 12/2018, HFpEF, HTN, AS s/p AVR 2004, severe MR, CKD4, HTN, and hx R breast cancer who presents after being sent to ED from arrhythmia clinic for AFib with RVR in 140's. History provided by daughter Sandar who states that her mother has not been herself for the past few weeks. She has had generalized weakness, including a fall at home (she lives in an assisted living facility), as well as shortness of breath. She has also had a lack of appetite as well as diminished memory lately. The patient denies having chest pain or palpitations.     In the ED, HR noted to be as high as 140's and now in 120's without intervention, EKG AF RVR, SBP in 100's, troponin negative, BNP 1271, CXR with pulmonary edema, renal function at baseline. Cardiology was consulted and recommend EP consult for LAUREEN/DCCV and consideration of amiodarone.     Interval History  Pt with confusion today. EP recommending PO amiodarone. Continuing diuresis. Daughter updated.        Review of Systems:  As per HPI  General: no fever, no chills, no weight loss, no fatigue  Nose, Sinuses: no rhinorrhea, no facial pain, no epistaxis  Cardiovascular: no chest pain, no orthopnea  Respiratory: no cough, no wheezes, no chest pain  Urinary: no dysuria, no increased frequency, no hematuria  Hematologic: no easy bruising, no overt bleeding, no petechiae  Endocrine: no heat intolerance, no cold intolerance, no polyuria  Neurologic: no seizures, no tremors, no numbness  Psychiatric: no hallucination, no depression, no suicidal ideation  Skin: no rashes, no pruritus, no pallor  All other systems reviewed & are negative.        Objective:   Last 24 Hour Vital Signs:  BP  Min: 80/48  Max:  110/57  Temp  Av.8 °F (36.6 °C)  Min: 96.6 °F (35.9 °C)  Max: 98.3 °F (36.8 °C)  Pulse  Av.7  Min: 50  Max: 80  Resp  Av.2  Min: 10  Max: 24  SpO2  Av.8 %  Min: 92 %  Max: 100 %  Body mass index is 26.75 kg/m².  I/O last 3 completed shifts:  In: 400 [P.O.:200; I.V.:200]  Out: 2500 [Urine:2500]    Physical Examination:  GEN: AAOx3, NAD  HEENT: NCAT, MMM, PERRL, EOMI, oropharynx clear  CV: RRR, no m/r, JVD  RESP:  no wheezes, b/l crackles  ABD: soft, NTND, normoactive BS, no organomegaly  EXTR: no c/c, 2+ distal pulses x 4, mild pitting edema BLE to the shins  NEURO: PERRL, EOMI, 5/5 motor strength all four extremities, intact sensation to light touch, no focal deficits  SKIN: no rashes, lesions, or color changes  PSYCH: normal affect     Laboratory:  I have reviewed all pertinent laboratory data within the past 24 hours.    Other Results:  EKG (my interpretation): AFib with RVR    Radiology:  I have reviewed all pertinent radiology imaging within the past 24 hours.     Assessment/Plan:     Ruth Lan is a 84 y.o. female with:    Atrial fibrillation with RVR  -Presents with arrhythmia clinic for AF w/ RVR HR 140s in the setting of fatigue and weakness  -Recent successful DCCV in 2018  -Home medications: diltiazem 240 mg daily + Lopressor 25 mg BID  -Resume Eliquis renal dose adjusted  -Consulted EP for LAUREEN/DCCV, possible amio initiation.  -EP recommending diltiazem gtt and uptitration of Lopressor as tolerated.   -LAUREEN/successful DCCV 19. Stopped dilt gtt and started on PO amio x 14 d, then 200 mg daily  -Cardiac monitoring  -Dr Guerra f/u 1 month with repeat TTE prior  -Will need outpatient monitoring of thyroid function, pulmonary status, liver function, as well as optho exam annually    Acute on chronic diastolic heart failure  Acute hypoxic respiratory failure, resolved  Severe MR  AS s/p bioprosthetic AVR '04  -Presents with shortness of breath, edema on CXR, BNP 1271  -Weaned  off O2  -Likely precipitated by AF with RVR or vice versa  -Home medication: Lasix 40 mg BID  -Echo: EF 55%, severe MR, moderate-severe TR, CVP 15, pHTN  -IV Lasix 80 mg BID  -Strict I/O's, daily weights, fluid/sodium restriction  -Dry weight ~159 lb?    Urinary retention  -Bedoya placement    Delirium  UTI  -Ceftriaxone  -Delirium precautiosn  -Olanzapine prn; may titrate dose if needed    Hypokalemia  -Replete prn    HTN  -Controlled; monitor    Generalized weakness  Falls  -PTOT consult - SNF  -Fall precautions    ANTONIO on CKD4  -2/2 hypotension vs cardiorenal  -Cont diuresis    Elevated TSH  -TSH 7.1, normal T4  -Repeat TFTs 4-6 wks    Diet- cardiac, fluid restrict 1.5 L  PPX- Eliquis  Dispo- pending diuresis  Discharge needs- SNF placement; EP & Cards f/u      Josee Spann MD  Hospital Medicine Staff  [facility]

## 2019-08-10 NOTE — PROGRESS NOTES
[facility]  Cardiac Electrophysiology  Progress Note    Admission Date: 8/7/2019  Code Status: DNR   Attending Physician: Josee Spann MD   Expected Discharge Date: 8/12/2019  Principal Problem:Atrial fibrillation with RVR    Subjective:     Interval History: Suspected to have AF last night, however, review of tele and EKG suggests that this may be frequent PACs    Some hypotension overnight    ROS  Objective:     Vital Signs (Most Recent):  Temp: 97.7 °F (36.5 °C) (08/10/19 0745)  Pulse: (!) 55 (08/10/19 0745)  Resp: 16 (08/10/19 0745)  BP: (!) 93/51 (08/10/19 0745)  SpO2: (!) 94 % (08/10/19 0745) Vital Signs (24h Range):  Temp:  [97.6 °F (36.4 °C)-98.3 °F (36.8 °C)] 97.7 °F (36.5 °C)  Pulse:  [] 55  Resp:  [10-24] 16  SpO2:  [92 %-100 %] 94 %  BP: ()/(48-70) 93/51     Weight: 68.5 kg (151 lb 0.2 oz)  Body mass index is 26.75 kg/m².     SpO2: (!) 94 %  O2 Device (Oxygen Therapy): nasal cannula    Physical Exam   Constitutional: She is oriented to person, place, and time. She appears well-developed and well-nourished.   HENT:   Head: Normocephalic and atraumatic.   Eyes: Pupils are equal, round, and reactive to light. Conjunctivae and EOM are normal.   Neck: JVD (at lower 1/3 of neck when sitting straight up) present.   Cardiovascular: Normal rate, regular rhythm, normal heart sounds and intact distal pulses. Exam reveals no gallop and no friction rub.   No murmur heard.  Pulmonary/Chest: Effort normal. No stridor. She has no wheezes. She has no rales. She exhibits no tenderness.   Abdominal: Soft. Bowel sounds are normal. She exhibits no distension and no mass. There is no tenderness. There is no guarding.   Musculoskeletal: She exhibits no tenderness or deformity.   Neurological: She is alert and oriented to person, place, and time.   Skin: Skin is warm and dry. No rash noted. No erythema.   Psychiatric: She has a normal mood and affect.       Significant Labs:   EP:   Recent Labs    Lab 08/09/19  0353 08/10/19  0335    142   K 4.2 3.9    104   CO2 23 24   * 161*   BUN 28* 32*   CREATININE 1.5* 1.7*   CALCIUM 8.9 8.9   ANIONGAP 12 14   ESTGFRAFRICA 36.6* 31.5*   EGFRNONAA 31.8* 27.3*   WBC 6.33 7.08   HGB 10.6* 10.9*   HCT 35.8* 36.7*    183           Assessment and Plan:     * Atrial fibrillation with RVR  [id number] 4, previously responsive to DCCV  - AC: can continue dose reduced apixaban 2.5mg bid  - Following IV continue amiodarone 400mg BID for 13 more days or until 10g has been loaded (whichever is sooner), then 200mg daily after that  - pt will follow up with Dr. Guerra, EP clinic in 1 month and needs repeat TTE prior to appointment  - Will need outpatient monitoring of thyroid function, pulmonary status, liver function, as well as optho exam annually  - will sign off, thank you for the consult, please call back with any questions          Anthony Yee MD  Cardiac Electrophysiology  [facility]

## 2019-08-10 NOTE — SUBJECTIVE & OBJECTIVE
Interval History: Suspected to have AF last night, however, review of tele and EKG suggests that this may be frequent PACs    Some hypotension overnight    ROS  Objective:     Vital Signs (Most Recent):  Temp: 97.7 °F (36.5 °C) (08/10/19 0745)  Pulse: (!) 55 (08/10/19 0745)  Resp: 16 (08/10/19 0745)  BP: (!) 93/51 (08/10/19 0745)  SpO2: (!) 94 % (08/10/19 0745) Vital Signs (24h Range):  Temp:  [97.6 °F (36.4 °C)-98.3 °F (36.8 °C)] 97.7 °F (36.5 °C)  Pulse:  [] 55  Resp:  [10-24] 16  SpO2:  [92 %-100 %] 94 %  BP: ()/(48-70) 93/51     Weight: 68.5 kg (151 lb 0.2 oz)  Body mass index is 26.75 kg/m².     SpO2: (!) 94 %  O2 Device (Oxygen Therapy): nasal cannula    Physical Exam   Constitutional: She is oriented to person, place, and time. She appears well-developed and well-nourished.   HENT:   Head: Normocephalic and atraumatic.   Eyes: Pupils are equal, round, and reactive to light. Conjunctivae and EOM are normal.   Neck: JVD (at lower 1/3 of neck when sitting straight up) present.   Cardiovascular: Normal rate, regular rhythm, normal heart sounds and intact distal pulses. Exam reveals no gallop and no friction rub.   No murmur heard.  Pulmonary/Chest: Effort normal. No stridor. She has no wheezes. She has no rales. She exhibits no tenderness.   Abdominal: Soft. Bowel sounds are normal. She exhibits no distension and no mass. There is no tenderness. There is no guarding.   Musculoskeletal: She exhibits no tenderness or deformity.   Neurological: She is alert and oriented to person, place, and time.   Skin: Skin is warm and dry. No rash noted. No erythema.   Psychiatric: She has a normal mood and affect.       Significant Labs:   EP:   Recent Labs   Lab 08/09/19  0353 08/10/19  0335    142   K 4.2 3.9    104   CO2 23 24   * 161*   BUN 28* 32*   CREATININE 1.5* 1.7*   CALCIUM 8.9 8.9   ANIONGAP 12 14   ESTGFRAFRICA 36.6* 31.5*   EGFRNONAA 31.8* 27.3*   WBC 6.33 7.08   HGB 10.6* 10.9*    HCT 35.8* 36.7*    183

## 2019-08-10 NOTE — ASSESSMENT & PLAN NOTE
[id number] 4, previously responsive to DCCV  - AC: can continue dose reduced apixaban 2.5mg bid  - Following IV continue amiodarone 400mg BID for 13 more days or until 10g has been loaded (whichever is sooner), then 200mg daily after that  - pt will follow up with Dr. Guerra, EP clinic in 1 month and needs repeat TTE prior to appointment  - Will need outpatient monitoring of thyroid function, pulmonary status, liver function, as well as optho exam annually  - will sign off, thank you for the consult, please call back with any questions

## 2019-08-10 NOTE — ANESTHESIA POSTPROCEDURE EVALUATION
Anesthesia Post Evaluation    Patient: Ruth Lan    Procedure(s) Performed: Procedure(s) (LRB):  CARDIOVERSION (N/A)  ECHOCARDIOGRAM,TRANSESOPHAGEAL (N/A)    Final Anesthesia Type: general  Patient location during evaluation: PACU  Patient participation: Yes- Able to Participate  Level of consciousness: awake and alert  Post-procedure vital signs: reviewed and stable  Pain management: adequate  Airway patency: patent  PONV status at discharge: No PONV  Anesthetic complications: no      Cardiovascular status: blood pressure returned to baseline  Respiratory status: unassisted  Hydration status: euvolemic  Follow-up not needed.          Vitals Value Taken Time   BP 83/50 8/9/2019  8:30 PM   Temp 36.6 °C (97.9 °F) 8/9/2019  7:26 PM   Pulse 71 8/9/2019  7:30 PM   Resp 16 8/9/2019  7:26 PM   SpO2 94 % 8/9/2019  7:26 PM         No case tracking events are documented in the log.      Pain/Angeles Score: Angeles Score: 9 (8/9/2019  3:35 PM)

## 2019-08-11 NOTE — PROGRESS NOTES
[facility] Hospital Medicine  Progress Note    Admitting Team: IM-C  Attending Physician: Josee Spann MD  Date of Admit: 2019        Subjective:      History of Present Illness:  Ruth Lan is a 84 y.o. female with chronic AF on Eliquis s/p DCCV 2018, HFpEF, HTN, AS s/p AVR 2004, severe MR, CKD4, HTN, and hx R breast cancer who presents after being sent to ED from arrhythmia clinic for AFib with RVR in 140's. History provided by daughter Sandra who states that her mother has not been herself for the past few weeks. She has had generalized weakness, including a fall at home (she lives in an assisted living facility), as well as shortness of breath. She has also had a lack of appetite as well as diminished memory lately. The patient denies having chest pain or palpitations.     In the ED, HR noted to be as high as 140's and now in 120's without intervention, EKG AF RVR, SBP in 100's, troponin negative, BNP 1271, CXR with pulmonary edema, renal function at baseline. Cardiology was consulted and recommend EP consult for LAUREEN/DCCV and consideration of amiodarone.     Interval History  No AEON. Continuing diuresis. Daughter at bedside.      Review of Systems:  As per HPI  General: no fever, no chills, no weight loss, no fatigue  Nose, Sinuses: no rhinorrhea, no facial pain, no epistaxis  Cardiovascular: no chest pain, no orthopnea  Respiratory: no cough, no wheezes, no chest pain  Urinary: no dysuria, no increased frequency, no hematuria  Hematologic: no easy bruising, no overt bleeding, no petechiae  Endocrine: no heat intolerance, no cold intolerance, no polyuria  Neurologic: no seizures, no tremors, no numbness  Psychiatric: no hallucination, no depression, no suicidal ideation  Skin: no rashes, no pruritus, no pallor  All other systems reviewed & are negative.        Objective:   Last 24 Hour Vital Signs:  BP  Min: 100/50  Max: 117/67  Temp  Av.8 °F (36.6 °C)  Min: 96.6 °F  (35.9 °C)  Max: 98.9 °F (37.2 °C)  Pulse  Av.9  Min: 56  Max: 74  Resp  Av.5  Min: 15  Max: 18  SpO2  Av.7 %  Min: 90 %  Max: 98 %  Weight  Av.1 kg (150 lb 2.1 oz)  Min: 68.1 kg (150 lb 2.1 oz)  Max: 68.1 kg (150 lb 2.1 oz)  Body mass index is 26.59 kg/m².  I/O last 3 completed shifts:  In: 1145 [P.O.:1145]  Out: 3525 [Urine:3525]    Physical Examination:  GEN: AAOx3, NAD  HEENT: NCAT, MMM, PERRL, EOMI, oropharynx clear  CV: RRR, no m/r, JVD mid-neck  RESP:  no wheezes, b/l crackles  ABD: soft, NTND, normoactive BS, no organomegaly  EXTR: no c/c, 2+ distal pulses x 4, mild pitting edema BLE to the shins  NEURO: PERRL, EOMI, 5/5 motor strength all four extremities, intact sensation to light touch, no focal deficits  SKIN: no rashes, lesions, or color changes  PSYCH: normal affect     Laboratory:  I have reviewed all pertinent laboratory data within the past 24 hours.    Other Results:  EKG (my interpretation): AFib with RVR    Radiology:  I have reviewed all pertinent radiology imaging within the past 24 hours.     Assessment/Plan:     Ruth Lan is a 84 y.o. female with:    Atrial fibrillation with RVR  -Presents with arrhythmia clinic for AF w/ RVR HR 140s in the setting of fatigue and weakness  -Recent successful DCCV in 2018  -Home medications: diltiazem 240 mg daily + Lopressor 25 mg BID  -Resume Eliquis renal dose adjusted  -Consulted EP for LAUREEN/DCCV, possible amio initiation.  -EP recommended diltiazem gtt and uptitration of Lopressor as tolerated.   -LAUREEN/successful DCCV 19. Stopped dilt gtt and started on PO amio x 14 d, then 200 mg daily  -Cardiac monitoring  -Dr Guerra f/u 1 month with repeat TTE prior  -Will need outpatient monitoring of thyroid function, pulmonary status, liver function, as well as optho exam annually    Acute on chronic diastolic heart failure  Acute hypoxic respiratory failure, resolved  Severe MR  AS s/p bioprosthetic AVR '  -Presents with  shortness of breath, edema on CXR, BNP 1271  -Weaned off O2  -Likely precipitated by AF with RVR or vice versa  -Home medication: Lasix 40 mg BID  -Echo: EF 55%, severe MR, moderate-severe TR, CVP 15, pHTN  -IV Lasix 80 mg BID  -Strict I/O's, daily weights, fluid/sodium restriction  -Dry weight ~159 lb?    Urinary retention  -Bedoya placement    Delirium  UTI  -Ceftriaxone  -UCx growing GNR's thus far  -Delirium precautiosn  -Olanzapine prn; may titrate dose if needed    Hypokalemia  -Replete prn    HTN  -Controlled; monitor    Generalized weakness  Falls  -PTOT consult - SNF  -Fall precautions    ANTONIO on CKD4  -2/2 hypotension vs cardiorenal, improving w/ diuresis  -Cont diuresis    Elevated TSH  -TSH 7.1, normal T4  -Repeat TFTs 4-6 wks    Diet- cardiac, fluid restrict 1.5 L  PPX- Eliquis  Dispo- pending diuresis  Discharge needs- SNF placement; EP & Cards f/u      Josee Spann MD  Hospital Medicine Staff  [facility]

## 2019-08-11 NOTE — PROGRESS NOTES
Dr. Palacios notified of morning K of 3.4 and Mag of 1.8. Also made aware DNR form is invalid due to lack of attending signature. No further orders at this time.

## 2019-08-11 NOTE — PLAN OF CARE
Problem: Adult Inpatient Plan of Care  Goal: Plan of Care Review  Outcome: Ongoing (interventions implemented as appropriate)  Pt free of fall, injuries, and trauma. Skin clean and dry. Skin tear located to LT arm. Pt educated on call light use and safety precautions. Reviewed plan of care with pt. Pt is awake and oriented to person only. Pt was reoriented to situation continuously throughout night. 80 mg lasix IVP given, diuresing well. Bedoya is in place due to urinary retention. VSS. NADN. Will continue to monitor.

## 2019-08-11 NOTE — PLAN OF CARE
Problem: Adult Inpatient Plan of Care  Goal: Plan of Care Review  Patient remains free from falls and injuries through out shift. VSS. Patient oriented to self at this. Sundowning protocol maintained. Reorientation needed throughout shift.  Patient denies chest pain and SOB. Lasix IVP given as ordered. Rocephin IV given daily. Plan of care reviewed with patient. Patient verbalizes understanding of plan but reinforcement needed. Will continue to monitor.

## 2019-08-12 PROBLEM — I95.9 HYPOTENSION: Status: ACTIVE | Noted: 2019-01-01

## 2019-08-12 NOTE — PT/OT/SLP PROGRESS
Occupational Therapy   Treatment    Name: Ruth Lan  MRN: [id number]  Admitting Diagnosis:  Atrial fibrillation with RVR  3 Days Post-Op    Recommendations:     Discharge Recommendations: nursing facility, skilled  Discharge Equipment Recommendations:  none  Barriers to discharge:  None    Assessment:     Ruth Lan is a 84 y.o. female with a medical diagnosis of Atrial fibrillation with RVR.  She presents with performance deficits affecting function are weakness, impaired functional mobilty, gait instability, impaired self care skills, impaired endurance, impaired balance, impaired cognition, decreased lower extremity function, decreased upper extremity function, decreased safety awareness, pain, impaired cardiopulmonary response to activity, edema. Pt tolerated session fair. Pt continue to present with confusion and self-limiting behaviors. Pt overall requires min A for bed mobility, sit<>stand transfers, and step transfer sequence. Pt is not safe to return to the home environment at current functional level and would benefit from SNF placement in order to improve overall functional independence.     Rehab Prognosis:  Fair; patient would benefit from acute skilled OT services to address these deficits and reach maximum level of function.       Plan:     Patient to be seen 4 x/week to address the above listed problems via self-care/home management, therapeutic activities, therapeutic exercises  · Plan of Care Expires: 09/07/19  · Plan of Care Reviewed with: patient, daughter    Subjective     Pain/Comfort:  · Pain Rating 1: (pt did not rate )  · Location - Side 1: Right  · Location - Orientation 1: generalized  · Location 1: ankle  · Pain Addressed 1: Distraction, Reposition, Cessation of Activity    Objective:     Communicated with: RN prior to session.  Patient found HOB elevated with telemetry, peripheral IV upon OT entry to room. Pt agreeable to therapy session with moderate encouragement.  "    General Precautions: Standard, fall, hearing impaired   Orthopedic Precautions:N/A   Braces: N/A     Occupational Performance:     Bed Mobility:    · Patient completed Scooting/Bridging with minimum assistance and for anterior scooting towards EOB   · Patient completed Supine to Sit with minimum assistance, with side rail and HOB elevated      Functional Mobility/Transfers:  · Patient completed Sit <> Stand Transfer (EOB and bedside chair) with minimum assistance  with  rolling walker   · Pt requires min A for facilitation and initiation to stand   · Pt with overall decreased safety awareness  · Patient completed Bed <> Chair Transfer using Step Transfer technique with minimum assistance with rolling walker   · Functional Mobility: Pt performed side steps to the left from EOB to bedside chair with min A using RW.   · Pt with increased forward flexion   · Pt required min A for RW management.     Activities of Daily Living:  · Upper Body Dressing: maximal assistance    · Lower Body Dressing: total assistance to don/doff B  socks     [facility] 6 Click ADL: 12    Treatment & Education:  - Pt educated on role of OT, POC, and goals for therapy.    - pt performed x 10 reps of AAROM chest press with support provided at elbows   - Pt continuously stated "I can't move my arms" though pt was  observed to actively move arms during bed mobility and during step  Transfer.   - Educated pt on being appropriate to transfer with nsg and PCT with min A using RW  - Daily orientation provided    - Importance of OOB ax's with staff member assistance and sitting OOB majority of day.   - Pt completed ADLs and functional mobility for treatment session as noted above   - Pt verbalized understanding. Pt expressed no further concerns/questions.  - whiteboard updated     Patient left up in chair with all lines intact, call button in reach, chair alarm on and RN notifiedEducation:      GOALS:   Multidisciplinary Problems     Occupational " Therapy Goals        Problem: Occupational Therapy Goal    Goal Priority Disciplines Outcome Interventions   Occupational Therapy Goal     OT, PT/OT Ongoing (interventions implemented as appropriate)    Description:  Goals to be met by: 8/23/19     Patient will increase functional independence with ADLs by performing:    UE Dressing with Stand-by Assistance.  LE Dressing with Minimal Assistance.  Grooming while seated at sink with Stand-by Assistance.  Toileting from bedside commode with Minimal Assistance for hygiene and clothing management.   Toilet transfer to bedside commode with Minimal Assistance.                      Time Tracking:     OT Date of Treatment: 08/12/19  OT Start Time: 0954  OT Stop Time: 1017  OT Total Time (min): 23 min    Billable Minutes:Therapeutic Activity 15  (co-treat with PT)    Antoinette Roland OT  8/12/2019

## 2019-08-12 NOTE — PLAN OF CARE
Problem: Adult Inpatient Plan of Care  Goal: Plan of Care Review  AMIODARONE BID, CIPRO FOR UTI, [facility] DC/ED, ENCOURAGED TO CALL FOR ASSIST TO VOID/BM FALL PRECAUTIONS REVIEWED. OOB TO CHAIR,   02 WEANED TO RA, METOPROLOL Q 8 HOURS, ELIQUIS BID. POC DISCUSSED WITH PT, VERBALIZE UNDERSTANDING. WILL CONTINUE TO MONITOR.

## 2019-08-12 NOTE — NURSING
Transported to [facility] BY RAUL VIA PERSONAL  W/C.PURSE CELL PHONE W/C , DENTURES AND HEARING AID WITH PT.

## 2019-08-12 NOTE — NURSING
"Pt admitted from [facility] to room 301 via w/c. Pt uncooperative with assessment, and agitated. Skin warm and dry to touch. BUE ecchymotic. Left arm has a foam dressing with dried blood visible. Left arm +2 edema noted, Right leg +3 edema. Buttock has 3 red circular are size of a quarter on left buttock and 1 red circular area size of nickel on right butock. Skin is flaccid and fragile. Bilateral heels   intact. Daughter at bedside with multiple question, which were answered.  Bed in lowest position, call light in reach. Pt refused to eat. When staff asked pt about urinating and if she need to, pt stated, "NO!"  "

## 2019-08-12 NOTE — PT/OT/SLP PROGRESS
"Physical Therapy Treatment    Patient Name:  Ruth Lan   MRN:  [id number]    Recommendations:     Discharge Recommendations:  nursing facility, skilled   Discharge Equipment Recommendations: none   Barriers to discharge: Decreased caregiver support    Assessment:     Ruth Lan is a 84 y.o. female admitted with a medical diagnosis of Atrial fibrillation with RVR.  She presents with the following impairments/functional limitations:  weakness, impaired endurance, impaired self care skills, impaired functional mobilty, gait instability, impaired balance, impaired cardiopulmonary response to activity, impaired cognition, pain, decreased safety awareness. Pt tolerated activity with continued confusion. Pt transferred to [facility] with RW and minimum assistance. Pt attempted to perform further gait training, unable to initiate step. Upon discharge, pt would benefit from continued skilled therapy intervention at [facility] nursing facility to progress toward more independent mobility. At this time, pt would continue to benefit from acute skilled therapy intervention to address deficits and progress toward prior level of function.       Rehab Prognosis: Good; patient would benefit from acute skilled PT services to address these deficits and reach maximum level of function.    Recent Surgery: Procedure(s) (LRB):  CARDIOVERSION (N/A)  ECHOCARDIOGRAM,TRANSESOPHAGEAL (N/A) 3 Days Post-Op    Plan:     During this hospitalization, patient to be seen 4 x/week to address the identified rehab impairments via gait training, therapeutic activities, therapeutic exercises, neuromuscular re-education and progress toward the following goals:    · Plan of Care Expires:  09/09/19    Subjective     Chief Complaint: Pt states "I suppose I have no choice, do what you want to"   Patient/Family Comments/goals: to get better and return home   Pain/Comfort:  · Pain Rating 1: (pt did not report )  · Location - Side 1: Right  · Location - " Orientation 1: generalized  · Location 1: ankle  · Pain Addressed 1: Distraction, Reposition, Cessation of Activity      Objective:     Communicated with RN prior to session.  Patient found HOB elevated with telemetry upon PT entry to room.     General Precautions: Standard, fall, hearing impaired   Orthopedic Precautions:N/A   Braces: N/A     Functional Mobility:  · Bed Mobility:     · Supine to Sit: minimum assistance  · Transfers:     · Sit to Stand:  minimum assistance with rolling walker  · Bed to chair: minimum assistance with RW using stand pivot transfer.   · Gait: Pt took 3 steps to the L with RW and minimum assistance. Pt with decreased step size, decreased foot clearance, flexed posture. Pt attempted to sit before reaching chair, lowered to chair with maximum assistance. Pt educated on importance of safety with mobility. Pt required cuing for step initiation, step placement.       AM-PAC 6 CLICK MOBILITY  Turning over in bed (including adjusting bedclothes, sheets and blankets)?: 3  Sitting down on and standing up from a chair with arms (e.g., wheelchair, bedside commode, etc.): 3  Moving from lying on back to sitting on the side of the bed?: 3  Moving to and from a bed to a chair (including a wheelchair)?: 3  Need to walk in hospital room?: 3  Climbing 3-5 steps with a railing?: 3  Basic Mobility Total Score: 18       Therapeutic Activities and Exercises:   Pt educated on role of PT/POC. Pt verbalized understanding.   Pt encouraged to only perform OOB mobility with assistance from nursing/therapy. Pt agreeable.   While sitting up in chair, pt performed 8x LAQ with active assistance, 8x seated marching, UE exercise with OT.   Pt educated on importance of safety with mobility, benefits of participating with therapy. Pt verbalized understanding.     Patient left up in chair with all lines intact, call button in reach and RN  notified, chair alarm on.     GOALS:   Multidisciplinary Problems     Physical  Therapy Goals        Problem: Physical Therapy Goal    Goal Priority Disciplines Outcome Goal Variances Interventions   Physical Therapy Goal     PT, PT/OT Ongoing (interventions implemented as appropriate)     Description:  Goals to be met by: 2019     Patient will increase functional independence with mobility by performin. Supine to sit with Set-up [place]  2. Sit to stand transfer with Stand-by Assistance  3. Gait  x 15 feet with Stand-by Assistance using Rolling Walker.   4. Sitting at edge of bed x10 minutes with Supervision                      Time Tracking:     PT Received On: 19  PT Start Time: 954     PT Stop Time:   PT Total Time (min): 23 min     Billable Minutes: Gait Training 8 mins  and Therapeutic Exercise 15 mins     Treatment Type: Treatment  PT/PTA: PT           Sabrina Thompson, PT  2019

## 2019-08-12 NOTE — PLAN OF CARE
Problem: Occupational Therapy Goal  Goal: Occupational Therapy Goal  Goals to be met by: 8/23/19     Patient will increase functional independence with ADLs by performing:    UE Dressing with Stand-by Assistance.  LE Dressing with Minimal Assistance.  Grooming while seated at sink with Stand-by Assistance.  Toileting from bedside commode with Minimal Assistance for hygiene and clothing management.   Toilet transfer to bedside commode with Minimal Assistance.     Outcome: Ongoing (interventions implemented as appropriate)  Continue POC     Antoinette Roland OTR/L  Pager: [phone]  8/12/2019

## 2019-08-12 NOTE — HOSPITAL COURSE
Atrial fibrillation with RVR  -Presents with arrhythmia clinic for AF w/ RVR HR 140s in the setting of fatigue and weakness  -Recent successful DCCV in 12/2018  -Home medications: diltiazem 240 mg daily + Lopressor 25 mg BID  -Resume Eliquis renal dose adjusted  -Consulted EP  -EP recommended diltiazem gtt and uptitration of Lopressor as tolerated.   -LAUREEN/successful DCCV 8/9/19. Stopped dilt gtt and started on PO amio x 14 d, then 200 mg daily  -Cardiac monitoring  -Dr Guerra f/u 1 month with repeat TTE prior  -Will need outpatient monitoring of thyroid function, pulmonary status, liver function, as well as optho exam annually     Acute on chronic diastolic heart failure  Acute hypoxic respiratory failure, resolved  Severe MR  AS s/p bioprosthetic AVR '04  -Presents with shortness of breath, edema on CXR, BNP 1271  -Weaned off O2  -Likely precipitated by AF with RVR or vice versa  -Echo: EF 55%, severe MR, moderate-severe TR, CVP 15, pHTN  -Diuresed well with IV Lasix 80 mg BID. Transitioned to home PO Lasix 40 mg bid  -Strict I/O's, daily weights, fluid/sodium restriction  -Dry weight ~150 lb     Urinary retention  -Bedoya placed. Voiding trial today w/ q6 bladder scans     Delirium  UTI  -Ceftriaxone > PO Cipro to complete 7 day course  -UCx: Klebsiella pneumoniae  -Delirium improving, continue precautions  -Olanzapine prn; may titrate dose if needed but patient has not needed as of late     Hypokalemia  -Replete prn     HTN  -Controlled; monitor     Generalized weakness  Falls  -PTOT consult - d/c to SNF  -Fall precautions     ANTONIO on CKD4  -2/2 hypotension vs cardiorenal, improving w/ diuresis  -Monitor renal function     Elevated TSH  -TSH 7.1, normal T4  -Repeat TFTs 4-6 wks

## 2019-08-12 NOTE — PLAN OF CARE
Problem: Adult Inpatient Plan of Care  Goal: Plan of Care Review  Outcome: Ongoing (interventions implemented as appropriate)  Patient free from falls and injuries throughout shift.  AAO and VSS.  Patient denies chest pain and SOB.  Patient continues on Lasix IVP 80 mg BID.  K+ 3.4 and Mg 1.8; replaced. BUN/Cre 32/1.5.  No acute events overnight. Patient resting well at this time.  Plan of care discussed with patient.  Patient verbalizes understanding.  Will continue to monitor.

## 2019-08-12 NOTE — NURSING
Discharged to SNF. Tele d/c'ed. Sl d/c'ed with catheter intact. Daughter notified by SW, report called to Nuria at [phone]. Belongings with pt including cell phone, bilat hearing aids, dentures and personal w/c

## 2019-08-12 NOTE — PLAN OF CARE
08/12/19 0913   Discharge Reassessment   Assessment Type Discharge Planning Reassessment   Do you have any problems affording any of your prescribed medications? TBD   Discharge Plan A Skilled Nursing Facility   Discharge Plan B Assisted Living;Home Health   DME Needed Upon Discharge  none

## 2019-08-12 NOTE — PLAN OF CARE
Referral sent today to [facility] skilled unit via [facility].  Pt and family refuse nursing home skilled placement.  Will follow.

## 2019-08-12 NOTE — PROGRESS NOTES
[facility] Hospital Medicine  Progress Note    Admitting Team: IM-C  Attending Physician: Josee Spann MD  Date of Admit: 2019        Subjective:      History of Present Illness:  Ruth Lan is a 84 y.o. female with chronic AF on Eliquis s/p DCCV 2018, HFpEF, HTN, AS s/p AVR 2004, severe MR, CKD4, HTN, and hx R breast cancer who presents after being sent to ED from arrhythmia clinic for AFib with RVR in 140's. History provided by daughter Sandra who states that her mother has not been herself for the past few weeks. She has had generalized weakness, including a fall at home (she lives in an assisted living facility), as well as shortness of breath. She has also had a lack of appetite as well as diminished memory lately. The patient denies having chest pain or palpitations.     In the ED, HR noted to be as high as 140's and now in 120's without intervention, EKG AF RVR, SBP in 100's, troponin negative, BNP 1271, CXR with pulmonary edema, renal function at baseline. Cardiology was consulted and recommend EP consult for LAUREEN/DCCV and consideration of amiodarone.     Interval History  No AEON. Transition to PO Lasix.    Review of Systems:  As per HPI  General: no fever, no chills, no weight loss, no fatigue  Nose, Sinuses: no rhinorrhea, no facial pain, no epistaxis  Cardiovascular: no chest pain, no orthopnea  Respiratory: no cough, no wheezes, no chest pain  Urinary: no dysuria, no increased frequency, no hematuria  Hematologic: no easy bruising, no overt bleeding, no petechiae  Endocrine: no heat intolerance, no cold intolerance, no polyuria  Neurologic: no seizures, no tremors, no numbness  Psychiatric: no hallucination, no depression, no suicidal ideation  Skin: no rashes, no pruritus, no pallor  All other systems reviewed & are negative.        Objective:   Last 24 Hour Vital Signs:  BP  Min: 97/55  Max: 120/59  Temp  Av.9 °F (36.6 °C)  Min: 96.6 °F (35.9 °C)  Max: 98.5 °F  (36.9 °C)  Pulse  Av.8  Min: 55  Max: 76  Resp  Av.2  Min: 16  Max: 18  SpO2  Av.8 %  Min: 95 %  Max: 99 %  Weight  Av.2 kg (150 lb 5.7 oz)  Min: 68.2 kg (150 lb 5.7 oz)  Max: 68.2 kg (150 lb 5.7 oz)  Body mass index is 26.63 kg/m².  I/O last 3 completed shifts:  In: 855 [P.O.:855]  Out: 4775 [Urine:4775]    Physical Examination:  GEN: AAOx3, NAD  HEENT: NCAT, MMM, PERRL, EOMI, oropharynx clear  CV: RRR, no m/r, JVD resolved  RESP:  no wheezes, CTA, no crackles  ABD: soft, NTND, normoactive BS, no organomegaly  EXTR: no c/c, 2+ distal pulses x 4, no edema  NEURO: PERRL, EOMI, 5/5 motor strength all four extremities, intact sensation to light touch, no focal deficits  SKIN: no rashes, lesions, or color changes  PSYCH: normal affect     Laboratory:  I have reviewed all pertinent laboratory data within the past 24 hours.    Other Results:  EKG (my interpretation): AFib with RVR    Radiology:  I have reviewed all pertinent radiology imaging within the past 24 hours.     Assessment/Plan:     Ruth Lan is a 84 y.o. female with:    Atrial fibrillation with RVR  -Presents with arrhythmia clinic for AF w/ RVR HR 140s in the setting of fatigue and weakness  -Recent successful DCCV in 2018  -Home medications: diltiazem 240 mg daily + Lopressor 25 mg BID  -Resume Eliquis renal dose adjusted  -Consulted EP for LAUREEN/DCCV, possible amio initiation.  -EP recommended diltiazem gtt and uptitration of Lopressor as tolerated.   -LAUREEN/successful DCCV 19. Stopped dilt gtt and started on PO amio x 14 d, then 200 mg daily  -Cardiac monitoring  -Dr Guerra f/u 1 month with repeat TTE prior  -Will need outpatient monitoring of thyroid function, pulmonary status, liver function, as well as optho exam annually    Acute on chronic diastolic heart failure  Acute hypoxic respiratory failure, resolved  Severe MR  AS s/p bioprosthetic AVR '04  -Presents with shortness of breath, edema on CXR, BNP 1271  -Weaned off  O2  -Likely precipitated by AF with RVR or vice versa  -Home medication: Lasix 40 mg BID  -Echo: EF 55%, severe MR, moderate-severe TR, CVP 15, pHTN  -Diuresed well with IV Lasix 80 mg BID. Transitioned to home PO Lasix 40 mg bid  -Strict I/O's, daily weights, fluid/sodium restriction  -Dry weight ~159 lb?    Urinary retention  -Bedoya placed. Voiding trial today w/ q6 bladder scans    Delirium  UTI  -Ceftriaxone > PO Cipro  -UCx: Klebsiella pneumoniae  -Delirium improving, continue precautions  -Olanzapine prn; may titrate dose if needed but patient has not needed as of late    Hypokalemia  -Replete prn    HTN  -Controlled; monitor    Generalized weakness  Falls  -PTOT consult - SNF  -Fall precautions    ANTONIO on CKD4  -2/2 hypotension vs cardiorenal, improving w/ diuresis    Elevated TSH  -TSH 7.1, normal T4  -Repeat TFTs 4-6 wks    Diet- cardiac, fluid restrict 1.5 L  PPX- Eliquis  Dispo- pending SNF placement  Discharge needs- SNF placement; EP & Cards f/u      Josee Spann MD  Hospital Medicine Staff  [facility]

## 2019-08-12 NOTE — HPI
Ruth Lan is a 84 y.o. female with chronic AF on Eliquis s/p DCCV 12/2018, HFpEF, HTN, AS s/p AVR 2004, severe MR, CKD4, HTN, and hx R breast cancer who presents after being sent to ED from arrhythmia clinic for AFib with RVR in 140's. History provided by daughter Sandra who states that her mother has not been herself for the past few weeks. She has had generalized weakness, including a fall at home (she lives in an assisted living facility), as well as shortness of breath. She has also had a lack of appetite as well as diminished memory lately. The patient denies having chest pain or palpitations.      In the ED, HR noted to be as high as 140's and now in 120's without intervention, EKG AF RVR, SBP in 100's, troponin negative, BNP 1271, CXR with pulmonary edema, renal function at baseline. Cardiology was consulted and recommend EP consult for LAUREEN/DCCV and consideration of amiodarone.

## 2019-08-12 NOTE — PLAN OF CARE
Problem: Physical Therapy Goal  Goal: Physical Therapy Goal  Goals to be met by: 2019     Patient will increase functional independence with mobility by performin. Supine to sit with Set-up [place]  2. Sit to stand transfer with Stand-by Assistance  3. Gait  x 15 feet with Stand-by Assistance using Rolling Walker.   4. Sitting at edge of bed x10 minutes with Supervision     Outcome: Ongoing (interventions implemented as appropriate)  Pt is progressing toward goals. All goals remain appropriate.

## 2019-08-12 NOTE — DISCHARGE SUMMARY
[facility] Hospital Medicine  Discharge Summary      Patient Name: Ruth Lan  MRN: [id number]  Admission Date: 8/7/2019  Hospital Length of Stay: 5 days  Discharge Date and Time:  08/12/2019 12:21 PM  Attending Physician: Josee Spann MD   Discharging Provider: Josee Spann MD  Primary Care Provider: Florentino Brar MD  [facility] Medicine Team: [facility] HOSP MED C Josee Spann MD    HPI:   Ruth Lan is a 84 y.o. female with chronic AF on Eliquis s/p DCCV 12/2018, HFpEF, HTN, AS s/p AVR 2004, severe MR, CKD4, HTN, and hx R breast cancer who presents after being sent to ED from arrhythmia clinic for AFib with RVR in 140's. History provided by daughter Sandra who states that her mother has not been herself for the past few weeks. She has had generalized weakness, including a fall at home (she lives in an assisted living facility), as well as shortness of breath. She has also had a lack of appetite as well as diminished memory lately. The patient denies having chest pain or palpitations.      In the ED, HR noted to be as high as 140's and now in 120's without intervention, EKG AF RVR, SBP in 100's, troponin negative, BNP 1271, CXR with pulmonary edema, renal function at baseline. Cardiology was consulted and recommend EP consult for LAUREEN/DCCV and consideration of amiodarone.     Procedure(s) (LRB):  CARDIOVERSION (N/A)  ECHOCARDIOGRAM,TRANSESOPHAGEAL (N/A)      Hospital Course:   Atrial fibrillation with RVR  -Presents with arrhythmia clinic for AF w/ RVR HR 140s in the setting of fatigue and weakness  -Recent successful DCCV in 12/2018  -Home medications: diltiazem 240 mg daily + Lopressor 25 mg BID  -Resume Eliquis renal dose adjusted  -Consulted EP  -EP recommended diltiazem gtt and uptitration of Lopressor as tolerated.   -LAUREEN/successful DCCV 8/9/19. Stopped dilt gtt and started on PO amio x 14 d, then 200 mg daily  -Cardiac monitoring  -Dr Guerra f/u 1 month with repeat TTE  prior  -Will need outpatient monitoring of thyroid function, pulmonary status, liver function, as well as optho exam annually     Acute on chronic diastolic heart failure  Acute hypoxic respiratory failure, resolved  Severe MR  AS s/p bioprosthetic AVR '04  -Presents with shortness of breath, edema on CXR, BNP 1271  -Weaned off O2  -Likely precipitated by AF with RVR or vice versa  -Echo: EF 55%, severe MR, moderate-severe TR, CVP 15, pHTN  -Diuresed well with IV Lasix 80 mg BID. Transitioned to home PO Lasix 40 mg bid  -Strict I/O's, daily weights, fluid/sodium restriction  -Dry weight ~150 lb     Urinary retention  -Bedoya placed. Voiding trial today w/ q6 bladder scans     Delirium  UTI  -Ceftriaxone > PO Cipro to complete 7 day course  -UCx: Klebsiella pneumoniae  -Delirium improving, continue precautions  -Olanzapine prn; may titrate dose if needed but patient has not needed as of late     Hypokalemia  -Replete prn     HTN  -Controlled; monitor     Generalized weakness  Falls  -PTOT consult - d/c to SNF  -Fall precautions     ANTONIO on CKD4  -2/2 hypotension vs cardiorenal, improving w/ diuresis  -Monitor renal function     Elevated TSH  -TSH 7.1, normal T4  -Repeat TFTs 4-6 wks     Consults:   Consults (From admission, onward)        Status Ordering Provider     Inpatient consult to Cardiology  Once     Provider:  (Not yet assigned)    Completed MARCELL COY     Inpatient consult to Electrophysiology  Once     Provider:  (Not yet assigned)    Completed KEEGAN PURI     [facility]  Once     Provider:  (Not yet assigned)    Completed KEEGAN PURI     Inpatient consult to SNF  Once     Provider:  (Not yet assigned)    Acknowledged KEEGAN PURI          No new Assessment & Plan notes have been filed under this hospital service since the last note was generated.  Service: Hospital Medicine    Final Active Diagnoses:    Diagnosis Date Noted POA    PRINCIPAL PROBLEM:  Atrial  fibrillation with RVR [I48.91] 12/18/2018 Yes    Acute on chronic diastolic heart failure [I50.33] 08/07/2019 Yes    Hypokalemia [E87.6] 08/07/2019 Yes    Stage 4 chronic kidney disease [N18.4] 06/26/2017 Yes     Chronic    Hypertension, essential [I10] 09/13/2013 Yes     Chronic    History of aortic valve replacement with porcine valve on 9/27/04 [Z95.3] 09/27/2004 Not Applicable     Chronic      Problems Resolved During this Admission:       Discharged Condition: good    Disposition: Skilled Nursing Facility    Follow Up:  Follow-up Information     Florentino Brar MD In 2 weeks.    Specialties:  Family Medicine, Sports Medicine  Why:  hospital follow up  Contact information:  [address]  [facility] [address]  [phone]             Emanuel Guerra MD In 1 month.    Specialties:  Electrophysiology, Cardiovascular Disease  Why:  cardiology follow up  Contact information:  [address]  [facility] [id number] [phone]                 Patient Instructions:      Transthoracic echo (TTE) 2D with Color Flow   Standing Status: Future Standing Exp. Date: 08/12/20       Significant Diagnostic Studies: Labs:   CMP   Recent Labs   Lab 08/11/19  0338 08/12/19  0454    139   K 3.4* 4.1    101   CO2 24 27   * 105   BUN 32* 30*   CREATININE 1.5* 1.4   CALCIUM 9.1 8.7   ANIONGAP 12 11   ESTGFRAFRICA 36.6* 39.8*   EGFRNONAA 31.8* 34.5*    and CBC   Recent Labs   Lab 08/11/19  0338 08/12/19  0454   WBC 7.58 6.51   HGB 10.7* 10.2*   HCT 36.0* 34.9*    161       Pending Diagnostic Studies:     None         Medications:  Reconciled Home Medications:      Medication List      START taking these medications    amiodarone 400 MG tablet  Commonly known as:  PACERONE  Take 400 mg bid x 11 days, then 200 mg daily     ciprofloxacin HCl 500 MG tablet  Commonly known as:  CIPRO  Take 1 tablet (500 mg total) by mouth once daily. for 3 days  Start taking on:  8/13/2019        CHANGE how you  take these medications    metoprolol tartrate 75 mg Tab  Take 75 mg by mouth 2 (two) times daily.  What changed:    · medication strength  · how much to take        CONTINUE taking these medications    * acetaminophen 500 MG tablet  Commonly known as:  TYLENOL  Take 500 mg by mouth every 6 (six) hours as needed for Pain.     * acetaminophen 500 MG tablet  Commonly known as:  TYLENOL  Take 1 tablet (500 mg total) by mouth 2 (two) times daily as needed for Pain.     apixaban 2.5 mg Tab  Commonly known as:  ELIQUIS  Take 1 tablet (2.5 mg total) by mouth 2 (two) times daily.     b complex vitamins tablet  Commonly known as:  B COMPLEX-VITAMIN B12  Take 1 tablet by mouth once daily.     furosemide 40 MG tablet  Commonly known as:  LASIX  Take 1 tablet (40 mg total) by mouth 2 (two) times daily.     multivitamin per tablet  Commonly known as:  THERAGRAN  Take 1 tablet by mouth once daily.     pravastatin 80 MG tablet  Commonly known as:  PRAVACHOL  Take 1 tablet (80 mg total) by mouth once daily.     tolterodine 4 MG 24 hr capsule  Commonly known as:  DETROL LA  Take 1 capsule (4 mg total) by mouth once daily.         * This list has 2 medication(s) that are the same as other medications prescribed for you. Read the directions carefully, and ask your doctor or other care provider to review them with you.            STOP taking these medications    diltiaZEM 240 MG 24 hr capsule  Commonly known as:  CARDIZEM CD            Indwelling Lines/Drains at time of discharge:   Lines/Drains/Airways          None          Time spent on the discharge of patient: 29 minutes  Patient was seen and examined on the date of discharge and determined to be suitable for discharge.         Josee Spann MD  Department of Hospital Medicine  [facility]

## 2019-08-12 NOTE — PLAN OF CARE
Pt will transfer today to [facility] skilled unit.  Transportation arranged through the patient flow center to  pt for 2:00pm today.  pts daughter notified and is agreeable.     08/12/19 1215   Post-Acute Status   Post-Acute Authorization Placement   Post-Acute Placement Status Set-up Complete

## 2019-08-13 PROBLEM — S41.102A WOUND OF LEFT UPPER EXTREMITY: Status: ACTIVE | Noted: 2019-01-01

## 2019-08-13 NOTE — CLINICAL REVIEW
Clinical Pharmacy Chart Review Note      Admit Date: 8/12/2019   LOS: 1 day       Ruth Lan is a 84 y.o. female admitted to SNF for PT/OT after hospitalization for hypotension.    Active Hospital Problems    Diagnosis  POA    Hypotension [I95.9]  Yes      Resolved Hospital Problems   No resolved problems to display.     Review of patient's allergies indicates:   Allergen Reactions    Etodolac Other (See Comments)     Dehydration    Nsaids (non-steroidal anti-inflammatory drug)      COLITIS/DEHYDRATION     Patient Active Problem List    Diagnosis Date Noted    Hypotension 08/12/2019    Acute on chronic diastolic heart failure 08/07/2019    Hypokalemia 08/07/2019    Refused pneumococcal vaccination 02/06/2019    Hyperlipidemia 02/06/2019    Atrial fibrillation with RVR 12/18/2018    Positive D dimer 11/01/2018    Persistent atrial fibrillation 10/24/2018    Aortic atherosclerosis 08/02/2018    Unintentional weight loss of 10% body weight within 6 months 07/18/2018    History of smoking greater than 50 pack years 07/16/2018    Pulmonary HTN 07/09/2018    DNR (do not resuscitate) 07/09/2018    Chronic diastolic heart failure 07/08/2018    Pleural effusion, right 07/07/2018    Malnutrition of moderate degree 07/07/2018    Stage 4 chronic kidney disease 06/26/2017    Psychophysiological insomnia 06/26/2017    Primary osteoarthritis of both knees 12/27/2016    Severe mitral regurgitation 03/31/2016    Breast cancer of lower-inner quadrant of right female breast 09/29/2015    MCI (mild cognitive impairment) 02/24/2015    History of breast cancer 02/24/2015    Attention or concentration deficit 02/25/2014    Memory loss, short term 02/25/2014    Vitamin B12 deficiency 09/17/2013    Hypertension, essential 09/13/2013    Normocytic anemia 09/13/2013    Pure hypercholesterolemia 10/02/2012    Anxiety 10/02/2012    History of aortic valve replacement with porcine valve on 9/27/04  09/27/2004       Scheduled Meds:    amiodarone  400 mg Oral BID    apixaban  2.5 mg Oral BID    ciprofloxacin HCl  500 mg Oral Q24H    [START ON 8/14/2019] furosemide  40 mg Oral Daily    metoprolol tartrate  50 mg Oral BID    multivitamin  1 tablet Oral Daily    pravastatin  80 mg Oral Daily     Continuous Infusions:   PRN Meds: acetaminophen, calcium carbonate, OLANZapine, ramelteon    OBJECTIVE:     Vital Signs (Last 24H)  Temp:  [96.1 °F (35.6 °C)-97.9 °F (36.6 °C)]   Pulse:  [60-70]   Resp:  [17-18]   BP: ()/(50-55)   SpO2:  [93 %-96 %]     Laboratory:  CBC:   Recent Labs   Lab 08/10/19  0335 08/11/19  0338 08/12/19  0454   WBC 7.08 7.58 6.51   RBC 3.90* 3.85* 3.73*   HGB 10.9* 10.7* 10.2*   HCT 36.7* 36.0* 34.9*    167 161   MCV 94 94 94   MCH 27.9 27.8 27.3   MCHC 29.7* 29.7* 29.2*     BMP:   Recent Labs   Lab 08/10/19  0335 08/10/19  1122 08/11/19  0338 08/12/19  0454   * 192* 152* 105    137 139 139   K 3.9 3.8 3.4* 4.1    103 103 101   CO2 24 23 24 27   BUN 32* 32* 32* 30*   CREATININE 1.7* 1.7* 1.5* 1.4   CALCIUM 8.9 8.9 9.1 8.7   MG 1.9  --  1.8 1.8     CMP:   Recent Labs   Lab 08/07/19  1300  08/10/19  1122 08/11/19  0338 08/12/19  0454   *   < > 192* 152* 105   CALCIUM 9.1   < > 8.9 9.1 8.7   ALBUMIN 3.1*  --   --   --   --    PROT 7.7  --   --   --   --       < > 137 139 139   K 3.4*   < > 3.8 3.4* 4.1   CO2 28   < > 23 24 27      < > 103 103 101   BUN 21   < > 32* 32* 30*   CREATININE 1.4   < > 1.7* 1.5* 1.4   ALKPHOS 167*  --   --   --   --    ALT 20  --   --   --   --    AST 23  --   --   --   --    BILITOT 0.8  --   --   --   --     < > = values in this interval not displayed.     LFTs:   Recent Labs   Lab 08/07/19  1300   ALT 20   AST 23   ALKPHOS 167*   BILITOT 0.8   PROT 7.7   ALBUMIN 3.1*     Coagulation:   Recent Labs   Lab 08/07/19  1300   INR 1.3*       Recent Labs   Lab 08/09/19  1148   COLORU Tia   SPECGRAV 1.010   PHUR 5.0    PROTEINUA Negative   BACTERIA Many*   NITRITE Negative   LEUKOCYTESUR 3+*   HYALINECASTS 49*     Others:   Recent Labs   Lab 08/10/19  0335   TSH 7.128*         ASSESSMENT/PLAN:     I have reviewed the medications in compliance with CMS Regulation F756 of the [place]. Based on information gathered, the following items may need to be addressed:    **Consider discontinuing prn olanzapine, not recommended for continued use in SNF setting per CMS regulations.    Medications will be reviewed and monitored by Pharm.D.        Claire Mckeon Pharm. D.  Clinical Pharmacist  [facility]

## 2019-08-13 NOTE — PT/OT/SLP EVAL
PhysicalTherapy   Evaluation    Ruth Lan   MRN: [id number]     PT Received On: 08/13/19  PT Start Time: 0955     PT Stop Time: 1048    PT Total Time (min): 53 min       Billable Minutes:  Evaluation 15, Therapeutic Activity 23, Therapeutic Exercise 15 and Total Time 53    Diagnosis: Atrial fibrillation with RVR.    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, ER/RI positive    Chronic diastolic heart failure     Heart murmur     Hyperlipidemia     Hypertension     Left atrial enlargement     Severe mitral regurgitation       Past Surgical History:   Procedure Laterality Date    BIOPSY, LYMPH NODE, SENTINEL Right 12/6/2012    [facility] 2ND FLR    BREAST BIOPSY  11/2012    Right breast- invasive ductal carcinoma    CARDIOVERSION N/A 8/9/2019    Performed by Vu Villalta MD at [facility] EP LAB    CARDIOVERSION N/A 12/18/2018    Performed by Emanuel Guerra [facility] at [facility] EP LAB    CATARACT EXTRACTION W/  INTRAOCULAR LENS IMPLANT Right 10/21/2008    OU     CATARACT EXTRACTION W/  INTRAOCULAR LENS IMPLANT Left     ECHOCARDIOGRAM,TRANSESOPHAGEAL N/A 8/9/2019    Performed by [facility] EP LAB    ECHOCARDIOGRAM,TRANSESOPHAGEAL N/A 12/18/2018    Per[facility] by [facility] at [facility] EP LAB    INSERTION, LOCALIZATION WIRE Right 12/6/2012    Performed by [facility] at [facility] OR 2ND FLR    [id number] Right 1/14/2013    Performed by [facility] at [facility] OR 2ND FLR    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    TUBAL LIGATION           General Precautions: Standard, fall, hearing impaired  Orthopedic Precautions: N/A   Braces: N/A    Recent Surgery: Procedure(s) (LRB):  CARDIOVERSION (N/A)  ECHOCARDIOGRAM,TRANSESOPHAGEAL (N/A)     "    Patient History:  Lives With: alone  Living Arrangements: assisted living  Home Accessibility: wheelchair accessible  Home Layout: Able to live on 1st floor  Stair Railings at Home: none  Transportation Anticipated: family or friend will provide  Equipment Currently Used at Home: walker, rolling, rollator, wheelchair  DME owned (not currently used): none    Previous Level of Function:  Ambulation Skills: needs device and assist  Transfer Skills: independent  ADL Skills: needs assist  Work/Leisure Activity: needs assist   Pt is poor historian and appears to have memory impairments. Does not remember the name of the assisted living facility she resided in. Reports she used a rollator for ambulation, but did not use one most recently. She was unable to answer what she used most recently. Per physical therapy evaluation in acute care, she used a wheelchair since she fell. Pt unable to give history of falls. Received supervision and assistance with ADLs.     Subjective:  Pt agreeable to therapy session with encouragement. "I guess Ill have to do it. I don't have a choice"    Chief Complaint: "I feel terrible. I didn't want to come here. They didn't give me a choice"  Patient goals: return home    Pain/Comfort  Pain Rating 1: 0/10    Objective:  Patient found supine.       Cognitive Exam:  Oriented to: Person  Follows Commands/attention: Easily distracted and Follows one-step commands  Communication: clear/fluent  Safety awareness/insight to disability: impaired    Physical Exam:  Postural examination/scapula alignment:    -       No postural abnormalities identified    Skin integrity: Visible skin intact  Edema: None noted     Sensation:      -       Intact    Upper Extremity Range of Motion:  Right Upper Extremity: WFL  Left Upper Extremity: WFL    Upper Extremity Strength:  Right Upper Extremity: Deficits: grossly 3/5 at shoulder, 3+/5 at elbow, 4/5  strength  Left Upper Extremity: Deficits: grossly 3+/5 at " shoulder, 4/5 at elbow, 4/5  strength    Lower Extremity Range of Motion:  Right Lower Extremity: WFL  Left Lower Extremity: WFL    Lower Extremity Strength:  Right Lower Extremity: Deficits: grossly 3/5 at hip and knee, ankle WFL  Left Lower Extremity: Deficits: grossly 4-/5  at hip and knee, ankle WFL     Fine motor coordination:     -       Intact    Gross motor coordination: WFL      AM-PAC 6 CLICK MOBILITY  Total Score:14    Bed Mobility:  Sit>Supine: ModA with BLE  Supine>Sit: Gianna with RLE    Transfers:  Sit<>Stand: Gianna from edge of bed and wheelchair c/ rolling walker, decreased standing tolerance will sit back down or forward flex without warning  Stand Pivot Transfer: Gianna edge of bed <> wheelchair c/ rolling walker    Gait:  Amb: attempted, pt only able to take 2 small steps before sitting down, Gianna c/ rolling walker, decreased standing tolerance       Therex:  Sitting: marches, LAQ, ankle pumps, glute sets (x5 reps each LE) required constant cuing and encouragement for participation      Vitals:   Supine: 93/54 mmHg,  60 bpm  Sittin/53mmHg, 62 bpm  Sitting after standing (unable to maintain standing position): 94/53mmHg, 63 bpm    Patient left supine with call button in reach.    Assessment:  Ruth Lan is a 84 y.o. female with a medical diagnosis of Atrial fibrillation with RVR with history of right knee arthritis. Pt appears confused and agitated, but agreeable to participation. Requires minimal assistance for transfers, but decreased standing tolerance, as she quickly tries to sit down or will forward flex without warning of feeling fatigued. Pt is a high fall risk. Reports pain in R knee, due to history of arthritis, and requires assistance with management of the RLE during bed mobility. Unable to maintain focus during therapeutic exercises and required constant cuing and reminder. Pt will continue to benefit from skilled physical therapy to address below impairments and  functional limitations to maximize level of function to reduce burden of care.     Rehab identified problem list/impairments: weakness, impaired endurance, impaired self care skills, impaired functional mobilty, gait instability, impaired balance, impaired cognition, decreased lower extremity function, decreased safety awareness    Rehab potential is good.    Activity tolerance: Fair    Discharge recommendations: assisted living facility     Barriers to discharge: Decreased caregiver support    Equipment recommendations: none     GOALS:   Multidisciplinary Problems     Physical Therapy Goals        Problem: Physical Therapy Goal    Goal Priority Disciplines Outcome Goal Variances Interventions   Physical Therapy Goal     PT, PT/OT Ongoing (interventions implemented as appropriate)     Description:  Goals to be met by: 18     Patient will increase functional independence with mobility by performin. Supine to sit with Set-up [place]  2. Sit to supine with Stand-by Assistance  3. Sit to stand transfer with Supervision  4. Bed to chair transfer with Supervision using Rolling Walker  5. Gait  x 50 feet with Moderate Assistance using Rolling Walker.   6. Wheelchair propulsion x50 feet with Stand-by Assistance using bilateral uppper extremities  6. Ascend/Descend 4 inch curb step with Moderate Assistance using Rolling Walker.  7. Stand for 3 minutes with Minimal Assistance using Rolling Walker                      PLAN:    Patient to be seen 5 x/week  to address the above listed problems via gait training, therapeutic activities, therapeutic exercises, therapeutic groups, neuromuscular re-education, wheelchair management/training  Plan of Care Expires: 19    Albino Pinto, [facility] 2019

## 2019-08-13 NOTE — PLAN OF CARE
08/13/19 0704   Final Note   Assessment Type Final Discharge Note   Anticipated Discharge Disposition SNF   Hospital Follow Up  Appt(s) scheduled? Yes

## 2019-08-13 NOTE — PLAN OF CARE
Problem: Occupational Therapy Goal  Goal: Occupational Therapy Goal  Goals to be met by: 8/28/19    Patient will increase functional independence with ADLs by performing:    UE Dressing with Set-up Assistance.  LE Dressing with Set-up Assistance.  Toileting from bedside commode with Supervision for hygiene and clothing management.   Bathing from  shower chair/bench with Stand-by Assistance.  Toilet transfer to bedside commode with Supervision.  Increased functional strength to 4/5 for improved performance of UB ADL tasks of self dressing.  Pt to consistently demonstrate the ability to make her basic self care needs known.  Pt to manage incontinence pads with Supervision only for improved task of toileting.    Outcome: Ongoing (interventions implemented as appropriate)  Luz Marina Levy OT  8/13/2019

## 2019-08-13 NOTE — PT/OT/SLP EVAL
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, ER/TN positive    Chronic diastolic heart failure     Heart murmur     Hyperlipidemia     Hypertension     Left atrial enlargement     Severe mitral regurgitation       Past Surgical History:   Procedure Laterality Date    BIOPSY, LYMPH NODE, SENTINEL Right 12/6/2012    Performed by [facility] at [facility] OR 2ND FLR    BREAST BIOPSY  11/2012    Right breast- invasive ductal carcinoma    CARDIOVERSION N/A 8/9/2019    Performed by Vu Villalta MD at [facility] EP LAB    CARDIOVERSION N/A 12/18/2018    Performed by Emanuel Guerra MD at [facility] EP LAB    CATARACT EXTRACTION W/  INTRAOCULAR LENS IMPLANT Right 10/21/2008    OU     CATARACT EXTRACTION W/  INTRAOCULAR LENS IMPLANT Left     ECHOCARDIOGRAM,TRANSESOPHAGEAL N/A 8/9/2019    Performed by [facility] at [facility] EP LAB    ECHOCARDIOGRAM,TRANSESOPHAGEAL N/A 12/18/2018    Performed by [facility] Provider at [facility] EP LAB    INSERTION, LOCALIZATION WIRE Right 12/6/2012    Performed by Demetri Epstein MD at [facility] OR 2ND FLR    [id number] Right 1/14/2013    Performed by Dem[facility]ri Epstein MD at [facility] OR 2ND FLR    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    TUBAL LIGATION           General Precautions: Standard, fall, hearing impaired  Orthopedic Precautions: N/A  Braces: N/A          Patient History:  Lives With: facility resident  Living Arrangements: other  (see comments)([facility] (per chart review, pt unable to verify))    Prior level of function:    Bed Mobility/Transfers: independent  Grooming: independent  Bathing: needs assist  Upper Body Dressing: independent  Lower Body Dressing: independent  Toileting: independent        Dominant hand: right    Subjective:  Communicated with nurse Walls prior to session.  While seated at sink during ADL's pt noted to become weak, leaning forward yet remaining cognizant. BP taken and determined need to return to supine with session halted.  Chief Complaint: Pt reports she went to sleep on another campus and was transferred to this campus without her knowledge.   Patient/Family stated goals: Pt unable to identify functional goals at this time.    Pain/Comfort  Pain Rating 1: 0/10  Pain Rating Post-Intervention 1: 0/10    Objective:        Cognitive Exam:  Oriented to: Person  Follows Commands/attention: Follows one-step commands  Communication: clear/fluent and [place], refused use of hearing aid during evaluation  Memory:  Impaired STM  Safety awareness/insight to disability: impaired  Coping skills/emotional control: noted anxiety, difficulty with self regulation during eval.    Visual/perceptual:  No deficits noted    Physical Exam:  Postural examination/scapula alignment:    -       Rounded shoulders  -       Forward head  Skin integrity: Visible skin intact  Edema: None noted throughout BUE, torso and BLE's    Sensation:      -       Intact for LT, deep pressure and thermal sensation     Upper Extremity Range of Motion:  Right Upper Extremity: WFL  Left Upper Extremity: WFL    Upper Extremity Strength:  Right Upper Extremity: grossly 3+/5 throughout  Left Upper Extremity: grossly 3+/5 throughout BUE   Strength: grossly 3+/5    Fine motor coordination:      -       Intact    Gross motor coordination: WFL    Occupational Performance:    Bed Mobility:    · Patient completed Rolling/Turning to Left with  minimum assistance  · Patient  completed Rolling/Turning to Right with minimum assistance  · Patient completed Scooting/Bridging with moderate assistance  · Patient completed Supine to Sit with minimum assistance  · Patient completed Sit to Supine with moderate assistance     Functional Mobility/Transfers:  · Patient completed Sit <> Stand Transfer with minimum assistance  with  rolling walker and and extended time   · Patient completed Bed <> Chair Transfer using Step Transfer technique with minimum assistance with rolling walker  · Patient completed Toilet Transfer Step Transfer technique with minimum assistance with  rolling walker and bedside commode  · Patient completed  Shower Transfer Stand Pivot technique with moderate assistance with rolling walker and ttb  · Functional Mobility: Pt currently performing ADL's from w/c level due to impairments noted below.    Activities of Daily Living:  · Feeding:  supervision and set up  · Grooming: minimum assistance for oral care, from seated position  · Bathing: moderate assistance seated at sink  · Upper Body Dressing: minimum assistance with set up  · Lower Body Dressing: moderate assistance    · Toileting: moderate assistance      AMPA 6 Click:  AMPAC Total Score: 12    Additional Treatment:  OT reviewed role of OT, discussed and set goals with pt participation and initiated standing tolerance activities during ADL retraining.    Patient left up in chair with call button in reach. Nurse was notified of hypotension.   Sitting @ sink: 105/59  Return to supine: 97/50  Supine @ 2 min: 105/55    Assessment:  Ruth Lan is a 84 y.o. female with a medical diagnosis of A-fib  Who presents this day with significantly declined functional status in all ADL's, functional mobility, decreased tolerance of ADL performance from standing, decreased ability to direct her own care, mild confusion who was living in correction prior to admission. Reportedly she has had several falls over the past month resulting on  her becoming more w/c bound due to fear of falling. This patient would benefit from skilled OT intervention to maximize her functional potential in below set goals. She is currently a high fall risk.    Rehab identified problem list/impairments: weakness, impaired endurance, impaired self care skills, impaired functional mobilty, impaired balance, impaired cognition, decreased upper extremity function, decreased ROM, impaired cardiopulmonary response to activity    Rehab potential is good    Activity tolerance: Good    Discharge recommendations: assisted living facility, nursing facility, basic(TBD, dependent on progress)     Barriers to discharge: None     Equipment recommendations: other (see comments)(TBD)     GOALS:   Multidisciplinary Problems     Occupational Therapy Goals        Problem: Occupational Therapy Goal    Goal Priority Disciplines Outcome Interventions   Occupational Therapy Goal     OT, PT/OT Ongoing (interventions implemented as appropriate)    Description:  Goals to be met by: 8/28/19    Patient will increase functional independence with ADLs by performing:    UE Dressing with Set-up Assistance.  LE Dressing with Set-up Assistance.  Toileting from bedside commode with Supervision for hygiene and clothing management.   Bathing from  shower chair/bench with Stand-by Assistance.  Toilet transfer to bedside commode with Supervision.  Increased functional strength to 4/5 for improved performance of UB ADL tasks of self dressing.  Pt to consistently demonstrate the ability to make her basic self care needs known.  Pt to manage incontinence pads with Supervision only for improved task of toileting.                      PLAN: Patient to be seen 5 x/week to address the above listed problems via self-care/home management, therapeutic activities, therapeutic exercises, cognitive retraining, wheelchair management/training  Plan of Care expires:    Plan of Care reviewed with: patient    Luz Marina Levy  OT  08/13/2019

## 2019-08-13 NOTE — PLAN OF CARE
Problem: Physical Therapy Goal  Goal: Physical Therapy Goal  Goals to be met by: 18     Patient will increase functional independence with mobility by performin. Supine to sit with Set-up [place]  2. Sit to supine with Stand-by Assistance  3. Sit to stand transfer with Supervision  4. Bed to chair transfer with Supervision using Rolling Walker  5. Gait  x 50 feet with Moderate Assistance using Rolling Walker.   6. Wheelchair propulsion x50 feet with Stand-by Assistance using bilateral uppper extremities  6. Ascend/Descend 4 inch curb step with Moderate Assistance using Rolling Walker.  7. Stand for 3 minutes with Minimal Assistance using Rolling Walker    Outcome: Ongoing (interventions implemented as appropriate)  Pt goals established.

## 2019-08-13 NOTE — PROGRESS NOTES
[facility]                                  Skilled Nursing Facility                   Progress Note   DOS 8/13/2019  Admit Date: 8/12/2019  SYEDA   Principal Problem:  Atrial fibrillation status post cardioversion   HPI obtained from patient interview and chart review     Chief Complaint: Establish Care/ Initial Visit     HPI: Ms Lan is an 84 y.o. female with sig pmhx of chronic AF on Eliquis s/p DCCV 12/2018, HFpEF, HTN, AS s/p AVR 2004, severe MR, CKD4, HTN, Arthritis,HLD and hx R breast cancer who presents to SNF s/p hospitalization for Atrial fibrillation with RVR with LAUREEN/successful DCCV 8/9/19 by Dr. Randall. The patient initially presented to arrhythmia clinic for AF w/ RVR HR 140s, generalized weakness, shortness of breath, and multiple falls. The patient denies having chest pain or palpitations. Initiating order to monitor patient with camera for fall prevention. 8/13 assessment reveals irregular heartbeat, per auscultation.  Initiated 12 lead EKG. 08/13, nurse reporting hypotension with manual BP at 90/50, decreased lasix to 40 mg daily and decreased lopressor to 50 mg bid. 8/13 Daughter reporting the patient has loose dental and lack of appetite.  Initiating nutrition consult. All labs reviewed from 8/12. No leukocytosis. Hemoglobin stable at 10.2.  Platelets stable at 161. Sodium at 139. Potasium 4.1. Creatinine stable at 1.4. 24 hr blood glucose 105. 8/13 Patient with multiple bruises to UE and wound to left UE.  Initiating consult to wound care for evaluation of left upper arm extremity wound. Patient progessing well with PT/OT. Patient medically stable. Continuing to follow and treat all acute and chronic conditions.    Patient will be treated at [facility] with PT and OT to improve functional status and ability to perform ADLs.     PEx  Constitutional: Patient appears well-developed and in no distress   HENT:  Head: Normocephalic and atraumatic.   Eyes: Pupils are  equal, round, and reactive to light.   Neck: Normal range of motion. Neck supple.   Cardiovascular: Normal rate, regular rhythm and normal heart sounds.    Pulmonary/Chest: Effort normal and breath sounds are clear  Abdominal: Soft. Bowel sounds are normal.   Musculoskeletal: Normal range of motion. + generalized weakness.  Neurological: Alert and oriented to person, place, and time.   Psychiatric: Normal mood and affect. Behavior is normal.   Wounds/Skin: Skin is warm and dry. + wound to LUE with Mepilex intact, old dry drainage    Temp:  [96.1 °F (35.6 °C)-97.9 °F (36.6 °C)]   Pulse:  [60-70]   Resp:  [17-18]   BP: ()/(50-55)   SpO2:  [93 %-96 %]   Body mass index is 27.02 kg/m².     ROS  Constitutional: Negative for fever and malaise/fatigue.   Eyes: Negative for blurred vision, double vision and discharge.   Respiratory: Negative for cough, shortness of breath and wheezing.    Cardiovascular: Negative for chest pain, palpitations, claudication, and leg swelling.   Gastrointestinal: Negative for abdominal pain, constipation, diarrhea, nausea and vomiting.   Genitourinary: Negative for dysuria, frequency and urgency.   Musculoskeletal:  + generalized weakness. Negative for back pain and myalgias.   Skin: Negative for itching and rash.  Neurological: Negative for dizziness, speech change, seizures, and headaches.   Psychiatric/Behavioral: Negative for depression. The patient is not nervous/anxious.      Past Medical History: Patient has a past medical history of Arthritis, Atrial fibrillation (10/2018), Breast cancer (11/2012), Chronic diastolic heart failure, Heart murmur, Hyperlipidemia, Hypertension, Left atrial enlargement, and Severe mitral regurgitation.    Past Surgical History: Patient has a past surgical history that includes Tubal ligation; Breast biopsy (11/2012); Tissue aortic valve replacement (09/27/2004); Cataract extraction w/  intraocular lens implant (Right, 10/21/2008); Cataract extraction  w/  intraocular lens implant (Left); Cardioversion (N/A, 12/18/2018); and Treatment of cardiac arrhythmia (N/A, 8/9/2019).    Social History: Patient reports that she quit smoking about 16 years ago. She has a 50.00 pack-year smoking history. She has never used smokeless tobacco. She reports that she drinks about 1.5 oz of alcohol per week. She reports that she does not use drugs.    Family History: family history includes Breast cancer in her maternal grandmother; Cancer in her maternal aunt; Heart disease in her father; No Known Problems in her brother, maternal grandfather, maternal uncle, mother, paternal aunt, paternal grandfather, paternal grandmother, paternal uncle, and sister.    Allergies: Patient is allergic to etodolac and nsaids (non-steroidal anti-inflammatory drug).      Assessment and Plan:  Atrial fibrillation with RVR s/p LAUREEN/successful DCCV 8/9/19   -Home medications: diltiazem 240 mg daily + Lopressor 25 mg BID  -continue started on PO amio x 14 d  -Resume Eliquis renal dose adjusted  -Dr Guerra f/u 1 month with repeat TTE prior  -8/13 initiated amiodarone 200 mg daily to start on 8/17  -8/13 Initiated 12 lead EKG for suspected afib    Hypotension  -08/13 Decreased lasix to 40 mg daily and decreased lopressor to 50 mg bid.     Nutritional deficiency  -8/13 Initiating nutrition consult.      Wound of left upper extremity  -8/13 Initiating consult to wound care for evaluation of left upper arm extremity wound.     ANTONIO on CKD4  -2/2 hypotension vs cardiorenal, improving w/ diuresis  -8/13 Decreased lasix to 40 mg daily.    Acute on chronic diastolic heart failure  Severe MR  AS s/p bioprosthetic AVR '04  -Home medication: Lasix 40 mg BID  -Echo: EF 55%, severe MR, moderate-severe TR, CVP 15, pHTN  -8/13 initiate order for Strict I/O's, daily weights, and decreased Lasix to 40 mg daily     Delirium resolved  UTI resolved   -Ceftriaxone > PO Cipro  -UCx: Klebsiella pneumoniae  -8/13 zyprexa dced       HTN  -8/13 decreased lopressor to 50 mg bid.      Generalized weakness  Falls  -PTOT consult - SNF  -Fall precautions  -8/13 Initiating order to monitor patient with camera for fall prevention.      Elevated TSH  -TSH 7.1, normal T4  -Repeat TFTs 4-6 wks per pcp    Acute hypoxic respiratory failure, resolved    Outpatient f/u thyroid function, pulmonary status, liver function, as well as optho exam annually    Future Appointments   Date Time Provider Department Center   9/5/2019  3:00 PM Florentino Valadez MD [facility] IM [facility] PCW   11/8/2019  1:00 PM Angelika Mcallister MD [facility] CARDIO [facility]       I certify that SNF services are required to be given on an inpatient basis because Ruth Lan needs for skilled nursing care and/or skilled rehabilitation are required on a daily basis and such services can only practically be provided in a skilled nursing facility setting and are for an ongoing condition for which she received inpatient care in the hospital.     Time (minutes) spent in the care of the patient (Greater than 1/2 spent in non direct face-to-face contact) 111 mins.   91 of 111 minutes spent on documentation and counseling patient on clinical condition and therapies provided regarding extensive chart review including hospital Medicine notes, electrophysiology, and cardiology consult notes, AFib with RVR status post cardioversion requiring 12 lead EKG, new hypotension, new nutritional deficiency, new left upper extremity wound, and CHF.The remainder of the time was spent in direct patient care.     Cali Chun NP

## 2019-08-13 NOTE — H&P
Hospital Medicine  History and Physical Exam    Admit Date: 8/12/2019  SYEDA TBD  Principal Problem:  Atrial fibrillation status post cardioversion   Primary care Physician: Florentino Brar MD  Code status: Full Code    HPI:   Ruth Lan is a 84 y.o. female with chronic AF on Eliquis s/p DCCV 12/2018, HFpEF, HTN, AS s/p AVR 2004, severe MR, CKD4, HTN, dementia, and hx R breast cancer who presents after being sent to ED from arrhythmia clinic for AFib with RVR in 140's. History provided by daughter Sandra who states that her mother has not been herself for the past few weeks. She has had generalized weakness, including a fall at home (she lives in an assisted living facility), as well as shortness of breath. She has also had a lack of appetite as well as diminished memory lately. The patient denies having chest pain or palpitations. In the ED, HR noted to be as high as 140's and now in 120's without intervention, EKG AF RVR, SBP in 100's, troponin negative, BNP 1271, CXR with pulmonary edema, renal function at baseline. She underwent LAUREEN/successful DCCV 8/9/19. Patient states she is still a little dyspneic on exertion and still with general weakness.    Patient transferred to [facility] with PT and OT to improve functional status and ability to perform ADLs.       Past Medical History: Patient has a past medical history of Arthritis, Atrial fibrillation (10/2018), Breast cancer (11/2012), Chronic diastolic heart failure, Heart murmur, Hyperlipidemia, Hypertension, Left atrial enlargement, and Severe mitral regurgitation.    Past Surgical History: Patient has a past surgical history that includes Tubal ligation; Breast biopsy (11/2012); Tissue aortic valve replacement (09/27/2004); Cataract extraction w/  intraocular lens implant (Right, 10/21/2008); Cataract extraction w/  intraocular lens implant (Left); Cardioversion (N/A, 12/18/2018); and Treatment of cardiac arrhythmia (N/A, 8/9/2019).    Social History:  Patient reports that she quit smoking about 16 years ago. She has a 50.00 pack-year smoking history. She has never used smokeless tobacco. She reports that she drinks about 1.5 oz of alcohol per week. She reports that she does not use drugs.    Family History: family history includes Breast cancer in her maternal grandmother; Cancer in her maternal aunt; Heart disease in her father; No Known Problems in her brother, maternal grandfather, maternal uncle, mother, paternal aunt, paternal grandfather, paternal grandmother, paternal uncle, and sister.    Medications: reviewed     Allergies: Patient is allergic to etodolac and nsaids (non-steroidal anti-inflammatory drug).    ROS  Constitutional: no fever or chills  Respiratory: no cough or shortness of breath  Cardiovascular: no chest pain or palpitations  Gastrointestinal: no nausea or vomiting, no abdominal pain or change in bowel habits  Genitourinary: no hematuria or dysuria  Integument/Breast: no rash or pruritis  Hematologic/Lymphatic: no easy bruising or lymphadenopathy  Musculoskeletal: no arthralgias or myalgias  Neurological: no seizures or tremors  Behavioral/Psych: no depression or anxiety    PEx  Temp:  [96.1 °F (35.6 °C)-97.9 °F (36.6 °C)]   Pulse:  [60-70]   Resp:  [17-18]   BP: ()/(50-55)   SpO2:  [93 %-96 %]   Body mass index is 27.02 kg/m².     General appearance: no distress  Mental status: Alert and oriented x 3  HEENT:  conjunctivae/corneas clear, PERRL  Neck: supple, thyroid not enlarged  Pulm:   normal respiratory effort, CTA B, no c/w/r  Card: RRR, no murmur  Abd: soft, NT, ND, BS present; no masses, no organomegaly  Ext: no edema  Pulses: 2+, symmetric  Skin: color, texture, turgor normal. No rashes or lesions  Neuro: CN II-XII grossly intact, no focal numbness or weakness, normal strength and tone     Recent Labs   Lab 08/10/19  0335 08/10/19  1122 08/11/19  0338 08/12/19  0454    137 139 139   K 3.9 3.8 3.4* 4.1    103 103 101    CO2 24 23 24 27   BUN 32* 32* 32* 30*   CREATININE 1.7* 1.7* 1.5* 1.4   * 192* 152* 105   CALCIUM 8.9 8.9 9.1 8.7   MG 1.9  --  1.8 1.8     Recent Labs   Lab 08/07/19  1300   ALKPHOS 167*   ALT 20   AST 23   ALBUMIN 3.1*   PROT 7.7   BILITOT 0.8   INR 1.3*       Recent Labs   Lab 08/10/19  0335 08/11/19  0338 08/12/19  0454   WBC 7.08 7.58 6.51   HGB 10.9* 10.7* 10.2*   HCT 36.7* 36.0* 34.9*    167 161   GRAN 66.3  4.7 71.3  5.4 64.0  4.2   LYMPH 18.2  1.3 15.6*  1.2 20.1  1.3   MONO 12.0  0.9 9.5  0.7 12.1  0.8     Assessment and Plan:    Atrial fibrillation with RVR s/p LAUREEN/successful DCCV 8/9/19   -Home medications: diltiazem 240 mg daily + Lopressor 25 mg BID  -continue amiodarone load  -continue apixaban 2.5 mg BI  -Dr Guerra f/u 1 month with repeat TTE prior  -Will need outpatient monitoring of thyroid function, pulmonary status, liver function, as well as optho exam annually     Acute on chronic diastolic heart failure  Acute hypoxic respiratory failure, resolved  Severe MR  AS s/p bioprosthetic AVR '04  Pulmonary HTN  continue furosemide 40 mg BID and metoprolol  -Strict I/O's, daily weights, fluid/sodium restriction  -Dry weight ~150 lb     Urinary retention  Resolved     Acute metabolic encephalopathy  Klebsiella pneumonia UTI  Complete remainder of course of antibiotics including ciprofloxacin  Olanzapine prn; may titrate dose if needed but patient has not needed as of late    Essential HTN  Continue metoprolol and furosemide     Generalized weakness  Falls  -PTOT  -Fall precautions     Mild cardiorenal acute kidney insufficiency CKD4  Resolved with diuresis     Elevated TSH  -TSH 7.1, normal T4  -Repeat TFTs 4-6 wks       Continue the following medications for treatment of the indicated conditions:  ·  Indication Medication Dose Route  Frequency       Atrial fibrillation [START ON 8/17/2019] amiodarone  200 mg Oral Daily    Atrial fibrillation amiodarone  400 mg Oral BID     Atrial fibrillation apixaban  2.5 mg Oral BID    klebsiella UTI ciprofloxacin HCl  500 mg Oral Q24H    Acute diastolic heart failure furosemide  40 mg Oral BID    Atrial fibrillation metoprolol tartrate  50 mg Oral BID    nutrition multivitamin  1 tablet Oral Daily    hyperlipidemia pravastatin  80 mg Oral Daily     Future Appointments   Date Time Provider Department Center   9/5/2019  3:00 PM Florentino Valadez MD [facility] PCW   11/8/2019  1:00 PM Angelika Mcallister MD [facility] CARDIO [facility] Serrano       I certify that SNF services are required to be given on an inpatient basis because Ruth Lan needs for skilled nursing care and/or skilled rehabilitation are required on a daily basis and such services can only practically be provided in a skilled nursing facility setting and are for an ongoing condition for which she received inpatient care in the hospital.     Time (minutes) spent in care of the patient (Greater than 1/2 spent in direct face-to-face contact) 40    Tracie Farley MD

## 2019-08-14 PROBLEM — S41.112A SKIN TEAR OF LEFT UPPER ARM WITHOUT COMPLICATION: Status: ACTIVE | Noted: 2019-01-01

## 2019-08-14 NOTE — PT/OT/SLP PROGRESS
Physical Therapy  Treatment    Ruth Lan   MRN: [id number]   Admitting Diagnosis: Atrial fibrillation status post cardioversion    PT Received On: 08/14/19          Billable Minutes:  Therapeutic Activity 23, Therapeutic Exercise 23 and Total Time 46    Treatment Type: Treatment  PT/PTA: PT     PTA Visit Number: 0       General Precautions: Standard, fall, hearing impaired  Orthopedic Precautions: N/A   Braces: N/A         Subjective:  Pt agreeable to therapy session.          Objective:  Patient found supine.        AM-PAC 6 CLICK MOBILITY  Total Score:     Bed Mobility:  Sit>Supine: Gianna c/ RLE on mat  Supine>Sit: Gianna-ModA c/ RLE and trunk on mat     Transfers:  Sit<>Stand: Gianna c/rolling walker, unsteady once in standing, Decreased eccentric control in sitting   Stand Pivot Transfer: Gianna-ModA c/rolling walker, wheelchair <> edge of mat    Gait:  Amb 2' x 2 trials, Gianna-ModA c/ rolling walker, decreased step length and clearance, forward flexed posture, decreased standing tolerance, (A) with walker management       Therex:  Supine: SAQ, ankle pumps, glute sets, heel slides AAROM (2x 10 reps)    Other comments:  Pt had an urinary accident while in the gym.    Patient left up in chair with with therapy staff for next therapy sesssion.      Assessment:  Ruth Lan is a 84 y.o. female with a medical diagnosis of Atrial fibrillation status post cardioversion.  Pt tolerated all exercises well today. Walked a further distance/took more steps than last session and required less assistance to stand. Pt still very unstable in standing, suddenly descends and requires Gianna-ModA to remain standing. Overall patient is making progress towards goals. Pt will continue to benefit from skilled physical therapy to address below impairments and functional limitations to maximize level of function.    Rehab identified problem list/impairments: weakness, impaired endurance, impaired self care skills, impaired functional  mobilty, gait instability, impaired balance, impaired cognition, decreased lower extremity function, decreased safety awareness    Rehab potential is good.    Activity tolerance: Fair    Discharge recommendations: assisted living facility     Barriers to discharge: Decreased caregiver support    Equipment recommendations: none     GOALS:   Multidisciplinary Problems     Physical Therapy Goals        Problem: Physical Therapy Goal    Goal Priority Disciplines Outcome Goal Variances Interventions   Physical Therapy Goal     PT, PT/OT Ongoing (interventions implemented as appropriate)     Description:  Goals to be met by: 18     Patient will increase functional independence with mobility by performin. Supine to sit with Set-up [place]  2. Sit to supine with Stand-by Assistance  3. Sit to stand transfer with Supervision  4. Bed to chair transfer with Supervision using Rolling Walker  5. Gait  x 50 feet with Moderate Assistance using Rolling Walker.   6. Wheelchair propulsion x50 feet with Stand-by Assistance using bilateral uppper extremities  6. Ascend/Descend 4 inch curb step with Moderate Assistance using Rolling Walker.  7. Stand for 3 minutes with Minimal Assistance using Rolling Walker                      PLAN:    Patient to be seen 5 x/week  to address the above listed problems via gait training, therapeutic activities, therapeutic exercises, therapeutic groups, neuromuscular re-education, wheelchair management/training  Plan of Care expires: 19  Plan of Care reviewed with: patient    Albino Pinto, SPT  2019     I certify that I was present in the room directing the student in service delivery and guiding them using my skilled judgment. As the co-signing therapist I have reviewed the students documentation and am responsible for the treatment, assessment, and plan.     Chanel Batista, PT  2019

## 2019-08-14 NOTE — PLAN OF CARE
Problem: Physical Therapy Goal  Goal: Physical Therapy Goal  Goals to be met by: 18     Patient will increase functional independence with mobility by performin. Supine to sit with Set-up [place]  2. Sit to supine with Stand-by Assistance  3. Sit to stand transfer with Supervision  4. Bed to chair transfer with Supervision using Rolling Walker  5. Gait  x 50 feet with Moderate Assistance using Rolling Walker.   6. Wheelchair propulsion x50 feet with Stand-by Assistance using bilateral uppper extremities  6. Ascend/Descend 4 inch curb step with Moderate Assistance using Rolling Walker.  7. Stand for 3 minutes with Minimal Assistance using Rolling Walker     Outcome: Ongoing (interventions implemented as appropriate)  Pt on going with goals.

## 2019-08-14 NOTE — PROGRESS NOTES
Consult received on patient's skin tear to left arm. Recommend weekly cleaning with sterile normal saline, dress with foam dressing. Discussed with Cali Chun NP, NP approved recs. Nursing to continue care. Re-consult wound care if needed.     08/14/19 1456        Wound 08/09/19 0618 Skin Tear lower Arm   Date First Assessed/Time First Assessed: 08/09/19 0618   Pre-existing: No  Primary Wound Type: Skin Tear  Side: Left  Orientation: lower  Location: Arm   Wound Image    Wound WDL ex   Drainage Amount None   Drainage Characteristics/Odor No odor   Appearance Moist;Pink   Tissue loss description Partial thickness   Periwound Area Ecchymotic   Wound Edges Open   Wound Length (cm) 1.5 cm   Wound Width (cm) 0.5 cm   Wound Depth (cm) 0.1 cm   Wound Volume (cm^3) 0.08 cm^3   Wound Surface Area (cm^2) 0.75 cm^2   Dressing   (recommend foam dressing weekly)

## 2019-08-14 NOTE — PT/OT/SLP PROGRESS
Occupational Therapy  Treatment    Ruth Lan   MRN: [id number]   Admitting Diagnosis: Atrial fibrillation status post cardioversion    OT Date of Treatment: 08/14/19       Billable Minutes:  Self Care/Home Management 38    General Precautions: Standard, fall, hearing impaired  Orthopedic Precautions: N/A  Braces: N/A    Subjective:  Communicated with PT prior to session.  Noted pt had episode of urinary incontinence. To re-consult with nursing re: bladder training program and benefits of such for this client.    Pain/Comfort  Pain Rating 1: 0/10  Pain Rating Post-Intervention 1: 0/10    Objective:    Functional Mobility/Transfers:  · Patient completed Sit <> Stand Transfer with minimum assistance  with  rolling walker   · Patient completed Toilet Transfer Step Transfer technique with moderate assistance with  rolling walker and extended time.  · Functional Mobility: Pt ambulated 5' to/from [facility] for toileting, had another episode of urinary incontinence and required chair pull with noted LOBx2    Activities of Daily Living:  · Upper Body Dressing: minimum assistance in sitting  · Lower Body Dressing: maximal assistance in sitting  · Toileting: maximal assistance with noted incontinence    [facility] 6 Click:  [facility] Total Score: 12    OT Exercises: AROM 1x10 reps all shoulder planes of movement.    Additional Treatment:  Pt educated in the benefit of maximizing standing balance/ tolerance as required for improved ADL performance from standing.    Patient left up in chair with call button in reach    ASSESSMENT:  Ruth Lan is a 84 y.o. female with a medical diagnosis of Atrial fibrillation status post cardioversion who is at significant fall risk with significant core weakness who is performing ADL's w/c level at this time. She has the potential for functional gains in standing balance/ tolerance as required to transition ADL performance at a household level RW level with skilled OT intervention.    Rehab  identified problem list/impairments: weakness, impaired endurance, impaired self care skills, impaired functional mobilty, impaired balance, impaired cognition, decreased upper extremity function, decreased ROM, impaired cardiopulmonary response to activity    Rehab potential is good    Activity tolerance: Good    Discharge recommendations: assisted living facility, nursing facility, basic(TBD, dependent on progress)     Barriers to discharge: None     Equipment recommendations: other (see comments)(TBD)     GOALS:   Multidisciplinary Problems     Occupational Therapy Goals        Problem: Occupational Therapy Goal    Goal Priority Disciplines Outcome Interventions   Occupational Therapy Goal     OT, PT/OT Ongoing (interventions implemented as appropriate)    Description:  Goals to be met by: 8/28/19    Patient will increase functional independence with ADLs by performing:    UE Dressing with Set-up Assistance.  LE Dressing with Set-up Assistance.  Toileting from bedside commode with Supervision for hygiene and clothing management.   Bathing from  shower chair/bench with Stand-by Assistance.  Toilet transfer to bedside commode with Supervision.  Increased functional strength to 4/5 for improved performance of UB ADL tasks of self dressing.  Pt to consistently demonstrate the ability to make her basic self care needs known.  Pt to manage incontinence pads with Supervision only for improved task of toileting.                      Plan:  Patient to be seen 5 x/week to address the above listed problems via self-care/home management, therapeutic activities, therapeutic exercises, cognitive retraining, wheelchair management/training  Plan of Care expires:    Plan of Care reviewed with: patient    Luz Marina Mildred, OT  08/14/2019

## 2019-08-14 NOTE — PLAN OF CARE
Problem: Adult Inpatient Plan of Care  Goal: Plan of Care Review  Outcome: Ongoing (interventions implemented as appropriate)     08/14/19 1552   Plan of Care Review   Plan of Care Reviewed With patient       Problem: Fall Injury Risk  Goal: Absence of Fall and Fall-Related Injury  Outcome: Ongoing (interventions implemented as appropriate)  Intervention: Identify and Manage Contributors to Fall Injury Risk     08/14/19 1552   Manage Acute Allergic Reaction   Medication Review/Management medications reviewed   Identify and Manage Contributors to Fall Injury Risk   Self-Care Promotion independence encouraged         Problem: Skin Injury Risk Increased  Goal: Skin Health and Integrity  Outcome: Ongoing (interventions implemented as appropriate)  Intervention: Optimize Skin Protection     08/14/19 1552   Prevent Additional Skin Injury   Head of Bed (HOB) HOB at 30 degrees   Pressure Reduction Devices heel offloading device utilized;pressure-redistributing mattress utilized   Pressure Reduction Techniques heels elevated off bed         Comments: Patient monitored every 1 to 2 hours for pain and safety.  Safety maintained.  Patient instructed to call for assistance.Call  Light and persoanl items in reach.

## 2019-08-15 NOTE — PLAN OF CARE
Problem: Fall Injury Risk  Goal: Absence of Fall and Fall-Related Injury  Outcome: Ongoing (interventions implemented as appropriate)  Patient used the call light throughout this shift. Telesitter camera in place at bedside  Intervention: Promote Injury-Free Environment     08/15/19 0637   Optimize [place] and Functional Mobility   Environmental Safety Modification assistive device/personal items within reach;clutter free environment maintained;lighting adjusted;room near unit station   Optimize Balance and Safe Activity   Safety Promotion/Fall Prevention bed alarm set;commode/urinal/bedpan at bedside;Fall Risk signage in place;/camera at bedside;side rails raised x 2;instructed to call staff for mobility

## 2019-08-15 NOTE — PLAN OF CARE
Problem: Adult Inpatient Plan of Care  Goal: Plan of Care Review  Outcome: Ongoing (interventions implemented as appropriate)  Moderate Malnutrition in the context of Chronic Illness/Injury     Related to (etiology):  Decline in cognitive function, decline in po intake      Signs and Symptoms (as evidenced by):  Energy Intake: <75% of estimated energy requirement for > 3months  Body Fat Depletion: mild depletion of orbitals and triceps   Muscle Mass Depletion: moderate depletion of clavicle and acromion bone region, clavicle bone region, temple region, dorsal hand   Weight Loss: in 2018 not this past yr     Interventions/Recommendations (treatment strategy):  Texture modification- dental soft  Mineral modified diet- 2 gram sodium  Commercial beverage- calories and protein- boost plus chocolate daily  Meals and snacks- milk with breakfast and lunch  Collaboration and referral of nutrition care     Nutrition Diagnosis Status:  New

## 2019-08-15 NOTE — PLAN OF CARE
Problem: Adult Inpatient Plan of Care  Goal: Plan of Care Review  Outcome: Ongoing (interventions implemented as appropriate)  Ms Vale received assistance with transferring from w/c to toilet to attempt void. Pt received verbal teaching of locking w/c wheels before getting in/out of w/c to prevent falls. She required a minimal amt of assistance with transfer. She had a medium BM, and she voided an unmeasured amt of urine into toilet. Safety measures maintained. [employer] camera is in use, and call light is within reach. Will cntinue with plan of care.

## 2019-08-15 NOTE — PLAN OF CARE
Problem: Occupational Therapy Goal  Goal: Occupational Therapy Goal  Goals to be met by: 8/28/19    Patient will increase functional independence with ADLs by performing:    UE Dressing with Set-up Assistance.  LE Dressing with Set-up Assistance.  Toileting from bedside commode with Supervision for hygiene and clothing management.   Bathing from  shower chair/bench with Stand-by Assistance.  Toilet transfer to bedside commode with Supervision.  Increased functional strength to 4/5 for improved performance of UB ADL tasks of self dressing.  Pt to consistently demonstrate the ability to make her basic self care needs known.  Pt to manage incontinence pads with Supervision only for improved task of toileting.     Outcome: Ongoing (interventions implemented as appropriate)  .

## 2019-08-15 NOTE — NURSING
Pt was assisted with transferring from w/c to toilet to attempt void. She voided 250 cc urine. Safety measures maintained. Call light is within reach.

## 2019-08-15 NOTE — PROGRESS NOTES
"[facility] - Skilled Nursing Care  Adult Nutrition  Progress Note    SUMMARY   Recommendations  Recommendation/Intervention: Continue low sodium , fluid restricted diet, recommend boost plus chocolate daily at supper, milk for lunch and breakfast for protein  Goals: PO to meet 85% of EEN with oral supplement by next RD visit  Nutrition Goal Status: new  Communication of RD Recs: reviewed with physician    Reason for Assessment    Reason For Assessment: consult  Diagnosis: (Debility, hypotension)  Relevant Medical History: HTN, CHF, AFIB, HLD, cancer, moderate malnutrition,  Interdisciplinary Rounds: attended  General Information Comments: [place] in [place], does not drink a lot of liquids, confused, cannot keep her fluid restriction on her own, her dentures dont fit and wobble in her mouth, she does not like to chew as she gets sores in her mouth  Nutrition Discharge Planning: DC on cardiac diet, with fluid restriciton per MD    Nutrition Risk Screen    Nutrition Risk Screen: no indicators present    Nutrition/Diet History    Patient Reported Diet/Restrictions/Preferences: general  Typical Food/Fluid Intake: lives in assisted living and her meals are taken care of.  Food Preferences: fruit, oatmeal, coffee, milk chocolate,   Spiritual, Cultural Beliefs, Yarsani Practices, Values that Affect Care: no  Supplemental Drinks or Food Habits: Ensure Plus  Food Allergies: NKFA  Factors Affecting Nutritional Intake: chewing difficulties/inability to chew food, impaired cognitive status/motor control, other (see comments)(lack of thirst)    Anthropometrics    Temp: 97.7 °F (36.5 °C)  Height Method: Stated  Height: 5' 3" (160 cm)  Height (inches): 63 in  Weight Method: Standard Scale  Weight: 66.6 kg (146 lb 13.2 oz)  Weight (lb): 146.83 lb  Ideal Body Weight (IBW), Female: 115 lb  % Ideal Body Weight, Female (lb): 127.68 lb  BMI (Calculated): 26.1  BMI Grade: 25 - 29.9 - overweight  Usual Body Weight (UBW), k kg  % Usual Body " Weight: 98.15  % Weight Change From Usual Weight: -2.06 %     Unintentional weight loss in 6 months   in 2018 with breast cancer  Lab/Procedures/Meds    Pertinent Labs Reviewed: reviewed  Pertinent Labs Comments: Hg 10.6, Hct 35.9,   Pertinent Medications Reviewed: reviewed  Pertinent Medications Comments: Abx, MVI, KCl, lasix, apixaban, statin,          Estimated/Assessed Needs    Weight Used For Calorie Calculations: 66.6 kg (146 lb 13.2 oz)  Energy Calorie Requirements (kcal): 1356  Energy Need Method: Fisher-St Jeor(x 1.25(PAL))  Protein Requirements: 66-80g  Weight Used For Protein Calculations: 66.6 kg (146 lb 13.2 oz)(1.0-1.2 gm/kg)  Fluid Requirements (mL): per MD 1500 pt and family made aware of restriciton today     RDA Method (mL): 1356  CHO Requirement: -      Nutrition Prescription Ordered    Current Diet Order: 2 gm Na , 1500 ml fluid restriction  Nutrition Order Comments: PO 50-75%  Oral Nutrition Supplement: none    Evaluation of Received Nutrient/Fluid Intake    Energy Calories Required: meeting needs  Protein Required: not meeting needs  Fluid Required: meeting needs  Tolerance: tolerating  % Intake of Estimated Energy Needs: 50 - 75 %  % Meal Intake: 50 - 75 %    Nutrition Risk    Level of Risk/Frequency of Follow-up: low     Assessment and Plan  Moderate  Malnutrition in the context of Chronic Illness/Injury    Related to (etiology):  Decline in cognitive function, decline in po intake     Signs and Symptoms (as evidenced by):  Energy Intake: <75% of estimated energy requirement for > 3months  Body Fat Depletion: mild depletion of orbitals and triceps   Muscle Mass Depletion: moderate depletion of clavicle and acromion bone region, clavicle bone region, temple region, dorsal hand   Weight Loss: in 2018 not this past yr    Interventions/Recommendations (treatment strategy):  Texture modification- dental soft  Mineral modified diet- 2 gram sodium  Commercial beverage- calories and protein- boost  plus chocolate daily  Meals and snacks- milk with breakfast and lunch  Collaboration and referral of nutrition care    Nutrition Diagnosis Status:  New    Monitor and Evaluation  Food and Nutrient Intake: food and beverage intake  Knowledge/Beliefs/Attitudes: food and nutrition knowledge/skill  Physical Activity and Function: nutrition-related ADLs and IADLs  Biochemical Data, Medical Tests and Procedures: inflammatory profile, gastrointestinal profile, electrolyte and renal panel  Nutrition-Focused Physical Findings: overall appearance     Malnutrition Assessment 8/15/19     Skin (Micronutrient): pallor  Hair/Scalp (Micronutrient): dry  Eyes (Micronutrient): conjunctiva dull  Teeth (Micronutrient): (poor fitting dentures pt does not want to replace)  Neck/Chest (Micronutrient): muscle wasting  Musculoskeletal/Lower Extremities: muscle wasting           Orbital Region (Subcutaneous Fat Loss): mild depletion  Upper Arm Region (Subcutaneous Fat Loss): mild depletion  Thoracic and Lumbar Region: well nourished   Raymond Region (Muscle Loss): moderate depletion  Clavicle Bone Region (Muscle Loss): moderate depletion  Clavicle and Acromion Bone Region (Muscle Loss): moderate depletion  Scapular Bone Region (Muscle Loss): mild depletion  Dorsal Hand (Muscle Loss): moderate depletion   Edema (Fluid Accumulation): 0-->no edema present         Nutrition Follow-Up    RD Follow-up?: Yes

## 2019-08-15 NOTE — PT/OT/SLP PROGRESS
Occupational Therapy  Treatment    Ruth Lan   MRN: [id number]   Admitting Diagnosis: Atrial fibrillation status post cardioversion    OT Date of Treatment: 08/15/19       Billable Minutes:48  Self Care/Home Management 28 and Therapeutic Exercise 20    General Precautions: Standard, fall, hearing impaired  Orthopedic Precautions: N/A  Braces: N/A         Subjective:  Communicated with nsg prior to session.  I am doing well today    Pain/Comfort  Pain Rating 1: 0/10  Pain Rating Post-Intervention 1: 0/10    Objective:   Pt. Seated in w/c on arrival    Occupational Performance:    Bed Mobility:    · Not tested     Functional Mobility/Transfers:  · Patient completed Sit <> Stand Transfer with minimum assistance  with  rolling walker   · Patient completed Toilet Transfer Stand Pivot technique with minimum assistance with  grab bars from w/c with cues for safety to and from w/c    Activities of Daily Living:  · Grooming: stand by assistance at sink level  · Upper Body Dressing: minimum assistance to vel and mange sweater around back  · Lower Body Dressing: minimum assistance to vel pants seated and to manage over hips instace with RW for bal and CGA to vel BLE socks w/c level   · Toileting: minimum assistance form 3n1 level with cleaning and clothing management     [facility] 6 Click:  [facility] Total Score: 15    OT Exercises: UE Ergometer 10 min    Additional Treatment:  Pt. With 2# dowel activity with 2x10 reps with  shd flex, bicep curls horz adb/add and forward flex motion to increase BUE ROM and strength,.     Pt. With standing and therex performed to increase ROM, endurance selfcare task and fxl mobility for independence     Patient left up in chair with all lines intact and call button in reach    ASSESSMENT:  Ruth Lan is a 84 y.o. female with a medical diagnosis of Atrial fibrillation status post cardioversion Pt. participated well with session on this day.Pt demos physical deficits with balance   functional mobility, UB strength, endurance  level of functional indep with daily tasks and activities and selfcare skills .Pt. Will continue to benefit from continued OT to progress towards goals  .  Rehab identified problem list/impairments: weakness, impaired endurance, impaired self care skills, impaired functional mobilty, impaired balance, impaired cognition, decreased upper extremity function, decreased ROM, impaired cardiopulmonary response to activity    Rehab potential is fair    Activity tolerance: Fair    Discharge recommendations: assisted living facility, nursing facility, basic(TBD, dependent on progress)     Barriers to discharge: None     Equipment recommendations: other (see comments)(TBD)     GOALS:   Multidisciplinary Problems     Occupational Therapy Goals        Problem: Occupational Therapy Goal    Goal Priority Disciplines Outcome Interventions   Occupational Therapy Goal     OT, PT/OT Ongoing (interventions implemented as appropriate)    Description:  Goals to be met by: 8/28/19    Patient will increase functional independence with ADLs by performing:    UE Dressing with Set-up Assistance.  LE Dressing with Set-up Assistance.  Toileting from bedside commode with Supervision for hygiene and clothing management.   Bathing from  shower chair/bench with Stand-by Assistance.  Toilet transfer to bedside commode with Supervision.  Increased functional strength to 4/5 for improved performance of UB ADL tasks of self dressing.  Pt to consistently demonstrate the ability to make her basic self care needs known.  Pt to manage incontinence pads with Supervision only for improved task of toileting.                  Plan:  Patient to be seen 5 x/week to address the above listed problems via self-care/home management, therapeutic activities, therapeutic exercises, cognitive retraining, wheelchair management/training  Plan of Care expires:    Plan of Care reviewed with: patient  A client care conference was  performed between the [facility] and [facility], prior to treatment by [facility], to discuss the patient's status, treatment plan and established goals.       DEVIN Monique/JENSEN 8/15/2019

## 2019-08-15 NOTE — PLAN OF CARE
Problem: Physical Therapy Goal  Goal: Physical Therapy Goal  Goals to be met by: 18     Patient will increase functional independence with mobility by performin. Supine to sit with Set-up [place]  2. Sit to supine with Stand-by Assistance  3. Sit to stand transfer with Supervision  4. Bed to chair transfer with Supervision using Rolling Walker  5. Gait  x 50 feet with Moderate Assistance using Rolling Walker.   6. Wheelchair propulsion x50 feet with Stand-by Assistance using bilateral uppper extremities  6. Ascend/Descend 4 inch curb step with Moderate Assistance using Rolling Walker.  7. Stand for 3 minutes with Minimal Assistance using Rolling Walker     Outcome: Ongoing (interventions implemented as appropriate)  Pt on going with goals.

## 2019-08-15 NOTE — PT/OT/SLP PROGRESS
"Physical Therapy  Treatment    Ruth Lan   MRN: [id number]   Admitting Diagnosis: Atrial fibrillation status post cardioversion    PT Received On: 08/15/19          Billable Minutes:  Therapeutic Activity 25, Therapeutic Exercise 23 and Total Time 48    Treatment Type: Treatment  PT/PTA: PT     PTA Visit Number: 0       General Precautions: Standard, fall, hearing impaired  Orthopedic Precautions: N/A   Braces: N/A       Subjective:  Pt agreeable to therapy session. "I didn't sleep last night"       Objective:  Patient found supine.        AM-PAC 6 CLICK MOBILITY  Total Score:15    Bed Mobility:  Sit>Supine: Gianna c/ RLE on mat   Supine>Sit: CGA on mat and bed     Transfers:  Sit<>Stand: Gianna-ModA c/ parallel bars, x 3 trials   Stand Pivot Transfer: Gianna-ModA c/no AD, wheelchair <> mat     Gait:  Amb: not attempt pt unable to remain in standing     Therex:  Supine: SAQ, ankle pumps, bridges, hip abduction AAROM, heel slides AAROM within pain free range (2x 15 reps)    Balance:  Static Standing c/ parallel bars, Gianna, Trial 1: 30s, Trial 2: 6s, Trial 3: 13s      Patient left in wheelchair with with therapy staff to be transport back to room.    Assessment:  Ruth Lan is a 84 y.o. female with a medical diagnosis of Atrial fibrillation status post cardioversion.  Pt had more difficulty remaining in standing today, making it unsafe to attempt ambulation. Reports she didn't sleep and was more tired today than usual. Tolerated all therapeutic exercises without complaints. Pt will continue to benefit from skilled physical therapy to address below impairments and functional limitations to maximize level of function.     Rehab identified problem list/impairments: weakness, impaired endurance, impaired self care skills, impaired functional mobilty, gait instability, impaired balance, impaired cognition, decreased lower extremity function, decreased safety awareness    Rehab potential is fair.    Activity " tolerance: Fair    Discharge recommendations: assisted living facility     Barriers to discharge: Decreased caregiver support    Equipment recommendations: none     GOALS:   Multidisciplinary Problems     Physical Therapy Goals        Problem: Physical Therapy Goal    Goal Priority Disciplines Outcome Goal Variances Interventions   Physical Therapy Goal     PT, PT/OT Ongoing (interventions implemented as appropriate)     Description:  Goals to be met by: 18     Patient will increase functional independence with mobility by performin. Supine to sit with Set-up [place]  2. Sit to supine with Stand-by Assistance  3. Sit to stand transfer with Supervision  4. Bed to chair transfer with Supervision using Rolling Walker  5. Gait  x 50 feet with Moderate Assistance using Rolling Walker.   6. Wheelchair propulsion x50 feet with Stand-by Assistance using bilateral uppper extremities  6. Ascend/Descend 4 inch curb step with Moderate Assistance using Rolling Walker.  7. Stand for 3 minutes with Minimal Assistance using Rolling Walker                      PLAN:    Patient to be seen 5 x/week  to address the above listed problems via gait training, therapeutic activities, therapeutic exercises, therapeutic groups, neuromuscular re-education, wheelchair management/training  Plan of Care expires: 19  Plan of Care reviewed with: patient    Brittnikeenan Blair, SPT  08/15/2019

## 2019-08-15 NOTE — ASSESSMENT & PLAN NOTE
Moderate  Malnutrition in the context of Chronic Illness/Injury    Related to (etiology):  Decline in cognitive function, decline in po intake     Signs and Symptoms (as evidenced by):  Energy Intake: <75% of estimated energy requirement for > 3months  Body Fat Depletion: mild depletion of orbitals and triceps   Muscle Mass Depletion: moderate depletion of clavicle and acromion bone region, clavicle bone region, temple region, dorsal hand   Weight Loss: in 2018 not this past yr    Interventions/Recommendations (treatment strategy):  Texture modification- dental soft  Mineral modified diet- 2 gram sodium  Commercial beverage- calories and protein- boost plus chocolate daily  Meals and snacks- milk with breakfast and lunch  Collaboration and referral of nutrition care    Nutrition Diagnosis Status:  New

## 2019-08-16 NOTE — PT/OT/SLP PROGRESS
Physical Therapy  Treatment    Ruth Lan   MRN: [id number]   Admitting Diagnosis: Atrial fibrillation status post cardioversion    PT Received On: 08/16/19  Total Time (min): (--)       Billable Minutes:  47 minutes  Therapeutic Activity 30 and Therapeutic Exercise 17    Treatment Type: Treatment  PT/PTA: PTA     PTA Visit Number: 1       General Precautions: Standard, fall, hearing impaired  Orthopedic Precautions: N/A   Braces: N/A         Subjective:  Communicated with nursing prior to session.  Pt agreed to work with therapy.     Pain/Comfort  Pain Rating 1: 0/10  Pain Rating Post-Intervention 1: 0/10    Objective:  Patient found seated w/c with  nursing in room.      AM-PAC 6 CLICK MOBILITY  Total Score:15    Bed Mobility:  Sit>Supine:on mat w/ Min A   Supine>Sit: on mat w/ Min A to trunk     Transfers:  Sit<>Stand: to/from w/c w/ RW and Min A; to/from mat w/ RW and Min A   Stand Pivot Transfer: wc<>mat w/ RW and Min A  Cueing for proper hand placement.   Gait:  Pt declined on this date 2* to pt fatigue.      Wheelchair Mobility:  Patient propels w/c 20ft w/ BUE and Mod A. Cueing for technique and w/c navigation     Therex:  Supine BLE therex 2x10 reps w/ AAROM as needed:    -AP   -SAQ   -GS  Seated BLE therex 2x10 reps w/ AAROM as needed:    -AP   -LAQ   -Hip Flexion     Additional Treatment:  -UBE x16 min    Patient left up in chair with call button in reach and nursing notified.    Assessment:  Ruth Lan is a 84 y.o. female with a medical diagnosis of Atrial fibrillation status post cardioversion.  Pt requires increased time for all activities. Pt will continue to benefit from PT services at this time. Continue with PT POC as indicated.    Rehab identified problem list/impairments: weakness, impaired endurance, impaired self care skills, impaired functional mobilty, gait instability, impaired balance, impaired cognition, decreased lower extremity function, decreased safety awareness    Rehab  potential is fair.    Activity tolerance: Fair    Discharge recommendations: assisted living facility     Barriers to discharge: Decreased caregiver support    Equipment recommendations: none     GOALS:   Multidisciplinary Problems     Physical Therapy Goals        Problem: Physical Therapy Goal    Goal Priority Disciplines Outcome Goal Variances Interventions   Physical Therapy Goal     PT, PT/OT Ongoing (interventions implemented as appropriate)     Description:  Goals to be met by: 18     Patient will increase functional independence with mobility by performin. Supine to sit with Set-up [place]  2. Sit to supine with Stand-by Assistance  3. Sit to stand transfer with Supervision  4. Bed to chair transfer with Supervision using Rolling Walker  5. Gait  x 50 feet with Moderate Assistance using Rolling Walker.   6. Wheelchair propulsion x50 feet with Stand-by Assistance using bilateral uppper extremities  6. Ascend/Descend 4 inch curb step with Moderate Assistance using Rolling Walker.  7. Stand for 3 minutes with Minimal Assistance using Rolling Walker                      PLAN:    Patient to be seen 5 x/week  to address the above listed problems via gait training, therapeutic activities, therapeutic exercises, therapeutic groups, neuromuscular re-education, wheelchair management/training  Plan of Care expires: 19  Plan of Care reviewed with: patient    Claire Smita, PTA  2019

## 2019-08-16 NOTE — PT/OT/SLP PROGRESS
Occupational Therapy  Treatment    Ruth Lan   MRN: [id number]   Admitting Diagnosis: Atrial fibrillation status post cardioversion    OT Date of Treatment: 08/16/19       Billable Minutes:  Self Care/Home Management 53    General Precautions: Standard, fall, hearing impaired  Orthopedic Precautions: N/A  Braces: N/A         Subjective:  Communicated with nsg prior to session.  I am doing well today    Pain/Comfort  Pain Rating 1: 0/10  Pain Rating Post-Intervention 1: 0/10    Objective:   Pt. Supine on arrival with teller sitter camera    Occupational Performance:    Bed Mobility:    · Patient completed Supine to Sit with minimum assistance     Functional Mobility/Transfers:  · Patient completed Sit <> Stand Transfer with minimum assistance  with  rolling walker x several trials from w/c with cues Pt. With forward flex position and BLE knees  · Patient completed Bed <> Chair Transfer using Stand Pivot technique with minimum assistance with no assistive device  · Patient completed Toilet Transfer Stand Pivot technique with minimum assistance with  bedside commode and grab bars  · Pt. With cues for safety and hand placement during task     Activities of Daily Living:  · Feeding:  set up (A) with breakfast .  · Grooming: supervision for grooming aspects with oral/facial care and Total A for hair washing   · Bathing: moderate assistance for BLE feet and perineal area. Pt. with (A) for cleaning area instance and bal (A) at sink level'  · Upper Body Dressing: minimum assistance to doff/vel pull over gown seated  · Lower Body Dressing: moderate assistance to vel uw/pants. Pt able to vel BLE feet but (A) for balance and over hips instance phase  · Toileting: moderate assistance with cleaning and clothing management and pad placement     [facility] 6 Click:  [facility] Total Score: 15    Patient left up in chair with all lines intact, call button in reach and teller sitter camera    ASSESSMENT:  Ruth Lan is a 84  y.o. female with a medical diagnosis of Atrial fibrillation status post cardioversion Pt. participated well with session on this day.Pt. With slight increase (A)  on this day with balance and selfcare task.  Pt demos physical deficits with balance  functional mobility, UB strength, endurance  level of functional indep with daily tasks and activities and selfcare skills .Pt. Will continue to benefit from continued OT to progress towards goals  .    Rehab identified problem list/impairments: weakness, impaired endurance, impaired self care skills, impaired functional mobilty, impaired balance, impaired cognition, decreased upper extremity function, decreased ROM, impaired cardiopulmonary response to activity    Rehab potential is fair    Activity tolerance: Fair    Discharge recommendations: assisted living facility, nursing facility, basic(TBD, dependent on progress)     Barriers to discharge: None     Equipment recommendations: other (see comments)(TBD)     GOALS:   Multidisciplinary Problems     Occupational Therapy Goals        Problem: Occupational Therapy Goal    Goal Priority Disciplines Outcome Interventions   Occupational Therapy Goal     OT, PT/OT Ongoing (interventions implemented as appropriate)    Description:  Goals to be met by: 8/28/19    Patient will increase functional independence with ADLs by performing:    UE Dressing with Set-up Assistance.  LE Dressing with Set-up Assistance.  Toileting from bedside commode with Supervision for hygiene and clothing management.   Bathing from  shower chair/bench with Stand-by Assistance.  Toilet transfer to bedside commode with Supervision.  Increased functional strength to 4/5 for improved performance of UB ADL tasks of self dressing.  Pt to consistently demonstrate the ability to make her basic self care needs known.  Pt to manage incontinence pads with Supervision only for improved task of toileting.                  Plan:  Patient to be seen 5 x/week to  address the above listed problems via self-care/home management, therapeutic activities, therapeutic exercises, cognitive retraining, wheelchair management/training  Plan of Care expires:    Plan of Care reviewed with: patient    JONATHAN Monique  08/16/2019

## 2019-08-16 NOTE — PT/OT/SLP EVAL
Speech Language Pathology  Evaluation    Ruth Lan   MRN: [id number]   Admitting Diagnosis: Atrial fibrillation status post cardioversion    Diet recommendations: Solid Diet Level: Regular  Liquid Diet Level: Thin 1 bite/sip at a time, Alternating bites/sips, HOB to 90 degrees, Monitor for s/s of aspiration, Small bites/sips and Standard aspiration precautions    SLP Treatment Date: 08/16/19  Speech Start Time: 1115     Speech Stop Time: 1150     Speech Total (min): 35 min       TREATMENT BILLABLE MINUTES:  Eval 35     Diagnosis: Atrial fibrillation status post cardioversion     HPI:   Ruth Lan is a 84 y.o. female with chronic AF on Eliquis s/p DCCV 12/2018, HFpEF, HTN, AS s/p AVR 2004, severe MR, CKD4, HTN, dementia, and hx R breast cancer who presents after being sent to ED from arrhythmia clinic for AFib with RVR in 140's. History provided by daughter Sandra who states that her mother has not been herself for the past few weeks. She has had generalized weakness, including a fall at home (she lives in an assisted living facility), as well as shortness of breath. She has also had a lack of appetite as well as diminished memory lately. The patient denies having chest pain or palpitations. In the ED, HR noted to be as high as 140's and now in 120's without intervention, EKG AF RVR, SBP in 100's, troponin negative, BNP 1271, CXR with pulmonary edema, renal function at baseline. She underwent LAUREEN     Patient transferred to [facility] with PT and OT to improve functional status and ability to perform ADLs.       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, ER/HI positive    Chronic diastolic heart failure     Heart murmur     Hyperlipidemia     Hypertension     Left atrial enlargement     Severe mitral regurgitation      Past Surgical  "History:   Procedure Laterality Date    BIOPSY, LYMPH NODE, SENTINEL Right 12/6/2012    Performed by [facility] at [facility] OR 2ND FLR    BREAST BIOPSY  11/2012    Right breast- invasive ductal carcinoma    CARDIOVERSION N/A 8/9/2019    Performed by Vu Villalta MD at [facility] EP LAB    CARDIOVERSION N/A 12/18/2018    Performed by Emanuel Guerra MD at [facility] EP LAB    CATARACT EXTRACTION W/  INTRAOCULAR LENS IMPLANT Right 10/21/2008    OU     CATARACT EXTRACTION W/  INTRAOCULAR LENS IMPLANT Left     ECHOCARDIOGRAM,TRANSESOPHAGEAL N/A 8/9/2019    Performed by [facility] at [facility] EP LAB    ECHOCARDIOGRAM,TRANSESOPHAGEAL N/A 12/18/2018    Performed by [facility] EP LAB    INSERTION, LOCALIZATION WIRE Right 12/6/2012    Performed by [facility]ri Epstein MD at [facility] OR 2ND FLR    AW-[id number]-VGAQMC Right 1/14/2013    Performed by Dem[facility]ri Epstein MD at [facility] OR 2ND FLR    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    TUBAL LIGATION         Has the patient been evaluated by SLP for swallowing? : No  Keep patient NPO?: No General Precautions: Standard, fall, hearing impaired    Current Respiratory Status: room air     Social Hx: Patient lives in an assisted living facility, but pt could not recall the name of the facility or if she was alone or supervised during the day.  Pt stated she did not drive and medications were managed for her at [facility].  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    Subjective:  "I can't remember. I think I was probably alone." "I just can't remember anything." pt was unable to recall if she had supervision or was alone during the day premorbidly.        Objective:        Oral Musculature Evaluation  Oral Musculature: WFL  Dentition: present and adequate  Voice Prior to PO Intake: dry, clear     Cognitive Status:  Behavioral Observations: alert and cooperative-  Memory and " "Orientation: Pt recalled the month after a delay.  An external aid was required to orient to place.  Pt was not orient to year despite use of external aid, now was she oriented to situation. Pt immediately recalled 7 digits in a series. Following a 2 minute delay, pt recalled 3/3 words given cues (2/3 ind'ly).   Attention: difficult to sustain attention at times  Problem Solving: simple problem solving q's answered with 83% accuracy. Moderate cues needed to generate multiple causes for a hypothetical situation.    Executive Function: insight/awareness, mental flexibility and self-monitoring    Auditory Comprehension: answers yes/no questions and able to follow commands complex 100%    Verbal Expression: Pt able to express basic wants/needs, but difficulty providing history 2/2 memory deficits.  Decreased thought organization and difficulty sustaining "train of thought" noted.  During a word fluency task, pt listed 6 items in a category within one minute (15-20 is wnl).    Motor Speech: wfl    Voice: wfl    Reading: tba    Writing: tba    Visual-Spatial: tba    Additional Treatment:  Upon entry, pt stated "I can't breath," although pt did not appear to be in any sort of respiratory distress. Nurse notified.  SPO2 was 94-95% and HR was 61bpm.  Pt was pulled up in bed and given a few sips of water.  Positioning and anxiety were thought to contribute to pt's perception of difficulty breathing.  Pt appeared to feel better after repositioning and reassurance that she was maintaining O2 well.     SLP concerned that pt will need increased supervision and assistance upon discharge due to cognitive deficits.     Assessment:  Ruth Lan is a 84 y.o. female with a medical diagnosis of Atrial fibrillation status post cardioversion and presents with cognitive-linguistic deficits (appears to be below baseline; noted h/o dementia).      Discharge recommendations: Discharge Facility/Level of Care Needs: (pt will need " assistance/supervision upon d/c)     Goals:   Multidisciplinary Problems     SLP Goals        Problem: SLP Goal    Goal Priority Disciplines Outcome   SLP Goal     SLP    Description:  Speech Language Pathology Goals  Goals expected to be met by 8/23:  1. Pt will orient to month, year, and place given max cues and/or external aid.  2. Pt will recall 3/3 words or facts after a 3 minute delay given moderate cues.  3. Pt will complete problem solving and reasoning tasks with 80% accuracy given mod cues.  4. Pt will list an average of 8 items in a category in one minute given min-mod cues.  5. Pt will participate in further assessment of reading, writing, and visual spatial abilities to determine need for further intervention.                           Plan:   Patient to be seen Therapy Frequency: 3 x/week   Planned Interventions: Cognitive-Linguistic Therapy  Plan of Care expires:    Plan of Care reviewed with: patient  SLP Follow-up?: Yes              ELIF Swain, CCC-SLP  08/16/2019        ELIF Swain, CCC-SLP  Speech Language Pathologist  [phone]  8/16/2019

## 2019-08-16 NOTE — PLAN OF CARE
Problem: Physical Therapy Goal  Goal: Physical Therapy Goal  Goals to be met by: 18     Patient will increase functional independence with mobility by performin. Supine to sit with Set-up [place]  2. Sit to supine with Stand-by Assistance  3. Sit to stand transfer with Supervision  4. Bed to chair transfer with Supervision using Rolling Walker  5. Gait  x 50 feet with Moderate Assistance using Rolling Walker.   6. Wheelchair propulsion x50 feet with Stand-by Assistance using bilateral uppper extremities  6. Ascend/Descend 4 inch curb step with Moderate Assistance using Rolling Walker.  7. Stand for 3 minutes with Minimal Assistance using Rolling Walker     Goals remain appropriate. Continue with PT POC as indicated.

## 2019-08-17 NOTE — NURSING
"Secure chat sent to ORALIA Carpenter concerning Zyprexa being discontinued and patient had a "bad night" per night shift. She did not rest well. NP will review patient chart and update orders if needed.Will continue to monitor patient status and note any new orders given. HEBER Alfaro included in secure chat.  "

## 2019-08-18 NOTE — PLAN OF CARE
Problem: Fall Injury Risk  Goal: Absence of Fall and Fall-Related Injury  Outcome: Ongoing (interventions implemented as appropriate)  Attendant camera and bed alarm remains in use. Staff continues with frequent rounds for safety.  Intervention: Promote Injury-Free Environment     08/18/19 0621   Optimize [place] and Functional Mobility   Environmental Safety Modification assistive device/personal items within reach;clutter free environment maintained;lighting adjusted;room near unit station   Optimize Balance and Safe Activity   Safety Promotion/Fall Prevention bed alarm set;bedside commode chair;/camera at bedside;instructed to call staff for mobility

## 2019-08-18 NOTE — PLAN OF CARE
Problem: Physical Therapy Goal  Goal: Physical Therapy Goal  Goals to be met by: 18     Patient will increase functional independence with mobility by performin. Supine to sit with Set-up [place]  2. Sit to supine with Stand-by Assistance  3. Sit to stand transfer with Supervision  4. Bed to chair transfer with Supervision using Rolling Walker  5. Gait  x 50 feet with Moderate Assistance using Rolling Walker.   6. Wheelchair propulsion x50 feet with Stand-by Assistance using bilateral uppper extremities  6. Ascend/Descend 4 inch curb step with Moderate Assistance using Rolling Walker.  7. Stand for 3 minutes with Minimal Assistance using Rolling Walker     Outcome: Ongoing (interventions implemented as appropriate)  goals remain appropriate

## 2019-08-18 NOTE — PT/OT/SLP PROGRESS
"Physical Therapy  Treatment    Ruth Lan   MRN: [id number]   Admitting Diagnosis: Atrial fibrillation status post cardioversion    PT Received On: 08/18/19          Billable Minutes:  Therapeutic Activity 31 and Therapeutic Exercise 15    Treatment Type: Treatment  PT/PTA: PTA     PTA Visit Number: 2       General Precautions: Standard, fall, hearing impaired  Orthopedic Precautions: N/A   Braces: N/A         Subjective:  "I'm good" Pt agreebale to therapy    Pain/Comfort  Pain Rating 1: 8/10  Location - Side 1: Right  Location - Orientation 1: generalized  Location 1: knee  Pain Addressed 1: Pre-medicate for activity, Reposition, Distraction  Pain Rating Post-Intervention 1: 8/10    Objective:  Patient found supine in bed       AM-PAC 6 CLICK MOBILITY  Total Score:15    Bed Mobility:  Sit>Supine:SBA HOB elevated bed rails for support  Supine>Sit: mod A for BLE mvmt HOB elevated  Scooting top of bed total x 2  Scooting  EOB SBA    Transfers:  Sit<>Stand: CGA RW t/f wc, t/f bed forward flexed posture, flexed knees, unable to extend due to pain in R knee  Stand Pivot Transfer: bed>wc CGA RW forward flexed posture, flexed knees, wc>bed no AD Mod A for hip mvmt and trunk support    Therex:  Seated x15 LAQ, HF, AP, GS    Balance:  Static standing 3 trials CGA RW  forward flexed posture, flexed knees, unable to extend due to pain in R knee, 30s, 40s, 20s, time limited to pain in R knee    Additional Treatment:  Pt educated on importance of increased time out of bed and EMI throughout the day    Patient left supine with call button in reach.    Assessment:  Ruth Lan is a 84 y.o. female with a medical diagnosis of Atrial fibrillation status post cardioversion.  Patient tolerated treatment well focusing on bed mobility transfers and therex. Patient presented AAO x 2 (not time or place). Patient limited in activity due to pain in R knee. Patient will continue to improve with skilled physical therapy services " in order to return to functional baseline.    Rehab identified problem list/impairments: weakness, impaired endurance, impaired self care skills, impaired functional mobilty, gait instability, impaired balance, impaired cognition, decreased lower extremity function, decreased safety awareness    Rehab potential is good.    Activity tolerance: Good    Discharge recommendations: assisted living facility     Barriers to discharge: Decreased caregiver support    Equipment recommendations: none     GOALS:   Multidisciplinary Problems     Physical Therapy Goals        Problem: Physical Therapy Goal    Goal Priority Disciplines Outcome Goal Variances Interventions   Physical Therapy Goal     PT, PT/OT Ongoing (interventions implemented as appropriate)     Description:  Goals to be met by: 18     Patient will increase functional independence with mobility by performin. Supine to sit with Set-up [place]  2. Sit to supine with Stand-by Assistance  3. Sit to stand transfer with Supervision  4. Bed to chair transfer with Supervision using Rolling Walker  5. Gait  x 50 feet with Moderate Assistance using Rolling Walker.   6. Wheelchair propulsion x50 feet with Stand-by Assistance using bilateral uppper extremities  6. Ascend/Descend 4 inch curb step with Moderate Assistance using Rolling Walker.  7. Stand for 3 minutes with Minimal Assistance using Rolling Walker                      PLAN:    Patient to be seen 5 x/week  to address the above listed problems via gait training, therapeutic activities, therapeutic exercises, therapeutic groups, neuromuscular re-education, wheelchair management/training  Plan of Care expires: 19  Plan of Care reviewed with: patient    Francine Jose Manuel, PTA  2019

## 2019-08-18 NOTE — PLAN OF CARE
Problem: Fall Injury Risk  Goal: Absence of Fall and Fall-Related Injury  Outcome: Ongoing (interventions implemented as appropriate)  Pt had no falls at this time.will continue to monitor.

## 2019-08-19 NOTE — PLAN OF CARE
Problem: SLP Goal  Goal: SLP Goal  Speech Language Pathology Goals  Goals expected to be met by 8/23:  1. Pt will orient to month, year, and place given max cues and/or external aid.  2. Pt will recall 3/3 words or facts after a 3 minute delay given moderate cues.  3. Pt will complete problem solving and reasoning tasks with 80% accuracy given mod cues.  4. Pt will list an average of 8 items in a category in one minute given min-mod cues.  5. Pt will participate in further assessment of reading, writing, and visual spatial abilities to determine need for further intervention.         Outcome: Ongoing (interventions implemented as appropriate)  Pt was seen for ST session today.

## 2019-08-19 NOTE — PLAN OF CARE
Problem: Fall Injury Risk  Goal: Absence of Fall and Fall-Related Injury  Outcome: Ongoing (interventions implemented as appropriate)  Pt. Had no falls at this time..will continue to monitor.

## 2019-08-19 NOTE — PLAN OF CARE
Problem: Physical Therapy Goal  Goal: Physical Therapy Goal  Goals to be met by: 18     Patient will increase functional independence with mobility by performin. Supine to sit with Set-up [place]  2. Sit to supine with Stand-by Assistance  3. Sit to stand transfer with Supervision  4. Bed to chair transfer with Supervision using Rolling Walker  5. Gait  x 50 feet with Moderate Assistance using Rolling Walker.   6. Wheelchair propulsion x50 feet with Stand-by Assistance using bilateral uppper extremities  6. Ascend/Descend 4 inch curb step with Moderate Assistance using Rolling Walker.  7. Stand for 3 minutes with Minimal Assistance using Rolling Walker     Outcome: Ongoing (interventions implemented as appropriate)  goals remain appropriate

## 2019-08-19 NOTE — PT/OT/SLP PROGRESS
Occupational Therapy  Treatment    Ruth Lan   MRN: [id number]   Admitting Diagnosis: Atrial fibrillation status post cardioversion    OT Date of Treatment: 08/19/19       Billable Minutes:45  Self Care/Home Management 45    General Precautions: Standard, fall  Orthopedic Precautions: N/A  Braces: N/A         Subjective:  Communicated with nsg prior to session.  I feel terrible and I can barely breath.     Pain/Comfort  Pain Rating 1: 0/10    Objective:   Pt. Found supine on arrival with teller sitter camera.   Pt. Oxygen was checked prior to session by nurse and was report to be in the 90's%.    Occupational Performance:    Bed Mobility:    · Patient completed Rolling/Turning to Right with modified independence  · Patient completed Scooting/Bridging with stand by assistance  · Patient completed Supine to Sit with supervision with verbal cues for balance and functional mobility.      Functional Mobility/Transfers:  · Patient completed Bed <> Chair Transfer using Stand Pivot technique with contact guard assistance with rolling walker    Activities of Daily Living:  · Feeding:  modified independence with breakfast  · Grooming: stand by assistance for oral and hair care seated at sink level  · Bathing: contact guard/minimium assistance with cues for decreased standing for emilee area cleaning at sink level.    · Upper Body Dressing: stand by assistance to doff/vel pull over gown seated  · Lower Body Dressing: minimum assistance to vel undergarments and pants seated over hips instance and supervison for sock management.      [facility] 6 Click:  AMPAC Total Score: 15        Additional Treatment:  Pt. Oxygen before treatment was in the 90's% according to nsg.   Pt. Oxygen was checked after ADL task and was at 87%. Pt. Was then provided with oxygen at end of session.    Patient left up in chair with all lines intact and call button in reach with breakfast    ASSESSMENT:  Ruth Lan is a 84 y.o. female with a  medical diagnosis of Atrial fibrillation status post cardioversion .  Pt. participated well with session on this day. Pt. With mild decreased endurance during ADLs. Pt. With breaks during tasks and O2 was checked and noted to be at 87% and nursing was notified. Pt is progressing well with session on this day still continues to requires cues with aspects of safety and transfers.Pt. Needs continue treatment to help with endurance, functional mobility, and impaired balance. Pt. Will continue to benefit from continued OT to progress towards goals.    Rehab identified problem list/impairments: weakness, impaired endurance, impaired self care skills, impaired functional mobilty, impaired balance, impaired cognition, decreased upper extremity function, decreased ROM, impaired cardiopulmonary response to activity    Rehab potential is fair    Activity tolerance: Fair    Discharge recommendations: assisted living facility, nursing facility, basic(TBD, dependent on progress)     Barriers to discharge: None     Equipment recommendations: other (see comments)(TBD)     GOALS:   Multidisciplinary Problems     Occupational Therapy Goals        Problem: Occupational Therapy Goal    Goal Priority Disciplines Outcome Interventions   Occupational Therapy Goal     OT, PT/OT Ongoing (interventions implemented as appropriate)    Description:  Goals to be met by: 8/28/19    Patient will increase functional independence with ADLs by performing:    UE Dressing with Set-up Assistance.  LE Dressing with Set-up Assistance.  Toileting from bedside commode with Supervision for hygiene and clothing management.   Bathing from  shower chair/bench with Stand-by Assistance.  Toilet transfer to bedside commode with Supervision.  Increased functional strength to 4/5 for improved performance of UB ADL tasks of self dressing.  Pt to consistently demonstrate the ability to make her basic self care needs known.  Pt to manage incontinence pads with  Supervision only for improved task of toileting.                  Plan:  Patient to be seen 5 x/week to address the above listed problems via self-care/home management, therapeutic activities, therapeutic exercises, cognitive retraining, wheelchair management/training  Plan of Care expires:    Plan of Care reviewed with: patient    Alice AHSIA Lala   I certify that I was present in the room directing the student in service delivery and guiding them using my skilled judgment. As the co-signing therapist I have reviewed the students documentation and am responsible for the treatment, assessment, and plan.   JONATHAN Blanco  08/19/2019

## 2019-08-19 NOTE — PT/OT/SLP PROGRESS
"Physical Therapy  Treatment    Ruth Lan   MRN: [id number]   Admitting Diagnosis: Atrial fibrillation status post cardioversion    PT Received On: 08/19/19          Billable Minutes:  Therapeutic Activity 30 and Therapeutic Exercise 16    Treatment Type: Treatment  PT/PTA: PTA     PTA Visit Number: 3       General Precautions: Standard, fall, hearing impaired  Orthopedic Precautions: N/A   Braces: N/A         Subjective:  "I'm good" Pt agreebale to therapy    Pain/Comfort  Pain Rating 1: 0/10  Pain Rating Post-Intervention 1: 0/10    Objective:  Patient found in wc with nursing       AM-PAC 6 CLICK MOBILITY  Total Score:14    Transfers:  Sit<>Stand: x 2 t/f wc CGA, x5 t/f wc and x 5 t/f toilet CGA/Min A for forward lean  Stand Pivot Transfer: wc<> toilet Min A for trunk support with max cues for tech    Wheelchair Mobility:  Patient propels w/c 75' BUE, Min A for object negotiation     Therex:  2 x 10 LAQ, HF, AP, GS, abd/add    Balance:  mulitiple static standing trials while performing emilee care patient only able to tolerate ~15s With CGA and hand rail for support, no LOB noted    Additional Treatment:  Toilet transfer, pt dependent with emliee care, SBA for hand hygeine    Patient left up in chair with nursing.    Assessment:  Ruth Lan is a 84 y.o. female with a medical diagnosis of Atrial fibrillation status post cardioversion.  Patient tolerated treatment well focusing on transfers, standing tolerance and therex. Patient will continue to improve with skilled physical therapy services in order to return to functional baseline..    Rehab identified problem list/impairments: weakness, impaired endurance, impaired self care skills, impaired functional mobilty, gait instability, impaired balance, impaired cognition, decreased lower extremity function, decreased safety awareness    Rehab potential is good.    Activity tolerance: Good    Discharge recommendations: assisted living facility     Barriers to " discharge: Decreased caregiver support    Equipment recommendations: none     GOALS:   Multidisciplinary Problems     Physical Therapy Goals        Problem: Physical Therapy Goal    Goal Priority Disciplines Outcome Goal Variances Interventions   Physical Therapy Goal     PT, PT/OT Ongoing (interventions implemented as appropriate)     Description:  Goals to be met by: 18     Patient will increase functional independence with mobility by performin. Supine to sit with Set-up Rowan  2. Sit to supine with Stand-by Assistance  3. Sit to stand transfer with Supervision  4. Bed to chair transfer with Supervision using Rolling Walker  5. Gait  x 50 feet with Moderate Assistance using Rolling Walker.   6. Wheelchair propulsion x50 feet with Stand-by Assistance using bilateral uppper extremities  6. Ascend/Descend 4 inch curb step with Moderate Assistance using Rolling Walker.  7. Stand for 3 minutes with Minimal Assistance using Rolling Walker                      PLAN:    Patient to be seen 5 x/week  to address the above listed problems via gait training, therapeutic activities, therapeutic exercises, therapeutic groups, neuromuscular re-education, wheelchair management/training  Plan of Care expires: 19  Plan of Care reviewed with: patient    Francine Jose Manuel, PTA  2019

## 2019-08-19 NOTE — PT/OT/SLP PROGRESS
Speech Language Pathology  Treatment    Ruth Lan   MRN: [id number]   Admitting Diagnosis: Atrial fibrillation status post cardioversion    Diet recommendations: Solid Diet Level: Regular  Liquid Diet Level: Thin Standard aspiration precautions    SLP Treatment Date: 08/19/19  Speech Start Time: 1030     Speech Stop Time: 1100     Speech Total (min): 30 min       TREATMENT BILLABLE MINUTES:  Speech Therapy Individual 30    Has the patient been evaluated by SLP for swallowing? : No  Keep patient NPO?: No   General Precautions: Standard, fall  Current Respiratory Status: room air       Subjective:  Awake, seated in WC. Pt monitored at [facility] station 2/2 fall risk. Pt mildly agitated about not having cell phone.     No pain reported pre/post session.          Objective:    Pt unable to orient to time without external aids. Pt provided with month paper calendar. Pt continued to require mod A to utilize visual aid to answer month, date, year & day of week orientation questions. Pt read paragraph with adequate fluency given min A. Pt did formulate full sentence when asked but handwriting was legible using dominant hand. Pt answered questions pertaining to problem solving & safety awareness in environment given mod A.         Assessment:  Ruth Lan is a 84 y.o. female with a medical diagnosis of Atrial fibrillation status post cardioversion and presents with cognitive linguistic impairment      Discharge recommendations: Discharge Facility/Level of Care Needs: (pt will need assistance/supervision upon d/c)     Goals:   Multidisciplinary Problems     SLP Goals        Problem: SLP Goal    Goal Priority Disciplines Outcome   SLP Goal     SLP Ongoing (interventions implemented as appropriate)   Description:  Speech Language Pathology Goals  Goals expected to be met by 8/23:  1. Pt will orient to month, year, and place given max cues and/or external aid.  2. Pt will recall 3/3 words or facts after a 3 minute delay  given moderate cues.  3. Pt will complete problem solving and reasoning tasks with 80% accuracy given mod cues.  4. Pt will list an average of 8 items in a category in one minute given min-mod cues.  5. Pt will participate in further assessment of reading, writing, and visual spatial abilities to determine need for further intervention.                           Plan:   Patient to be seen Therapy Frequency: 3 x/week  Planned Interventions: Cognitive-Linguistic Therapy  Plan of Care expires:    Plan of Care reviewed with: patient  SLP Follow-up?: Yes              Aye Koehler CCC-SLP  08/19/2019

## 2019-08-19 NOTE — PROGRESS NOTES
[facility]                                  Skilled Nursing Facility                   Progress Note   DOS 8/19/2019  Admit Date: 8/12/2019  SYEDA   Principal Problem:  Atrial fibrillation status post cardioversion   HPI obtained from patient interview and chart review     Chief Complaint: Revaluation of medical treatment and therapy status: Lab review    HPI: Ms Lan is an 84 y.o. female with sig pmhx of chronic AF on Eliquis s/p DCCV 12/2018, HFpEF, HTN, AS s/p AVR 2004, severe MR, CKD4, HTN, Arthritis,HLD and hx R breast cancer who presents to SNF s/p hospitalization for Atrial fibrillation with RVR with LAUREEN/successful DCCV 8/9/19 by Dr. Randall. The patient initially presented to arrhythmia clinic for AF w/ RVR HR 140s, generalized weakness, shortness of breath, and multiple falls. The patient denies having chest pain or palpitations. Initiating order to monitor patient with camera for fall prevention.     Interval Hx: All of today's labs reviewed. No leukocytosis. Hemoglobin stable at 10.9.  Platelets stable at 161. Sodium at 139. Potasium 4.3 and stable. Creatinine at 1.6, discontinued lasix. 24 hr blood glucose 104. Patient's current blood pressure is at 133/60 and stable. 8/13 Daughter reporting the patient has loose dental and lack of appetite, per nutritionist recs, 8/19 continue dental soft diet. Patient progessing well with PT/OT. Patient medically stable. Continuing to follow and treat all acute and chronic conditions.     PEx  Constitutional: Patient appears well-developed and in no distress   HENT:  Head: Normocephalic and atraumatic.   Eyes: Pupils are equal, round, and reactive to light.   Neck: Normal range of motion. Neck supple.   Cardiovascular: Normal rate, regular rhythm and normal heart sounds.    Pulmonary/Chest: Effort normal and breath sounds are clear  Abdominal: Soft. Bowel sounds are normal.   Musculoskeletal: Normal range of motion. + generalized  weakness.  Neurological: + Alert and oriented to person, place  Psychiatric: Normal mood and affect. Behavior is normal.   Wounds/Skin: Skin is warm and dry.    08/14/19 1456        Wound 08/09/19 0618 Skin Tear lower Arm   Date First Assessed/Time First Assessed: 08/09/19 0618   Pre-existing: No  Primary Wound Type: Skin Tear  Side: Left  Orientation: lower  Location: Arm       Wound WDL ex   Drainage Amount None   Drainage Characteristics/Odor No odor   Appearance Moist;Pink   Tissue loss description Partial thickness   Periwound Area Ecchymotic   Wound Edges Open   Wound Length (cm) 1.5 cm   Wound Width (cm) 0.5 cm   Wound Depth (cm) 0.1 cm   Wound Volume (cm^3) 0.08 cm^3   Wound Surface Area (cm^2) 0.75 cm^2   Dressing     Following: This NP weekly- last observed on 8/19/2019 and wound care team Prn.     Temp:  [97.5 °F (36.4 °C)-98.1 °F (36.7 °C)]   Pulse:  [60-70]   Resp:  [20]   BP: (105-133)/(54-60)   SpO2:  [92 %-93 %]   Body mass index is 25.74 kg/m².     ROS  Constitutional: Negative for fever and malaise/fatigue.   Eyes: Negative for blurred vision, double vision and discharge.   Respiratory: Negative for cough, shortness of breath and wheezing.    Cardiovascular: Negative for chest pain, palpitations, claudication, and leg swelling.   Gastrointestinal: Negative for abdominal pain, constipation, diarrhea, nausea and vomiting.   Genitourinary: Negative for dysuria, frequency and urgency.   Musculoskeletal:  + generalized weakness. Negative for back pain and myalgias.   Skin: Negative for itching and rash.  Neurological: Negative for dizziness, speech change, seizures, and headaches.   Psychiatric/Behavioral: Negative for depression. The patient is not nervous/anxious.      Past Medical History: Patient has a past medical history of Arthritis, Atrial fibrillation (10/2018), Breast cancer (11/2012), Chronic diastolic heart failure, Heart murmur, Hyperlipidemia, Hypertension, Left atrial enlargement, and  Severe mitral regurgitation.    Past Surgical History: Patient has a past surgical history that includes Tubal ligation; Breast biopsy (11/2012); Tissue aortic valve replacement (09/27/2004); Cataract extraction w/  intraocular lens implant (Right, 10/21/2008); Cataract extraction w/  intraocular lens implant (Left); Cardioversion (N/A, 12/18/2018); and Treatment of cardiac arrhythmia (N/A, 8/9/2019).    Social History: Patient reports that she quit smoking about 16 years ago. She has a 50.00 pack-year smoking history. She has never used smokeless tobacco. She reports that she drinks about 1.5 oz of alcohol per week. She reports that she does not use drugs.    Family History: family history includes Breast cancer in her maternal grandmother; Cancer in her maternal aunt; Heart disease in her father; No Known Problems in her brother, maternal grandfather, maternal uncle, mother, paternal aunt, paternal grandfather, paternal grandmother, paternal uncle, and sister.    Allergies: Patient is allergic to etodolac and nsaids (non-steroidal anti-inflammatory drug).      Assessment and Plan:  ANTONIO on CKD4  -2/2 hypotension vs cardiorenal, improving w/ diuresis  -8/13 Decreased lasix to 40 mg daily.  -8/19 Creatinine at 1.6, discontinued lasix.      Hypotension  -08/13 Decreased lasix to 40 mg daily and decreased lopressor to 50 mg bid.   -8/15 Patient's current blood pressure is at 109/55 and stable.   -8/19 discontinued lasix and blood pressure is stable at 133/60 and stable.     Hypokalemia  -8/15 initiated potassium 40 mEq x1 and initiated potassium 20 mEq daily to start on 08/16.   -8/19 Potasium 4.3 and stable.     Nutritional deficiency  -8/13 Initiating nutrition consult.    -8/15 Awaiting nutritionist recs.   -8/19 continue dental soft diet.    CONTINUED     Atrial fibrillation with RVR s/p LAUREEN/successful DCCV 8/9/19   -Home medications: diltiazem 240 mg daily + Lopressor 25 mg BID  -continue started on PO amio x 14  d  -Resume Eliquis renal dose adjusted  -Dr Guerra f/u 1 month with repeat TTE prior  -8/13 initiated amiodarone 200 mg daily to start on 8/17  -8/13 Initiated 12 lead EKG for suspected afib  -8/15 8/15 review of 12 lead EKG revealed normal sinus rhythm with artifact, will consider reordering if patient becomes symptomatic.      Wound of left upper extremity  -8/13 Initiating consult to wound care for evaluation of left upper arm extremity wound.  -8/15 Initiated wound care order to clean with sterile normal saline and dressed with foam dressing weekly.    Acute on chronic diastolic heart failure  Severe MR  AS s/p bioprosthetic AVR '04  -Home medication: Lasix 40 mg BID  -Echo: EF 55%, severe MR, moderate-severe TR, CVP 15, pHTN  -8/13 initiate order for Strict I/O's, daily weights, and decreased Lasix to 40 mg daily     Delirium resolved  UTI resolved   -Ceftriaxone > PO Cipro  -UCx: Klebsiella pneumoniae  -8/13 zyprexa dced     HTN  -8/13 decreased lopressor to 50 mg bid.      Generalized weakness  Falls  -PTOT consult - SNF  -Fall precautions  -8/13 Initiating order to monitor patient with camera for fall prevention.      Elevated TSH  -TSH 7.1, normal T4  -Repeat TFTs 4-6 wks per pcp    Acute hypoxic respiratory failure, resolved    Outpatient f/u thyroid function, pulmonary status, liver function, as well as optho exam annually    Future Appointments   Date Time Provider Department Center   9/5/2019  3:00 PM Florentino Valadez MD [facility] IM Rohan Serrano PCW   9/10/2019  2:45 PM EKG, APPT [facility] EKG Rohan Serrano   9/10/2019  3:30 PM Claire Calvin NP [facility] ARRHYTH Rohan Serrano   11/8/2019  1:00 PM Angelika Mcallister MD [facility] CARDIO Rohan Chun NP

## 2019-08-20 NOTE — PT/OT/SLP PROGRESS
Occupational Therapy  Treatment    Ruth Lan   MRN: [id number]   Admitting Diagnosis: Atrial fibrillation status post cardioversion    OT Date of Treatment: 08/20/19       Billable Minutes:45  Therapeutic Activity 25 and Therapeutic Exercise 20    General Precautions: Standard, fall  Orthopedic Precautions: N/A  Braces: N/A         Subjective:  Communicated with nsgprior to session.  I am cold Pt. Seated at [facility] station on arrival     Pain/Comfort  Pain Rating 1: 0/10  Pain Rating Post-Intervention 1: 0/10    Objective:   Pt. Seated at [facility] station arrival     Occupational Performance:    Bed Mobility:    Not tested     Functional Mobility/Transfers:  · Patient completed Sit <> Stand Transfer with contact guard assistance  with  rolling walker  x multiple trials from w/c with cues for safety    Activities of Daily Living:  · Feeding:  set up (A) with lunch .    [facility] 6 Click:  [facility] Total Score: 15    OT Exercises: UE Ergometer 10 min intermittent breaks    Additional Treatment:  Pt. With standing act on this day with task. Pt. With Min to mod standing for balance aspects with task with  AD at raised counter Pt with visual perception task with discrimination of various shapes and sizes x  Several seated breaks not lasting over 1 min   Pt. Unable to complete task and basic peg task was performed still with same level of (A) and help.With standing bal.and min cues throught out with weight shifting and use of BUE's incorporated and crossing mid line and facilitation with posture in prep for home management .     Pt. With 1# dowel activity with cues and AAROM  X 20 reps with shd flex, bicep curls horz adb/add and forward flex motion to increase BUE ROM and strength,.   Pt. With standing and therex performed to increase ROM, endurance selfcare task and fxl mobility for independence     Patient left up in chair with nsg present with teller sitter camera in view     ASSESSMENT:  Ruth Lan is a 84 y.o. female  with a medical diagnosis of Atrial fibrillation status post cardioversion Pt. participated fiar with session on this day. Pt. With complaints of feeling cold and not sleeping well on this day. Pt. With several seated rest break during session on this day. Pt demos physical deficits with balance  functional mobility, UB strength, endurance  level of functional indep with daily tasks and activities and selfcare skills .Pt. Will continue to benefit from continued OT to progress towards goals  .    Rehab identified problem list/impairments: weakness, impaired endurance, impaired self care skills, impaired functional mobilty, impaired balance, impaired cognition, decreased upper extremity function, decreased ROM, impaired cardiopulmonary response to activity    Rehab potential is fair    Activity tolerance: Fair    Discharge recommendations: assisted living facility, nursing facility, basic(TBD, dependent on progress)     Barriers to discharge: None     Equipment recommendations: other (see comments)(TBD)     GOALS:   Multidisciplinary Problems     Occupational Therapy Goals        Problem: Occupational Therapy Goal    Goal Priority Disciplines Outcome Interventions   Occupational Therapy Goal     OT, PT/OT Ongoing (interventions implemented as appropriate)    Description:  Goals to be met by: 8/28/19    Patient will increase functional independence with ADLs by performing:    UE Dressing with Set-up Assistance.  LE Dressing with Set-up Assistance.  Toileting from bedside commode with Supervision for hygiene and clothing management.   Bathing from  shower chair/bench with Stand-by Assistance.  Toilet transfer to bedside commode with Supervision.  Increased functional strength to 4/5 for improved performance of UB ADL tasks of self dressing.  Pt to consistently demonstrate the ability to make her basic self care needs known.  Pt to manage incontinence pads with Supervision only for improved task of toileting.                   Plan:  Patient to be seen 5 x/week to address the above listed problems via self-care/home management, therapeutic activities, therapeutic exercises, cognitive retraining, wheelchair management/training  Plan of Care expires:    Plan of Care reviewed with: patient    JONATHAN Monique  08/20/2019

## 2019-08-20 NOTE — PT/OT/SLP PROGRESS
"Physical Therapy  Treatment    Ruth Lan   MRN: [id number]   Admitting Diagnosis: Atrial fibrillation status post cardioversion    PT Received On: 08/20/19          Billable Minutes:  Gait Training 20, Therapeutic Activity 13 and Therapeutic Exercise 10    Treatment Type: Treatment  PT/PTA: PTA     PTA Visit Number: 4       General Precautions: Standard, fall, hearing impaired  Orthopedic Precautions: N/A   Braces: N/A         Subjective:  "I'm good" Pt agreebale to therapy    Pain/Comfort  Pain Rating 1: 0/10(pain reported with mvmt)  Location - Side 1: Right  Location - Orientation 1: generalized  Location 1: knee  Pain Addressed 1: Pre-medicate for activity, Reposition, Distraction  Pain Rating Post-Intervention 1: 0/10    Objective:  Patient found in wc with nursing       AM-PAC 6 CLICK MOBILITY  Total Score:16    Transfers:  Sit<>Stand: CGA RW t/f wc vcs for upright posture    Gait:  Amb 3 trials 20' total with seated rest breaks,CGA RW  forward flexed posture step to gait pattern decreased step length, vcs for upright posture, distance limited to pain in R knee    Wheelchair Mobility:  Patient propels w/c SBA 3 trials 50' each BUE, verbal and tactile cues for tech     Therex:  X 15 LAQ, HF, AP    Balance:  Static standing RW bilateral UE support 20s CGA, limited due to pain in R knee    Patient left up in chair with nursing.    Assessment:  Ruth Lan is a 84 y.o. female with a medical diagnosis of Atrial fibrillation status post cardioversion.  Patient tolerated treatment well focusing on transfers gait and therex. Patient improving with gait tolerance walking a total of 20' with CGA RW. Patient will continue to improve with skilled physical therapy services in order to return to functional baseline.    Rehab identified problem list/impairments: weakness, impaired endurance, impaired self care skills, impaired functional mobilty, gait instability, impaired balance, impaired cognition, decreased " lower extremity function, decreased safety awareness    Rehab potential is good.    Activity tolerance: Good    Discharge recommendations: assisted living facility     Barriers to discharge: Decreased caregiver support    Equipment recommendations: none     GOALS:   Multidisciplinary Problems     Physical Therapy Goals        Problem: Physical Therapy Goal    Goal Priority Disciplines Outcome Goal Variances Interventions   Physical Therapy Goal     PT, PT/OT Ongoing (interventions implemented as appropriate)     Description:  Goals to be met by: 18     Patient will increase functional independence with mobility by performin. Supine to sit with Set-up [place]  2. Sit to supine with Stand-by Assistance  3. Sit to stand transfer with Supervision  4. Bed to chair transfer with Supervision using Rolling Walker  5. Gait  x 50 feet with Moderate Assistance using Rolling Walker.   6. Wheelchair propulsion x50 feet with Stand-by Assistance using bilateral uppper extremities  6. Ascend/Descend 4 inch curb step with Moderate Assistance using Rolling Walker.  7. Stand for 3 minutes with Minimal Assistance using Rolling Walker                      PLAN:    Patient to be seen 5 x/week  to address the above listed problems via gait training, therapeutic activities, therapeutic exercises, therapeutic groups, neuromuscular re-education, wheelchair management/training  Plan of Care expires: 19  Plan of Care reviewed with: patient    Francine Richard, PTA  2019

## 2019-08-20 NOTE — PLAN OF CARE
Problem: Physical Therapy Goal  Goal: Physical Therapy Goal  Goals to be met by: 18     Patient will increase functional independence with mobility by performin. Supine to sit with Set-up [place]  2. Sit to supine with Stand-by Assistance  3. Sit to stand transfer with Supervision  4. Bed to chair transfer with Supervision using Rolling Walker  5. Gait  x 50 feet with Moderate Assistance using Rolling Walker.   6. Wheelchair propulsion x50 feet with Stand-by Assistance using bilateral uppper extremities  6. Ascend/Descend 4 inch curb step with Moderate Assistance using Rolling Walker.  7. Stand for 3 minutes with Minimal Assistance using Rolling Walker     Outcome: Ongoing (interventions implemented as appropriate)  goals remain appropriate

## 2019-08-20 NOTE — PLAN OF CARE
Problem: Occupational Therapy Goal  Goal: Occupational Therapy Goal  Goals to be met by: 8/28/19    Patient will increase functional independence with ADLs by performing:    UE Dressing with Set-up Assistance.  LE Dressing with Set-up Assistance.  Toileting from bedside commode with Supervision for hygiene and clothing management.   Bathing from  shower chair/bench with Stand-by Assistance.  Toilet transfer to bedside commode with Supervision.  Increased functional strength to 4/5 for improved performance of UB ADL tasks of self dressing.  Pt to consistently demonstrate the ability to make her basic self care needs known.  Pt to manage incontinence pads with Supervision only for improved task of toileting.     Outcome: Ongoing (interventions implemented as appropriate)  .    Comments: .

## 2019-08-20 NOTE — PROGRESS NOTES
"[facility] - Skilled Nursing Care  Adult Nutrition  Progress Note    SUMMARY       Recommendations    Recommendation/Intervention: Continue 2gm Na diet, 1500 ml fluid restriciton, select menu, increase boost plus to BID  Goals: PO to meet 85% of EEN with oral supplement by next RD visit  Nutrition Goal Status: progressing towards goal    Reason for Assessment    Reason For Assessment: RD follow-up  Diagnosis: (Debility, hypotension)  Relevant Medical History: HTN, CHF, AFIB, HLD, cancer  Interdisciplinary Rounds: attended  General Information Comments: po VARIABLE   70% @  Nutrition Discharge Planning: DC on cardiac diet, with fluid restriciton per MD    Nutrition/Diet History    Patient Reported Diet/Restrictions/Preferences: general  Typical Food/Fluid Intake: lives in assisted living and her meals are taken care of.  Food Preferences: fruit, oatmeal, coffee, milk chocolate,   Spiritual, Cultural Beliefs, Nondenominational Practices, Values that Affect Care: no  Supplemental Drinks or Food Habits: Ensure Plus  Food Allergies: NKFA  Factors Affecting Nutritional Intake: chewing difficulties/inability to chew food, impaired cognitive status/motor control, other (see comments)(lack of thirst)    Anthropometrics    Temp: 97.7 °F (36.5 °C)  Height Method: Stated  Height: 5' 3" (160 cm)  Height (inches): 63 in  Weight Method: Standard Scale  Weight: 65.5 kg (144 lb 6.4 oz)  Weight (lb): 144.4 lb  Ideal Body Weight (IBW), Female: 115 lb  % Ideal Body Weight, Female (lb): 127.68 lb  BMI (Calculated): 26.1  BMI Grade: 25 - 29.9 - overweight  Usual Body Weight (UBW), k kg  % Usual Body Weight: 98.15  % Weight Change From Usual Weight: -2.06 %       Lab/Procedures/Meds    Pertinent Labs Reviewed: reviewed  Pertinent Labs Comments: Cr 1.6, BUN 28  Pertinent Medications Reviewed: reviewed  Pertinent Medications Comments: Abx, MVI, KCl, lasix, apixaban, statin,         Estimated/Assessed Needs    Weight Used For Calorie " Calculations: 66.6 kg (146 lb 13.2 oz)  Energy Calorie Requirements (kcal): 1356  Energy Need Method: [place]-St Jeor(x 1.25(PAL))  Protein Requirements: 66-80g  Weight Used For Protein Calculations: 66.6 kg (146 lb 13.2 oz)(1.0-1.2 gm/kg)  Fluid Requirements (mL): per MD 1500 pt and family made aware of restriciton today     RDA Method (mL): 1356  CHO Requirement: -      Nutrition Prescription Ordered    Current Diet Order: 2gm Na, 1500 ml fluid restriction, boost plus chocolate daily at supper  Nutrition Order Comments: PO 50-75%  Oral Nutrition Supplement: boost plus daily    Evaluation of Received Nutrient/Fluid Intake    Energy Calories Required: not meeting needs  Protein Required: not meeting needs  Fluid Required: meeting needs  Comments: confused  Tolerance: tolerating  % Intake of Estimated Energy Needs: 50 - 75 %  % Meal Intake: 50 - 75 %    Nutrition Risk    Level of Risk/Frequency of Follow-up: high     Assessment and Plan    Malnutrition of moderate degree  Moderate  Malnutrition in the context of Chronic Illness/Injury    Related to (etiology):  Decline in cognitive function, decline in po intake     Signs and Symptoms (as evidenced by):  Energy Intake: <75% of estimated energy requirement for > 3months  Body Fat Depletion: mild depletion of orbitals and triceps   Muscle Mass Depletion: moderate depletion of clavicle and acromion bone region, clavicle bone region, temple region, dorsal hand   Weight Loss: in 2018 not this past yr    Interventions/Recommendations (treatment strategy):  Texture modification- dental soft  Mineral modified diet- 2 gram sodium  Commercial beverage- calories and protein- boost plus chocolate BID  Meals and snacks- milk with breakfast and lunch  Collaboration and referral of nutrition care    Nutrition Diagnosis Status:  Ongoing           Monitor and Evaluation    Food and Nutrient Intake: food and beverage intake  Knowledge/Beliefs/Attitudes: food and nutrition  knowledge/skill  Physical Activity and Function: nutrition-related ADLs and IADLs  Biochemical Data, Medical Tests and Procedures: inflammatory profile, gastrointestinal profile, electrolyte and renal panel  Nutrition-Focused Physical Findings: overall appearance     Malnutrition Assessment   8/15/19     Skin (Micronutrient): pallor  Hair/Scalp (Micronutrient): dry  Eyes (Micronutrient): conjunctiva dull  Teeth (Micronutrient): (poor fitting denturs pt does not want to replace)  Neck/Chest (Micronutrient): muscle wasting  Musculoskeletal/Lower Extremities: muscle wasting           Orbital Region (Subcutaneous Fat Loss): mild depletion  Upper Arm Region (Subcutaneous Fat Loss): mild depletion  Thoracic and Lumbar Region: well nourished   [place] Region (Muscle Loss): moderate depletion  Clavicle Bone Region (Muscle Loss): moderate depletion  Clavicle and Acromion Bone Region (Muscle Loss): moderate depletion  Scapular Bone Region (Muscle Loss): mild depletion  Dorsal Hand (Muscle Loss): moderate depletion   Edema (Fluid Accumulation): 0-->no edema present             Nutrition Follow-Up    RD Follow-up?: Yes

## 2019-08-21 NOTE — PLAN OF CARE
Problem: SLP Goal  Goal: SLP Goal  Speech Language Pathology Goals  Goals expected to be met by 8/23:  1. Pt will orient to month, year, and place given max cues and/or external aid.  2. Pt will recall 3/3 words or facts after a 3 minute delay given moderate cues.  3. Pt will complete problem solving and reasoning tasks with 80% accuracy given mod cues.  4. Pt will list an average of 8 items in a category in one minute given min-mod cues.  5. Pt will participate in further assessment of reading, writing, and visual spatial abilities to determine need for further intervention.         Outcome: Ongoing (interventions implemented as appropriate)  Goals remain appropriate. SLP to continue POC.  DARI Zaman  8/21/19

## 2019-08-21 NOTE — PHYSICIAN QUERY
"PT Name: Ruth Lan  MR #: [id number]    Physician Query Form - Respiratory Condition Clarification      CDS/: Saurabh Monzon Jr, RN              Contact information:[email or web address]   Query Withdrawn Pending further input RBR 9601 8/21    This form is a permanent document in the medical record.    Query Date: August 21, 2019    By submitting this query, we are merely seeking further clarification of documentation. Please utilize your independent clinical judgment when addressing the question(s) below.    The Medical record contains the following   Indicators   Supporting Clinical Findings Location in Medical Record   x   SOB, VALLE, Wheezing, Productive Cough, Use of Accessory Muscles, etc. Pulmonary/Chest: Breath sounds normal. No accessory muscle usage. No tachypnea. No respiratory distress.    majority of history from daughter Sandra who says has had worsened fatigue/some SOB recently but no known CP, palpitations 8/7 ED Provider Note      8/7 Cardiac Electrophysiology Consult Note   x   Acute/Chronic Illness Acute on chronic diastolic heart failure  Acute hypoxic respiratory failure, resolved 8/12 Discharge Summary   x   Radiology Findings There is moderate edema pleural fluid right greater than left.   8/7 Chest X ray Report   x   Respiratory Distress or Failure Upon entry, pt stated "I can't breath," although pt did not appear to be in any sort of respiratory distress. Nurse notified.  SPO2 was 94-95% and HR was 61bpm 8/16 Speech Pathology Evaluation Note      Hypoxia or Hypercapnia     x   RR         ABGs         O2 sat RR=22-->16-->23  O2 Sat=90--->81--->93 8/7-8/9 VS Flowsheet  8/7-8/9 VS Flowsheet      BiPAP/Intubation     x   Supplemental O2 SpO2: 97 %  O2 Device (Oxygen Therapy): nasal cannula 8/9 8/9 Cardiac Electrophysiology Progress Note      Home O2, Oxygen Dependence        Treatment        Other       Respiratory failure can be acute, chronic or both. It is generally further " specified as hypoxic, hypercapnic or both. Lastly, it is important to identify an etiology, if known or suspected.   References::  https://www.acphospitalist.org/archives/2013/10/coding.htm http://aDealio/acute-respiratory-failure-know     The clinical guidelines noted below are only system guidelines, and do not replace the providers clinical judgment.    Provider, please specify diagnosis or diagnoses associated with above clinical findings.   Are you able to further clarify the Respiratory Condition diagnosis    [   ] Acute Respiratory Failure with Hypoxia - ABG pO2 < 60 mmHg or O2 sat of 88% on RA and respiratory symptoms documented   [   ] Acute Respiratory Insufficiency - Generally describes less severe respiratory symptoms and measurements (pO2, SpO2, pH, and pCO2) NOT meeting criteria for respiratory failure     [   ] Hypoxia Only   [   ] Other Respiratory Diagnosis (please specify): _________________________________   [   ]  Clinically Undetermined       Please document in your progress notes daily for the duration of treatment until resolved and include in your discharge summary.

## 2019-08-21 NOTE — PT/OT/SLP PROGRESS
"Speech Language Pathology  Treatment    Ruth Lan   MRN: [id number]   Admitting Diagnosis: Atrial fibrillation status post cardioversion    Diet recommendations: Solid Diet Level: Regular  Liquid Diet Level: Thin Standard aspiration precautions    SLP Treatment Date: 08/21/19  Speech Start Time: 1159     Speech Stop Time: 1225     Speech Total (min): 26 min       TREATMENT BILLABLE MINUTES:  Speech Therapy Individual 16 and Seld Care/Home Management Training 10    Has the patient been evaluated by SLP for swallowing? : No  Keep patient NPO?: No   General Precautions: Standard, fall  Current Respiratory Status: room air       Subjective:  "I don't want you to think it's because I'm not participating; it's just not coming to me." re:pt during category tasks    Pain/Comfort  Pain Rating 1: 0/10  Pain Rating Post-Intervention 1: 0/10    Objective:    Pt seen for ongoing cognitive-linguistic therapy sessions. Pt alert/awake in bed upon arrival with daughter arriving during session. Pts daughter reported that pt lives in an assisted living facility and does not receive additional supervision throughout the day. HOB elevated to increase pts attention to tasks. Pt reported to have continued trouble with her memory. Pt able to orient to month and year with visual aid and mod cues and oriented to place independently. Pt recalled 0/3 words after a 1 minute delay independently however recalled 3/3 words utilizing compensatory strategies and max SLP cues. 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 Pt able to list 1-3 items in a category in 1 minute across 2 trials given mod cues. Pt demonstrated visible frustration with memory deficits. Pt able to list 5 concrete category items requiring min cues for 1 item. Education provided to pt and daughter including SLPs role, purpose of cognitive tasks, and pt treatment plan/POC. Understanding was demonstrated by pt and daughter. No " further question.       Assessment:  Ruth Lan is a 84 y.o. female with a medical diagnosis of Atrial fibrillation status post cardioversion and presents with cognitive linguistic deficits.      Discharge recommendations: Discharge Facility/Level of Care Needs: (pt will need assistance/supervision upon d/c)     Goals:   Multidisciplinary Problems     SLP Goals        Problem: SLP Goal    Goal Priority Disciplines Outcome   SLP Goal     SLP Ongoing (interventions implemented as appropriate)   Description:  Speech Language Pathology Goals  Goals expected to be met by 8/23:  1. Pt will orient to month, year, and place given max cues and/or external aid.  2. Pt will recall 3/3 words or facts after a 3 minute delay given moderate cues.  3. Pt will complete problem solving and reasoning tasks with 80% accuracy given mod cues.  4. Pt will list an average of 8 items in a category in one minute given min-mod cues.  5. Pt will participate in further assessment of reading, writing, and visual spatial abilities to determine need for further intervention.                           Plan:   Patient to be seen Therapy Frequency: 3 x/week  Planned Interventions: Cognitive-Linguistic Therapy  Plan of Care expires:    Plan of Care reviewed with: patient, daughter  SLP Follow-up?: Yes              DARI Zaman  08/21/2019

## 2019-08-21 NOTE — PT/OT/SLP PROGRESS
Occupational Therapy  Treatment    Ruth Lan   MRN: [id number]   Admitting Diagnosis: Atrial fibrillation status post cardioversion    OT Date of Treatment: 08/14/19       Billable Minutes:45  Therapeutic Activity 35 and Therapeutic Exercise 10    General Precautions: Standard, fall  Orthopedic Precautions: N/A  Braces: N/A         Subjective:  Communicated with nsg prior to session.  I am doing fair today I guess    Pain/Comfort  Pain Rating 1: 0/10  Pain Rating Post-Intervention 1: 0/10    Objective:   Pt. Seated in w/c on arrival with teller sitter camera    Occupational Performance:    Bed Mobility:    · Not tested    Functional Mobility/Transfers:  Patient completed Sit <> Stand Transfer with minimum assistance to Contact guard assist from w/c x several trials and cues for safety and hand placement     Activities of Daily Living:  · Grooming: moderate assistance for grooming with electrical shaver for facial hairs  · Upper Body Dressing: minimum assistance to vel sweater around back seated and cues    AMPA 6 Click:  AMPAC Total Score: 16    OT Exercises: UE Ergometer 10 min with deyanirane brreks and (A)    Additional Treatment:  Pt. With standing act on this day with task. Pt. With Min to CGA for balance aspects with task with  AD at raised counter Pt with visual perception task x several trials Pt. Not standing greater than 30 sec to complete task due to limited standing tolerance and issues with R knee  and min cues through out  and use of BUE's incorporated and crossing mid line and facilitation with posture in prep for home management   Pt. With standing and therex performed to increase ROM, endurance selfcare task and fxl mobility for independence     Patient left up in chair with all lines intact, call button in reach and teller sitter notified at end of p,m session notified    ASSESSMENT:  Ruth Lan is a 84 y.o. female with a medical diagnosis of Atrial fibrillation status post  cardioversion Pt. participated fair  with session on this day.Pt. Continues to demo decreased standing bal with task  And also continued (A) with selfcare task. Pt demos physical deficits with balance  functional mobility, UB strength, endurance  level of functional indep with daily tasks and activities and selfcare skills .Pt. Will continue to benefit from continued OT to progress towards goals      Rehab identified problem list/impairments: weakness, impaired endurance, impaired self care skills, impaired functional mobilty, impaired balance, impaired cognition, decreased upper extremity function, decreased ROM, impaired cardiopulmonary response to activity    Rehab potential is fair    Activity tolerance: Fair    Discharge recommendations: assisted living facility, nursing facility, basic(TBD, dependent on progress)     Barriers to discharge: None     Equipment recommendations: other (see comments)(TBD)     GOALS:   Multidisciplinary Problems     Occupational Therapy Goals        Problem: Occupational Therapy Goal    Goal Priority Disciplines Outcome Interventions   Occupational Therapy Goal     OT, PT/OT Ongoing (interventions implemented as appropriate)    Description:  Goals to be met by: 8/28/19    Patient will increase functional independence with ADLs by performing:    UE Dressing with Set-up Assistance.  LE Dressing with Set-up Assistance.  Toileting from bedside commode with Supervision for hygiene and clothing management.   Bathing from  shower chair/bench with Stand-by Assistance.  Toilet transfer to bedside commode with Supervision.  Increased functional strength to 4/5 for improved performance of UB ADL tasks of self dressing.  Pt to consistently demonstrate the ability to make her basic self care needs known.  Pt to manage incontinence pads with Supervision only for improved task of toileting.                    Plan:  Patient to be seen 5 x/week to address the above listed problems via  self-care/home management, therapeutic activities, therapeutic exercises, cognitive retraining, wheelchair management/training  Plan of Care expires:    Plan of Care reviewed with: patient    JONATHAN Monique  08/21/2019

## 2019-08-21 NOTE — PROGRESS NOTES
[employer]  [employer]  Invoice Date: 1/26/18   SN [id number]  Warranty Exp 2/26/24

## 2019-08-21 NOTE — PT/OT/SLP PROGRESS
"Physical Therapy  Treatment    Ruth Lan   MRN: [id number]   Admitting Diagnosis: Atrial fibrillation status post cardioversion    PT Received On: 08/21/19          Billable Minutes:  Therapeutic Activity 20 and Therapeutic Exercise 23    Treatment Type: Treatment  PT/PTA: PTA     PTA Visit Number: 5       General Precautions: Standard, fall, hearing impaired  Orthopedic Precautions: N/A   Braces: N/A         Subjective:  "I'm alright" Pt agreebale to therapy    Pain/Comfort  Pain Rating 1: 0/10  Pain Rating Post-Intervention 1: 0/10    Objective:  Patient found in wc in gym       AM-PAC 6 CLICK MOBILITY  Total Score:16    Transfers:  Sit<>Stand: RW CGA t/f wc c/o dizziness with SpO2 88% nursing and MD notified, additional trials deferred    Gait:  Amb deferred due to low SpO2 and dizziness     Wheelchair Mobility:  Patient propels w/c 50' BUE Min A for object negotiation     Therex:  2 x 10 LAQ, HF, AP, GS  Mini Elliptical 15 minutes with rest breaks as needed    Additional Treatment:  Focused breathing x 10 minutes to increase SpO2. Patient started at 88%, patient able to go up to 89-90%, while patient has conversation O2 came up to 91%    Patient left up in chair with call button in reach.    Assessment:  Ruth Lan is a 84 y.o. female with a medical diagnosis of Atrial fibrillation status post cardioversion.  Patient tolerated treatment well focusing on transfers therex and breathing tech. Patient activity limited due to low SpO2. Patient will continue to improve with skilled physical therapy services in order to return to functional baseline..    Rehab identified problem list/impairments: weakness, impaired endurance, impaired self care skills, impaired functional mobilty, gait instability, impaired balance, impaired cognition, decreased lower extremity function, decreased safety awareness    Rehab potential is good.    Activity tolerance: Good    Discharge recommendations: assisted living " facility     Barriers to discharge: Decreased caregiver support    Equipment recommendations: none     GOALS:   Multidisciplinary Problems     Physical Therapy Goals        Problem: Physical Therapy Goal    Goal Priority Disciplines Outcome Goal Variances Interventions   Physical Therapy Goal     PT, PT/OT Ongoing (interventions implemented as appropriate)     Description:  Goals to be met by: 18     Patient will increase functional independence with mobility by performin. Supine to sit with Set-up Novice  2. Sit to supine with Stand-by Assistance  3. Sit to stand transfer with Supervision  4. Bed to chair transfer with Supervision using Rolling Walker  5. Gait  x 50 feet with Moderate Assistance using Rolling Walker.   6. Wheelchair propulsion x50 feet with Stand-by Assistance using bilateral uppper extremities  6. Ascend/Descend 4 inch curb step with Moderate Assistance using Rolling Walker.  7. Stand for 3 minutes with Minimal Assistance using Rolling Walker                      PLAN:    Patient to be seen 5 x/week  to address the above listed problems via gait training, therapeutic activities, therapeutic exercises, therapeutic groups, neuromuscular re-education, wheelchair management/training  Plan of Care expires: 19  Plan of Care reviewed with: patient    Francine Greyond, PTA  2019

## 2019-08-21 NOTE — PLAN OF CARE
Problem: Physical Therapy Goal  Goal: Physical Therapy Goal  Goals to be met by: 18     Patient will increase functional independence with mobility by performin. Supine to sit with Set-up [place]  2. Sit to supine with Stand-by Assistance  3. Sit to stand transfer with Supervision  4. Bed to chair transfer with Supervision using Rolling Walker  5. Gait  x 50 feet with Moderate Assistance using Rolling Walker.   6. Wheelchair propulsion x50 feet with Stand-by Assistance using bilateral uppper extremities  6. Ascend/Descend 4 inch curb step with Moderate Assistance using Rolling Walker.  7. Stand for 3 minutes with Minimal Assistance using Rolling Walker     Outcome: Ongoing (interventions implemented as appropriate)  goals remain appropriate

## 2019-08-21 NOTE — TELEPHONE ENCOUNTER
Answered via Epic. Staff not available     Clinical status:  Requires  a. Injections  b. IV  c. Nebulizers, 02 evaluation sats  d. Enteral feedings new or recent change  e. Care of new colostomy or teaching  f. Frequent suctioning, trach and /or vent needs  g. Frequent irrigation, replacement of urinary cath, complex suprapubic  h. Treatment stg 3/ or 4 pressure ulcers, complex wound care  i. Nursing evaluation due to unstable and complex medical condition  j. Nursing rehab teaching e.g. bowel and bladder training, adaptive care    PT/OT/ST    *Transfer mobility (eg, from bed to chair)   Independent (patient can perform an activity safely and independently without any devices, physical assistance, or supervision)  Modified independent (patient requires the use of an assistive device or extra time to complete an activity, but is otherwise independent)  X Contact guard assistance (patient can perform an activity independently, but needs constant physical touch to steady or guide to ensure safe performance) with RW  Supervision (patient can perform an activity, but supervision is provided to address safety concerns)  Minimum assistance (patient can perform most of an activity (ie, approximately 75%), but requires limited assistance from one person for safe completion)  Moderate assistance (patient can perform about half the effort of an activity, but requires extensive assistance from one or more persons for safe performance of the other half)  Maximum assistance (patient has difficulty performing an activity, but can offer some assistance (eg, approximately 25% of effort) and is dependent on one or more people to assist for safe completion)  Dependent or unable (patient is unable to perform an activity or depends on 2 or more people to complete it)    *Ambulation inside (eg, within room, hallways, to toilet) __20___Ft x 3 trials and seated breaks  Independent  Modified independent  X Contact guard  assistance  Supervision  X Minimum assistance  Moderate assistance  Maximum assistance  Dependent or unable    *Ambulation outside (eg, getting in or out of buildings, walking on uneven surfaces) ______Ft  Independent  Modified independent  Contact guard assistance  Supervision  Minimum assistance  Moderate assistance  Maximum assistance  Dependent or unable    *Toileting self-management:   Independent  Modified independent  Contact guard assistance  Supervision  Minimum assistance  X Moderate assistance  Maximum assistance  Dependent or unable    *Nourishment self-management (ie, ability to feed self)   Independent  X Modified independent  Supervision  Minimum assistance  Moderate assistance  Maximum assistance  Dependent or unable    *Personal care self-management (eg, dressing, bathing, brushing teeth, washing hair)   Independent  Modified independent  X Contact guard assistance with SBA - grooming and upper body dressing   Supervision  X Minimum assistance- bathing and lower body dressing   Moderate assistance  Maximum assistance  Dependent or unable    Medication support (ie, preparing/taking all prescribed and over-the-counter medications reliably and safely, including correct dosage at correct times)   X Not applicable  Independent  Modified independent  Supervision  Minimum assistance  Moderate assistance  Maximum assistance    ADL adaptive devices self-management (ie, ability to manage putting on and/or removing braces, splints, and other adaptive devices)   X Not applicable  Independent  Modified independent  Supervision  Minimum assistance  Moderate assistance  Maximum assistance  Dependent or unable          For Extensions  - Unanticipated functional problems impacting safe completion of therapy  - Multiple comorbidities or frailty impairing functional improvement  - Cognitive impairment requiring additional intervention and education  - Communication impairment requiring additional intervention and  education  - Assessment or care unmanageable at lower level of care  - Expect brief to moderate stay extension.        Barriers to progress:  Hearing impaired, weakness, decreased ROM, impaired cardiopulmonary response to activity, impaired cognition     New treatments:  Speech Language Pathology     Projected length of stay/ expected discharge:  15 days 08- / Undetermined at this time    Discharge Disposition:  Assisted Living Facility     Recommendations:  Cont with POC until discharge

## 2019-08-22 NOTE — PROGRESS NOTES
[facility]                                  Skilled Nursing Facility                   Progress Note   DOS 8/22/2019  Admit Date: 8/12/2019  SYEDA   Principal Problem:  Atrial fibrillation status post cardioversion   HPI obtained from patient interview and chart review     Chief Complaint:  Physical therapy reporting patient drowsy this a.m. and Revaluation of medical treatment and therapy status: Lab review    HPI: Ms Lan is an 84 y.o. female with sig pmhx of chronic AF on Eliquis s/p DCCV 12/2018, HFpEF, HTN, AS s/p AVR 2004, severe MR, CKD4, HTN, Arthritis,HLD and hx R breast cancer who presents to SNF s/p hospitalization for Atrial fibrillation with RVR with LAUREEN/successful DCCV 8/9/19 by Dr. Randall. The patient initially presented to arrhythmia clinic for AF w/ RVR HR 140s, generalized weakness, shortness of breath, and multiple falls. The patient denies having chest pain or palpitations. Initiating order to monitor patient with camera for fall prevention.     Interval Hx:  Patient found to be slightly drowsy this a.m., discontinuing OLANZapine and ramelteon.  Nurse reported on 08/21 patient with O2 sats at 88% which rebounded to 90% after allowing patient to take deep breaths.  Upon evaluation patient found to be satting at 82% on room air. 8/21 Initiated oxygen 2L nasal cannula to maintain sats greater than 90% and patient is satting 94% on 2 L. patient does not use oxygen at home.  initiated chest x-ray and initiated UA. 8/22 Patient's oxygen saturations stable at 93% on 2 L. Cxr still pending called Bryan DIAS who is reading the image now. Review of urinalysis revealed negative for UTI. All of today's labs reviewed. No leukocytosis. Hemoglobin stable at 10.3.  Platelets stable at 143. Sodium at 139. Potasium 4.6. Creatinine stable wnl at 1.1, restarted lasix at 40 mg daily. 24 hr blood glucose range is 106. Patient's current blood pressure is at 136/66. Continuing to follow and  treat all acute and chronic conditions.    PEx  Constitutional: Patient appears well-developed and in no distress   HENT:  Head: Normocephalic and atraumatic.   Eyes: Pupils are equal, round, and reactive to light.   Neck: Normal range of motion. Neck supple.   Cardiovascular: Normal rate, regular rhythm and normal heart sounds.    Pulmonary/Chest: Effort normal and breath sounds are clear  Abdominal: Soft. Bowel sounds are normal.   Musculoskeletal: Normal range of motion. + generalized weakness.  Neurological: + Alert and oriented to person and place  Psychiatric: Normal mood and affect. Behavior is normal.   Wounds/Skin:   Left lower arm skin tear, foam dressing CDI, see measurements and appearance below, unchanged, continue wound care orders to clean with sterile normal saline and dressed with foam dressing weekly.  Full body skin assessed with multiple ecchymotic areas to upper extremities       08/14/19 1456        Wound from 08/09/19 0618 Skin Tear lower Arm          Wound WDL ex   Drainage Amount None   Drainage Characteristics/Odor No odor   Appearance Moist;Pink   Tissue loss description Partial thickness   Periwound Area Ecchymotic   Wound Edges Open   Wound Length (cm) 1.5 cm   Wound Width (cm) 0.5 cm   Wound Depth (cm) 0.1 cm   Wound Volume (cm^3) 0.08 cm^3   Wound Surface Area (cm^2) 0.75 cm^2   Dressing     Following: This NP weekly- last observed on 8/22/2019 and is unchanged.     Temp:  [98 °F (36.7 °C)-98.4 °F (36.9 °C)]   Pulse:  [73-76]   Resp:  [18]   BP: (130-136)/(61-66)   SpO2:  [83 %-99 %]   Body mass index is 25.5 kg/m².     ROS  Constitutional: Negative for fever and malaise/fatigue.   Eyes: Negative for blurred vision, double vision and discharge.   Respiratory: Negative for cough, shortness of breath and wheezing.    Cardiovascular: Negative for chest pain, palpitations, claudication, and leg swelling.   Gastrointestinal: Negative for abdominal pain, constipation, diarrhea, nausea and  vomiting.   Genitourinary: Negative for dysuria, frequency and urgency.   Musculoskeletal:  + generalized weakness. Negative for back pain and myalgias.   Skin: Negative for itching and rash.  Neurological: Negative for dizziness, speech change, seizures, and headaches.   Psychiatric/Behavioral: Negative for depression. The patient is not nervous/anxious.      Past Medical History: Patient has a past medical history of Arthritis, Atrial fibrillation (10/2018), Breast cancer (11/2012), Chronic diastolic heart failure, Heart murmur, Hyperlipidemia, Hypertension, Left atrial enlargement, and Severe mitral regurgitation.    Past Surgical History: Patient has a past surgical history that includes Tubal ligation; Breast biopsy (11/2012); Tissue aortic valve replacement (09/27/2004); Cataract extraction w/  intraocular lens implant (Right, 10/21/2008); Cataract extraction w/  intraocular lens implant (Left); Cardioversion (N/A, 12/18/2018); and Treatment of cardiac arrhythmia (N/A, 8/9/2019).    Social History: Patient reports that she quit smoking about 16 years ago. She has a 50.00 pack-year smoking history. She has never used smokeless tobacco. She reports that she drinks about 1.5 oz of alcohol per week. She reports that she does not use drugs.    Family History: family history includes Breast cancer in her maternal grandmother; Cancer in her maternal aunt; Heart disease in her father; No Known Problems in her brother, maternal grandfather, maternal uncle, mother, paternal aunt, paternal grandfather, paternal grandmother, paternal uncle, and sister.    Allergies: Patient is allergic to etodolac and nsaids (non-steroidal anti-inflammatory drug).      Assessment and Plan:  Lethargy new  8/22 discontinuing OLANZapine and ramelteon.     Hypoxia new  8/21 Initiated oxygen 2L nasal cannula to maintain sats greater than 90% and patient is satting 94% on 2 L. patient does not use oxygen at home.  initiated chest x-ray and  initiated UA.   8/22 Patient's oxygen saturations stable at 93% on 2 L. Cxr done on 08/21 at 8:00 p.m. still pending called Bryan DIAS who is reading the image now. Review of urinalysis revealed negative for UTI.      ANTONIO on CKD4  -2/2 hypotension vs cardiorenal, improving w/ diuresis  -8/13 Decreased lasix to 40 mg daily.  -8/19 Creatinine at 1.6, discontinued lasix.    -8/22 initiated lasix at 40 mg daily    CONTINUED    Hypotension  -08/13 Decreased lasix to 40 mg daily and decreased lopressor to 50 mg bid.   -8/15 Patient's current blood pressure is at 109/55 and stable.   -8/19 discontinued lasix and blood pressure is stable at 133/60 and stable.     Hypokalemia  -8/15 initiated potassium 40 mEq x1 and initiated potassium 20 mEq daily to start on 08/16.   -8/19 Potasium 4.3 and stable.     Nutritional deficiency  -8/13 Initiating nutrition consult.    -8/15 Awaiting nutritionist recs.   -8/19 continue dental soft diet.     Atrial fibrillation with RVR s/p LAUREEN/successful DCCV 8/9/19   -Home medications: diltiazem 240 mg daily + Lopressor 25 mg BID  -continue started on PO amio x 14 d  -Resume Eliquis renal dose adjusted  -Dr Guerra f/u 1 month with repeat TTE prior  -8/13 initiated amiodarone 200 mg daily to start on 8/17  -8/13 Initiated 12 lead EKG for suspected afib  -8/15 8/15 review of 12 lead EKG revealed normal sinus rhythm with artifact, will consider reordering if patient becomes symptomatic.      Wound of left upper extremity  -8/13 Initiating consult to wound care for evaluation of left upper arm extremity wound.  -8/15 Initiated wound care order to clean with sterile normal saline and dressed with foam dressing weekly.    Acute on chronic diastolic heart failure  Severe MR  AS s/p bioprosthetic AVR '04  -Home medication: Lasix 40 mg BID  -Echo: EF 55%, severe MR, moderate-severe TR, CVP 15, pHTN  -8/13 initiate order for Strict I/O's, daily weights, and decreased Lasix to 40 mg daily     Delirium  resolved  UTI resolved   -Ceftriaxone > PO Cipro  -UCx: Klebsiella pneumoniae  -8/13 zyprexa dced     HTN  -8/13 decreased lopressor to 50 mg bid.      Generalized weakness  Falls  -PTOT consult - SNF  -Fall precautions  -8/13 Initiating order to monitor patient with camera for fall prevention.      Elevated TSH  -TSH 7.1, normal T4  -Repeat TFTs 4-6 wks per pcp    Acute hypoxic respiratory failure, resolved    Outpatient f/u thyroid function, pulmonary status, liver function, as well as optho exam annually    Future Appointments   Date Time Provider Department Center   9/5/2019  3:00 PM Florentino Valadez MD [facility] IM [facility] PCW   9/10/2019  2:45 PM EKG, APPT [facility] EKG [facility]   9/10/2019  3:30 PM Claire Calvin NP [facility] ARRHYTH [facility]   11/8/2019  1:00 PM Angelika Mcallister MD [facility] CARDIO [facility]     Total time of the visit 45 min 1005AM- 1050M   Non physical exam/ non charting time: 36 minutes   Description of non physical exam/non charting time: counseling patient on clinical conditions and therapies provided regarding new lethargy, new hypoxia, and ANTONIO. All pertinent laboratory and radiographical images reviewed.      Cali Chun NP

## 2019-08-22 NOTE — PT/OT/SLP PROGRESS
Physical Therapy  Treatment    Ruth Lan   MRN: [id number]   Admitting Diagnosis: Atrial fibrillation status post cardioversion    PT Received On: 08/22/19          Billable Minutes:  Gait Training 15, Therapeutic Activity 23 and Therapeutic Exercise 15    Treatment Type: Treatment  PT/PTA: PT     PTA Visit Number: 0       General Precautions: Standard, fall, hearing impaired  Orthopedic Precautions: N/A   Braces: N/A         Subjective:  Communicated with nursing prior to session.  Nurse, Hilaria, reported patient appropriate for therapy.  Patient agreeable to therapy    Pain/Comfort  Pain Rating 1: 0/10  Location - Side 1: Right  Location - Orientation 1: generalized  Location 1: knee(c/o knee pain w/ mobility, weight bearing)  Pain Addressed 1: Reposition, Cessation of Activity, Distraction  Pain Rating Post-Intervention 1: 0/10    Objective:  Patient found supine w/ HOB elevated and oxygen 2LPM with       AM-PAC 6 CLICK MOBILITY  Total Score:16    Bed Mobility:  Sit>Supine:mat w/ CGA, extra time, wedge and pillows  Supine>Sit: bed w/ HOB elevated and siderail w/ CGA; mat w/ mod assist for steadying and LEs to EOB    Transfers:  Sit<>Stand: from bed w/o AD and CG/min for SPT to w/c; to/from w/c x2 trials w/ RW and min assist, x1 w/ RW and CGA; from mat w/ RW and min assist  Cues/assist for technique, steadying    Gait:  Amb w/ RW and min to mod assistance 6 feet; after seated rest break, amb w/ RW and min to mod assist w/ encouragement to continue gait trial, 16 feet. W/c follow and oxygen 2 LPM in tow. Amb w/ flexed hips and knees and decreased weight acceptance on RLE as trial progressed w/ patient c/o right knee pain/OA     Advanced Gait:  Stairs: unable  Curb Step: unable    Wheelchair Mobility:  Not performed     Therex:  Quad sets,   Glute sets,   Ankle  Pumps,   hip abduction/adduction,  heelslides,   SAQ,   LAQ   x20 reps w/ assist as needed      Balance:  Sits EOB and EOM w/ CGA/SBA; stands w/ RW and  CGA    Additional Treatment:  Patient educated on PT POC, gait and trf technique, bed mobility, LE exercises; need for assistance at time of d/c home    Patient left up in w/c with all lines intact, call button in reach, nurseHilaria present and called telesitter to confirm could see patient.    Assessment:  Ruth Lan is a 84 y.o. female with a medical diagnosis of Atrial fibrillation status post cardioversion.  Patient participated fairly well, but continues to require encouragment, and increased assistance to walk w/ c/o right knee pain. Patient will benefit from continued physical therapy to address deficits and improve safety and functional mobility. Continue with physical therapy plan of care. .    Rehab identified problem list/impairments: weakness, impaired endurance, impaired self care skills, impaired functional mobilty, gait instability, impaired balance, impaired cognition, decreased lower extremity function, decreased safety awareness    Rehab potential is good.    Activity tolerance: Good    Discharge recommendations: assisted living facility     Barriers to discharge: Decreased caregiver support    Equipment recommendations: none     GOALS:   Multidisciplinary Problems     Physical Therapy Goals        Problem: Physical Therapy Goal    Goal Priority Disciplines Outcome Goal Variances Interventions   Physical Therapy Goal     PT, PT/OT Ongoing (interventions implemented as appropriate)     Description:  Goals to be met by: 18     Patient will increase functional independence with mobility by performin. Supine to sit with Set-up [place]  2. Sit to supine with Stand-by Assistance  3. Sit to stand transfer with Supervision  4. Bed to chair transfer with Supervision using Rolling Walker  5. Gait  x 50 feet with Moderate Assistance using Rolling Walker.   6. Wheelchair propulsion x50 feet with Stand-by Assistance using bilateral uppper extremities  6. Ascend/Descend 4 inch curb step  with Moderate Assistance using Rolling Walker.  7. Stand for 3 minutes with Minimal Assistance using Rolling Walker                    PT had a face-to-face encounter with regards to the patient's plan of care with the PTA who has been seeing this patient regularly. Francine Richard PTA and Avis Mathew PT.    PLAN:    Patient to be seen 5 x/week  to address the above listed problems via gait training, therapeutic activities, therapeutic exercises, therapeutic groups, neuromuscular re-education, wheelchair management/training  Plan of Care expires: 09/13/19  Plan of Care reviewed with: patient    Avis Mathew, PT  08/22/2019

## 2019-08-22 NOTE — PT/OT/SLP PROGRESS
Occupational Therapy  Treatment    Ruth Lan   MRN: [id number]   Admitting Diagnosis: Atrial fibrillation status post cardioversion    OT Date of Treatment: 08/22/19       Billable Minutes: Selfcare management 53    General Precautions: Standard, fall  Orthopedic Precautions: N/A  Braces: N/A         Subjective:  Communicated with nsg prior to session.  I am feeling weak    Pain/Comfort  Pain Rating 1: 0/10  Pain Rating Post-Intervention 1: 0/10    Objective:    Pt. Supine on arrival     Occupational Performance:    Bed Mobility:    · Patient completed Scooting/Bridging with moderate assistance  · Patient completed Supine to Sit with moderate assistance  · Patient completed Sit to Supine with moderate assistance     Functional Mobility/Transfers:  · Patient completed Sit <> Stand Transfer with minimum assistance and moderate assistance  with  rolling walker   · Patient completed Bed <> Chair Transfer using Stand Pivot technique with minimum assistance with no assistive device  · Patient completed  Shower Transfer Stand Pivot technique with minimum assistance and moderate assistance with bedside commode and grab bars with cues for safety and hand placement       Activities of Daily Living:  · Grooming: stand by assistance with grooming as  · Bathing: minimum assistance for BLE feet and post emilee area  · Upper Body Dressing: minimum assistance to doff/vel pull over dress  · Lower Body Dressing: moderate assistance to manage Under garments over hips in stance and Total A to doff/vel BLE socks due to Pt. Stating she was feeling weak     AMPAC 6 Click:  AMPAC Total Score: 16    Patient left supine with all lines intact and call button in reach and teller sitter camera    ASSESSMENT:  Ruth Lan is a 84 y.o. female with a medical diagnosis of Atrial fibrillation status post cardioversion Pt. participated fair with session on this day. Pt. With complaints of feeling weak on this day after shower. Pt. BP  taken 103/54 HR unable to obtain HR and 02 stats 93% with session on this day. Pt. Nurse call and CN came in to check Pt. On this day. Pt demos physical deficits with balance  functional mobility, UB strength, endurance  level of functional indep with daily tasks and activities and selfcare skills .Pt. Will continue to benefit from continued OT to progress towards goals  .  Rehab identified problem list/impairments: weakness, impaired endurance, impaired self care skills, impaired functional mobilty, impaired balance, impaired cognition, decreased upper extremity function, decreased ROM, impaired cardiopulmonary response to activity    Rehab potential is fair    Activity tolerance: Fair    Discharge recommendations: assisted living facility, nursing facility, basic(TBD, dependent on progress)     Barriers to discharge: None     Equipment recommendations: other (see comments)(TBD)     GOALS:   Multidisciplinary Problems     Occupational Therapy Goals        Problem: Occupational Therapy Goal    Goal Priority Disciplines Outcome Interventions   Occupational Therapy Goal     OT, PT/OT Ongoing (interventions implemented as appropriate)    Description:  Goals to be met by: 8/28/19    Patient will increase functional independence with ADLs by performing:    UE Dressing with Set-up Assistance.  LE Dressing with Set-up Assistance.  Toileting from bedside commode with Supervision for hygiene and clothing management.   Bathing from  shower chair/bench with Stand-by Assistance.  Toilet transfer to bedside commode with Supervision.  Increased functional strength to 4/5 for improved performance of UB ADL tasks of self dressing.  Pt to consistently demonstrate the ability to make her basic self care needs known.  Pt to manage incontinence pads with Supervision only for improved task of toileting.                    Plan:  Patient to be seen 5 x/week to address the above listed problems via self-care/home management, therapeutic  activities, therapeutic exercises, cognitive retraining, wheelchair management/training  Plan of Care expires:    Plan of Care reviewed with: patient    DEVIN Monique/JENSEN  08/22/2019

## 2019-08-22 NOTE — PHYSICIAN QUERY
"PT Name: Ruth Lan  MR #: [id number]    Physician Query Form - Respiratory Condition Clarification      CDS/: Saurabh Monzon Jr, RN              Contact information:[email or web address]       This form is a permanent document in the medical record.    Query Date: August 22, 2019    By submitting this query, we are merely seeking further clarification of documentation. Please utilize your independent clinical judgment when addressing the question(s) below.    The Medical record contains the following   Indicators   Supporting Clinical Findings Location in Medical Record   x   SOB, VALLE, Wheezing, Productive Cough, Use of Accessory Muscles, etc. Pulmonary/Chest: Breath sounds normal. No accessory muscle usage. No tachypnea. No respiratory distress.    majority of history from daughter Sandra who says has had worsened fatigue/some SOB recently but no known CP, palpitations 8/7 ED Provider Note      8/7 Cardiac Electrophysiology Consult Note   x   Acute/Chronic Illness Acute on chronic diastolic heart failure  Acute hypoxic respiratory failure, resolved 8/12 Discharge Summary   x   Radiology Findings There is moderate edema pleural fluid right greater than left.   8/7 Chest X ray Report   x   Respiratory Distress or Failure Upon entry, pt stated "I can't breath," although pt did not appear to be in any sort of respiratory distress. Nurse notified.  SPO2 was 94-95% and HR was 61bpm 8/16 Speech Pathology Evaluation Note      Hypoxia or Hypercapnia     x   RR         ABGs         O2 sat RR=22-->16-->23  O2 Sat=90--->81--->93 8/7-8/9 VS Flowsheet  8/7-8/9 VS Flowsheet      BiPAP/Intubation     x   Supplemental O2 SpO2: 97 %  O2 Device (Oxygen Therapy): nasal cannula 8/9 8/9 Cardiac Electrophysiology Progress Note      Home O2, Oxygen Dependence        Treatment        Other       Respiratory failure can be acute, chronic or both. It is generally further specified as hypoxic, hypercapnic or both. Lastly, it " is important to identify an etiology, if known or suspected.   References::  https://www.acphospitalist.org/archives/2013/10/coding.htm http://[employer][email or web address]/acute-respiratory-failure-know     The clinical guidelines noted below are only system guidelines, and do not replace the providers clinical judgment.    Provider, please specify diagnosis or diagnoses associated with above clinical findings.   Are you able to further clarify the Respiratory Condition (Not Present on Admit)    [   ] Acute Respiratory Failure with Hypoxia - ABG pO2 < 60 mmHg or O2 sat of 88% on RA and respiratory symptoms documented   [   ] Acute Respiratory Insufficiency - Generally describes less severe respiratory symptoms and measurements (pO2, SpO2, pH, and pCO2) NOT meeting criteria for respiratory failure     [   ] Hypoxia Only   [   ] Other Respiratory Diagnosis (please specify): _________________________________   [ x  ]  Clinically Undetermined       Please document in your progress notes daily for the duration of treatment until resolved and include in your discharge summary.

## 2019-08-22 NOTE — PLAN OF CARE
Problem: Fall Injury Risk  Goal: Absence of Fall and Fall-Related Injury  Outcome: Ongoing (interventions implemented as appropriate)  Pt. Had no falss at this time.will continue to monitor.

## 2019-08-22 NOTE — PLAN OF CARE
Problem: Physical Therapy Goal  Goal: Physical Therapy Goal  Goals to be met by: 18     Patient will increase functional independence with mobility by performin. Supine to sit with Set-up [place]  2. Sit to supine with Stand-by Assistance  3. Sit to stand transfer with Supervision  4. Bed to chair transfer with Supervision using Rolling Walker  5. Gait  x 50 feet with Moderate Assistance using Rolling Walker.   6. Wheelchair propulsion x50 feet with Stand-by Assistance using bilateral uppper extremities  6. Ascend/Descend 4 inch curb step with Moderate Assistance using Rolling Walker.  7. Stand for 3 minutes with Minimal Assistance using Rolling Walker     Goals remain appropriate. Continue with Physical therapy Plan of Care. Avis Mathew, PT 2019

## 2019-08-23 NOTE — PLAN OF CARE
Problem: Adult Inpatient Plan of Care  Goal: Plan of Care Review  Outcome: Ongoing (interventions implemented as appropriate)  FALL PRECAUTIONS MAINTAINED NO INJURIES NOTED.AVASYS CAMERA MONITORING IN PROGRESS.AFEBRILE.

## 2019-08-23 NOTE — PLAN OF CARE
Problem: SLP Goal  Goal: SLP Goal  Speech Language Pathology Goals  Goals expected to be met by 8/23:  1. Pt will orient to month, year, and place given max cues and/or external aid.  2. Pt will recall 3/3 words or facts after a 3 minute delay given moderate cues.  3. Pt will complete problem solving and reasoning tasks with 80% accuracy given mod cues.  4. Pt will list an average of 8 items in a category in one minute given min-mod cues.  5. Pt will participate in further assessment of reading, writing, and visual spatial abilities to determine need for further intervention.         Outcome: Ongoing (interventions implemented as appropriate)  Pt was seen today for ST session.

## 2019-08-23 NOTE — TREATMENT PLAN
[facility]hab Services' DME recommendations    Ruth Lan  MRN: [id number]    [x]  No DME needed      [x] Home health PT, OT and SLP        Avis Mathew, PT 8/23/2019

## 2019-08-23 NOTE — PLAN OF CARE
Problem: Occupational Therapy Goal  Goal: Occupational Therapy Goal  Goals to be met by: 8/28/19    Patient will increase functional independence with ADLs by performing:    UE Dressing with Set-up Assistance.  LE Dressing with Set-up Assistance.  Toileting from bedside commode with Supervision for hygiene and clothing management.   Bathing from  shower chair/bench with Stand-by Assistance.  Toilet transfer to bedside commode with Supervision.  Increased functional strength to 4/5 for improved performance of UB ADL tasks of self dressing.  Pt to consistently demonstrate the ability to make her basic self care needs known.  Pt to manage incontinence pads with Supervision only for improved task of toileting.     Tolerated session fairly

## 2019-08-23 NOTE — PROGRESS NOTES
"[facility] - Skilled Nursing Care  Adult Nutrition  Progress Note    SUMMARY       Recommendations    Recommendation/Intervention: Continue dysphagia soft, fluid restriciton, 2gm Na diet, RD following  Goals: PO to meet 85% of EEN with oral supplement by next RD visit  Nutrition Goal Status: progressing towards goal  Communication of RD Recs: reviewed with physician    Reason for Assessment    Reason For Assessment: RD follow-up  Diagnosis: (Debility, hypotension)  Relevant Medical History: HTN, CHF, AFIB, HLD, cancer  Interdisciplinary Rounds: attended  General Information Comments: PO 75%, taking ONS, sleeps a lot  Nutrition Discharge Planning: DC on cardiac diet, with fluid restriciton per MD  Nutrition/Diet History    Patient Reported Diet/Restrictions/Preferences: general  Typical Food/Fluid Intake: lives in assisted living and her meals are taken care of.  Food Preferences: fruit, oatmeal, coffee, milk chocolate,   Spiritual, Cultural Beliefs, Latter day Practices, Values that Affect Care: no  Supplemental Drinks or Food Habits: Ensure Plus  Food Allergies: NKFA  Factors Affecting Nutritional Intake: chewing difficulties/inability to chew food, impaired cognitive status/motor control, other (see comments)(lack of thirst)    Anthropometrics    Temp: 97.8 °F (36.6 °C)  Height Method: Stated  Height: 5' 3" (160 cm)  Height (inches): 63 in  Weight Method: Standard Scale  Weight: 64.5 kg (142 lb 3.2 oz)  Weight (lb): 142.2 lb  Ideal Body Weight (IBW), Female: 115 lb  % Ideal Body Weight, Female (lb): 127.68 lb  BMI (Calculated): 26.1  BMI Grade: 25 - 29.9 - overweight  Usual Body Weight (UBW), k kg  % Usual Body Weight: 98.15  % Weight Change From Usual Weight: -2.06 %  Weight Loss Since Admission: (wt loss from fluid losses at least partially )       Lab/Procedures/Meds    Pertinent Labs Reviewed: reviewed  Pertinent Labs Comments: Hg 10.3, Hct 35.0  Pertinent Medications Reviewed: reviewed  Pertinent " Medications Comments: lasix        Estimated/Assessed Needs    Weight Used For Calorie Calculations: 66.6 kg (146 lb 13.2 oz)  Energy Calorie Requirements (kcal): 1356  Energy Need Method: [employer]-St Jeor(x 1.25(PAL))  Protein Requirements: 66-80g  Weight Used For Protein Calculations: 66.6 kg (146 lb 13.2 oz)(1.0-1.2 gm/kg)  Fluid Requirements (mL): per MD 1500 pt and family made aware of restriciton today     RDA Method (mL): 1356  CHO Requirement: -      Nutrition Prescription Ordered    Current Diet Order: 2gm Na dysphgia soft, 1500 ml fluid restriciton  Nutrition Order Comments: PO 75%  Oral Nutrition Supplement: Boost plus BID    Evaluation of Received Nutrient/Fluid Intake    Energy Calories Required: meeting needs  Protein Required: meeting needs  Fluid Required: meeting needs  Comments: confused  Tolerance: tolerating  % Intake of Estimated Energy Needs: 75 - 100 %  % Meal Intake: 75 - 100 %    Nutrition Risk    Level of Risk/Frequency of Follow-up: low     Assessment and Plan    Malnutrition of moderate degree  Moderate  Malnutrition in the context of Chronic Illness/Injury    Related to (etiology):  Decline in cognitive function, decline in po intake     Signs and Symptoms (as evidenced by):  Energy Intake: <75% of estimated energy requirement for > 3months  Body Fat Depletion: mild depletion of orbitals and triceps   Muscle Mass Depletion: moderate depletion of clavicle and acromion bone region, clavicle bone region, temple region, dorsal hand   Weight Loss: in 2018 not this past yr    Interventions/Recommendations (treatment strategy):  Texture modification- dental soft  Mineral modified diet- 2 gram sodium  Commercial beverage- calories and protein- boost plus chocolate daily  Meals and snacks- milk with breakfast and lunch  Collaboration and referral of nutrition care    Nutrition Diagnosis Status:  New           Monitor and Evaluation    Food and Nutrient Intake: food and beverage  intake  Knowledge/Beliefs/Attitudes: food and nutrition knowledge/skill  Physical Activity and Function: nutrition-related ADLs and IADLs  Biochemical Data, Medical Tests and Procedures: inflammatory profile, gastrointestinal profile, electrolyte and renal panel  Nutrition-Focused Physical Findings: overall appearance     Malnutrition Assessment         8/15/19     Skin (Micronutrient): pallor  Hair/Scalp (Micronutrient): dry  Eyes (Micronutrient): conjunctiva dull  Teeth (Micronutrient): (poor fitting denturs pt does not want to replace)  Neck/Chest (Micronutrient): muscle wasting  Musculoskeletal/Lower Extremities: muscle wasting           Orbital Region (Subcutaneous Fat Loss): mild depletion  Upper Arm Region (Subcutaneous Fat Loss): mild depletion  Thoracic and Lumbar Region: well nourished   [place] Region (Muscle Loss): moderate depletion  Clavicle Bone Region (Muscle Loss): moderate depletion  Clavicle and Acromion Bone Region (Muscle Loss): moderate depletion  Scapular Bone Region (Muscle Loss): mild depletion  Dorsal Hand (Muscle Loss): moderate depletion   Edema (Fluid Accumulation): 0-->no edema present             Nutrition Follow-Up    RD Follow-up?: Yes

## 2019-08-23 NOTE — PLAN OF CARE
Problem: Physical Therapy Goal  Goal: Physical Therapy Goal  Goals to be met by: 18     Patient will increase functional independence with mobility by performin. Supine to sit with Set-up [place] = met 2019 in bed w/ siderail and HOB slightly elevated  2. Sit to supine with Stand-by Assistance  3. Sit to stand transfer with Supervision  4. Bed to chair transfer with Supervision using Rolling Walker  5. Gait  x 50 feet with Moderate Assistance using Rolling Walker = met 2019   6. Wheelchair propulsion x50 feet with Stand-by Assistance using bilateral uppper extremities  6. Ascend/Descend 4 inch curb step with Moderate Assistance using Rolling Walker.  7. Stand for 3 minutes with Minimal Assistance using Rolling Walker     Goals remain appropriate. Continue with Physical therapy Plan of Care. Avis Mathew, PT 2019

## 2019-08-23 NOTE — PT/OT/SLP PROGRESS
Physical Therapy  Treatment    Ruth Lan   MRN: [id number]   Admitting Diagnosis: Atrial fibrillation status post cardioversion    PT Received On: 08/23/19          Billable Minutes:  Gait Training 11, Therapeutic Activity 15 and Therapeutic Exercise 15=41    Treatment Type: Treatment  PT/PTA: PT     PTA Visit Number: 0       General Precautions: Standard, fall, hearing impaired  Orthopedic Precautions: N/A   Braces: N/A         Subjective:  Communicated with patient/nurse prior to session.  Nurse okays session; patient agreeable    Pain/Comfort  Pain Rating 1: 0/10  Location - Side 1: Right  Location - Orientation 1: generalized  Location 1: knee  Pain Addressed 1: Reposition, Distraction, Cessation of Activity  Pain Rating Post-Intervention 1: (unable to rate; worse w/ weight bearing R knee)    Objective:  Patient found supine in bed w/ HOB elevated and 2 LPM oxygen with     When beginning to walk in gym, noted soiled pants and ambulated to restroom for clean up.  AM-PAC 6 CLICK MOBILITY  Total Score:16    Bed Mobility:  Sit>Supine:not performed  Supine>Sit: bed w/ supervision w/ HOB slightly elevated and w/ use of side rail    Transfers:  Sit<>Stand: from EOB w/ RW and CGA; to/from w/c w/ RW and CGA; to/from BSC over toilet w/ RW and CGA  Stand Pivot Transfer: bed>w/c w/ RW and CGA  Cues for technique, extra time    Gait:  Amb w/ RW and CGA w/ w/c follow and 2 LPM oxygen in tow 68 feet. Slow pace. Improved upright posture and w/o LOB, some steps swing thru; mildly antalgic R knee/LE.   After toileting, amb w/ RW and CGA/min approx 3 feet to/w/c w/ marked antalgic gait. Decreased weight acceptance on RLE and short step on LLE.      Advanced Gait:  Stairs: unable   Curb Step: not performed    Wheelchair Mobility:  Patient propels w/c w/ BUEs and SBA w/ increased time, one turn 38 feet.     Therex:  Quad sets,   Glute sets,   Ankle  Pumps,   hip abduction/adduction,  heelslides,   SAQ,   x20 reps w/ assist as  needed      Balance:  Stands w/ intermittent UE support at toilet for clothes managment    Additional Treatment:  Patient assisted w/ toileitng. Max assist for clothes /diaper management. Don/doff diaper and pants. PT performs emilee cleanse posteriororly and patient performs anterior w/ cues and set up.  Educacated on gait and trf tech,     Patient left w/c with all lines intact, call button in reach, nurse/PCT notified and camera verifeid w/ phone  call.    Assessment:  Ruth Lan is a 84 y.o. female with a medical diagnosis of Atrial fibrillation status post cardioversion.  Patient w/ good participation today and increased amb distance to 68 feet w/ overall CGA. Patient will benefit from continued physical therapy to address deficits and improve safety and functional mobility. Continue with physical therapy plan of care. .    Rehab identified problem list/impairments: weakness, impaired endurance, impaired self care skills, impaired functional mobilty, gait instability, impaired balance, impaired cognition, decreased lower extremity function, decreased safety awareness    Rehab potential is good.    Activity tolerance: Good    Discharge recommendations: assisted living facility     Barriers to discharge: Decreased caregiver support    Equipment recommendations: none     GOALS:   Multidisciplinary Problems     Physical Therapy Goals        Problem: Physical Therapy Goal    Goal Priority Disciplines Outcome Goal Variances Interventions   Physical Therapy Goal     PT, PT/OT Ongoing (interventions implemented as appropriate)     Description:  Goals to be met by: 18     Patient will increase functional independence with mobility by performin. Supine to sit with Set-up [place] = met 2019 in bed w/ siderail and HOB slightly elevated  2. Sit to supine with Stand-by Assistance  3. Sit to stand transfer with Supervision  4. Bed to chair transfer with Supervision using Rolling Walker  5. Gait  x  50 feet with Moderate Assistance using Rolling Walker = met 8/23/2019   6. Wheelchair propulsion x50 feet with Stand-by Assistance using bilateral uppper extremities  6. Ascend/Descend 4 inch curb step with Moderate Assistance using Rolling Walker.  7. Stand for 3 minutes with Minimal Assistance using Rolling Walker                       PLAN:    Patient to be seen 5 x/week  to address the above listed problems via gait training, therapeutic activities, therapeutic exercises, therapeutic groups, neuromuscular re-education, wheelchair management/training  Plan of Care expires: 09/13/19  Plan of Care reviewed with: patient    Avis LEILA Mathew, PT  08/23/2019

## 2019-08-23 NOTE — PT/OT/SLP PROGRESS
Speech Language Pathology  Treatment    Ruth Lan   MRN: [id number]   Admitting Diagnosis: Atrial fibrillation status post cardioversion    Diet recommendations: Solid Diet Level: Regular  Liquid Diet Level: Thin Standard aspiration precautions    SLP Treatment Date: 08/23/19  Speech Start Time: 1300     Speech Stop Time: 1330     Speech Total (min): 30 min       TREATMENT BILLABLE MINUTES:  Speech Therapy Individual 30    Has the patient been evaluated by SLP for swallowing? : No  Keep patient NPO?: No   General Precautions: Standard, fall  Current Respiratory Status: room air       Subjective:  Pt asleep up in WC upon SLP entry, pt required mod stim throughout session to maintain arousal to participate in session, lethargic.      No pain pre/post session    Objective:    Pt named month & year, facility name & city IND'ly. Pt required min A to name season. Pt read paragraph adequately. Pt wrote name legibly using dominant hand but poor attention to complete sentence formulation task. For clock drawing task, numbers unorganized & evidence of right neglect. Pt located 26/29 items on a page, all missed on far right & far left. Pt required min A to name 3 items in a concrete category.       Assessment:  Ruth Lan is a 84 y.o. female with a medical diagnosis of Atrial fibrillation status post cardioversion and presents with cognitive linguistic impairment.       Discharge recommendations: Discharge Facility/Level of Care Needs: (pt will need assistance/supervision upon d/c)     Goals:   Multidisciplinary Problems     SLP Goals        Problem: SLP Goal    Goal Priority Disciplines Outcome   SLP Goal     SLP Ongoing (interventions implemented as appropriate)   Description:  Speech Language Pathology Goals  Goals expected to be met by 8/23:  1. Pt will orient to month, year, and place given max cues and/or external aid.  2. Pt will recall 3/3 words or facts after a 3 minute delay given moderate cues.  3. Pt  will complete problem solving and reasoning tasks with 80% accuracy given mod cues.  4. Pt will list an average of 8 items in a category in one minute given min-mod cues.  5. Pt will participate in further assessment of reading, writing, and visual spatial abilities to determine need for further intervention.                           Plan:   Patient to be seen Therapy Frequency: 3 x/week  Planned Interventions: Cognitive-Linguistic Therapy  Plan of Care expires:    Plan of Care reviewed with: patient  SLP Follow-up?: Yes              Aye Koehler CCC-SLP  08/23/2019

## 2019-08-23 NOTE — PT/OT/SLP PROGRESS
Occupational Therapy  Treatment    Ruth Lan   MRN: [id number]   Admitting Diagnosis: Atrial fibrillation status post cardioversion    OT Date of Treatment: 08/23/19       Billable Minutes:  Self Care/Home Management 15 and Therapeutic Exercise 17    General Precautions: Standard, fall avasys camera  Orthopedic Precautions: N/A  Braces: N/A         Subjective:  Communicated with nurse prior to session.  Pt. Reported that she was tired and cold    Pain/Comfort  Pain Rating 1: 0/10  Location - Side 1: Right  Location 1: knee  Pain Addressed 1: Reposition, Distraction  Pain Rating Post-Intervention 1: (only with standing)    Objective:  Patient found with: (supine on air bed )0xygen     Occupational Performance:    Bed Mobility:    · Patient completed Supine to Sit with minimum assistance  · Patient completed Sit to Supine with moderate assistance  With assist at BLE    Functional Mobility/Transfers:  · Patient completed Sit <> Stand Transfer with minimum assistance  with  rolling walker   · Patient completed Bed <> Chair Transfer using Stand Pivot technique with minimum assistance with rolling walker  · Functional Mobility: not tested as pt. Reported knee pain in stand and did not want to walk    Activities of Daily Living:  · Upper Body Dressing: moderate assistance to daren doll    [facility] 6 Click:  [facility] Total Score: 16    OT Exercises: AROM /AAROM for alll major planes of BUE motion x 2 sets 10  reps    Additional Treatment:  Pt. Engaged in balloon tapping activity x multiple trials with good use of BUE noted to assist with improving overall level of endurance    Patient left supine with call button in reach, bed alarm on and avasys camera system notified notified    ASSESSMENT:  Ruth Lan is a 84 y.o. female with a medical diagnosis of Atrial fibrillation status post cardioversion and presents with deficits in funcitonal mobility and ADL task performance. Pt. Noted to have pain in right  knee with standing activities on this date. Pt. Will require assist/S on d/c.    Rehab identified problem list/impairments: weakness, impaired endurance, impaired self care skills, impaired functional mobilty, impaired balance, impaired cognition, decreased upper extremity function, decreased ROM, impaired cardiopulmonary response to activity    Rehab potential is fair    Activity tolerance: Fair    Discharge recommendations: assisted living facility, nursing facility, basic(TBD, dependent on progress)     Barriers to discharge: None     Equipment recommendations: other (see comments)(TBD)     GOALS:   Multidisciplinary Problems     Occupational Therapy Goals        Problem: Occupational Therapy Goal    Goal Priority Disciplines Outcome Interventions   Occupational Therapy Goal     OT, PT/OT Ongoing (interventions implemented as appropriate)    Description:  Goals to be met by: 8/28/19    Patient will increase functional independence with ADLs by performing:    UE Dressing with Set-up Assistance.  LE Dressing with Set-up Assistance.  Toileting from bedside commode with Supervision for hygiene and clothing management.   Bathing from  shower chair/bench with Stand-by Assistance.  Toilet transfer to bedside commode with Supervision.  Increased functional strength to 4/5 for improved performance of UB ADL tasks of self dressing.  Pt to consistently demonstrate the ability to make her basic self care needs known.  Pt to manage incontinence pads with Supervision only for improved task of toileting.                      Plan:  Patient to be seen 5 x/week to address the above listed problems via self-care/home management, therapeutic activities, therapeutic exercises, cognitive retraining, wheelchair management/training  Plan of Care expires:    Plan of Care reviewed with: patient    Charissa GARNER BI Salazar  08/23/2019

## 2019-08-24 NOTE — PLAN OF CARE
Problem: Adult Inpatient Plan of Care  Goal: Plan of Care Review  Outcome: Ongoing (interventions implemented as appropriate)  Pt remained free from falls, injury and trauma throughout shift. Pt AAO x 1. Bed in lowest position and wheels locked. Camera bedside, side rails up x 3. Pt instructed to call for assistance. Hourly rounds to monitor pt for safety and comfort. Personal items and call bell within reach. Commode bedside.  Pt denies pain. No signs or symptoms of distress.

## 2019-08-25 NOTE — PT/OT/SLP PROGRESS
"Occupational Therapy  Treatment    Ruth Lan   MRN: [id number]   Admitting Diagnosis: Atrial fibrillation status post cardioversion    OT Date of Treatment: 08/25/19       Billable Minutes:  Therapeutic Activity 18    General Precautions: Standard, fall  Orthopedic Precautions: N/A  Braces: N/A         Subjective:  Communicated with nurse prior to session.  "I'm sorry honey.  I'm too tired to even lift my arms."    Pain/Comfort  Pain Rating 1: 0/10  Pain Rating Post-Intervention 1: 0/10    Objective:  Patient found with: (supine in bed - O2 not on)    Occupational Performance:    Bed Mobility:    · Patient completed Scooting/Bridging with minimum assistance and scoot to HOB with cues and bed in trendelenberg     Functional Mobility/Transfers:  · No transfers - pt reports being too sleepy to get OOB after lunch - agreed to bedside therapy.         [facility] 6 Click:  [facility] Total Score: 16    OT Exercises: AROM pt attempted AROM using 1# dowel, but stated she was too tired to hold onto the weight.  Instead, pt performed 1 set of 12 reps of shoulder flex/ext, elbow flex/ext, forearm sup/pronation and shoulder abduction.  Pt needed A to bring shoulder to endrange due to tightness in muscles.  Pt working in maintaining UE strength/endurance to faciltate transfers and ADLs.     Additional Treatment:  · Pt seen at bedside following decline to get OOB secondary to significant fatigue/sleepiness after lunch.  Pt falling asleep during UE exercises and therefore only performed 1 set.   · Reviewed pt's POC with her  · Pt not wearing O2 canula (next to her in bed) - Pulse ox measured O2 to be 86% - O2 canula reapplied    Patient left HOB elevated with call button in reach    ASSESSMENT:  Ruth Lan is a 84 y.o. female with a medical diagnosis of Atrial fibrillation status post cardioversion and deficits noted below.  Pt was agreeable to OT and was noted to make progress towards her goals in therapy.  Pt's goals remain " appropriate at this time.  Pt attempted to participate in tx session, but due to significant fatigue, pt only able to have limited session at bedside.  She will continue to benefit from skilled OT services in order to assist her with increasing her safety and level of independence with self care and mobility tasks.       Rehab identified problem list/impairments: weakness, impaired endurance, impaired self care skills, impaired functional mobilty, impaired balance, impaired cognition, decreased upper extremity function, decreased ROM, impaired cardiopulmonary response to activity    Rehab potential is good    Activity tolerance: Good    Discharge recommendations: assisted living facility, nursing facility, basic(TBD, dependent on progress)     Barriers to discharge: None     Equipment recommendations: other (see comments)(TBD)     GOALS:   Multidisciplinary Problems     Occupational Therapy Goals        Problem: Occupational Therapy Goal    Goal Priority Disciplines Outcome Interventions   Occupational Therapy Goal     OT, PT/OT Ongoing (interventions implemented as appropriate)    Description:  Goals to be met by: 8/28/19    Patient will increase functional independence with ADLs by performing:    UE Dressing with Set-up Assistance.  LE Dressing with Set-up Assistance.  Toileting from bedside commode with Supervision for hygiene and clothing management.   Bathing from  shower chair/bench with Stand-by Assistance.  Toilet transfer to bedside commode with Supervision.  Increased functional strength to 4/5 for improved performance of UB ADL tasks of self dressing.  Pt to consistently demonstrate the ability to make her basic self care needs known.  Pt to manage incontinence pads with Supervision only for improved task of toileting.                      Plan:  Patient to be seen 5 x/week to address the above listed problems via self-care/home management, therapeutic activities, therapeutic exercises, cognitive  retraining, wheelchair management/training  Plan of Care expires:    Plan of Care reviewed with: patient    Irma Choe, OT  08/25/2019

## 2019-08-25 NOTE — PLAN OF CARE
Problem: Occupational Therapy Goal  Goal: Occupational Therapy Goal  Goals to be met by: 8/28/19    Patient will increase functional independence with ADLs by performing:    UE Dressing with Set-up Assistance.  LE Dressing with Set-up Assistance.  Toileting from bedside commode with Supervision for hygiene and clothing management.   Bathing from  shower chair/bench with Stand-by Assistance.  Toilet transfer to bedside commode with Supervision.  Increased functional strength to 4/5 for improved performance of UB ADL tasks of self dressing.  Pt to consistently demonstrate the ability to make her basic self care needs known.  Pt to manage incontinence pads with Supervision only for improved task of toileting.     Outcome: Ongoing (interventions implemented as appropriate)    Pt was agreeable to OT and was noted to make progress towards her goals in therapy.  Pt's goals remain appropriate at this time.  She will continue to benefit from skilled OT services in order to assist her with increasing her safety and level of independence with self care and mobility tasks.     Irma Choe, OT  8/25/2019

## 2019-08-26 NOTE — PLAN OF CARE
Problem: Occupational Therapy Goal  Goal: Occupational Therapy Goal  Goals to be met by: 8/28/19    Patient will increase functional independence with ADLs by performing:    UE Dressing with Set-up Assistance.  LE Dressing with Set-up Assistance.  Toileting from bedside commode with Supervision for hygiene and clothing management.   Bathing from  shower chair/bench with Stand-by Assistance.  Toilet transfer to bedside commode with Supervision.  Increased functional strength to 4/5 for improved performance of UB ADL tasks of self dressing.  Pt to consistently demonstrate the ability to make her basic self care needs known.  Pt to manage incontinence pads with Supervision only for improved task of toileting.     Outcome: Ongoing (interventions implemented as appropriate)  FILIPE Griffiths/JENSEN      8/26/2019

## 2019-08-26 NOTE — PLAN OF CARE
Problem: Hypertension Comorbidity  Goal: Blood Pressure in Desired Range    Intervention: Maintain Hypertension-Management Strategies     08/26/19 4365   Manage Acute Allergic Reaction   Medication Review/Management medications reviewed   Monitor and Manage Postpartum Bleeding   Syncope Management position changed slowly;legs elevated;verbally stimulated

## 2019-08-26 NOTE — PT/OT/SLP PROGRESS
"Physical Therapy  Treatment    Ruth Lan   MRN: [id number]   Admitting Diagnosis: Atrial fibrillation status post cardioversion    PT Received On: 08/26/19          Billable Minutes:  Therapeutic Activity 13 and Therapeutic Exercise 10    Treatment Type: Treatment  PT/PTA: PTA     PTA Visit Number: 1       General Precautions: Standard, fall, hearing impaired  Orthopedic Precautions: N/A   Braces: N/A     Subjective:  "OK" pt initially agreeable, after trfs to bed for bed mob, pt refused getting in WC and going to gym, refused gait in room, agreeable to therex in chair    Pain/Comfort  Pain Rating 1: 8/10  Location - Side 1: Right  Location - Orientation 1: generalized  Location 1: knee("arthritis")  Pain Addressed 1: Reposition, Distraction, Cessation of Activity(decline needing meds)  Pain Rating Post-Intervention 1: (inc with gait/WB)    Objective:   Patient found with: (in BS)     [facility] 6 CLICK MOBILITY  Total Score:16    Bed Mobility:  Sit>Supine:CG/vcs for tech/positioning, rolling B SBA  Supine>Sit: min A with HOB slightly elev    Transfers:  Sit<>Stand: CGA with RW vcs for tech  Stand Pivot Transfer: with RW CGA ~ 3 ft x 2 to[place] [facility]    Gait: "don't want to" despite ed/encouragement  refused    Therex:  2x10 reps AP,LAQ,hip flex    Patient left up in chair with call button in reach, camera present and belongings in reach.    Assessment:  Ruth Lan is a 84 y.o. female with a medical diagnosis of Atrial fibrillation status post cardioversion.  Pt with limited session today, refused gait 2* to R knee pain,  pt would continue to benefit from skilled PT services to improve overall functional mobility, strength and endurance.  .    Rehab identified problem list/impairments: weakness, impaired endurance, impaired self care skills, impaired functional mobilty, gait instability, impaired balance, impaired cognition, decreased lower extremity function, decreased safety awareness    Rehab " potential is good.    Activity tolerance: Fair    Discharge recommendations: assisted living facility     Barriers to discharge: Decreased caregiver support    Equipment recommendations: none     GOALS:   Multidisciplinary Problems     Physical Therapy Goals        Problem: Physical Therapy Goal    Goal Priority Disciplines Outcome Goal Variances Interventions   Physical Therapy Goal     PT, PT/OT Ongoing (interventions implemented as appropriate)     Description:  Goals to be met by: 18     Patient will increase functional independence with mobility by performin. Supine to sit with Set-up [place] = met 2019 in bed w/ siderail and HOB slightly elevated  2. Sit to supine with Stand-by Assistance  3. Sit to stand transfer with Supervision  4. Bed to chair transfer with Supervision using Rolling Walker  5. Gait  x 50 feet with Moderate Assistance using Rolling Walker = met 2019   6. Wheelchair propulsion x50 feet with Stand-by Assistance using bilateral uppper extremities  6. Ascend/Descend 4 inch curb step with Moderate Assistance using Rolling Walker.  7. Stand for 3 minutes with Minimal Assistance using Rolling Walker                       PLAN:    Patient to be seen 5 x/week  to address the above listed problems via gait training, therapeutic activities, therapeutic exercises, therapeutic groups, neuromuscular re-education, wheelchair management/training  Plan of Care expires: 19  Plan of Care reviewed with: patient    Leda Love, PTA  2019

## 2019-08-26 NOTE — PT/OT/SLP PROGRESS
Occupational Therapy  Treatment    Ruth Lan   MRN: [id number]   Admitting Diagnosis: Atrial fibrillation status post cardioversion    OT Date of Treatment: 08/26/19       Billable Minutes:  Self Care/Home Management 25 and Therapeutic Exercise 15    General Precautions: Standard, fall(standard)  Orthopedic Precautions: N/A  Braces: N/A         Subjective:  Communicated with nurse prior to session.      Pain/Comfort  Pain Rating 1: 3/10  Location - Side 1: Right  Location - Orientation 1: generalized  Location 1: leg  Pain Rating Post-Intervention 1: 3/10    Objective:  Patient found with: (with no lines and seated in bedside chair.)    Occupational Performance:    Bed Mobility:    · Pt seated in bedside chair at onset of therapy session.    Functional Mobility/Transfers:  · Patient completed Sit <> Stand Transfer with minimum assistance  with  rolling walker   · Patient completed Bed <> Chair Transfer using Step Transfer technique with minimum assistance with rolling walker  · Functional Mobility: Pt ambulated with RW from bedside chair to W/C with RW a distance of 8 ft .    Activities of Daily Living:  · Upper Body Dressing: minimum assistance Donning robe.  · Lower Body Dressing: contact guard assistance when standing to manage pants over hips. Pt donned pants and socks seated in W/C with RW to steady when standing.    [facility] 6 Click:  [facility] Total Score: 17    OT Exercises: UE Ergometer performed 13 minutes on UBE with Min resistance. UE exercises performed to increase functional endurance and strength which is required in order increase independence when performing self care tasks, W/C propulsion , functional standing activities as well as when performing functional tasks.      Additional Treatment:  Pt edu on Plan of care,  safety when performing functional transfers, self care tasks and functional standing activities.  - White board updated  - Self care tasks completed-- as noted above   - She performed  dynamic sitting activity incorporating reaching in all planes while sitting in W/C and working on self care tasks.    Patient left up in chair with call button in reach and nurse notified    ASSESSMENT:  Ruth Lan is a 84 y.o. female with a medical diagnosis of Atrial fibrillation status post cardioversion . Pt is making progress but requires V/Cs to properly and safely perform activities to completion.  She continues to present with deficits affecting (I)ce with functional transfers, functional standing balance and self care tasks with standing component.   OT continues to be recommended to further her functional (I)ce and safety. Goals remain appropriate and continued OT is recommended.      Rehab identified problem list/impairments: weakness, impaired endurance, impaired self care skills, impaired functional mobilty, impaired balance, impaired cognition, decreased upper extremity function, decreased ROM, impaired cardiopulmonary response to activity    Rehab potential is good    Activity tolerance: Good    Discharge recommendations: assisted living facility, nursing facility, basic(TBD, dependent on progress)     Barriers to discharge: None     Equipment recommendations: other (see comments)(TBD)     GOALS:   Multidisciplinary Problems     Occupational Therapy Goals        Problem: Occupational Therapy Goal    Goal Priority Disciplines Outcome Interventions   Occupational Therapy Goal     OT, PT/OT Ongoing (interventions implemented as appropriate)    Description:  Goals to be met by: 8/28/19    Patient will increase functional independence with ADLs by performing:    UE Dressing with Set-up Assistance.  LE Dressing with Set-up Assistance.  Toileting from bedside commode with Supervision for hygiene and clothing management.   Bathing from  shower chair/bench with Stand-by Assistance.  Toilet transfer to bedside commode with Supervision.  Increased functional strength to 4/5 for improved performance of UB  ADL tasks of self dressing.  Pt to consistently demonstrate the ability to make her basic self care needs known.  Pt to manage incontinence pads with Supervision only for improved task of toileting.                      Plan:  Patient to be seen 5 x/week to address the above listed problems via self-care/home management, therapeutic activities, therapeutic exercises, cognitive retraining, wheelchair management/training  Plan of Care expires:  9/12/19  Plan of Care reviewed with: patient    Tony Bruce OTR/JENSEN  08/26/2019

## 2019-08-26 NOTE — PLAN OF CARE
Problem: Physical Therapy Goal  Goal: Physical Therapy Goal  Goals to be met by: 18     Patient will increase functional independence with mobility by performin. Supine to sit with Set-up [place] = met 2019 in bed w/ siderail and HOB slightly elevated  2. Sit to supine with Stand-by Assistance  3. Sit to stand transfer with Supervision  4. Bed to chair transfer with Supervision using Rolling Walker  5. Gait  x 50 feet with Moderate Assistance using Rolling Walker = met 2019   6. Wheelchair propulsion x50 feet with Stand-by Assistance using bilateral uppper extremities  6. Ascend/Descend 4 inch curb step with Moderate Assistance using Rolling Walker.  7. Stand for 3 minutes with Minimal Assistance using Rolling Walker      Outcome: Ongoing (interventions implemented as appropriate)  Goals remain appropriate

## 2019-08-26 NOTE — PLAN OF CARE
[facility]    HOME HEALTH ORDERS  FACE TO FACE ENCOUNTER    Patient Name: Ruth Lan  YOB: 1935    PCP: Florentino Brar MD   PCP Address: [address] / [place] [address]  PCP Phone Number: [phone]  PCP Fax: [phone]    Encounter Date: 08/26/2019    Admit to Home Health    Diagnoses:  Active Hospital Problems    Diagnosis  POA    *Atrial fibrillation status post cardioversion [I48.91]  Yes    Skin tear of left upper arm without complication [S41.112A]  Yes    Wound of left upper extremity [S41.102A]  Yes    Hypotension [I95.9]  Yes    Acute on chronic diastolic heart failure [I50.33]  Yes    DNR (do not resuscitate) [Z66]  Yes     Chronic    Pulmonary HTN [I27.20]  Yes     Chronic    Chronic diastolic heart failure [I50.32]  Yes     Chronic    Malnutrition of moderate degree [E44.0]  Yes    Stage 4 chronic kidney disease [N18.4]  Yes     Chronic    Primary osteoarthritis of both knees [M17.0]  Yes     Chronic    Severe mitral regurgitation [I34.0]  Yes     Chronic    Hypertension, essential [I10]  Yes     Chronic    History of aortic valve replacement with porcine valve on 9/27/04 [Z95.3]  Not Applicable     Chronic      Resolved Hospital Problems   No resolved problems to display.       Future Appointments   Date Time Provider Department Center   9/5/2019  3:00 PM Florentino Brar MD [facility] IM [address] PCW   9/10/2019  2:45 PM EKG, APPT [facility] EKG [address]   9/10/2019  3:30 PM Claire Calvin NP [facility] ARRHYTH [address]   11/8/2019  1:00 PM Angelika Mcallister MD [facility] CARDIO [address]     Follow-up Information     Florentino Brar MD. Schedule an appointment as soon as possible for a visit in 1 week.    Specialties:  Family Medicine, Sports Medicine  Contact information:  [address]  [place] [address]  [phone]             [facility] CARDIOLOGY. Schedule an appointment as soon as possible for a visit in 1 week.    Specialty:   Cardiology  Contact information:  [address]  [facility] [address]  [phone]                   I have seen and examined this patient face to face today. My clinical findings that support the need for the home health skilled services and home bound status are the following:  Weakness/numbness causing balance and gait disturbance due to Heart Failure and Weakness/Debility making it taxing to leave home.  Requiring assistive device to leave home due to unsteady gait caused by  Heart Failure and Weakness/Debility.    Allergies:  Review of patient's allergies indicates:   Allergen Reactions    Etodolac Other (See Comments)     Dehydration    Nsaids (non-steroidal anti-inflammatory drug)      COLITIS/DEHYDRATION     Activities: activity as tolerated    Nursing:   SN to complete comprehensive assessment including routine vital signs. Instruct on disease process and s/s of complications to report to MD. Review/verify medication list sent home with the patient at time of discharge  and instruct patient/caregiver as needed. Frequency may be adjusted depending on start of care date.    Notify MD if SBP > 160 or < 90; DBP > 90 or < 50; HR > 120 or < 50; Temp > 101; Other:         CONSULTS:    Physical Therapy to evaluate and treat. Evaluate for home safety and equipment needs; Establish/upgrade home exercise program. Perform / instruct on therapeutic exercises, gait training, transfer training, and Range of Motion.  Occupational Therapy to evaluate and treat. Evaluate home environment for safety and equipment needs. Perform/Instruct on transfers, ADL training, ROM, and therapeutic exercises.  Speech Therapy  to evaluate and treat for  Language and Cognition.  Aide to provide assistance with personal care, ADLs, and vital signs.       WOUND CARE ORDERS  Skin tear left upper arm: weekly clean with sterile normal saline, dress with foam dressing.      Medications: Review discharge medications with patient and  family and provide education.      Current Discharge Medication List      START taking these medications    Details   potassium chloride SA (K-DUR,KLOR-CON) 20 MEQ tablet Take 1 tablet (20 mEq total) by mouth once daily.  Qty: 30 tablet, Refills: 3         CONTINUE these medications which have CHANGED    Details   amiodarone (PACERONE) 200 MG Tab Take 1 tablet (200 mg total) by mouth once daily.  Qty: 30 tablet, Refills: 3      metoprolol tartrate (LOPRESSOR) 50 MG tablet Take 1 tablet (50 mg total) by mouth 2 (two) times daily.  Qty: 60 tablet, Refills: 11         CONTINUE these medications which have NOT CHANGED    Details   acetaminophen (TYLENOL) 500 MG tablet Take 500 mg by mouth every 6 (six) hours as needed for Pain.      apixaban (ELIQUIS) 2.5 mg Tab Take 1 tablet (2.5 mg total) by mouth 2 (two) times daily.  Qty: 60 tablet, Refills: 11    Associated Diagnoses: Paroxysmal atrial fibrillation      b complex vitamins (B COMPLEX-VITAMIN B12) tablet Take 1 tablet by mouth once daily.  Qty: 90 tablet, Refills: 1      furosemide (LASIX) 40 MG tablet Take 1 tablet (40 mg total) by mouth 2 (two) times daily.  Qty: 180 tablet, Refills: 3      multivitamin (THERAGRAN) per tablet Take 1 tablet by mouth once daily.  Qty: 90 tablet, Refills: 1      pravastatin (PRAVACHOL) 80 MG tablet Take 1 tablet (80 mg total) by mouth once daily.  Qty: 90 tablet, Refills: 3    Associated Diagnoses: Pure hypercholesterolemia      tolterodine (DETROL LA) 4 MG 24 hr capsule Take 1 capsule (4 mg total) by mouth once daily.  Qty: 90 capsule, Refills: 3    Associated Diagnoses: Urinary incontinence without sensory awareness         STOP taking these medications       ciprofloxacin HCl (CIPRO) 500 MG tablet Comments:   Reason for Stopping:         diltiaZEM (CARDIZEM CD) 240 MG 24 hr capsule Comments:   Reason for Stopping:               I certify that this patient is confined to her home and needs intermittent skilled nursing care, physical  therapy, speech therapy and occupational therapy.

## 2019-08-27 NOTE — PROGRESS NOTES
[facility]                                  Skilled Nursing Facility                   Progress Note   DOS 8/27/2019  Admit Date: 8/12/2019  SYEDA   Principal Problem:  Atrial fibrillation status post cardioversion   HPI obtained from patient interview and chart review     Chief Complaint:  Follow-up drowsiness, f/u hypoxia, and Revaluation of medical treatment and therapy status: Lab review    HPI: Ms Lan is an 84 y.o. female with sig pmhx of chronic AF on Eliquis s/p DCCV 12/2018, HFpEF, HTN, AS s/p AVR 2004, severe MR, CKD4, HTN, Arthritis,HLD and hx R breast cancer who presents to SNF s/p hospitalization for Atrial fibrillation with RVR with LAUREEN/successful DCCV 8/9/19 by Dr. Randall. The patient initially presented to arrhythmia clinic for AF w/ RVR HR 140s, generalized weakness, shortness of breath, and multiple falls. The patient denies having chest pain or palpitations. Initiating order to monitor patient with camera for fall prevention.     Interval Hx:  Patient without drowsiness this a.m. after medication adjustments and continues to be awake alert oriented x2 which appears to be her baseline. Patient sating 98% on 2 liters NC, initiated nursing order to weaning O2 to keep sats greater thank 90 %.  Review of chest x-ray revealed Slight diffuse accentuation interstitial markings identified.  Ill-defined opacification at the right lung base with blunted costophrenic angle consistent with pleural effusion and/or atelectasis versus airspace consolidation. No significant changes as compared to the previous study, continue Lasix. All labs reviewed from 8/26. No leukocytosis. Hemoglobin stable at 10.  Platelets stable at 184. Sodium at 142. Potasium 4.4. Creatinine stable at 1.1. 24 hr blood glucose 109. Patient's current blood pressure is at 116/60. Patient progessing well with PT/OT. Continuing to follow and treat all acute and chronic conditions.    PEx  Constitutional:  Patient appears well-developed and in no distress   HENT:  Head: Normocephalic and atraumatic.   Eyes: Pupils are equal, round, and reactive to light.   Neck: Normal range of motion. Neck supple.   Cardiovascular: Normal rate, regular rhythm and normal heart sounds.    Pulmonary/Chest: Effort normal and breath sounds are clear  Abdominal: Soft. Bowel sounds are normal.   Musculoskeletal: Normal range of motion. + generalized weakness.  Neurological: + Alert and oriented to person and place  Psychiatric: Normal mood and affect. Behavior is normal.   Wounds/Skin:   Left lower arm skin tear, foam dressing CDI, see measurements and appearance below, continue wound care orders to clean with sterile normal saline and dressed with foam dressing weekly.  Full body skin assessed with multiple ecchymotic areas to upper extremities       08/14/19 1456        Wound from 08/09/19 0618 Skin Tear lower Arm          Wound WDL ex   Drainage Amount None   Drainage Characteristics/Odor No odor   Appearance Moist;Pink   Tissue loss description Partial thickness   Periwound Area Ecchymotic   Wound Edges Open   Wound Length (cm) 1 cm   Wound Width (cm) 0.5 cm   Wound Depth (cm) 0.1 cm   Wound Volume (cm^3) 0.05 cm^3   Wound Surface Area (cm^2) 0.5 cm^2   Dressing     Following: This NP weekly- last observed on 8/27/2019      Temp:  [97.8 °F (36.6 °C)]   Pulse:  [67-69]   Resp:  [20]   BP: (110)/(58)   SpO2:  [96 %-98 %]   Body mass index is 25.11 kg/m².     ROS  Constitutional: Negative for fever and malaise/fatigue.   Eyes: Negative for blurred vision, double vision and discharge.   Respiratory: Negative for cough, shortness of breath and wheezing.    Cardiovascular: Negative for chest pain, palpitations, claudication, and leg swelling.   Gastrointestinal: Negative for abdominal pain, constipation, diarrhea, nausea and vomiting.   Genitourinary: Negative for dysuria, frequency and urgency.   Musculoskeletal:  + generalized weakness.  Negative for back pain and myalgias.   Skin: Negative for itching and rash.  Neurological: Negative for dizziness, speech change, seizures, and headaches.   Psychiatric/Behavioral: Negative for depression. The patient is not nervous/anxious.      Past Medical History: Patient has a past medical history of Arthritis, Atrial fibrillation (10/2018), Breast cancer (11/2012), Chronic diastolic heart failure, Heart murmur, Hyperlipidemia, Hypertension, Left atrial enlargement, and Severe mitral regurgitation.    Past Surgical History: Patient has a past surgical history that includes Tubal ligation; Breast biopsy (11/2012); Tissue aortic valve replacement (09/27/2004); Cataract extraction w/  intraocular lens implant (Right, 10/21/2008); Cataract extraction w/  intraocular lens implant (Left); Cardioversion (N/A, 12/18/2018); and Treatment of cardiac arrhythmia (N/A, 8/9/2019).    Social History: Patient reports that she quit smoking about 16 years ago. She has a 50.00 pack-year smoking history. She has never used smokeless tobacco. She reports that she drinks about 1.5 oz of alcohol per week. She reports that she does not use drugs.    Family History: family history includes Breast cancer in her maternal grandmother; Cancer in her maternal aunt; Heart disease in her father; No Known Problems in her brother, maternal grandfather, maternal uncle, mother, paternal aunt, paternal grandfather, paternal grandmother, paternal uncle, and sister.    Allergies: Patient is allergic to etodolac and nsaids (non-steroidal anti-inflammatory drug).      Assessment and Plan:  Lethargy new  -8/22 discontinuing OLANZapine and ramelteon.   -8/27 Patient without drowsiness this a.m. after medication adjustments and continues to be awake alert oriented x2 which appears to be her baseline.     Hypoxia new  -8/21 Initiated oxygen 2L nasal cannula to maintain sats greater than 90% and patient is satting 94% on 2 L. patient does not use oxygen at  home.  initiated chest x-ray and initiated UA.   -8/22 Patient's oxygen saturations stable at 93% on 2 L. Cxr done on 08/21 at 8:00 p.m. still pending called Bryan DIAS who is reading the image now. Review of urinalysis revealed negative for UTI.  -8/27 Patient sating 98% on 2 liters NC, initiated nursing order to weaning O2 to keep sats greater thank 90 %.  Review of chest x-ray revealed Slight diffuse accentuation interstitial markings identified.  Ill-defined opacification at the right lung base with blunted costophrenic angle consistent with pleural effusion and/or atelectasis versus airspace consolidation. No significant changes as compared to the previous study, continue Lasix.     ANTONIO on CKD4  -2/2 hypotension vs cardiorenal, improving w/ diuresis  -8/13 Decreased lasix to 40 mg daily.  -8/19 Creatinine at 1.6, discontinued lasix.    -8/22 initiated lasix at 40 mg daily  -8/27 Cr stable at 1.1    CONTINUED    Hypotension  -08/13 Decreased lasix to 40 mg daily and decreased lopressor to 50 mg bid.   -8/15 Patient's current blood pressure is at 109/55 and stable.   -8/19 discontinued lasix and blood pressure is stable at 133/60 and stable.     Hypokalemia  -8/15 initiated potassium 40 mEq x1 and initiated potassium 20 mEq daily to start on 08/16.   -8/19 Potasium 4.3 and stable.     Nutritional deficiency  -8/13 Initiating nutrition consult.    -8/15 Awaiting nutritionist recs.   -8/19 continue dental soft diet.     Atrial fibrillation with RVR s/p LAUREEN/successful DCCV 8/9/19   -Home medications: diltiazem 240 mg daily + Lopressor 25 mg BID  -continue started on PO amio x 14 d  -Resume Eliquis renal dose adjusted  -Dr Guerra f/u 1 month with repeat TTE prior  -8/13 initiated amiodarone 200 mg daily to start on 8/17  -8/13 Initiated 12 lead EKG for suspected afib  -8/15 8/15 review of 12 lead EKG revealed normal sinus rhythm with artifact, will consider reordering if patient becomes symptomatic.      Wound of left  upper extremity  -8/13 Initiating consult to wound care for evaluation of left upper arm extremity wound.  -8/15 Initiated wound care order to clean with sterile normal saline and dressed with foam dressing weekly.    Acute on chronic diastolic heart failure  Severe MR  AS s/p bioprosthetic AVR '04  -Home medication: Lasix 40 mg BID  -Echo: EF 55%, severe MR, moderate-severe TR, CVP 15, pHTN  -8/13 initiate order for Strict I/O's, daily weights, and decreased Lasix to 40 mg daily     Delirium resolved  UTI resolved   -Ceftriaxone > PO Cipro  -UCx: Klebsiella pneumoniae  -8/13 zyprexa dced     HTN  -8/13 decreased lopressor to 50 mg bid.      Generalized weakness  Falls  -PTOT consult - SNF  -Fall precautions  -8/13 Initiating order to monitor patient with camera for fall prevention.      Elevated TSH  -TSH 7.1, normal T4  -Repeat TFTs 4-6 wks per pcp    Acute hypoxic respiratory failure, resolved    Outpatient f/u thyroid function, pulmonary status, liver function, as well as optho exam annually    Future Appointments   Date Time Provider Department Center   9/5/2019  3:00 PM Florentino Valadez MD [facility] IM Roh[address] S[address]ano PCW   9/10/2019  2:45 PM EKG, APPT [facility] EKG Rohan Serrano   9/10/2019  3:30 PM Claire Calvin NP [facility] ARRHYTH Rohan Serrano   11/8/2019  1:00 PM Angelika Mcallister MD [facility] CARDIO Roh[address] Chun NP

## 2019-08-27 NOTE — PT/OT/SLP PROGRESS
Occupational Therapy  Treatment    Ruth Lan   MRN: [id number]   Admitting Diagnosis: Atrial fibrillation status post cardioversion    OT Date of Treatment: 08/27/19       Billable Minutes:45  Therapeutic Exercise 20 Therapeutic Activity 25    General Precautions: Standard, fall(standard)  Orthopedic Precautions: N/A  Braces: N/A         Subjective:  Communicated with nsg prior to session.  I am doing okay today but I am cold     Pain/Comfort  Pain Rating 1: 4/10  Location - Side 1: Right  Location - Orientation 1: generalized  Location 1: knee    Objective:   Pt. Found trying to get out of bed on arrival with AVS camera alarm going off.    Occupational Performance:    Bed Mobility:    · Not tested    Functional Mobility/Transfers:  · Patient completed Sit <> Stand Transfer with contact guard assistance  with  no assistive device  due to Pt. Getting up on own and assisted pt to w/c with hand held assist    Activities of Daily Living:  Grooming: stand by assistance with hair care seated at sink level.    [facility] 6 Click:  [facility] Total Score: 17    OT Exercises: UE Ergometer 10 mins with intermittent breaks  Pt. With standing act on this day with task. Pt. With Minimum and contact guard assistance for balance aspects with task with no AD at raised counter Pt with visual perception task with discrimination of various shapes and sizes x 2 min with standing bal and min cues throught out. Pt. Not able to complete finish task and several seated rest break in between. Pt. Also with cues and coaxing required for standing activity after 1 min of standing.  Pt. Able to stand 2 trails over 1 min a piece   Pt. With 2# dowel activity with AAROM with 2x15 reps with shd flex, bicep curls horz adb/add and forward flex motion to increase BUE ROM and strength,. Pt. Need guidance with ROM when performing dowel.        Patient left up in chair with all lines intact and call button in reach with AVS camera intact and notified      ASSESSMENT:  Ruth Lan is a 84 y.o. female with a medical diagnosis of Atrial fibrillation status post cardioversion   Pt. participated well with session on this day. Pt is progressing well with session on this day still continues to requires cues with aspects of safety. Pt. Appeared sleepy and unmotivated during session with low endurance during activities. Pt. Required repeated verbal cues throughout session to continue all OT exercises. Pt. Reported a pain level of 3/10 in her R knee when performing standing activity. Pt. Will continue to benefit from continued OT to progress towards goals.    Rehab identified problem list/impairments: weakness, impaired endurance, impaired self care skills, impaired functional mobilty, impaired balance, impaired cognition, decreased upper extremity function, decreased ROM, impaired cardiopulmonary response to activity    Rehab potential is fair    Activity tolerance: Fair    Discharge recommendations: assisted living facility, nursing facility, basic(TBD, dependent on progress)     Barriers to discharge: None     Equipment recommendations: other (see comments)(TBD)     GOALS:   Multidisciplinary Problems     Occupational Therapy Goals        Problem: Occupational Therapy Goal    Goal Priority Disciplines Outcome Interventions   Occupational Therapy Goal     OT, PT/OT Ongoing (interventions implemented as appropriate)    Description:  Goals to be met by: 8/28/19    Patient will increase functional independence with ADLs by performing:    UE Dressing with Set-up Assistance.  LE Dressing with Set-up Assistance.  Toileting from bedside commode with Supervision for hygiene and clothing management.   Bathing from  shower chair/bench with Stand-by Assistance.  Toilet transfer to bedside commode with Supervision.  Increased functional strength to 4/5 for improved performance of UB ADL tasks of self dressing.  Pt to consistently demonstrate the ability to make her basic self  care needs known.  Pt to manage incontinence pads with Supervision only for improved task of toileting.                  Plan:  Patient to be seen 5 x/week to address the above listed problems via self-care/home management, therapeutic activities, therapeutic exercises, cognitive retraining, wheelchair management/training  Plan of Care expires:    Plan of Care reviewed with: patient    ASHIA Snider   I certify that I was present in the room directing the student in service delivery and guiding them using my skilled judgment. As the co-signing therapist I have reviewed the students documentation and am responsible for the treatment, assessment, and plan.   JONATHAN Blanco  08/27/2019

## 2019-08-27 NOTE — PT/OT/SLP PROGRESS
Physical Therapy  Treatment   8/29/2019 removed discharge from title of note as patient was extended due to medical problems which have resulted in a decline in functional mobility. Avis Mathew, PT 8/29/2019    Ruth Lan   MRN: [id number]   Admitting Diagnosis: Atrial fibrillation status post cardioversion    PT Received On: 08/27/19          Billable Minutes:  Gait Training 0, Therapeutic Activity 25 and Therapeutic Exercise 8=33    Treatment Type: Treatment  PT/PTA: PT     PTA Visit Number: 0       General Precautions: Standard, fall, hearing impaired  Orthopedic Precautions: N/A   Braces: N/A         Subjective:  Communicated with patient prior to session.  Agreeable to session    Pain/Comfort  Pain Rating 1: other (see comments)  Location - Side 1: Right  Location - Orientation 1: generalized  Location 1: knee  Pain Addressed 1: Reposition, Distraction, Cessation of Activity  Pain Rating Post-Intervention 1: other (see comments)(reports hurts all the time, worse w/ WB; unable to rate)    Objective:  Patient found seated in w/c w/ 2 LPM oxygen with       AM-PAC 6 CLICK MOBILITY  Total Score:16    Bed Mobility:  Sit>Supine:on mat w/ mod assist for BLEs  Supine>Sit: on mat w/ CGA    Transfers:  Sit<>Stand: w/c<>stand w/ RW and CGA, extra time, 2 trials  Stand Pivot Transfer: w/c>mat w/ RW and CGA  Assist for safety; extra time    Gait:  Attempt gait. Unable to step x2 attempts. Patient w/ minor Right knee buckling both tries, and then asks to sit due to right knee pain     Advanced Gait:  Stairs: unable   Curb Step: unable    Wheelchair Mobility:  Patient propels w/c w/ BUEs and armrests in swing back postion to facilitate reaching for the wheels 24 feet. Slow pace. Unable to perform turns.     Therex:  Patient performs AP, SAQ, GS x15 w/ assistance, rest breaks    Balance:  Sits EOM w/ supervision for safety  Stands w/ RW and CGA for safety, static stand    Additional Treatment:  Patient educated on PT  POC; safety and need for assistance for functional mobility for safety.    Patient left up in w/c with supervision of staff.    Assessment:  Ruth Lan is a 84 y.o. female with a medical diagnosis of Atrial fibrillation status post cardioversion.  Patient discharging home tomorrow. Recommend assistance for all mobility for safety and function. Patient limited today by fatigue and right knee pain worse w/ weight bearing and unable to ambulate today although previous days she has been able to ambulate w/ assistance and RW. .    Rehab identified problem list/impairments: weakness, impaired endurance, impaired self care skills, impaired functional mobilty, gait instability, impaired balance, impaired cognition, decreased lower extremity function, decreased safety awareness    Rehab potential is fair.    Activity tolerance: Fair    Discharge recommendations: assisted living facility     Barriers to discharge: Decreased caregiver support    Equipment recommendations: none     GOALS:   Multidisciplinary Problems     Physical Therapy Goals        Problem: Physical Therapy Goal    Goal Priority Disciplines Outcome Goal Variances Interventions   Physical Therapy Goal     PT, PT/OT Ongoing (interventions implemented as appropriate)     Description:  Goals to be met by: 18     Patient will increase functional independence with mobility by performin. Supine to sit with Set-up [place] = met 2019 in bed w/ siderail and HOB slightly elevated  2. Sit to supine with Stand-by Assistance  3. Sit to stand transfer with Supervision  4. Bed to chair transfer with Supervision using Rolling Walker  5. Gait  x 50 feet with Moderate Assistance using Rolling Walker = met 2019   6. Wheelchair propulsion x50 feet with Stand-by Assistance using bilateral uppper extremities  6. Ascend/Descend 4 inch curb step with Moderate Assistance using Rolling Walker.  7. Stand for 3 minutes with Minimal Assistance using  Rolling Walker                       PLAN:    Patient to be seen 5 x/week  to address the above listed problems via gait training, therapeutic activities, therapeutic exercises, therapeutic groups, neuromuscular re-education, wheelchair management/training  Plan of Care expires: 09/13/19  Plan of Care reviewed with: patient    Avis DEE [place], PT  08/27/2019

## 2019-08-27 NOTE — PLAN OF CARE
Problem: Physical Therapy Goal  Goal: Physical Therapy Goal  Goals to be met by: 18     Patient will increase functional independence with mobility by performin. Supine to sit with Set-up [place] = met 2019 in bed w/ siderail and HOB slightly elevated  2. Sit to supine with Stand-by Assistance  3. Sit to stand transfer with Supervision  4. Bed to chair transfer with Supervision using Rolling Walker  5. Gait  x 50 feet with Moderate Assistance using Rolling Walker = met 2019   6. Wheelchair propulsion x50 feet with Stand-by Assistance using bilateral uppper extremities  6. Ascend/Descend 4 inch curb step with Moderate Assistance using Rolling Walker.  7. Stand for 3 minutes with Minimal Assistance using Rolling Walker      Goals remain appropriate. Continue with Physical therapy Plan of Care. Avis Mathew, PT 2019

## 2019-08-27 NOTE — PT/OT/SLP PROGRESS
"Speech Language Pathology  Treatment    Ruth Lan   MRN: [id number]   Admitting Diagnosis: Atrial fibrillation status post cardioversion    Diet recommendations: Solid Diet Level: Regular  Liquid Diet Level: Thin Standard aspiration precautions    SLP Treatment Date: 08/27/19  Speech Start Time: 0938     Speech Stop Time: 1015     Speech Total (min): 37 min       TREATMENT BILLABLE MINUTES:  Speech Therapy Individual 29 and Seld Care/Home Management Training 8    Has the patient been evaluated by SLP for swallowing? : No  Keep patient NPO?: No   General Precautions: Standard, fall  Current Respiratory Status: room air       Subjective:  "These are stupid questions"     Pain/Comfort  Pain Rating 1: 0/10  Pain Rating Post-Intervention 1: 0/10    Objective:       Pt seen for ongoing cognitive-linguistic therapy sessions. Pt awake/alert sitting in wheelchair with 2 L oxygen tank attached in rehab room upon arrival. SLP transferred pt back to her room and attached pt to wall oxygen which nurse later came and removed. Pts daughter in room at beginning of session and expressed concerns about pts safety upon d/c. Daughter stated assisted nursing facility may plan to come here and assess pt to determine further specific needs upon d/c.   Pt recalled 1/3 personal facts with a 2 minute delay independently and recalled 1/3 personal facts when provided min cues. Pt completed problem solving task with visual stim requiring mod-max cues and consistent encouragement on reasoning that task was important for cognition. Pt completed 3 trials of abstract category tasks requiring mod-max cues to list items and was able to name 2 items independently. Pt demonstrated increased processing time for task and frustration. Education provided to pt and daughter included SLP role, purpose of cognitive tasks and therapy, concerns for safety upon d/c, and pt POC. Understanding was demonstrated. No further questions.     Assessment:  Ruth" JHOAN Lan is a 84 y.o. female with a medical diagnosis of Atrial fibrillation status post cardioversion and presents with cognitive-linguistic deficits.       Discharge recommendations: Discharge Facility/Level of Care Needs: (pt will need assistance/supervision upon d/c)     Goals:   Multidisciplinary Problems     SLP Goals        Problem: SLP Goal    Goal Priority Disciplines Outcome   SLP Goal     SLP Revised   Description:  Speech Language Pathology Goals  Goals expected to be met by 9/3:  1. Pt will orient to month, year, and place given max cues and/or external aid.  2. Pt will recall 3/3 words or facts after a 3 minute delay given moderate cues.  3. Pt will complete problem solving and reasoning tasks with 80% accuracy given mod cues.  4. Pt will list an average of 8 items in a category in one minute given min-mod cues.  5. Pt will complete visual spatial tasks with 90% acc given min A to increase functional scanning      Goals expected to be met by 8/23:  1. Pt will orient to month, year, and place given max cues and/or external aid.  2. Pt will recall 3/3 words or facts after a 3 minute delay given moderate cues.  3. Pt will complete problem solving and reasoning tasks with 80% accuracy given mod cues.  4. Pt will list an average of 8 items in a category in one minute given min-mod cues.  5. Pt will participate in further assessment of reading, writing, and visual spatial abilities to determine need for further intervention. MET  6. Pt will complete visual spatial tasks with 90% acc given min A to increase functional scanning                             Plan:   Patient to be seen Therapy Frequency: 3 x/week  Planned Interventions: Cognitive-Linguistic Therapy  Plan of Care expires:    Plan of Care reviewed with: patient, daughter  SLP Follow-up?: Yes              DARI Zaman  08/27/2019

## 2019-08-27 NOTE — PLAN OF CARE
Problem: SLP Goal  Goal: SLP Goal  Speech Language Pathology Goals  Goals expected to be met by 9/3:  1. Pt will orient to month, year, and place given max cues and/or external aid.  2. Pt will recall 3/3 words or facts after a 3 minute delay given moderate cues.  3. Pt will complete problem solving and reasoning tasks with 80% accuracy given mod cues.  4. Pt will list an average of 8 items in a category in one minute given min-mod cues.  5. Pt will complete visual spatial tasks with 90% acc given min A to increase functional scanning      Goals expected to be met by 8/23:  1. Pt will orient to month, year, and place given max cues and/or external aid.  2. Pt will recall 3/3 words or facts after a 3 minute delay given moderate cues.  3. Pt will complete problem solving and reasoning tasks with 80% accuracy given mod cues.  4. Pt will list an average of 8 items in a category in one minute given min-mod cues.  5. Pt will participate in further assessment of reading, writing, and visual spatial abilities to determine need for further intervention. MET  6. Pt will complete visual spatial tasks with 90% acc given min A to increase functional scanning          Outcome: Revised  Goals updated and remain appropriate. Continue POC.   DARI Zaman  8/27/19

## 2019-08-27 NOTE — TELEPHONE ENCOUNTER
----- Message from Zoe Florez sent at 8/27/2019  1:51 PM CDT -----  Contact: Sandra pt daughter [phone]  Pt daughter would like a call in ref to her mother medication.    Thanks

## 2019-08-27 NOTE — PLAN OF CARE
Problem: Occupational Therapy Goal  Goal: Occupational Therapy Goal  Goals to be met by: 8/28/19    Patient will increase functional independence with ADLs by performing:    UE Dressing with Set-up Assistance.  LE Dressing with Set-up Assistance.  Toileting from bedside commode with Supervision for hygiene and clothing management.   Bathing from  shower chair/bench with Stand-by Assistance.  Toilet transfer to bedside commode with Supervision.  Increased functional strength to 4/5 for improved performance of UB ADL tasks of self dressing.  Pt to consistently demonstrate the ability to make her basic self care needs known.  Pt to manage incontinence pads with Supervision only for improved task of toileting.     Outcome: Ongoing (interventions implemented as appropriate)  ..    Comments: .

## 2019-08-27 NOTE — TELEPHONE ENCOUNTER
Spoke with patient's daughter. Patient is in SNF and [facility] is having issues with rx's sent by NP there. I spoke with pharmacist and advised that we cannot override the SNF orders at this point. We are not seeing her there. She verbalized understanding and will reach out to Tej Good for clarification.

## 2019-08-28 NOTE — PT/OT/SLP PROGRESS
"Physical Therapy  Treatment    Ruth Lan   MRN: [id number]   Admitting Diagnosis: Atrial fibrillation status post cardioversion    PT Received On: 08/28/19          Billable Minutes:  Therapeutic Exercise 15    Treatment Type: Treatment  PT/PTA: PTA     PTA Visit Number: 1       General Precautions: Standard, fall, hearing impaired  Orthopedic Precautions: N/A   Braces: N/A         Subjective:  "I will not be doing much of anything today" Pt agreebale to therapy    Pain/Comfort  Pain Rating 1: 0/10  Pain Rating Post-Intervention 1: 0/10    Objective:  Patient found in wc in gym with oxygen       AM-PAC 6 CLICK MOBILITY  Total Score:14    Therex:  2x10 A/AAROM LAQ, AP, HF, GS    Additional Treatment:  Pt educated on importance of increased time out of bed and EMI throughout the day  Discussed DME needs upon d/c    Patient left up in chair with call button in reach.    Assessment:  Ruth Lan is a 84 y.o. female with a medical diagnosis of Atrial fibrillation status post cardioversion.  Patient tolerated treatment well focusing on therex. Patient limited with activity due to pain in R knee. Patient will continue to improve with skilled physical therapy services in order to return to functional baseline.    Rehab identified problem list/impairments: weakness, impaired endurance, impaired self care skills, impaired functional mobilty, gait instability, impaired balance, impaired cognition, decreased lower extremity function, decreased safety awareness    Rehab potential is good.    Activity tolerance: Good    Discharge recommendations: assisted living facility     Barriers to discharge: Decreased caregiver support    Equipment recommendations: none     GOALS:   Multidisciplinary Problems     Physical Therapy Goals        Problem: Physical Therapy Goal    Goal Priority Disciplines Outcome Goal Variances Interventions   Physical Therapy Goal     PT, PT/OT Ongoing (interventions implemented as appropriate)   "   Description:  Goals to be met by: 18     Patient will increase functional independence with mobility by performin. Supine to sit with Set-up [place] = met 2019 in bed w/ siderail and HOB slightly elevated  2. Sit to supine with Stand-by Assistance  3. Sit to stand transfer with Supervision  4. Bed to chair transfer with Supervision using Rolling Walker  5. Gait  x 50 feet with Moderate Assistance using Rolling Walker = met 2019   6. Wheelchair propulsion x50 feet with Stand-by Assistance using bilateral uppper extremities  6. Ascend/Descend 4 inch curb step with Moderate Assistance using Rolling Walker.  7. Stand for 3 minutes with Minimal Assistance using Rolling Walker                       PLAN:    Patient to be seen 5 x/week  to address the above listed problems via gait training, therapeutic activities, therapeutic exercises, therapeutic groups, neuromuscular re-education, wheelchair management/training  Plan of Care expires: 19  Plan of Care reviewed with: patient    Francine Richard, PTA  2019

## 2019-08-28 NOTE — PLAN OF CARE
Problem: Occupational Therapy Goal  Goal: Occupational Therapy Goal  Goals to be met by: 19    Patient will increase functional independence with ADLs by performin. UE Dressing with Set-up Assistance.   REVISED UE dressing with CGA.  2.LE Dressing with Set-up Assistance.     REVISED LB dressing with Min (A) .  3.Toileting from bedside commode with Supervision for hygiene and clothing management.      REVISED Toileting from BSC with Min (A) for hygiene and clothing management.  4.Bathing from  shower chair/bench with Stand-by Assistance.    REVISED Bathing from shower bench/chair with Min (A).  5.Toilet transfer to bedside commode with Supervision.     REVISED  Toilet Transfers to BSC with CGA with grab bars or RW.  6.Increased functional strength to 4/5 for improved performance of UB ADL tasks of self dressing.  7.Pt to consistently demonstrate the ability to make her basic self care needs known.  8. Pt to manage incontinence pads with Supervision only for improved task of toileting.      .    Comments: .

## 2019-08-28 NOTE — PLAN OF CARE
Problem: Physical Therapy Goal  Goal: Physical Therapy Goal  Goals to be met by: 18     Patient will increase functional independence with mobility by performin. Supine to sit with Set-up [place] = met 2019 in bed w/ siderail and HOB slightly elevated  2. Sit to supine with Stand-by Assistance  3. Sit to stand transfer with Supervision  4. Bed to chair transfer with Supervision using Rolling Walker  5. Gait  x 50 feet with Moderate Assistance using Rolling Walker = met 2019   6. Wheelchair propulsion x50 feet with Stand-by Assistance using bilateral uppper extremities  6. Ascend/Descend 4 inch curb step with Moderate Assistance using Rolling Walker.  7. Stand for 3 minutes with Minimal Assistance using Rolling Walker      Outcome: Ongoing (interventions implemented as appropriate)  .gra

## 2019-08-28 NOTE — PT/OT/SLP PROGRESS
"Speech Language Pathology  Treatment    Ruth Lan   MRN: [id number]   Admitting Diagnosis: Atrial fibrillation status post cardioversion    Diet recommendations: Solid Diet Level: Regular  Liquid Diet Level: Thin HOB to 90 degrees, Monitor for s/s of aspiration and Standard aspiration precautions    SLP Treatment Date: 08/28/19  Speech Start Time: 0943     Speech Stop Time: 1018     Speech Total (min): 35 min       TREATMENT BILLABLE MINUTES:  Speech Therapy Individual 27 and Seld Care/Home Management Training 8    Has the patient been evaluated by SLP for swallowing? : No  Keep patient NPO?: No   General Precautions: Standard, fall, hearing impaired  Current Respiratory Status: room air       Subjective:  "That's goofy questions."          Objective:      Pt was seen for ongoing cognitive-linguistic therapy at bedside this morning.  Pt receiving 1L supplemental O2 via NC.  Pt was able to name/identify names of animals in photo album containing pictures from pt's [place] the [place].  Pt began to complete a 12 word/10x10 word search puzzle, but was unable to sustain attention to complete. Task was discontinued after pt found 2 words in 7 minutes and cues were not effectively in improving attention to further complete puzzle.  Pt provided solutions to hypothetical problem situations with 50% accuracy ind'ly/83% given cues.  [place] convergent categorization tasks were completed with 100% acc.  Pt also recalled names of concrete category items beginning with given letters with 100% acc.  Pt named 3 items in abstract categories with 44% accuracy ind'ly/100% given mod-max cues.  Ongoing education provided to pt regarding cognition, purpose and benefits of cognitive therapy and stimulation, and SLP POC.  Pt's full understanding and carryover are limited.     Assessment:  Ruth Lan is a 84 y.o. female with a medical diagnosis of Atrial fibrillation status post cardioversion and presents with " cognitive-linguistic deficits.     Discharge recommendations: Discharge Facility/Level of Care Needs: (assistance/supervision upon d/c)     Goals:   Multidisciplinary Problems     SLP Goals        Problem: SLP Goal    Goal Priority Disciplines Outcome   SLP Goal     SLP Ongoing (interventions implemented as appropriate)   Description:  Speech Language Pathology Goals  Goals expected to be met by 9/3:  1. Pt will orient to month, year, and place given max cues and/or external aid.  2. Pt will recall 3/3 words or facts after a 3 minute delay given moderate cues.  3. Pt will complete problem solving and reasoning tasks with 80% accuracy given mod cues.  4. Pt will list an average of 8 items in a category in one minute given min-mod cues.  5. Pt will complete visual spatial tasks with 90% acc given min A to increase functional scanning      Goals expected to be met by 8/23:  1. Pt will orient to month, year, and place given max cues and/or external aid.  2. Pt will recall 3/3 words or facts after a 3 minute delay given moderate cues.  3. Pt will complete problem solving and reasoning tasks with 80% accuracy given mod cues.  4. Pt will list an average of 8 items in a category in one minute given min-mod cues.  5. Pt will participate in further assessment of reading, writing, and visual spatial abilities to determine need for further intervention. MET  6. Pt will complete visual spatial tasks with 90% acc given min A to increase functional scanning                             Plan:   Patient to be seen Therapy Frequency: 3 x/week  Planned Interventions: Cognitive-Linguistic Therapy  Plan of Care expires:    Plan of Care reviewed with: patient  SLP Follow-up?: Yes              ELIF Swain, CEASAR-SLP  08/28/2019     ELIF Swain, CCC-SLP  Speech Language Pathologist  [phone]  8/28/2019

## 2019-08-28 NOTE — PT/OT/SLP PROGRESS
Occupational Therapy  Treatment    Ruth Lan   MRN: [id number]   Admitting Diagnosis: Atrial fibrillation status post cardioversion    OT Date of Treatment: 08/28/19       Billable Minutes:45  Self Care/Home Management 35 and Therapeutic Exercise 10    General Precautions: Standard, fall(standard)  Orthopedic Precautions: N/A  Braces: N/A         Subjective:  Communicated with nsg prior to session.  I am doing well today    Pain/Comfort  Pain Rating 1: 0/10    Objective:   Pt. Supine on arrival     Occupational Performance:    Bed Mobility:    · Patient completed Supine to Sit with minimum assistance and with side rail     Functional Mobility/Transfers:  · Patient completed Sit <> Stand Transfer with contact guard assistance and minimum assistance  with  rolling walker cues for safety and hand placement Pt present with forward flex posture  · Patient completed Bed <> Chair Transfer using Stand Pivot technique with contact guard assistance with rolling walker  · Patient completed Toilet Transfer Stand Pivot technique with contact guard assistance to minimum assistance with use of grab bars cues for safety with hand placement and transitional phase      Activities of Daily Living:  · Grooming: stand by assistance at sink level   · Upper Body Dressing: minimum assistance to vel sweater around back  · Lower Body Dressing: moderate assistance to vel pants seated over BLE feet and (A) to manage over hips instace with RW for bal   · Toileting: minimum assistance with cleaning and clothing managment  and utilization of grab bars    [facility] 6 Click:  [facility] Total Score: 17    OT Exercises: UE Ergometer 10 min with intermittent breaks     Patient left up in chair with PTA persent present    ASSESSMENT:  Ruth Lan is a 84 y.o. female with a medical diagnosis of Atrial fibrillation status post cardioversion Pt. participated well with session on this day. Pt. Does take frequent rest breaks with task and and have  difficulty attain standing bal and (A) required Pt demos physical deficits with balance  functional mobility, UB strength, endurance  level of functional indep with daily tasks and activities and selfcare skills .Pt. Will continue to benefit from continued OT to progress towards goals      Rehab identified problem list/impairments: weakness, impaired endurance, impaired self care skills, impaired functional mobilty, impaired balance, impaired cognition, decreased upper extremity function, decreased ROM, impaired cardiopulmonary response to activity    Rehab potential is fair    Activity tolerance: Fair    Discharge recommendations: assisted living facility, nursing facility, basic(TBD, dependent on progress)     Barriers to discharge: None     Equipment recommendations: other (see comments)(TBD)     GOALS:   Multidisciplinary Problems     Occupational Therapy Goals        Problem: Occupational Therapy Goal    Goal Priority Disciplines Outcome Interventions   Occupational Therapy Goal     OT, PT/OT Ongoing (interventions implemented as appropriate)    Description:  Goals to be met by: 19    Patient will increase functional independence with ADLs by performin. UE Dressing with Set-up Assistance.   REVISED UE dressing with CGA.  2.LE Dressing with Set-up Assistance.     REVISED LB dressing with Min (A) .  3.Toileting from bedside commode with Supervision for hygiene and clothing management.      REVISED Toileting from BSC with Min (A) for hygiene and clothing management.  4.Bathing from  shower chair/bench with Stand-by Assistance.    REVISED Bathing from shower bench/chair with Min (A).  5.Toilet transfer to bedside commode with Supervision.     REVISED  Toilet Transfers to BSC with CGA with grab bars or RW.  6.Increased functional strength to 4/5 for improved performance of UB ADL tasks of self dressing.  7.Pt to consistently demonstrate the ability to make her basic self care needs known.  8. Pt to  manage incontinence pads with Supervision only for improved task of toileting.                   Plan:  Patient to be seen 5 x/week to address the above listed problems via self-care/home management, therapeutic activities, therapeutic exercises, cognitive retraining, wheelchair management/training  Plan of Care expires:    Plan of Care reviewed with: patient    JONATHAN Monique  08/28/2019

## 2019-08-28 NOTE — PLAN OF CARE
"Problem: SLP Goal  Goal: SLP Goal  Speech Language Pathology Goals  Goals expected to be met by 9/3:  1. Pt will orient to month, year, and place given max cues and/or external aid.  2. Pt will recall 3/3 words or facts after a 3 minute delay given moderate cues.  3. Pt will complete problem solving and reasoning tasks with 80% accuracy given mod cues.  4. Pt will list an average of 8 items in a category in one minute given min-mod cues.  5. Pt will complete visual spatial tasks with 90% acc given min A to increase functional scanning      Goals expected to be met by 8/23:  1. Pt will orient to month, year, and place given max cues and/or external aid.  2. Pt will recall 3/3 words or facts after a 3 minute delay given moderate cues.  3. Pt will complete problem solving and reasoning tasks with 80% accuracy given mod cues.  4. Pt will list an average of 8 items in a category in one minute given min-mod cues.  5. Pt will participate in further assessment of reading, writing, and visual spatial abilities to determine need for further intervention. MET  6. Pt will complete visual spatial tasks with 90% acc given min A to increase functional scanning           Outcome: Ongoing (interventions implemented as appropriate)  Pt continues to show decreased insight into purpose of cognitive tx tasks. She is able to be redirected to respond appropriately, but often states "these questions are stupid." Will continue to follow as cognition appears to still be below baseline.   ELIF Swain, CCC-SLP  Speech Language Pathologist  [phone]  8/28/2019        "

## 2019-08-29 NOTE — PROGRESS NOTES
[facility]                                  Skilled Nursing Facility                   Progress Note   DOS 8/29/2019  Admit Date: 8/12/2019  SYEDA   Principal Problem:  Atrial fibrillation status post cardioversion   HPI obtained from patient interview and chart review     Chief Complaint:  Follow-up hypoxia, and Revaluation of medical treatment and therapy status: Lab review    HPI: Ms Lan is an 84 y.o. female with sig pmhx of chronic AF on Eliquis s/p DCCV 12/2018, HFpEF, HTN, AS s/p AVR 2004, severe MR, CKD4, HTN, Arthritis,HLD and hx R breast cancer who presents to SNF s/p hospitalization for Atrial fibrillation with RVR with LAUREEN/successful DCCV 8/9/19 by Dr. Randall. The patient initially presented to arrhythmia clinic for AF w/ RVR HR 140s, generalized weakness, shortness of breath, and multiple falls. The patient denies having chest pain or palpitations. Initiating order to monitor patient with camera for fall prevention.     Interval Hx: Patient sating 94% on 1 liters NC, continue to wean O2 to keep sats greater thank 90 % and continue Lasix. All of today's labs reviewed. No leukocytosis. Hemoglobin stable at 10.9. Platelets stable at 219. Sodium at 141. Potasium 4.4. Bun increased to 35, discontinued pravastatin and potassium.  Creatinine stable at 1.2. 24 hr blood glucose 111. Patient's current blood pressure is at 106/53. Patient progessing well with PT/OT. Continuing to follow and treat all acute and chronic conditions.    PEx  Constitutional: Patient appears well-developed and in no distress   HENT:  Head: Normocephalic and atraumatic.   Eyes: Pupils are equal, round, and reactive to light.   Neck: Normal range of motion. Neck supple.   Cardiovascular: Normal rate, regular rhythm and normal heart sounds.    Pulmonary/Chest: Effort normal and breath sounds are clear  Abdominal: Soft. Bowel sounds are normal.   Musculoskeletal: Normal range of motion. + generalized  weakness.  Neurological: + Alert and oriented to person and place  Psychiatric: Normal mood and affect. Behavior is normal.   Wounds/Skin:   Left lower arm skin tear  Appearance of wound:  Open to air, 100% dry tan/brown eschar, irregular edges  Wound length: 1 cm   Wound width: 1 cm  Following: This NP weekly- last observed on 8/29/2019 and wound care team Prn.     Temp:  [97.6 °F (36.4 °C)-98.2 °F (36.8 °C)]   Pulse:  [67-68]   Resp:  [17-18]   BP: (106-108)/(53-61)   SpO2:  [93 %-95 %]   Body mass index is 23.86 kg/m².     ROS  Constitutional: Negative for fever and malaise/fatigue.   Eyes: Negative for blurred vision, double vision and discharge.   Respiratory: Negative for cough, shortness of breath and wheezing.    Cardiovascular: Negative for chest pain, palpitations, claudication, and leg swelling.   Gastrointestinal: Negative for abdominal pain, constipation, diarrhea, nausea and vomiting.   Genitourinary: Negative for dysuria, frequency and urgency.   Musculoskeletal:  + generalized weakness. Negative for back pain and myalgias.   Skin: Negative for itching and rash.  Neurological: Negative for dizziness, speech change, seizures, and headaches.   Psychiatric/Behavioral: Negative for depression. The patient is not nervous/anxious.      Past Medical History: Patient has a past medical history of Arthritis, Atrial fibrillation (10/2018), Breast cancer (11/2012), Chronic diastolic heart failure, Heart murmur, Hyperlipidemia, Hypertension, Left atrial enlargement, and Severe mitral regurgitation.    Past Surgical History: Patient has a past surgical history that includes Tubal ligation; Breast biopsy (11/2012); Tissue aortic valve replacement (09/27/2004); Cataract extraction w/  intraocular lens implant (Right, 10/21/2008); Cataract extraction w/  intraocular lens implant (Left); Cardioversion (N/A, 12/18/2018); and Treatment of cardiac arrhythmia (N/A, 8/9/2019).    Social History: Patient reports that she  quit smoking about 16 years ago. She has a 50.00 pack-year smoking history. She has never used smokeless tobacco. She reports that she drinks about 1.5 oz of alcohol per week. She reports that she does not use drugs.    Family History: family history includes Breast cancer in her maternal grandmother; Cancer in her maternal aunt; Heart disease in her father; No Known Problems in her brother, maternal grandfather, maternal uncle, mother, paternal aunt, paternal grandfather, paternal grandmother, paternal uncle, and sister.    Allergies: Patient is allergic to etodolac and nsaids (non-steroidal anti-inflammatory drug).      Assessment and Plan:  Azotemia  -8/29 Bun increased to 35, discontinued pravastatin and potassium.     Hypoxia new  -8/21 Initiated oxygen 2L nasal cannula to maintain sats greater than 90% and patient is satting 94% on 2 L. patient does not use oxygen at home.  initiated chest x-ray and initiated UA.   -8/22 Patient's oxygen saturations stable at 93% on 2 L. Cxr done on 08/21 at 8:00 p.m. still pending called Bryan DIAS who is reading the image now. Review of urinalysis revealed negative for UTI.  -8/27 Patient sating 98% on 2 liters NC, initiated nursing order to weaning O2 to keep sats greater thank 90 %.  Review of chest x-ray revealed Slight diffuse accentuation interstitial markings identified.  Ill-defined opacification at the right lung base with blunted costophrenic angle consistent with pleural effusion and/or atelectasis versus airspace consolidation. No significant changes as compared to the previous study, continue Lasix.  -8/29 continue to wean O2 to keep sats greater thank 90 % and continue Lasix.    CONTINUED    ANTONIO on CKD4  -2/2 hypotension vs cardiorenal, improving w/ diuresis  -8/13 Decreased lasix to 40 mg daily.  -8/19 Creatinine at 1.6, discontinued lasix.    -8/22 initiated lasix at 40 mg daily  -8/27 Cr stable at 1.1    Lethargy new  -8/22 discontinuing OLANZapine and ramelteon.    -8/27 Patient without drowsiness this a.m. after medication adjustments and continues to be awake alert oriented x2 which appears to be her baseline.     Hypotension  -08/13 Decreased lasix to 40 mg daily and decreased lopressor to 50 mg bid.   -8/15 Patient's current blood pressure is at 109/55 and stable.   -8/19 discontinued lasix and blood pressure is stable at 133/60 and stable.     Nutritional deficiency  -8/13 Initiating nutrition consult.    -8/15 Awaiting nutritionist recs.   -8/19 continue dental soft diet.     Atrial fibrillation with RVR s/p LAUREEN/successful DCCV 8/9/19   -Home medications: diltiazem 240 mg daily + Lopressor 25 mg BID  -continue started on PO amio x 14 d  -Resume Eliquis renal dose adjusted  -Dr Guerra f/u 1 month with repeat TTE prior  -8/13 initiated amiodarone 200 mg daily to start on 8/17  -8/13 Initiated 12 lead EKG for suspected afib  -8/15 8/15 review of 12 lead EKG revealed normal sinus rhythm with artifact, will consider reordering if patient becomes symptomatic.      Wound of left upper extremity  -8/13 Initiating consult to wound care for evaluation of left upper arm extremity wound.  -8/15 Initiated wound care order to clean with sterile normal saline and dressed with foam dressing weekly.    Acute on chronic diastolic heart failure  Severe MR  AS s/p bioprosthetic AVR '04  -Home medication: Lasix 40 mg BID  -Echo: EF 55%, severe MR, moderate-severe TR, CVP 15, pHTN  -8/13 initiate order for Strict I/O's, daily weights, and decreased Lasix to 40 mg daily     Delirium resolved  UTI resolved   -Ceftriaxone > PO Cipro  -UCx: Klebsiella pneumoniae  -8/13 zyprexa dced     HTN  -8/13 decreased lopressor to 50 mg bid.      Generalized weakness  Falls  -PTOT consult - SNF  -Fall precautions  -8/13 Initiating order to monitor patient with camera for fall prevention.      Elevated TSH  -TSH 7.1, normal T4  -Repeat TFTs 4-6 wks per pcp    Acute hypoxic respiratory failure,  resolved    Outpatient f/u thyroid function, pulmonary status, liver function, as well as optho exam annually    Future Appointments   Date Time Provider Department Center   9/10/2019  2:45 PM EKG, APPT [facility] EKG [facility]   9/10/2019  3:30 PM Claire Calvin NP [facility] ARRHYTH [facility]   10/21/2019  2:00 PM [facility] MAMMO3 DX [facility] MAMMO [facility]   10/21/2019  3:30 PM Mae Ramon PA-C [facility] HEM ONC [facility]   11/8/2019  1:00 PM Angelika Mcallister MD [facility] CARDIO [facility]       Cali Chun NP

## 2019-08-29 NOTE — PT/OT/SLP PROGRESS
Occupational Therapy  Treatment    Ruth Lan   MRN: [id number]   Admitting Diagnosis: Atrial fibrillation status post cardioversion    OT Date of Treatment: 08/29/19       Billable Minutes:40  Therapeutic Activity 30 and Therapeutic Exercise 10    General Precautions: Standard, fall(standard)  Orthopedic Precautions: N/A  Braces: N/A         Subjective:  Communicated with PT prior to session.  I am doing well today    Pain/Comfort  Pain Rating 1: 0/10    Objective:   Pt. Seated in w/c in gym with PT on arrival     Occupational Performance:    Bed Mobility:    · Not tested     Functional Mobility/Transfers:  · Patient completed Sit <> Stand Transfer with contact guard assistance  with  rolling walker  with cues for safety . Pt. With limited standing bal      Activities of Daily Living:  · Not tested    [facility] 6 Click:  [facility] Total Score: 17    OT Exercises: UE Ergometer 10 min with intermittent  breaks in between     Additional Treatment:  Pt. With BUE FM activity on this day with placement of cloths pins on to subrace with placement on/off    Pt. With 1# dowel activity with x10 reps with AAROM shd flex, bicep curls horz adb/add and forward flex motion to increase BUE ROM and strength,.   Pt. With FM task therex performed to increase ROM, endurance selfcare task and fxl mobility for independence     Patient left up in chair with all lines intact, call button in reach and tellers sitter camera    ASSESSMENT:  Ruth Lan is a 84 y.o. female with a medical diagnosis of Atrial fibrillation status post cardioversion Pt. participated well with session on this day. Pt. Continues to require increase breaks an cues for safety and re direction. To focus back to task. Pt demos physical deficits with balance  functional mobility, UB strength, endurance  level of functional indep with daily tasks and activities and selfcare skills .Pt. Will continue to benefit from continued OT to progress towards goals      Rehab  identified problem list/impairments: weakness, impaired endurance, impaired self care skills, impaired functional mobilty, impaired balance, impaired cognition, decreased upper extremity function, decreased ROM, impaired cardiopulmonary response to activity    Rehab potential is fair    Activity tolerance: Fair    Discharge recommendations: assisted living facility, nursing facility, basic(TBD, dependent on progress)     Barriers to discharge: None     Equipment recommendations: other (see comments)(TBD)     GOALS:   Multidisciplinary Problems     Occupational Therapy Goals        Problem: Occupational Therapy Goal    Goal Priority Disciplines Outcome Interventions   Occupational Therapy Goal     OT, PT/OT Ongoing (interventions implemented as appropriate)    Description:  Goals to be met by: 19    Patient will increase functional independence with ADLs by performin. UE Dressing with Set-up Assistance.   REVISED UE dressing with CGA.  2.LE Dressing with Set-up Assistance.     REVISED LB dressing with Min (A) .  3.Toileting from bedside commode with Supervision for hygiene and clothing management.      REVISED Toileting from BSC with Min (A) for hygiene and clothing management.  4.Bathing from  shower chair/bench with Stand-by Assistance.    REVISED Bathing from shower bench/chair with Min (A).  5.Toilet transfer to bedside commode with Supervision.     REVISED  Toilet Transfers to BSC with CGA with grab bars or RW.  6.Increased functional strength to 4/5 for improved performance of UB ADL tasks of self dressing.  7.Pt to consistently demonstrate the ability to make her basic self care needs known.  8. Pt to manage incontinence pads with Supervision only for improved task of toileting.                       Plan:  Patient to be seen 5 x/week to address the above listed problems via self-care/home management, therapeutic activities, therapeutic exercises, cognitive retraining, wheelchair  management/training  Plan of Care expires:    Plan of Care reviewed with: patient    DEVIN Monique/JENSEN  08/29/2019

## 2019-08-29 NOTE — PT/OT/SLP PROGRESS
Physical Therapy  Treatment    Ruth Lan   MRN: [id number]   Admitting Diagnosis: Atrial fibrillation status post cardioversion    PT Received On: 08/29/19          Billable Minutes:  Gait Training 10 and Therapeutic Activity 15    Treatment Type: Treatment  PT/PTA: PT     PTA Visit Number: 0       General Precautions: Standard, fall, hearing impaired  Orthopedic Precautions: N/A   Braces: N/A         Subjective:  Communicated with patient prior to session.  Patient agreeable to session.     Pain/Comfort  Pain Rating 1: 0/10  Location - Side 1: Right  Location - Orientation 1: generalized  Location 1: knee  Pain Addressed 1: Distraction, Cessation of Activity(pain w/ weightbearing/walking)  Pain Rating Post-Intervention 1: 0/10    Objective:  Patient found seated in w/c with       AM-PAC 6 CLICK MOBILITY  Total Score:16    Bed Mobility:  Sit>Supine:on mat w/ SBA  Supine>Sit: on mat w/ min assist for trunk guidance    Transfers:  Sit<>Stand: to/from w/c w/ RW and CGA x2 trials; to/from mat w/ RW and CGA  Stand Pivot Transfer: w/c<>mat w/ RW and CGA      Gait:  Amb w/ RW and CGA 32 feet, cues for improved RW management; 8 feet w/ RW and CGA to mat. Slow pace, forward flexed posture. Mildly antalgic gait RLE as trial progresses     Advanced Gait:  Stairs: unable  Curb Step: unable     Therex:  Quad sets,   Glute sets,   Ankle  Pumps,   hip abduction/adduction,  heelslides,   SAQ,   LAQ   x20 reps w/ assist as needed      Balance:  Stands w/ RW and CGA  Sits EOB w/ mod(I)    Additional Treatment:  Patient educated on PT POC; LE exercises, gait and transfers    Patient left up in chair with OT.    Assessment:  Ruth Lan is a 84 y.o. female with a medical diagnosis of Atrial fibrillation status post cardioversion.  Patient overall CGA to min assist for mobility. Limited in distance and tolerance for activity, but overall good participation. Patient will benefit from continued physical therapy to address  deficits and improve safety and functional mobility. Continue with physical therapy plan of care. .    Rehab identified problem list/impairments: weakness, impaired endurance, impaired self care skills, impaired functional mobilty, gait instability, impaired balance, impaired cognition, decreased lower extremity function, decreased safety awareness    Rehab potential is good.    Activity tolerance: Good    Discharge recommendations: assisted living facility     Barriers to discharge: Decreased caregiver support    Equipment recommendations: none     GOALS:   Multidisciplinary Problems     Physical Therapy Goals        Problem: Physical Therapy Goal    Goal Priority Disciplines Outcome Goal Variances Interventions   Physical Therapy Goal     PT, PT/OT Ongoing (interventions implemented as appropriate)     Description:  Goals to be met by: 18     Patient will increase functional independence with mobility by performin. Supine to sit with Set-up [place] = met 2019 in bed w/ siderail and HOB slightly elevated  2. Sit to supine with Stand-by Assistance= met 2019  3. Sit to stand transfer with Supervision = not met  4. Bed to chair transfer with Supervision using Rolling Walker= not met  5. Gait  x 50 feet with Moderate Assistance using Rolling Walker = met 2019   6. Wheelchair propulsion x50 feet with Stand-by Assistance using bilateral uppper extremities = not met  6. Ascend/Descend 4 inch curb step with Moderate Assistance using Rolling Walker.= not met  7. Stand for 3 minutes with Minimal Assistance using Rolling Walker= not met                        PLAN:    Patient to be seen 5 x/week  to address the above listed problems via gait training, therapeutic activities, therapeutic exercises, therapeutic groups, neuromuscular re-education, wheelchair management/training  Plan of Care expires: 19  Plan of Care reviewed with: patient    Avis Mathew, PT  2019

## 2019-08-29 NOTE — PLAN OF CARE
Problem: Physical Therapy Goal  Goal: Physical Therapy Goal  Goals to be met by: 18     Patient will increase functional independence with mobility by performin. Supine to sit with Set-up [place] = met 2019 in bed w/ siderail and HOB slightly elevated  2. Sit to supine with Stand-by Assistance= met 2019  3. Sit to stand transfer with Supervision = not met  4. Bed to chair transfer with Supervision using Rolling Walker= not met  5. Gait  x 50 feet with Moderate Assistance using Rolling Walker = met 2019   6. Wheelchair propulsion x50 feet with Stand-by Assistance using bilateral uppper extremities = not met  6. Ascend/Descend 4 inch curb step with Moderate Assistance using Rolling Walker.= not met  7. Stand for 3 minutes with Minimal Assistance using Rolling Walker= not met      Goals remain appropriate. Continue with Physical therapy Plan of Care. Avis Mathew, PT 2019

## 2019-08-29 NOTE — PLAN OF CARE
Problem: Occupational Therapy Goal  Goal: Occupational Therapy Goal  Goals to be met by: 19    Patient will increase functional independence with ADLs by performin. UE Dressing with Set-up Assistance.   REVISED UE dressing with CGA.  2.LE Dressing with Set-up Assistance.     REVISED LB dressing with Min (A) .  3.Toileting from bedside commode with Supervision for hygiene and clothing management.      REVISED Toileting from BSC with Min (A) for hygiene and clothing management.  4.Bathing from  shower chair/bench with Stand-by Assistance.    REVISED Bathing from shower bench/chair with Min (A).  5.Toilet transfer to bedside commode with Supervision.     REVISED  Toilet Transfers to BSC with CGA with grab bars or RW.  6.Increased functional strength to 4/5 for improved performance of UB ADL tasks of self dressing.  7.Pt to consistently demonstrate the ability to make her basic self care needs known.  8. Pt to manage incontinence pads with Supervision only for improved task of toileting.      Outcome: Ongoing (interventions implemented as appropriate)  .    Comments: .

## 2019-08-30 NOTE — PT/OT/SLP PROGRESS
"Physical Therapy  Treatment    Ruth Lan   MRN: [id number]   Admitting Diagnosis: Atrial fibrillation status post cardioversion    PT Received On: 08/30/19        Billable Minutes:  Gait Training 10, Therapeutic Activity 18 and Therapeutic Exercise 10    Treatment Type: Treatment  PT/PTA: PTA     PTA Visit Number: 1       General Precautions: Standard, fall, hearing impaired  Orthopedic Precautions: N/A   Braces: N/A     Subjective:  "OK"    Pain/Comfort  Pain Rating 1: 0/10  Pain Rating Post-Intervention 1: 0/10    Objective:   Patient found with: (in wc)     AM-PAC 6 CLICK MOBILITY  Total Score:16    Transfers:  Sit<>Stand: min A with vcs for tech with RW and grab bar  Stand pivot WC<>BSC with grab bar min A    Gait:  Amb with RW min/CGA ~ 7 ft and 8 ft seated rest break, 2* to reports of fatigue, likely R knee pain, antalgic gait noted    Wheelchair Mobility:  Patient propels w/c ~ 50 ft with BUE min/CG briefly SBA    Therex:  2x10 reps AP,LAQ,hip flex AA to stay engaged    Balance: static standing bal act with min/CG holding grab bar for cleaning/changing diaper/pants x 2 trials ~ 2 minutes (urine came through diaper) pt stood at grab bar and continues to urinate, pivoted to BSC    Patient left up in chair with with OT.    Assessment:  Ruth Lan is a 84 y.o. female with a medical diagnosis of Atrial fibrillation status post cardioversion.  Pt tolerated well, cooperative with encouragement, however limited, pt would continue to benefit from skilled PT services to improve overall functional mobility, strength and endurance.  .    Rehab identified problem list/impairments: weakness, impaired endurance, impaired self care skills, impaired functional mobilty, gait instability, impaired balance, impaired cognition, decreased lower extremity function, decreased safety awareness    Rehab potential is good.    Activity tolerance: Fair    Discharge recommendations: assisted living facility     Barriers to " discharge: Decreased caregiver support    Equipment recommendations: none     GOALS:   Multidisciplinary Problems     Physical Therapy Goals        Problem: Physical Therapy Goal    Goal Priority Disciplines Outcome Goal Variances Interventions   Physical Therapy Goal     PT, PT/OT Ongoing (interventions implemented as appropriate)     Description:  Goals to be met by: 18     Patient will increase functional independence with mobility by performin. Supine to sit with Set-up [place] = met 2019 in bed w/ siderail and HOB slightly elevated  2. Sit to supine with Stand-by Assistance= met 2019  3. Sit to stand transfer with Supervision = not met  4. Bed to chair transfer with Supervision using Rolling Walker= not met  5. Gait  x 50 feet with Moderate Assistance using Rolling Walker = met 2019   6. Wheelchair propulsion x50 feet with Stand-by Assistance using bilateral uppper extremities = not met  6. Ascend/Descend 4 inch curb step with Moderate Assistance using Rolling Walker.= not met  7. Stand for 3 minutes with Minimal Assistance using Rolling Walker= not met                        PLAN:    Patient to be seen 5 x/week  to address the above listed problems via gait training, therapeutic activities, therapeutic exercises, therapeutic groups, neuromuscular re-education, wheelchair management/training  Plan of Care expires: 19  Plan of Care reviewed with: patient    Leda Love, PTA  2019

## 2019-08-30 NOTE — PT/OT/SLP PROGRESS
"Speech Language Pathology  Treatment    Ruth Lan   MRN: [id number]   Admitting Diagnosis: Atrial fibrillation status post cardioversion    Diet recommendations: Solid Diet Level: Regular  Liquid Diet Level: Thin Monitor for s/s of aspiration and Standard aspiration precautions    SLP Treatment Date: 08/30/19  Speech Start Time: 0930     Speech Stop Time: 1002     Speech Total (min): 32 min       TREATMENT BILLABLE MINUTES:  Speech Therapy Individual 24 and Seld Care/Home Management Training 8    Has the patient been evaluated by SLP for swallowing? : No  Keep patient NPO?: No   General Precautions: Standard, fall, hearing impaired  Current Respiratory Status: room air       Subjective:  "I wasn't fulfilling something, but I don't remember." pt's response when asked to recall reason for admission.      Objective:      Pt was oriented to month and place ind'ly, but not to year or situation.  Given assistance to utilize memory strategies to facilitate delayed recall, pt recalled 3/3 facts after a 3 minute delay given min cues.  Pt provided 2 uses for common objects with 75% accuracy given supervision.  Pt ID'd inconsistencies in sentences with 30% accuracy ind'yl/60% given cues.  Education provided to pt regarding role of SLP, cognition, benefits of cognitive tx and stimulation, rationale for each cognitive tx task, memory strategies, and SLP POC. Pt will continue to benefit from education, reinforcement, and encouragement.     Assessment:  Ruth Lan is a 84 y.o. female with a medical diagnosis of Atrial fibrillation status post cardioversion and presents with cognitive-linguistic deficits.     Discharge recommendations: Discharge Facility/Level of Care Needs: (assistance/supervision upon d/c; cont SLP services)     Goals:   Multidisciplinary Problems     SLP Goals        Problem: SLP Goal    Goal Priority Disciplines Outcome   SLP Goal     SLP Ongoing (interventions implemented as appropriate) "   Description:  Speech Language Pathology Goals  Goals expected to be met by 9/3:  1. Pt will orient to month, year, and place given max cues and/or external aid.  2. Pt will recall 3/3 words or facts after a 3 minute delay given moderate cues.  3. Pt will complete problem solving and reasoning tasks with 80% accuracy given mod cues.  4. Pt will list an average of 8 items in a category in one minute given min-mod cues.  5. Pt will complete visual spatial tasks with 90% acc given min A to increase functional scanning      Goals expected to be met by 8/23:  1. Pt will orient to month, year, and place given max cues and/or external aid.  2. Pt will recall 3/3 words or facts after a 3 minute delay given moderate cues.  3. Pt will complete problem solving and reasoning tasks with 80% accuracy given mod cues.  4. Pt will list an average of 8 items in a category in one minute given min-mod cues.  5. Pt will participate in further assessment of reading, writing, and visual spatial abilities to determine need for further intervention. MET  6. Pt will complete visual spatial tasks with 90% acc given min A to increase functional scanning                             Plan:   Patient to be seen Therapy Frequency: 3 x/week  Planned Interventions: Cognitive-Linguistic Therapy  Plan of Care expires:    Plan of Care reviewed with: patient  SLP Follow-up?: Yes              ELIF Swain, CCC-SLP  08/30/2019     ELIF Swain, CCC-SLP  Speech Language Pathologist  [phone]  8/30/2019

## 2019-08-30 NOTE — PLAN OF CARE
Problem: Physical Therapy Goal  Goal: Physical Therapy Goal  Goals to be met by: 18     Patient will increase functional independence with mobility by performin. Supine to sit with Set-up [place] = met 2019 in bed w/ siderail and HOB slightly elevated  2. Sit to supine with Stand-by Assistance= met 2019  3. Sit to stand transfer with Supervision = not met  4. Bed to chair transfer with Supervision using Rolling Walker= not met  5. Gait  x 50 feet with Moderate Assistance using Rolling Walker = met 2019   6. Wheelchair propulsion x50 feet with Stand-by Assistance using bilateral uppper extremities = not met  6. Ascend/Descend 4 inch curb step with Moderate Assistance using Rolling Walker.= not met  7. Stand for 3 minutes with Minimal Assistance using Rolling Walker= not met       Outcome: Ongoing (interventions implemented as appropriate)  Goals remain appropriate

## 2019-08-30 NOTE — PT/OT/SLP PROGRESS
"Occupational Therapy  Treatment    Ruth Lan   MRN: [id number]   Admitting Diagnosis: Atrial fibrillation status post cardioversion    OT Date of Treatment: 08/30/19       Billable Minutes:33  Therapeutic Activity 33     General Precautions: Standard, fall(standard)  Orthopedic Precautions: N/A  Braces: N/A         Subjective:  Communicated with nsg prior to session.  Im ok today    Pain/Comfort  Pain Rating 1: 0/10  Location - Side 1: Right  Location - Orientation 1: generalized  Location 1: knee  Pain Addressed 1: Distraction  Pain Rating Post-Intervention 1: 0/10    Objective:   Pt. Found seated in w/c in gym     Occupational Performance:    Bed Mobility:    · Not tested     Functional Mobility/Transfers:  · Not tested    Activities of Daily Living:  · Feeding: set up assistance for lunch    [facility] 6 Click:  [facility] Total Score: 17    OT Exercises: UE Ergometer 10 mins with frequent rest breaks between and cues to keep going  Pt. With 3# dowel activity with 2x20 reps with  shd flex, bicep curls horz adb/add and forward flex motion to increase BUE ROM and strength,.   AAROM for all planes for 3# dowel activity due to Pt. Showing feelings of being unmotivated and distracted  Pt. With therex performed to increase ROM, endurance selfcare task and fxl mobility for independence       Patient left up in chair with all lines intact, call button in reach and and AVS cameras on     ASSESSMENT:  Ruth Lan is a 84 y.o. female with a medical diagnosis of Atrial fibrillation status post cardioversion.  Pt. participated fair with session on this day. Pt is progressing well with session on this day still continues to requires cues with aspects of safety. Pt. Appeared distracted and unmotivated during therapy session and required multiple verbal cues to finish task.  Pt. Refused to perform Lower body dressing with socks and stated " "Its just one of those day. I need to go back to my room". OT session was ended and " Pt. Was brought back to room. Pt. Reported she was not having any pain.  Pt. Will continue to benefit from continued OT to progress towards goals    Rehab identified problem list/impairments: weakness, impaired endurance, impaired self care skills, impaired functional mobilty, impaired balance, impaired cognition, decreased upper extremity function, decreased ROM, impaired cardiopulmonary response to activity    Rehab potential is fair    Activity tolerance: Fair    Discharge recommendations: assisted living facility, nursing facility, basic(TBD, dependent on progress)     Barriers to discharge: None     Equipment recommendations: other (see comments)(TBD)     GOALS:   Multidisciplinary Problems     Occupational Therapy Goals        Problem: Occupational Therapy Goal    Goal Priority Disciplines Outcome Interventions   Occupational Therapy Goal     OT, PT/OT Ongoing (interventions implemented as appropriate)    Description:  Goals to be met by: 19    Patient will increase functional independence with ADLs by performin. UE Dressing with Set-up Assistance.   REVISED UE dressing with CGA.  2.LE Dressing with Set-up Assistance.     REVISED LB dressing with Min (A) .  3.Toileting from bedside commode with Supervision for hygiene and clothing management.      REVISED Toileting from BSC with Min (A) for hygiene and clothing management.  4.Bathing from  shower chair/bench with Stand-by Assistance.    REVISED Bathing from shower bench/chair with Min (A).  5.Toilet transfer to bedside commode with Supervision.     REVISED  Toilet Transfers to BSC with CGA with grab bars or RW.  6.Increased functional strength to 4/5 for improved performance of UB ADL tasks of self dressing.  7.Pt to consistently demonstrate the ability to make her basic self care needs known.  8. Pt to manage incontinence pads with Supervision only for improved task of toileting.                   Plan:  Patient to be seen 5 x/week to address the  above listed problems via self-care/home management, therapeutic activities, therapeutic exercises, cognitive retraining, wheelchair management/training  Plan of Care expires:    Plan of Care reviewed with: patient    ASHIA Snider   I certify that I was present in the room directing the student in service delivery and guiding them using my skilled judgment. As the co-signing therapist I have reviewed the students documentation and am responsible for the treatment, assessment, and plan.   JONATHAN Blanco  08/30/2019

## 2019-08-30 NOTE — PROGRESS NOTES
"[facility]  Adult Nutrition  Progress Note    SUMMARY   Recommendations  Recommendation/Intervention: Continue low sodium, dysphagia soft diet, 1500 ml fluid restriction,  ONS BID  Goals: PO to meet 85% of EEN with oral supplement by next RD visit  Nutrition Goal Status: progressing towards goal  Reason for Assessment    Reason For Assessment: RD follow-up  Diagnosis: (Debility, hypotension)  Relevant Medical History: HTN, CHF, AFIB, HLD, cancer  Interdisciplinary Rounds: attended  General Information Comments: PO 50-75%  Nutrition Discharge Planning: DC on cardiac diet, with fluid restriciton per MD  Nutrition/Diet History    Patient Reported Diet/Restrictions/Preferences: general  Typical Food/Fluid Intake: lives in assisted living and her meals are taken care of.  Food Preferences: fruit, oatmeal, coffee, milk chocolate,   Spiritual, Cultural Beliefs, Shinto Practices, Values that Affect Care: no  Supplemental Drinks or Food Habits: Ensure Plus  Food Allergies: NKFA  Factors Affecting Nutritional Intake: chewing difficulties/inability to chew food, impaired cognitive status/motor control, other (see comments)(lack of thirst)    Anthropometrics    Height Method: Stated  Height: 5' 3" (160 cm)  Height (inches): 63 in  Weight Method: Standard Scale  Weight: 62.1 kg (136 lb 14.5 oz)  Weight (lb): 136.91 lb  Ideal Body Weight (IBW), Female: 115 lb  % Ideal Body Weight, Female (lb): 127.68 lb  Weight Loss: (some of the weight loss is attributed to lasix 40 mg)  Usual Body Weight (UBW), k kg  Weight Loss Since Admission: (wt loss from fluid losses at least partially )       Lab/Procedures/Meds    Pertinent Labs Reviewed: reviewed  Pertinent Labs Comments: BUN 35m glucose 111  Pertinent Medications Reviewed: reviewed  Pertinent Medications Comments: lasix        Estimated/Assessed Needs    Weight Used For Calorie Calculations: 66.6 kg (146 lb 13.2 oz)  Energy Calorie Requirements (kcal): " 1356  Energy Need Method: [place]-St Jeor(x 1.25(PAL))  Protein Requirements: 66-80g  Weight Used For Protein Calculations: 66.6 kg (146 lb 13.2 oz)(1.0-1.2 gm/kg)  Fluid Requirements (mL): per MD 1500 pt and family made aware of restriciton today     RDA Method (mL): 1356  CHO Requirement: -      Nutrition Prescription Ordered    Current Diet Order: 2gm Na  Nutrition Order Comments: chopped meats  Oral Nutrition Supplement: Boost plus BID chocolate    Evaluation of Received Nutrient/Fluid Intake    Energy Calories Required: not meeting needs  Protein Required: not meeting needs  Fluid Required: meeting needs  Comments: pt sleeping often, eats what she wans, feeds self with set up  Tolerance: tolerating  % Intake of Estimated Energy Needs: 50 - 75 %  % Meal Intake: 50 - 75 %    Nutrition Risk    Level of Risk/Frequency of Follow-up: low     Assessment and Plan   Moderate  Malnutrition in the context of Chronic Illness/Injury     Related to (etiology):  Decline in cognitive function, decline in po intake      Signs and Symptoms (as evidenced by):  Energy Intake: <75% of estimated energy requirement for > 3months  Body Fat Depletion: mild depletion of orbitals and triceps   Muscle Mass Depletion: moderate depletion of clavicle and acromion bone region, clavicle bone region, temple region, dorsal hand   Weight Loss: in 2018 not this past yr     Interventions/Recommendations (treatment strategy):  Texture modification- dental soft  Mineral modified diet- 2 gram sodium  Commercial beverage- calories and protein- boost plus chocolate daily  Meals and snacks- milk with breakfast and lunch       Collaboration and referral of nutrition care  Ongoing    Monitor and Evaluation    Food and Nutrient Intake: food and beverage intake  Knowledge/Beliefs/Attitudes: food and nutrition knowledge/skill  Physical Activity and Function: nutrition-related ADLs and IADLs  Biochemical Data, Medical Tests and Procedures: inflammatory  profile, gastrointestinal profile, electrolyte and renal panel  Nutrition-Focused Physical Findings: overall appearance     Malnutrition Assessment   8/15/19     Skin (Micronutrient): pallor  Hair/Scalp (Micronutrient): dry  Eyes (Micronutrient): conjunctiva dull  Teeth (Micronutrient): (poor fitting denturs pt does not want to replace)  Neck/Chest (Micronutrient): muscle wasting  Musculoskeletal/Lower Extremities: muscle wasting           Orbital Region (Subcutaneous Fat Loss): mild depletion  Upper Arm Region (Subcutaneous Fat Loss): mild depletion  Thoracic and Lumbar Region: well nourished   [place] Region (Muscle Loss): moderate depletion  Clavicle Bone Region (Muscle Loss): moderate depletion  Clavicle and Acromion Bone Region (Muscle Loss): moderate depletion  Scapular Bone Region (Muscle Loss): mild depletion  Dorsal Hand (Muscle Loss): moderate depletion   Edema (Fluid Accumulation): 0-->no edema present             Nutrition Follow-Up    RD Follow-up?: Yes

## 2019-08-30 NOTE — PLAN OF CARE
Problem: SLP Goal  Goal: SLP Goal  Speech Language Pathology Goals  Goals expected to be met by 9/3:  1. Pt will orient to month, year, and place given max cues and/or external aid.  2. Pt will recall 3/3 words or facts after a 3 minute delay given moderate cues.  3. Pt will complete problem solving and reasoning tasks with 80% accuracy given mod cues.  4. Pt will list an average of 8 items in a category in one minute given min-mod cues.  5. Pt will complete visual spatial tasks with 90% acc given min A to increase functional scanning      Goals expected to be met by 8/23:  1. Pt will orient to month, year, and place given max cues and/or external aid.  2. Pt will recall 3/3 words or facts after a 3 minute delay given moderate cues.  3. Pt will complete problem solving and reasoning tasks with 80% accuracy given mod cues.  4. Pt will list an average of 8 items in a category in one minute given min-mod cues.  5. Pt will participate in further assessment of reading, writing, and visual spatial abilities to determine need for further intervention. MET  6. Pt will complete visual spatial tasks with 90% acc given min A to increase functional scanning           Outcome: Ongoing (interventions implemented as appropriate)  Cont POC.   ELIF Swain, CCC-SLP  Speech Language Pathologist  [phone]  8/30/2019

## 2019-08-31 NOTE — PLAN OF CARE
Problem: Occupational Therapy Goal  Goal: Occupational Therapy Goal  Goals to be met by: 19    Patient will increase functional independence with ADLs by performin. UE Dressing with Set-up Assistance.   REVISED UE dressing with CGA. Met 19  2.LE Dressing with Set-up Assistance.     REVISED LB dressing with Min (A) .  3.Toileting from bedside commode with Supervision for hygiene and clothing management.      REVISED Toileting from BSC with Min (A) for hygiene and clothing management.  4.Bathing from  shower chair/bench with Stand-by Assistance.    REVISED Bathing from shower bench/chair with Min (A).  5.Toilet transfer to bedside commode with Supervision.     REVISED  Toilet Transfers to BSC with CGA with grab bars or RW.  6.Increased functional strength to 4/5 for improved performance of UB ADL tasks of self dressing.  7.Pt to consistently demonstrate the ability to make her basic self care needs known.  8. Pt to manage incontinence pads with Supervision only for improved task of toileting.      Outcome: Ongoing (interventions implemented as appropriate)  Patient goals are appropriate.

## 2019-08-31 NOTE — PT/OT/SLP PROGRESS
Physical Therapy      Patient Name:  Rtuh Lan   MRN:  [id number]    Patient not seen today secondary to daughter and ALFattendent present for trng, patient back in bed sleeping. OT just got the pt OOB walked around the room and went to the bathroom. They were both comfortable with level of A pt required not necessary to get the pt up again. Ed/reviewed safety/tech with trfs,gait,therex,HHPT,DME,level of A pt requires, which may fluctuate, both verbalized understanding, all questions answered, 20 minutes, no charges billed.    Leda Love, PTA

## 2019-08-31 NOTE — PT/OT/SLP PROGRESS
Occupational Therapy  Treatment    Ruth Lan   MRN: [id number]   Admitting Diagnosis: Atrial fibrillation status post cardioversion    OT Date of Treatment: 08/31/19       Billable Minutes:  Self Care/Home Management 32    General Precautions: Standard, fall, hearing impaired  Orthopedic Precautions: N/A  Braces: N/A         Subjective:  Communicated with patient, daughter, and nurse from [facility] prior to session.  Patient's daughter mainly concerned about patient ambulating to restroom on her own.     Pain/Comfort  Pain Rating 1: 0/10  Pain Rating Post-Intervention 1: 0/10    Objective:   Patient found supine with HOB slightly elevated.     Occupational Performance:    Bed Mobility:    · Patient completed Rolling/Turning to Left with  minimum assistance using hand rails  · Patient completed Rolling/Turning to Right with minimum assistance using hand rails  · Patient completed Supine to Sit with stand by assistance using hand rails  · Patient completed Sit to Supine with stand by assistance using hand rails    Functional Mobility/Transfers:  · Patient completed Sit <> Stand Transfer with stand by assistance  with  rolling walker   · Patient completed Bed > sofa Transfer using Step Transfer technique with stand by assistance with rolling walker  · Patient completed Toilet Transfer sofa > BSC over toilet > bed Step Transfer technique with stand by assistance with  rolling walker  · Functional Mobility: Patient ambulated with rolling walker with SBA throughout room and bathroom.     Activities of Daily Living:  · Upper Body Dressing: stand by assistance vel/doff pullv oer gown with extended time and verbal cues for sequencing   · Lower Body Dressing: stand by assistance doff socks and total(A) vel socks. Patient's daughter states that patient does not normally wear socks.  · Toileting: stand by assistance on BSC over toilet. Inofmred daughter and nurse from [facility] that she requires min(A) for hygiene according to  previous treatment notes.     [facility] 6 Click:  [facility] Total Score: 17    Additional Treatment:  -Educated patient, daughter, and nurse on proper t/f technique (push from surface then grab walker)    Patient left supine with call button in reach and daughter and nurse from [facility] present    ASSESSMENT:  Ruth Lan is a 84 y.o. female with a medical diagnosis of Atrial fibrillation status post cardioversion and presents with the deficits listed below. [facility] provides therapy services. Daughter is confident in d/c back to [facility]. Patient will benefit from continued OT services to maximize safety and independence with ADLs.     Rehab identified problem list/impairments: weakness, impaired endurance, impaired self care skills, impaired functional mobilty, impaired balance, impaired cognition, decreased upper extremity function, decreased ROM, impaired cardiopulmonary response to activity    Rehab potential is fair    Activity tolerance: Fair    Discharge recommendations: assisted living facility     Barriers to discharge: None     Equipment recommendations: none     GOALS:   Multidisciplinary Problems     Occupational Therapy Goals        Problem: Occupational Therapy Goal    Goal Priority Disciplines Outcome Interventions   Occupational Therapy Goal     OT, PT/OT Ongoing (interventions implemented as appropriate)    Description:  Goals to be met by: 19    Patient will increase functional independence with ADLs by performin. UE Dressing with Set-up Assistance.   REVISED UE dressing with CGA. Met 19  2.LE Dressing with Set-up Assistance.     REVISED LB dressing with Min (A) .  3.Toileting from bedside commode with Supervision for hygiene and clothing management.      REVISED Toileting from BSC with Min (A) for hygiene and clothing management.  4.Bathing from  shower chair/bench with Stand-by Assistance.    REVISED Bathing from shower bench/chair with Min (A).  5.Toilet transfer to bedside commode with  Supervision.     REVISED  Toilet Transfers to [facility] with CGA with grab bars or RW.  6.Increased functional strength to 4/5 for improved performance of UB ADL tasks of self dressing.  7.Pt to consistently demonstrate the ability to make her basic self care needs known.  8. Pt to manage incontinence pads with Supervision only for improved task of toileting.                        Plan:  Patient to be seen 5 x/week to address the above listed problems via self-care/home management, therapeutic activities, therapeutic exercises, cognitive retraining, wheelchair management/training  Plan of Care expires:    Plan of Care reviewed with: patient   The BI and DEVIN have collaborated and discussed the patient's status, treatment plan and progress toward established goals.   DORCAS Newman 8/31/2019     DORCAS Newman  08/31/2019

## 2019-09-03 NOTE — PLAN OF CARE
Problem: Adult Inpatient Plan of Care  Goal: Plan of Care Review  Outcome: Revised  Pt repositions with maximum assist. No knew skin breakdown noted. Afebrile. Monitored for pain and safety. Safety maintained. Pain meds affective.

## 2019-09-03 NOTE — NURSING
Educated pt and daughter in room on home discharge orders, medications, importance of follow ups, and safety. Pt transported via wheel chair accompanied by daughter and pct to front entrance for discharge to assisted living with home health.

## 2019-09-03 NOTE — PROGRESS NOTES
Patient discharged today with daughter, returned to assisted living facility at [facility].  Patient at request of facility and family home health information was sent to [place] [facility] home health agency.  Pt needed no DME at time of discharge.

## 2019-09-03 NOTE — HOSPITAL COURSE
Patient progressed well with PT and OT. Patient had no significant events during their stay at SNF. Home health was ordered. DME was ordered if needed. Follow up appointment to be made by patient within one week. All prescriptions and discharge instructions were ordered to be given to patient prior to discharge.

## 2019-09-03 NOTE — PROGRESS NOTES
SW spoke with pt daughter in room and discussed NOMNC with the daughter. She voiced an understanding of the appeals process and stated she would like to have a family training prior to discharge that she can observe what her mother's performance is and she will decide if she wants to appeal discharge or not.  Family training scheduled with Therapy manager for 1pm Saturday. Daughter given a copy of NOMNC with number to call appeal and the deadline to appeal. SW will continue to monitor until the pt is discharged.

## 2019-09-03 NOTE — HPI
Ms Lan is an 84 y.o. female with sig pmhx of chronic AF on Eliquis s/p DCCV 12/2018, HFpEF, HTN, AS s/p AVR 2004, severe MR, CKD4, HTN, Arthritis,HLD and hx R breast cancer who presents to SNF s/p hospitalization for Atrial fibrillation with RVR with LAUREEN/successful DCCV 8/9/19 by Dr. Randall. The patient initially presented to arrhythmia clinic for AF w/ RVR HR 140s, generalized weakness, shortness of breath, and multiple falls. The patient denies having chest pain or palpitations.      PEx  Constitutional: Patient appears well-developed and in no distress   HENT:  Head: Normocephalic and atraumatic.   Eyes: Pupils are equal, round, and reactive to light.   Neck: Normal range of motion. Neck supple.   Cardiovascular: Normal rate, regular rhythm and normal heart sounds.    Pulmonary/Chest: Effort normal and breath sounds are clear  Abdominal: Soft. Bowel sounds are normal.   Musculoskeletal: Normal range of motion. + generalized weakness.  Neurological: + Alert and oriented to person and place  Psychiatric: Normal mood and affect. Behavior is normal.   Wounds/Skin:   Left lower arm skin tear  Appearance of wound:  Open to air, 100% dry tan/brown eschar, irregular edges  Wound length: 1 cm   Wound width: 1 cm  Following: This NP weekly- last observed on 9/03/2019.

## 2019-09-04 NOTE — ED PROVIDER NOTES
Encounter Date: 9/4/2019       History     Chief Complaint   Patient presents with    Fall     Patient is a 85 yo F with a history of chronic AF on Eliquis s/p DCCV 12/2018, HFpEF, HTN, AS s/p AVR 2004, CKD4, HTN, Arthritis,HLD and hx R breast cancer who presents to the ED after a fall.  Patient states that she was attempting to sit on her bed when she slipped and slid down the edge of the bed.  She states that she landed on her buttocks and hit her head somewhere.  Patient was at a skilled nursing facility.  She endorses pain in her buttocks from where she fell.  She denies losing consciousness, any preceding dizziness, nausea, vomiting, chest pain, SOB or current headaches.         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, ER/PA positive    Chronic diastolic heart failure     Heart murmur     Hyperlipidemia     Hypertension     Left atrial enlargement     Severe mitral regurgitation      Past Surgical History:   Procedure Laterality Date    BIOPSY, LYMPH NODE, SENTINEL Right 12/6/2012    [facility]formed [facility] 2ND FLR    BREAST BIOPSY  11/2012    Right breast- invasive ductal carcinoma    CARDIOVERSION N/A 8/9/2019    Performed by Vu Villalta [facility] at [facility] EP LAB    CARDIOVERSION N/A 12/18/2018    [facility] by Emanuel Guerra [facility] at [facility] LAB    CATARACT EXTRACTION W/  INTRAOCULAR LENS IMPLANT Right 10/21/2008    [facility]     CATARACT EXTRACTION W/  INTRAOCULAR LENS IMPLANT Left     ECHOCARDIOGRAM,TRANSESOPHAGEAL N/A 8/9/2019    Performed by [facility] Provider at [facility] EP LAB     ECHOCARDIOGRAM,TRANSESOPHAGEAL N/A 2018    Performed by [facility] Diagnostic Provider at [facility] EP LAB    INSERTION, LOCALIZATION WIRE Right 2012    Performed by [facility] at [facility] OR 2ND FLR    IG-OH[place]-ZFRVNS Right 2013    Performed by [facility] at [facility] OR 2ND FLR    TISSUE AORTIC VALVE REPLACEMENT  2004    TUBAL LIGATION       Family History   Problem Relation Age of Onset    Cancer Maternal Aunt         cervical or breast    Breast cancer Maternal Grandmother     Heart disease Father     No Known Problems Mother     No Known Problems Sister     No Known Problems Brother     No Known Problems Maternal Uncle     No Known Problems Paternal Aunt     No Known Problems Paternal Uncle     No Known Problems Maternal Grandfather     No Known Problems Paternal Grandmother     No Known Problems Paternal Grandfather     Blindness Neg Hx     Cataracts Neg Hx     Diabetes Neg Hx     Glaucoma Neg Hx     Ovarian cancer Neg Hx     Amblyopia Neg Hx     Hypertension Neg Hx     Macular degeneration Neg Hx     Retinal detachment Neg Hx     Strabismus Neg Hx     Stroke Neg Hx     Thyroid disease Neg Hx      Social History     Tobacco Use    Smoking status: Former Smoker     Packs/day: 1.00     Years: 50.00     Pack years: 50.00     Last attempt to quit: 11/15/2002     Years since quittin.8    Smokeless tobacco: Never Used   Substance Use Topics    Alcohol use: Yes     Alcohol/week: 1.5 oz     Types: 3 Standard drinks or equivalent per week     Comment: 3 OZ WINE     Drug use: No     Review of Systems   Constitutional: Negative for chills and fever.   HENT: Negative for congestion and rhinorrhea.    Eyes: Negative for visual disturbance.   Respiratory: Negative for cough and shortness of breath.    Cardiovascular: Negative for chest pain.   Gastrointestinal: Negative for abdominal distention, abdominal pain, diarrhea, nausea and vomiting.   Genitourinary:  Negative for difficulty urinating and dysuria.   Musculoskeletal: Positive for arthralgias, back pain and joint swelling.        She states that she has osteoporosis in her left knee and that is chronically swollen and red.    Skin: Negative for rash.   Neurological: Negative for syncope, speech difficulty, numbness and headaches.   Psychiatric/Behavioral: Negative for confusion.       Physical Exam     Initial Vitals [09/04/19 0857]   BP Pulse Resp Temp SpO2   (!) 124/50 88 14 97.6 °F (36.4 °C) 98 %      MAP       --         Physical Exam    Constitutional: She appears well-developed. No distress.   HENT:   Head: Normocephalic and atraumatic.   Eyes: Conjunctivae and EOM are normal. Pupils are equal, round, and reactive to light.   Neck: Normal range of motion. Neck supple.   Cardiovascular: Normal rate and regular rhythm.   Pulmonary/Chest: Breath sounds normal. No respiratory distress.   Abdominal: Soft. She exhibits no distension. There is no tenderness.   Musculoskeletal:   Tenderness and swelling in right knee.    Pain with axial load placed on right hip.    Neurological: She is alert and oriented to person, place, and time. She has normal strength.   Skin: Skin is warm and dry.   Psychiatric: She has a normal mood and affect.         ED Course   Procedures  Labs Reviewed - No data to display     ECG Results          EKG 12-lead (In process)  Result time 09/04/19 09:31:51    In process by Interface, Lab [facility] (09/04/19 09:31:51)                 Narrative:    Test Reason : W19.XXXA,    Vent. Rate : 077 BPM     Atrial Rate : 077 BPM     P-R Int : 174 ms          QRS Dur : 112 ms      QT Int : 444 ms       P-R-T Axes : 050 055 065 degrees     QTc Int : 502 ms    Sinus rhythm with Premature supraventricular complexes  Prolonged QT  Abnormal ECG  When compared with ECG of 13-AUG-2019 16:40,  Premature supraventricular complexes are now Present  T wave amplitude has increased in Anterior leads    Referred By:  AAAREFERR   SELF           Confirmed By:                             Imaging Results    None          Medical Decision Making:   History:   Old Records Summarized: records from previous admission(s).  Initial Assessment:   Patient is a 83 yo F with a hx of A fib who fell while trying to sit on her bed. She fell onto her buttocks and hit her head. Pain in her knee is illicited with passive flexion of her right knee.   On exam, patient is alert, oriented. She winces to pain in her buttocks when asked to lie down in bed.  Vitals are stable  Differential Diagnosis:   Differential dx includes but is not limited to:  - subdural hematoma  - intracranial hemorrage  - hip fracture  ED Management:  Patient seen and examined. She is no acute distress. Alert and oriented appropriately.  Will order imaging to rule out any underlying bleed of the brain as patient is elderly and fall was unwitnessed with unknown mechanism of head hitting something.    9:48 AM  Ordered Xrays of the hips and pelvis. Ordered CT head wo contrast.     11:18 AM  CT scan reveals no acute intracranial bleeding.  Pelvic xrays showed no acute fracture or changes.   Recommend patient take tylenol for any pain and to follow up if she has any other concerns.               Attending Attestation:   Physician Attestation Statement for Resident:  As the supervising MD   Physician Attestation Statement: I have personally seen and examined this patient.   I agree with the above history. -: Patient fell out of her bed struck her head and buttocks.  She also has pain to her right knee and this is chronic.  No loss of consciousness or neurologic complaints. She does not walk very well at the nursing home at her baseline.  Denies vomiting   As the supervising MD I agree with the above PE.   -: Head is atraumatic neck is nontender spine is nontender. She has swelling to her right knee.  She states this is her baseline.  She has full range of motions of her hips.  There  is tenderness to her is sacral area.  It is mild.  She has normal strength sensation to arms and legs and her mental status is clear.  Her thought processes seem to be intact.   As the supervising MD I agree with the above treatment, course, plan, and disposition.   -: Set CT was ordered and reviewed.  No acute fracture or subdural hematoma.  No other bleed.    X-ray of the hips shows no fracture.    I recommended we do an x-ray of her knee.  She declined.  She states that this is her baseline and does not wish to have unnecessary testing.  We were able to ambulate the patient.  She states that she is walking is normal.  She is able to take a few steps with assistance.  She would like to go back home to the nursing home.  I believe this is reasonable.  Will discharge home                       Clinical Impression:       ICD-10-CM ICD-9-CM   1. Fall W19.XXXA E888.9                                Gianluca Willett MD  Resident  09/04/19 1126       Fco Francois MD  09/04/19 1146

## 2019-09-04 NOTE — ED NOTES
Patient request to notify daughter Tiana.  Request honored at this time.  Family member reports that she is aware her mother is present in ED.  Patient updated.

## 2019-09-04 NOTE — CHAPLAIN
"Visited patient waiting in salgado-- gave blanket, chatted; declined prayer ("it won't work anyway").   "

## 2019-09-04 NOTE — ED NOTES
Patient on stretcher, Bed placed in low/locked position, side rails up x 2,  Patient placed into position of comfort. Patient in NAD and offers no complaints at this time. Will continue to monitor.

## 2019-09-04 NOTE — ED NOTES
Continues to tolerate PO fluids well.  No complaints.  Speech remains clear and appropriate.  Awaits disposition.

## 2019-09-04 NOTE — ED NOTES
Two shuffling steps taken by patient.  Patient then wanted to sit.  Patient reports chronic knee pain present during ambulation and buttock pain.  No complaint of hip, back or neck pain.

## 2019-09-04 NOTE — ED NOTES
[facility] arrived for patient, did not bring wheelchair for patient who requires wheelchair transfer. Patient on stretcher, Bed placed in low/locked position, side rails up x 2, Patient placed into position of comfort. Patient in NAD and offers no complaints at this time. Will continue to monitor.

## 2019-09-04 NOTE — ED NOTES
"Patient received at this time with report of "found on floor".  No other report obtained.  Patient is awake.  Alert.  Oriented to person, place, time, and event.  Patient states that she fell to the floor from her bed and reports pain to buttocks.  No other reported pain.  Denies LOC.  Denies neck or back pain.       Family not present.     Pain:  Rated 4/10.     Psychosocial:  Patient is calm and cooperative.  Patients insight and judgement are appropriate to situation.  Appears clean, well maintained, with clothing appropriate to environment.  No evidence of delusions, hallucinations, or psychosis.     Neuro:  Eyes open spontaneously.  Awake, alert, oriented x 4.  Speech clear and appropriate.  Tolerating saliva secretions well.  Able to follow commands, demonstrating ability to actively and appropriately communicate within context of current conversation.  Symmetrical facial muscles.  Moving all extremities well with no noted weakness.  Adequate muscle tone present.    Movement is purposeful.       Airway:  Bilateral chest rise and fall.  RR regular and non-labored.  No crepitus or subcutaneous emphysema noted on palpation.       Circulatory:  Skin warm, dry, and pink.  Apical pulses strong and regular.  Capillary refill/skin blanching less than 3 seconds to distal of 4 extremities.     Abdomen:  Abdomen soft and non-distended.         Urinary:  No related complaints.     Extremities:  No redness, heat, swelling, deformity, or pain.     Skin:  Intact with no bruising/discolorations noted.  Unable to view buttock in the hallway but will evaluate ASAP.    "

## 2019-09-04 NOTE — TELEPHONE ENCOUNTER
----- Message from Sandra Price sent at 9/4/2019  7:52 AM CDT -----  Contact: [facility]  [phone]  [facility] worker found patient on the floor of her apartment and said that she lost her balance. Pt has a small laceration on her head so they are sending her to [facility] to have a ct scan. Patient returned yesterday from a hospital stay and rehab and appears to be ok. They are sending her for the ct scan just to certain that everything is ok/

## 2019-09-04 NOTE — TELEPHONE ENCOUNTER
Answered via Epic. Staff unavailable     Clinical status:  Requires  a. Injections  b. IV  c. Nebulizers, 02 evaluation sats  d. Enteral feedings new or recent change  e. Care of new colostomy or teaching  f. Frequent suctioning, trach and /or vent needs  g. Frequent irrigation, replacement of urinary cath, complex suprapubic  h. Treatment stg 3/ or 4 pressure ulcers, complex wound care  i. Nursing evaluation due to unstable and complex medical condition  j. Nursing rehab teaching e.g. bowel and bladder training, adaptive care    PT/OT/ST    *Transfer mobility (eg, from bed to chair)   Independent (patient can perform an activity safely and independently without any devices, physical assistance, or supervision)  Modified independent (patient requires the use of an assistive device or extra time to complete an activity, but is otherwise independent)  Contact guard assistance (patient can perform an activity independently, but needs constant physical touch to steady or guide to ensure safe performance)  Supervision (patient can perform an activity, but supervision is provided to address safety concerns)  Minimum assistance (patient can perform most of an activity (ie, approximately 75%), but requires limited assistance from one person for safe completion)  X Moderate assistance (patient can perform about half the effort of an activity, but requires extensive assistance from one or more persons for safe performance of the other half)  Maximum assistance (patient has difficulty performing an activity, but can offer some assistance (eg, approximately 25% of effort) and is dependent on one or more people to assist for safe completion)  Dependent or unable (patient is unable to perform an activity or depends on 2 or more people to complete it)    *Ambulation inside (eg, within room, hallways, to toilet) _____Ft  Independent  Modified independent  Contact guard assistance  Supervision  Minimum assistance  Moderate  assistance  Maximum assistance  X Dependent or unable- unable to step x 2 attempts- minor right knee buckling with both tries     *Ambulation outside (eg, getting in or out of buildings, walking on uneven surfaces) ______Ft  Independent  Modified independent    Contact guard assistance  Supervision  Minimum assistance    Moderate assistance  Maximum assistance  Dependent or unable    *Toileting self-management:   Independent  Modified independent  X Contact guard assistance with Stand By Assistance   Supervision  Minimum assistance  Moderate assistance  Maximum assistance  Dependent or unable    *Nourishment self-management (ie, ability to feed self)   Independent  Modified independent  X Supervision  Minimum assistance  Moderate assistance  Maximum assistance  Dependent or unable    *Personal care self-management (eg, dressing, bathing, brushing teeth, washing hair)   Independent  Modified independent  X Contact guard assistance- lower body dressing   Supervision  X Minimum assistance- upper body dressing   Moderate assistance  Maximum assistance  Dependent or unable    Medication support (ie, preparing/taking all prescribed and over-the-counter medications reliably and safely, including correct dosage at correct times)   X Not applicable  Independent  Modified independent  Supervision  Minimum assistance  Moderate assistance  Maximum assistance    ADL adaptive devices self-management (ie, ability to manage putting on and/or removing braces, splints, and other adaptive devices)   X Not applicable  Independent  Modified independent  Supervision  Minimum assistance  Moderate assistance  Maximum assistance  Dependent or unable          For Extensions  - Unanticipated functional problems impacting safe completion of therapy  - Multiple comorbidities or frailty impairing functional improvement  - Cognitive impairment requiring additional intervention and education  - Communication impairment requiring additional  intervention and education  - Assessment or care unmanageable at lower level of care  - Expect brief to moderate stay extension.        Barriers to progress:  Hearing impaired, weakness, impaired endurance, impaired cognition, decreased safety awareness, gait instability, decreased ROM    New treatments:  Speech Language Pathology Consult     Projected length of stay/ expected discharge:  15 days 08-  /  Undetermined at this time    Discharge Disposition:  Assisted Living Facility     Recommendations:  Cont with POC until discharge

## 2019-09-08 PROBLEM — I50.32 CHRONIC DIASTOLIC CONGESTIVE HEART FAILURE: Status: ACTIVE | Noted: 2019-01-01

## 2019-09-08 NOTE — ED NOTES
Darius from [facility] at bedside with patient and family.   We will see you back as planned.

## 2019-09-08 NOTE — ED NOTES
Pt. Is resting quietly with eyes closed. Respirations-20's and unlabored presently. V.s.s.. Daughter at bedside with pt..

## 2019-09-08 NOTE — ED PROVIDER NOTES
Encounter Date: 9/8/2019    SCRIBE #1 NOTE: I, Murtaza Trejo, am scribing for, and in the presence of,  Dr. Valdivia. I have scribed the entire note.       History     Chief Complaint   Patient presents with    Altered Mental Status     pt. sent from assisted living for eval. of worsening weakness/AMS for 2-3 days. pt. is a DNR per accompanying daughter but was unable to get emergency hospice consult, consequently she had the patient transported here to arrange hospice care. ems reports pt. has agonal respirations, hypoxia sbp-70's. [id number] en route.      Ruth Lan is a 84 y.o. female who  has a past medical history of Arthritis, Atrial fibrillation (10/2018), Breast cancer (11/2012), Chronic diastolic heart failure, Heart murmur, Hyperlipidemia, Hypertension, Left atrial enlargement, and Severe mitral regurgitation.    The patient presents to the ED via EMS from Assisted Living due to decreased consciousness. She was admitted last month for atrial fibrillation, and was sent to a skilled nursing facility 3 weeks ago. At that time, family reports the patient was at her baseline mental status and was able to verbalize appropriately and feed herself. However, the patient's daughter states that over the past 3 days the patient's mental status has decline and she is now lethargic and unresponsive to verbal stimuli with decreased respirations. Of note, the patient is a DNR and family reports the patient was transported because she was unable to get emergency hospice today. The history is provided by the patient's daughter, who is power of .    The history is provided by a relative (daughter). The history is limited by the condition of the patient.     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, ER/[place] positive    Chronic diastolic heart failure     Heart murmur     Hyperlipidemia     Hypertension     Left atrial enlargement     Severe mitral regurgitation      Past Surgical History:   Procedure Laterality Date    BIOPSY, LYMPH NODE, SENTINEL Right 12/6/2012    [facility] 2ND FLR    BREAST BIOPSY  11/2012    Right breast- invasive ductal carcinoma    CARDIOVERSION N/A 8/9/2019    Performed by Vu [facility]alta [facility] at [facility] EP LAB    CARDIOVERSION N/A 12/18/2018    Performed by [facility] at [facility] EP LAB    CATARACT EXTRACTION W/  INTRAOCULAR LENS IMPLANT Right 10/21/2008    OU     CATARACT EXTRACTION W/  INTRAOCULAR LENS IMPLANT Left     ECHOCARDIOGRAM,TRANSESOPHAGEAL N/A 8/9/2019    [facility]formed by [facility] at [facility] EP LAB    ECHOCARDIOGRAM,TRANSESOPHAGEAL N/A 12/18/2018    Performed by [facility] at [facility] EP LAB    INSERTION, LOCALIZATION WIRE Right 12/6/2012    Performed by [facility] at [facility] OR 2ND FLR    [id number] Right 1/14/2013    Performed by [facility] at [facility] OR 2ND FLR    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    TUBAL LIGATION       Family History   Problem Relation Age of Onset    Cancer Maternal Aunt         cervical or breast    Breast cancer Maternal Grandmother     Heart disease Father     No Known Problems Mother     No Known Problems Sister     No Known Problems Brother     No Known Problems Maternal Uncle     No Known Problems Paternal Aunt     No Known Problems Paternal Uncle     No Known Problems Maternal Grandfather     No Known Problems Paternal Grandmother     No Known Problems Paternal Grandfather     Blindness Neg Hx     Cataracts Neg Hx     Diabetes Neg Hx     Glaucoma Neg Hx     Ovarian  cancer Neg Hx     Amblyopia Neg Hx     Hypertension Neg Hx     Macular degeneration Neg Hx     Retinal detachment Neg Hx     Strabismus Neg Hx     Stroke Neg Hx     Thyroid disease Neg Hx      Social History     Tobacco Use    Smoking status: Former Smoker     Packs/day: 1.00     Years: 50.00     Pack years: 50.00     Last attempt to quit: 11/15/2002     Years since quittin.8    Smokeless tobacco: Never Used   Substance Use Topics    Alcohol use: Yes     Alcohol/week: 1.5 oz     Types: 3 Standard drinks or equivalent per week     Comment: 3 OZ WINE     Drug use: No     Review of Systems   Unable to perform ROS: Patient unresponsive       Physical Exam     Initial Vitals   BP Pulse Resp Temp SpO2   -- -- -- -- --      MAP       --         Physical Exam    Nursing note and vitals reviewed.  Constitutional: She appears well-developed and well-nourished. She is not diaphoretic. No distress.   Ill-appearing    HENT:   Head: Normocephalic and atraumatic.   Mouth/Throat: Oropharynx is clear and moist.   Eyes: Pupils are equal, round, and reactive to light.   Neck: No tracheal deviation present.   Cardiovascular: Normal rate, regular rhythm, normal heart sounds and intact distal pulses.   Pulmonary/Chest: No stridor. She is in respiratory distress.   Agonal respirations   Abdominal: Soft. Bowel sounds are normal. She exhibits no distension. There is no tenderness.   Musculoskeletal: Normal range of motion. She exhibits no edema.   Neurological: No cranial nerve deficit or sensory deficit.   Grimacing to painful stimuli  Unresponsive   Skin: Skin is warm and dry. No rash noted.   Feels warm         ED Course   Procedures  Labs Reviewed - No data to display       Imaging Results    None                       Attending Attestation:         Attending Critical Care:   Critical Care Times:   Direct Patient Care (initial evaluation, reassessments, and time considering the  case)................................................................10 minutes.   Additional History from reviewing old medical records or taking additional history from the family, EMS, PCP, etc.......................5 minutes.   Documentation..................................................................................................................................................................................5 minutes.   Consultation with other Physicians. .................................................................................................................................................5 minutes.   Consultation with the patient's family directly relating to the patient's condition, care, and DNR status (when patient unable)......5 minutes.   ==============================================================  · Total Critical Care Time - exclusive of procedural time: 30 minutes.  ==============================================================  Critical care was necessary to treat or prevent imminent or life-threatening deterioration of the following conditions: nontraumatic shock.               ED Course as of Sep 08 1410   Sun Sep 08, 2019   1323 This is 84-year-old female who presents via ambulance from skilled nursing with daughter who is requesting emergency hospice.  Patient daughter is power of  she has a DNR present.  Patient's daughter says the patient was doing okay until a couple of days ago when she became more lethargic.  I discussed the case with [facility] who has agreed to consult hospice and to arrange for inpatient hospice however patient's daughter is requesting hospice services at the patient's skilled nursing facility    [RN]   1325 Patient's daughter does not want IV fluids or any aggressive measures at this time    [RN]   1403 Dr. Ruelas evaluated the patient.  He agrees with hospice disposition.  Is requesting we fax over pregnant documentation as to [facility].  Patient will subsequently be transferred back to her skilled nursing facility for hospice care.    [RN]      ED Course User Index  [RN] Jet Valdivia Jr., MD     Clinical Impression:       ICD-10-CM ICD-9-CM   1. Chronic diastolic congestive heart failure I50.32 428.32     428.0   2. Malignant neoplasm of female breast, unspecified estrogen receptor status, unspecified laterality, unspecified site of breast C50.919 174.9            Scribe attestation I, Jet Valdivia,  personally performed the services described in this documentation. All medical record entries made by the scribe were at my direction and in my presence.  I have reviewed the chart and agree that the record reflects my personal performance and is accurate and complete. Jet Valdivia M.D. 2:11 PM09/08/2019    Portions of this note were dictated using voice recognition software and may contain dictation related errors in spelling/grammar/syntax not found on text review     Jet Valdivia Jr., MD  09/08/19 4061

## 2019-09-08 NOTE — NURSING
Pt up to floor with daughter at bedside; settled into room. Purewick in place. VS taken and recorded. Patient currently on 10 L non-rebreather.

## 2019-09-08 NOTE — H&P
[facility]  H&P    Patient Name: Ruth Lan  MRN: [id number]  Admission Date: 9/8/2019  Hospital Length of Stay: 0 days  Code Status: Prior   Attending Provider: Jet Valdivia Jr., MD      Assessment/Plan:     Hospice qualifying diagnosis:  Distolic heart failure.    Hospice Encounter / Goals of care discussion:   Ruth Lan is a 84 y.o. female who  has a past medical history of Arthritis, Atrial fibrillation (10/2018), Breast cancer (11/2012), Chronic diastolic heart failure, Heart murmur, Hyperlipidemia, Hypertension, Left atrial enlargement, and Severe mitral regurgitation.     The patient presents to the ED via EMS from Assisted Living due to decreased consciousness. She was admitted last month for atrial fibrillation, and was sent to a skilled nursing facility 3 weeks ago. At that time, family reports the patient was at her baseline mental status and was able to verbalize appropriately and feed herself. However, the patient's daughter states that over the past 3 days the patient's mental status has decline and she is now lethargic and unresponsive to verbal stimuli with decreased respirations. Of note, the patient is a DNR and family reports the patient was transported because she was unable to get emergency hospice today. The history is provided by the patient's daughter, who is power of .    Palliative care service was consulted , as per daughter she wanted the mother to go to [facility] for the hospice, we consulted hospice, but since logistics were that pt might be discharged back to [facility] tomorrow, we elected after discussions with the POA, to admit the pt to palliative care service and consult hospice [facility] and manage the pt on the floor in a hospice capacity.     Narrative:       1: Symptom Assessment:     - Pain: cannot assess     - Dyspnea: yes     - Agitation: no     - Mentation: unable to assess    3: Current Treatment regiment   Will place  on morphine, scopolamine patch and oxygen.    4: Family discussion   At bedside, POA agrees for hospice IP    5: Plan of care   Admit to palliatve care and then flip to hospice.        Subjective:     Hospice Qualifying Diagnosis:   Chronic diastolic heart failure  Breast cancer.    HPI:   Ruth Lan is a 84 y.o. female who  has a past medical history of Arthritis, Atrial fibrillation (10/2018), Breast cancer (11/2012), Chronic diastolic heart failure, Heart murmur, Hyperlipidemia, Hypertension, Left atrial enlargement, and Severe mitral regurgitation.     The patient presents to the ED via EMS from Assisted Living due to decreased consciousness. She was admitted last month for atrial fibrillation, and was sent to a skilled nursing facility 3 weeks ago. At that time, family reports the patient was at her baseline mental status and was able to verbalize appropriately and feed herself. However, the patient's daughter states that over the past 3 days the patient's mental status has decline and she is now lethargic and unresponsive to verbal stimuli with decreased respirations. Of note, the patient is a DNR and family reports the patient was transported because she was unable to get emergency hospice today. The history is provided by the patient's daughter, who is power of .      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, ER/[place] positive    Chronic diastolic heart failure     Heart murmur     Hyperlipidemia     Hypertension     Left atrial enlargement     Severe mitral regurgitation        Past Surgical History:   Procedure Laterality Date    BIOPSY, LYMPH NODE, SENTINEL Right 12/6/2012    [facility] 2ND FLR    BREAST BIOPSY  11/2012    Right breast- invasive ductal carcinoma    CARDIOVERSION N/A  2019    Performed by [facility] at [facility] EP LAB    CARDIOVERSION N/A 2018    Performed by [facility] at [facility] EP LAB    CATARACT EXTRACTION W/  INTRAOCULAR LENS IMPLANT Right 10/21/2008    OU     CATARACT EXTRACTION W/  INTRAOCULAR LENS IMPLANT Left     ECHOCARDIOGRAM,TRANSESOPHAGEAL N/A 2019    Performed by [facility] at [facility] EP LAB    ECHOCARDIOGRAM,TRANSESOPHAGEAL N/A 2018    Performed by [facility] Provider at [facility] EP LAB    INSERTION, LOCALIZATION WIRE Right 2012    Performed by [facility] MD at [facility] OR 2ND FLR    [id number] Right 2013    Performed by [facility] at [facility] OR 2ND FLR    TISSUE AORTIC VALVE REPLACEMENT  2004    TUBAL LIGATION          Family History     Problem Relation (Age of Onset)    Breast cancer Maternal Grandmother    Cancer Maternal Aunt    Heart disease Father    No Known Problems Mother, Sister, Brother, Maternal Uncle, Paternal Aunt, Paternal Uncle, Maternal Grandfather, Paternal Grandmother, Paternal Grandfather          Tobacco Use    Smoking status: Former Smoker     Packs/day: 1.00     Years: 50.00     Pack years: 50.00     Last attempt to quit: 11/15/2002     Years since quittin.8    Smokeless tobacco: Never Used   Substance and Sexual Activity    Alcohol use: Yes     Alcohol/week: 1.5 oz     Types: 3 Standard drinks or equivalent per week     Comment: 3 OZ WINE     Drug use: No    Sexual activity: Never       Review of patient's allergies indicates:   Allergen Reactions    Etodolac Other (See Comments)     Dehydration    Nsaids (non-steroidal anti-inflammatory drug)      COLITIS/DEHYDRATION         Review of Symptoms    Review of Systems    Objective:     Vital Signs (Most Recent):  BP (!) 86/42   Pulse 86   Temp 99 °F (37.2 °C) (Rectal)   Resp (!) 26   SpO2 99%         Physical Exam  On o2 mask,  Lethargic.  Malnourished.  Unresponsive.  CTA  BL  Coarse breath sounds.  Fragile skin.    Medications:  Scheduled Meds:    Scheduled Meds:  Continuous Infusions:  PRN Meds:.           Advanced Directives::  Living Will: Yes. Copy on chart: No  LaPOST: Yes  Do Not Resuscitate Status: Yes  Surrogate Decision maker / Medical Power of :       > 50% of 69 min visit spent in chart review, face to face discussion of goals of care,  symptom assessment, coordination of care and emotional support.    Dell Ruelas DO. MIMI. OMAIRA. CPE.  Department of Hospital Medicine  [facility]  Palliative Medicine  [facility]

## 2019-09-09 NOTE — PLAN OF CARE
Problem: Adult Inpatient Plan of Care  Goal: Plan of Care Review  Outcome: Ongoing (interventions implemented as appropriate)  Patient appears to be resting comfortably in bed, nonresponsive. Family at the bedside, attentive to patient. Morphine drip infusing as ordered. Patient with elevated respirations, PRN ativan administered. Will continue to monitor.

## 2019-09-09 NOTE — PLAN OF CARE
"Problem: Adult Inpatient Plan of Care  Goal: Plan of Care Review  Outcome: Ongoing (interventions implemented as appropriate)  Cued into patient's room.  Permission received per patient's daughter to turn camera to view patient.  Introduced as VN for night shift that will be working with floor nurse and nursing assistant.  Hospice nurse, Taylor, and daughter, Tiana, at bedside.  Patient resting comfortably in bed; respirations even and unlabored.  No distress noted.  Pat states patient is "nonresponsive".  Educated daughter on VN's role in patient care. Plan of care reviewed with daughter.  Education per flowsheet.  Opportunity given for questions and questions answered.  Admission assessment questions answered.  Instructed to call for assistance.  Will cont to monitor.    Labs, notes, and orders reviewed.        "

## 2019-09-09 NOTE — PROGRESS NOTES
Upon assessment, no pulse palpated and no breath sound auscultated. Will allow family to have privacy and contact hospice nurse.

## 2019-09-09 NOTE — PROGRESS NOTES
Called to bedside by patient's daughter to assess change in breathing. Respiratory rate 8, shallow breathing, very weak pulse noted. Family standing with patient.

## 2019-09-09 NOTE — PROGRESS NOTES
Patient assessed at start of shift, only responsive to pain and turning in bed. Respirations even and unlabored. Morphine drip initiated, explained to patients daughter Tiana the use of the morphine drip to keep the patient comfortable and that ativan is available PRN. Notified Tiana that the staff will give the family privacy at this time and round periodically to ensure that her mother is comfortable. Will continue to monitor.

## 2019-09-11 PROBLEM — I50.33 ACUTE ON CHRONIC DIASTOLIC HEART FAILURE: Status: RESOLVED | Noted: 2019-01-01 | Resolved: 2019-09-11

## 2019-09-11 PROBLEM — I70.0 AORTIC ATHEROSCLEROSIS: Status: RESOLVED | Noted: 2018-08-02 | Resolved: 2019-09-11

## 2019-09-11 PROBLEM — I48.91 ATRIAL FIBRILLATION STATUS POST CARDIOVERSION: Status: RESOLVED | Noted: 2018-01-01 | Resolved: 2019-09-11

## 2019-09-11 PROBLEM — E78.5 HYPERLIPIDEMIA: Status: RESOLVED | Noted: 2019-01-01 | Resolved: 2019-09-11

## 2019-09-11 PROBLEM — J90 PLEURAL EFFUSION, RIGHT: Chronic | Status: RESOLVED | Noted: 2018-07-07 | Resolved: 2019-09-11

## 2019-09-11 PROBLEM — Z28.21 REFUSED PNEUMOCOCCAL VACCINATION: Status: RESOLVED | Noted: 2019-01-01 | Resolved: 2019-09-11

## 2019-09-11 PROBLEM — Z66 DNR (DO NOT RESUSCITATE): Chronic | Status: RESOLVED | Noted: 2018-07-09 | Resolved: 2019-09-11

## 2019-09-11 PROBLEM — R79.89 POSITIVE D DIMER: Status: RESOLVED | Noted: 2018-01-01 | Resolved: 2019-09-11

## 2019-09-11 PROBLEM — E87.6 HYPOKALEMIA: Status: RESOLVED | Noted: 2019-01-01 | Resolved: 2019-09-11

## 2019-09-11 PROBLEM — I27.20 PULMONARY HTN: Chronic | Status: RESOLVED | Noted: 2018-07-09 | Resolved: 2019-09-11

## 2019-09-11 PROBLEM — F51.04 PSYCHOPHYSIOLOGICAL INSOMNIA: Status: RESOLVED | Noted: 2017-06-26 | Resolved: 2019-09-11

## 2019-09-11 PROBLEM — R63.4 UNINTENTIONAL WEIGHT LOSS OF 10% BODY WEIGHT WITHIN 6 MONTHS: Status: RESOLVED | Noted: 2018-07-18 | Resolved: 2019-09-11

## 2019-09-11 PROBLEM — Z87.891 HISTORY OF SMOKING GREATER THAN 50 PACK YEARS: Status: RESOLVED | Noted: 2018-07-16 | Resolved: 2019-09-11

## 2019-09-12 NOTE — DISCHARGE SUMMARY
[facility]  Discharge Summary      Admit Date: 9/8/2019    Discharge Date and Time: 9/9/2019 10:05 AM    Attending Physician: No att. providers found     Reason for Admission:     Procedures Performed: * No surgery found *    Hospital Course (synopsis of major diagnoses, care, treatment, and services provided during the course of the hospital stay):     Ruth Lan is a 84 y.o. female who  has a past medical history of Arthritis, Atrial fibrillation (10/2018), Breast cancer (11/2012), Chronic diastolic heart failure, Heart murmur, Hyperlipidemia, Hypertension, Left atrial enlargement, and Severe mitral regurgitation.     The patient presents to the ED via EMS from Assisted Living due to decreased consciousness. She was admitted last month for atrial fibrillation, and was sent to a skilled nursing facility 3 weeks ago. At that time, family reports the patient was at her baseline mental status and was able to verbalize appropriately and feed herself. However, the patient's daughter states that over the past 3 days the patient's mental status has decline and she is now lethargic and unresponsive to verbal stimuli with decreased respirations. Of note, the patient is a DNR and family reports the patient was transported because she was unable to get emergency hospice today. The history is provided by the patient's daughter, who is power of .     Palliative care service was consulted , as per daughter she wanted the mother to go to [facility] for the hospice, we consulted hospice, but since logistics were that pt might be discharged back to [facility] tomorrow, we elected after discussions with the POA, to admit the pt to palliative care service and consult hospice compassus and manage the pt on the floor in a hospice capacity.      no pulse palpated and no breath sound auscultated. Patient pronounced on 9/9/2019 at 0725.  Consults: none    Significant Diagnostic Studies:      Final Diagnoses:    Principal Problem: <principal problem not specified>   Secondary Diagnoses:   Active Hospital Problems    Diagnosis  POA    Chronic diastolic congestive heart failure [I50.32]  Yes    Chronic diastolic heart failure [I50.32]  Yes     Chronic      Resolved Hospital Problems   No resolved problems to display.       Discharged Condition:     Disposition:     Follow Up/Patient Instructions:     Medications:  None (patient  at medical facility)  No discharge procedures on file.       Kiya Jackson MD  Palliative Medicine  [facility]

## 2019-09-16 ENCOUNTER — EXTERNAL HOME HEALTH (OUTPATIENT)
Dept: HOME HEALTH SERVICES | Facility: HOSPITAL | Age: 84
End: 2019-09-16
Payer: MEDICARE

## 2019-09-20 ENCOUNTER — TELEPHONE (OUTPATIENT)
Dept: HOME HEALTH SERVICES | Facility: HOSPITAL | Age: 84
End: 2019-09-20

## 2019-11-27 NOTE — PROGRESS NOTES
Results of lab given to patient via [facility].    Bilobed Transposition Flap Text: The defect edges were debeveled with a #15c scalpel blade.  Given the location of the defect and the proximity to free margins a bilobed transposition flap was deemed most appropriate.  Using a sterile surgical marker, an appropriate bilobe flap drawn around the defect.    The area thus outlined was incised deep to adipose tissue with a #15 scalpel blade.  The skin margins were undermined to an appropriate distance in all directions utilizing iris scissors.

## 2024-04-29 NOTE — ED NOTES
Pt. Arrives via ems from [facility] facility. Daughter-Sandra (tyson) is with pt. And reports that she has recently began to have mental decline, increasing weakness with falls and worsening AMS for 2-3 days. She was unable to get emergency hospice today and subsequently brought her to ED to get hospice care initiated. Ems reports sbp-70's, agonal respirations, hypoxia, and minimal response to stimuli. She was placed on o2 per nrb with increase in sao2 to 90's. On arrival pt. Is lethargic (minimal response to stimuli), respirations shallow approx. 10/min.. [id number] per ems. Pt. Is pale and cool to touch. Dr. Valdivia at bedside on arrival. Discussed plan of care with daughter who states she wants hospice care only, no interventions or treatments.    operating room

## 2025-01-23 NOTE — TELEPHONE ENCOUNTER
----- Message from Yvon Sands sent at 6/22/2018 11:56 AM CDT -----  Contact: Self  REFILL: tolterodine (DETROL LA) 4 MG 24 hr capsule    PHARMACY: [facility]  
----- Message from Yvon Sands sent at 6/22/2018 11:56 AM CDT -----  Contact: Self  REFILL: tolterodine (DETROL LA) 4 MG 24 hr capsule    PHARMACY: [facility]  
Last OV was 06/04/2018 , last refill was 03/26/2018 90 days supplies no refill .  
Was notify patient daughter's medication was approved sent to [facility] , verbalized understand .  
Detail Level: Detailed
Depth Of Biopsy: dermis
Was A Bandage Applied: Yes
Size Of Lesion In Cm: 0
Biopsy Type: H and E
Biopsy Method: Dermablade
Anesthesia Type: 1% lidocaine with epinephrine
Anesthesia Volume In Cc: 0.5
Hemostasis: Drysol
Wound Care: Petrolatum
Dressing: bandage
Destruction After The Procedure: No
Type Of Destruction Used: Curettage
Curettage Text: The wound bed was treated with curettage after the biopsy was performed.
Cryotherapy Text: The wound bed was treated with cryotherapy after the biopsy was performed.
Electrodesiccation Text: The wound bed was treated with electrodesiccation after the biopsy was performed.
Electrodesiccation And Curettage Text: The wound bed was treated with electrodesiccation and curettage after the biopsy was performed.
Silver Nitrate Text: The wound bed was treated with silver nitrate after the biopsy was performed.
Lab: -[id number]
Medical Necessity Information: It is in your best interest to select a reason for this procedure from the list below. All of these items fulfill various CMS LCD requirements except the new and changing color options.
Consent: Written consent was obtained and risks were reviewed including but not limited to scarring, infection, bleeding, scabbing, incomplete removal, nerve damage and allergy to anesthesia.
Post-Care Instructions: I reviewed with the patient in detail post-care instructions. Patient is to keep the biopsy site dry overnight, and then apply bacitracin twice daily until healed. Patient may apply hydrogen peroxide soaks to remove any crusting.
Notification Instructions: Patient will be notified of biopsy results. However, patient instructed to call the office if not contacted within 2 weeks.
Billing Type: Third-Party Bill
Information: Selecting Yes will display possible errors in your note based on the variables you have selected. This validation is only offered as a suggestion for you. PLEASE NOTE THAT THE VALIDATION TEXT WILL BE REMOVED WHEN YOU FINALIZE YOUR NOTE. IF YOU WANT TO FAX A PRELIMINARY NOTE YOU WILL NEED TO TOGGLE THIS TO 'NO' IF YOU DO NOT WANT IT IN YOUR FAXED NOTE.

## (undated) DEVICE — PAD DEFIB CADENCE ADULT R2